# Patient Record
Sex: MALE | Race: BLACK OR AFRICAN AMERICAN | NOT HISPANIC OR LATINO | URBAN - METROPOLITAN AREA
[De-identification: names, ages, dates, MRNs, and addresses within clinical notes are randomized per-mention and may not be internally consistent; named-entity substitution may affect disease eponyms.]

---

## 2017-01-03 ENCOUNTER — INPATIENT (INPATIENT)
Facility: HOSPITAL | Age: 27
LOS: 5 days | Discharge: HOME | DRG: 546 | End: 2017-01-09
Attending: HOSPITALIST | Admitting: HOSPITALIST
Payer: MEDICAID

## 2017-01-03 VITALS
WEIGHT: 214.95 LBS | HEIGHT: 70 IN | OXYGEN SATURATION: 100 % | HEART RATE: 113 BPM | RESPIRATION RATE: 20 BRPM | SYSTOLIC BLOOD PRESSURE: 143 MMHG | TEMPERATURE: 98 F | DIASTOLIC BLOOD PRESSURE: 105 MMHG

## 2017-01-03 DIAGNOSIS — M32.13 LUNG INVOLVEMENT IN SYSTEMIC LUPUS ERYTHEMATOSUS: ICD-10-CM

## 2017-01-03 DIAGNOSIS — Z95.828 PRESENCE OF OTHER VASCULAR IMPLANTS AND GRAFTS: Chronic | ICD-10-CM

## 2017-01-03 DIAGNOSIS — Z41.8 ENCOUNTER FOR OTHER PROCEDURES FOR PURPOSES OTHER THAN REMEDYING HEALTH STATE: ICD-10-CM

## 2017-01-03 DIAGNOSIS — R07.89 OTHER CHEST PAIN: ICD-10-CM

## 2017-01-03 DIAGNOSIS — M32.9 SYSTEMIC LUPUS ERYTHEMATOSUS, UNSPECIFIED: ICD-10-CM

## 2017-01-03 DIAGNOSIS — I82.501 CHRONIC EMBOLISM AND THROMBOSIS OF UNSPECIFIED DEEP VEINS OF RIGHT LOWER EXTREMITY: ICD-10-CM

## 2017-01-03 DIAGNOSIS — B34.9 VIRAL INFECTION, UNSPECIFIED: ICD-10-CM

## 2017-01-03 DIAGNOSIS — Z98.890 OTHER SPECIFIED POSTPROCEDURAL STATES: Chronic | ICD-10-CM

## 2017-01-03 LAB
ALBUMIN SERPL ELPH-MCNC: 2 G/DL — LOW (ref 3.3–5)
ALP SERPL-CCNC: 65 U/L — SIGNIFICANT CHANGE UP (ref 40–120)
ALT FLD-CCNC: 13 U/L RC — SIGNIFICANT CHANGE UP (ref 10–45)
ANION GAP SERPL CALC-SCNC: 13 MMOL/L — SIGNIFICANT CHANGE UP (ref 5–17)
AST SERPL-CCNC: 14 U/L — SIGNIFICANT CHANGE UP (ref 10–40)
BASOPHILS # BLD AUTO: 0 K/UL — SIGNIFICANT CHANGE UP (ref 0–0.2)
BASOPHILS NFR BLD AUTO: 0.5 % — SIGNIFICANT CHANGE UP (ref 0–2)
BILIRUB SERPL-MCNC: 0.1 MG/DL — LOW (ref 0.2–1.2)
BUN SERPL-MCNC: 5 MG/DL — LOW (ref 7–23)
CALCIUM SERPL-MCNC: 8.4 MG/DL — SIGNIFICANT CHANGE UP (ref 8.4–10.5)
CHLORIDE SERPL-SCNC: 104 MMOL/L — SIGNIFICANT CHANGE UP (ref 96–108)
CK MB CFR SERPL CALC: 1 NG/ML — SIGNIFICANT CHANGE UP (ref 0–6.7)
CK SERPL-CCNC: 46 U/L — SIGNIFICANT CHANGE UP (ref 30–200)
CO2 SERPL-SCNC: 23 MMOL/L — SIGNIFICANT CHANGE UP (ref 22–31)
CREAT SERPL-MCNC: 0.67 MG/DL — SIGNIFICANT CHANGE UP (ref 0.5–1.3)
EOSINOPHIL # BLD AUTO: 0.1 K/UL — SIGNIFICANT CHANGE UP (ref 0–0.5)
EOSINOPHIL NFR BLD AUTO: 2.2 % — SIGNIFICANT CHANGE UP (ref 0–6)
GAS PNL BLDV: SIGNIFICANT CHANGE UP
GLUCOSE SERPL-MCNC: 84 MG/DL — SIGNIFICANT CHANGE UP (ref 70–99)
HCT VFR BLD CALC: 33.3 % — LOW (ref 39–50)
HGB BLD-MCNC: 11.3 G/DL — LOW (ref 13–17)
INR BLD: 0.9 RATIO — SIGNIFICANT CHANGE UP (ref 0.88–1.16)
LYMPHOCYTES # BLD AUTO: 0.9 K/UL — LOW (ref 1–3.3)
LYMPHOCYTES # BLD AUTO: 12.8 % — LOW (ref 13–44)
MCHC RBC-ENTMCNC: 28.8 PG — SIGNIFICANT CHANGE UP (ref 27–34)
MCHC RBC-ENTMCNC: 33.9 GM/DL — SIGNIFICANT CHANGE UP (ref 32–36)
MCV RBC AUTO: 85 FL — SIGNIFICANT CHANGE UP (ref 80–100)
MONOCYTES # BLD AUTO: 0.7 K/UL — SIGNIFICANT CHANGE UP (ref 0–0.9)
MONOCYTES NFR BLD AUTO: 10.9 % — SIGNIFICANT CHANGE UP (ref 2–14)
NEUTROPHILS # BLD AUTO: 4.9 K/UL — SIGNIFICANT CHANGE UP (ref 1.8–7.4)
NEUTROPHILS NFR BLD AUTO: 73.6 % — SIGNIFICANT CHANGE UP (ref 43–77)
PLATELET # BLD AUTO: 200 K/UL — SIGNIFICANT CHANGE UP (ref 150–400)
POTASSIUM SERPL-MCNC: 4.2 MMOL/L — SIGNIFICANT CHANGE UP (ref 3.5–5.3)
POTASSIUM SERPL-SCNC: 4.2 MMOL/L — SIGNIFICANT CHANGE UP (ref 3.5–5.3)
PROT SERPL-MCNC: 5.3 G/DL — LOW (ref 6–8.3)
PROTHROM AB SERPL-ACNC: 9.7 SEC — LOW (ref 10–13.1)
RAPID RVP RESULT: SIGNIFICANT CHANGE UP
RBC # BLD: 3.91 M/UL — LOW (ref 4.2–5.8)
RBC # FLD: 15.5 % — HIGH (ref 10.3–14.5)
SODIUM SERPL-SCNC: 140 MMOL/L — SIGNIFICANT CHANGE UP (ref 135–145)
TROPONIN T SERPL-MCNC: <0.01 NG/ML — SIGNIFICANT CHANGE UP (ref 0–0.06)
TROPONIN T SERPL-MCNC: <0.01 NG/ML — SIGNIFICANT CHANGE UP (ref 0–0.06)
WBC # BLD: 6.7 K/UL — SIGNIFICANT CHANGE UP (ref 3.8–10.5)
WBC # FLD AUTO: 6.7 K/UL — SIGNIFICANT CHANGE UP (ref 3.8–10.5)

## 2017-01-03 PROCEDURE — 99285 EMERGENCY DEPT VISIT HI MDM: CPT | Mod: 25

## 2017-01-03 PROCEDURE — 93010 ELECTROCARDIOGRAM REPORT: CPT

## 2017-01-03 PROCEDURE — 71020: CPT | Mod: 26

## 2017-01-03 PROCEDURE — 99223 1ST HOSP IP/OBS HIGH 75: CPT | Mod: GC

## 2017-01-03 RX ORDER — OXYCODONE HYDROCHLORIDE 5 MG/1
20 TABLET ORAL EVERY 12 HOURS
Qty: 0 | Refills: 0 | Status: DISCONTINUED | OUTPATIENT
Start: 2017-01-03 | End: 2017-01-04

## 2017-01-03 RX ORDER — ASPIRIN/CALCIUM CARB/MAGNESIUM 324 MG
81 TABLET ORAL DAILY
Qty: 0 | Refills: 0 | Status: DISCONTINUED | OUTPATIENT
Start: 2017-01-03 | End: 2017-01-09

## 2017-01-03 RX ORDER — GABAPENTIN 400 MG/1
400 CAPSULE ORAL THREE TIMES A DAY
Qty: 0 | Refills: 0 | Status: DISCONTINUED | OUTPATIENT
Start: 2017-01-03 | End: 2017-01-06

## 2017-01-03 RX ORDER — SODIUM CHLORIDE 9 MG/ML
1000 INJECTION INTRAMUSCULAR; INTRAVENOUS; SUBCUTANEOUS ONCE
Qty: 0 | Refills: 0 | Status: COMPLETED | OUTPATIENT
Start: 2017-01-03 | End: 2017-01-03

## 2017-01-03 RX ORDER — ENOXAPARIN SODIUM 100 MG/ML
100 INJECTION SUBCUTANEOUS
Qty: 0 | Refills: 0 | Status: COMPLETED | OUTPATIENT
Start: 2017-01-03 | End: 2017-01-05

## 2017-01-03 RX ORDER — HYDROMORPHONE HYDROCHLORIDE 2 MG/ML
1 INJECTION INTRAMUSCULAR; INTRAVENOUS; SUBCUTANEOUS ONCE
Qty: 0 | Refills: 0 | Status: DISCONTINUED | OUTPATIENT
Start: 2017-01-03 | End: 2017-01-03

## 2017-01-03 RX ORDER — ENOXAPARIN SODIUM 100 MG/ML
100 INJECTION SUBCUTANEOUS
Qty: 0 | Refills: 0 | Status: DISCONTINUED | OUTPATIENT
Start: 2017-01-03 | End: 2017-01-03

## 2017-01-03 RX ORDER — HYDROMORPHONE HYDROCHLORIDE 2 MG/ML
4 INJECTION INTRAMUSCULAR; INTRAVENOUS; SUBCUTANEOUS EVERY 6 HOURS
Qty: 0 | Refills: 0 | Status: DISCONTINUED | OUTPATIENT
Start: 2017-01-03 | End: 2017-01-04

## 2017-01-03 RX ORDER — COLCHICINE 0.6 MG
0.6 TABLET ORAL DAILY
Qty: 0 | Refills: 0 | Status: DISCONTINUED | OUTPATIENT
Start: 2017-01-03 | End: 2017-01-07

## 2017-01-03 RX ORDER — HYDROMORPHONE HYDROCHLORIDE 2 MG/ML
2 INJECTION INTRAMUSCULAR; INTRAVENOUS; SUBCUTANEOUS ONCE
Qty: 0 | Refills: 0 | Status: DISCONTINUED | OUTPATIENT
Start: 2017-01-03 | End: 2017-01-03

## 2017-01-03 RX ORDER — HYDROXYCHLOROQUINE SULFATE 200 MG
200 TABLET ORAL
Qty: 0 | Refills: 0 | Status: DISCONTINUED | OUTPATIENT
Start: 2017-01-03 | End: 2017-01-09

## 2017-01-03 RX ORDER — HYDROMORPHONE HYDROCHLORIDE 2 MG/ML
2 INJECTION INTRAMUSCULAR; INTRAVENOUS; SUBCUTANEOUS EVERY 4 HOURS
Qty: 0 | Refills: 0 | Status: DISCONTINUED | OUTPATIENT
Start: 2017-01-03 | End: 2017-01-03

## 2017-01-03 RX ORDER — OXYCODONE HYDROCHLORIDE 5 MG/1
30 TABLET ORAL EVERY 12 HOURS
Qty: 0 | Refills: 0 | Status: DISCONTINUED | OUTPATIENT
Start: 2017-01-03 | End: 2017-01-03

## 2017-01-03 RX ORDER — HYDROMORPHONE HYDROCHLORIDE 2 MG/ML
4 INJECTION INTRAMUSCULAR; INTRAVENOUS; SUBCUTANEOUS EVERY 4 HOURS
Qty: 0 | Refills: 0 | Status: DISCONTINUED | OUTPATIENT
Start: 2017-01-03 | End: 2017-01-03

## 2017-01-03 RX ORDER — ONDANSETRON 8 MG/1
4 TABLET, FILM COATED ORAL ONCE
Qty: 0 | Refills: 0 | Status: COMPLETED | OUTPATIENT
Start: 2017-01-03 | End: 2017-01-03

## 2017-01-03 RX ORDER — INFLUENZA VIRUS VACCINE 15; 15; 15; 15 UG/.5ML; UG/.5ML; UG/.5ML; UG/.5ML
0.5 SUSPENSION INTRAMUSCULAR ONCE
Qty: 0 | Refills: 0 | Status: COMPLETED | OUTPATIENT
Start: 2017-01-03 | End: 2017-01-03

## 2017-01-03 RX ORDER — HYDROMORPHONE HYDROCHLORIDE 2 MG/ML
2 INJECTION INTRAMUSCULAR; INTRAVENOUS; SUBCUTANEOUS
Qty: 0 | Refills: 0 | Status: DISCONTINUED | OUTPATIENT
Start: 2017-01-03 | End: 2017-01-03

## 2017-01-03 RX ORDER — FAMOTIDINE 10 MG/ML
20 INJECTION INTRAVENOUS DAILY
Qty: 0 | Refills: 0 | Status: DISCONTINUED | OUTPATIENT
Start: 2017-01-03 | End: 2017-01-06

## 2017-01-03 RX ORDER — OXYCODONE HYDROCHLORIDE 5 MG/1
15 TABLET ORAL EVERY 6 HOURS
Qty: 0 | Refills: 0 | Status: DISCONTINUED | OUTPATIENT
Start: 2017-01-03 | End: 2017-01-04

## 2017-01-03 RX ORDER — DABIGATRAN ETEXILATE MESYLATE 150 MG/1
150 CAPSULE ORAL EVERY 12 HOURS
Qty: 0 | Refills: 0 | Status: DISCONTINUED | OUTPATIENT
Start: 2017-01-03 | End: 2017-01-09

## 2017-01-03 RX ADMIN — HYDROMORPHONE HYDROCHLORIDE 2 MILLIGRAM(S): 2 INJECTION INTRAMUSCULAR; INTRAVENOUS; SUBCUTANEOUS at 20:15

## 2017-01-03 RX ADMIN — Medication 1 TABLET(S): at 19:53

## 2017-01-03 RX ADMIN — Medication 0.6 MILLIGRAM(S): at 17:38

## 2017-01-03 RX ADMIN — Medication 81 MILLIGRAM(S): at 17:40

## 2017-01-03 RX ADMIN — FAMOTIDINE 20 MILLIGRAM(S): 10 INJECTION INTRAVENOUS at 17:38

## 2017-01-03 RX ADMIN — HYDROMORPHONE HYDROCHLORIDE 1 MILLIGRAM(S): 2 INJECTION INTRAMUSCULAR; INTRAVENOUS; SUBCUTANEOUS at 11:52

## 2017-01-03 RX ADMIN — ONDANSETRON 4 MILLIGRAM(S): 8 TABLET, FILM COATED ORAL at 11:14

## 2017-01-03 RX ADMIN — HYDROMORPHONE HYDROCHLORIDE 2 MILLIGRAM(S): 2 INJECTION INTRAMUSCULAR; INTRAVENOUS; SUBCUTANEOUS at 19:52

## 2017-01-03 RX ADMIN — Medication 32 MILLIGRAM(S): at 19:52

## 2017-01-03 RX ADMIN — HYDROMORPHONE HYDROCHLORIDE 1 MILLIGRAM(S): 2 INJECTION INTRAMUSCULAR; INTRAVENOUS; SUBCUTANEOUS at 14:52

## 2017-01-03 RX ADMIN — SODIUM CHLORIDE 500 MILLILITER(S): 9 INJECTION INTRAMUSCULAR; INTRAVENOUS; SUBCUTANEOUS at 11:13

## 2017-01-03 RX ADMIN — HYDROMORPHONE HYDROCHLORIDE 1 MILLIGRAM(S): 2 INJECTION INTRAMUSCULAR; INTRAVENOUS; SUBCUTANEOUS at 12:22

## 2017-01-03 RX ADMIN — HYDROMORPHONE HYDROCHLORIDE 1 MILLIGRAM(S): 2 INJECTION INTRAMUSCULAR; INTRAVENOUS; SUBCUTANEOUS at 15:22

## 2017-01-03 RX ADMIN — HYDROMORPHONE HYDROCHLORIDE 1 MILLIGRAM(S): 2 INJECTION INTRAMUSCULAR; INTRAVENOUS; SUBCUTANEOUS at 11:14

## 2017-01-03 RX ADMIN — Medication 200 MILLIGRAM(S): at 19:52

## 2017-01-03 RX ADMIN — ENOXAPARIN SODIUM 100 MILLIGRAM(S): 100 INJECTION SUBCUTANEOUS at 14:53

## 2017-01-03 RX ADMIN — HYDROMORPHONE HYDROCHLORIDE 2 MILLIGRAM(S): 2 INJECTION INTRAMUSCULAR; INTRAVENOUS; SUBCUTANEOUS at 18:00

## 2017-01-03 RX ADMIN — DABIGATRAN ETEXILATE MESYLATE 150 MILLIGRAM(S): 150 CAPSULE ORAL at 19:52

## 2017-01-03 RX ADMIN — OXYCODONE HYDROCHLORIDE 15 MILLIGRAM(S): 5 TABLET ORAL at 22:06

## 2017-01-03 RX ADMIN — HYDROMORPHONE HYDROCHLORIDE 1 MILLIGRAM(S): 2 INJECTION INTRAMUSCULAR; INTRAVENOUS; SUBCUTANEOUS at 11:44

## 2017-01-03 RX ADMIN — OXYCODONE HYDROCHLORIDE 15 MILLIGRAM(S): 5 TABLET ORAL at 22:36

## 2017-01-03 RX ADMIN — HYDROMORPHONE HYDROCHLORIDE 2 MILLIGRAM(S): 2 INJECTION INTRAMUSCULAR; INTRAVENOUS; SUBCUTANEOUS at 17:37

## 2017-01-03 NOTE — H&P ADULT. - NEGATIVE OPHTHALMOLOGIC SYMPTOMS
no blurred vision L/no lacrimation L/no lacrimation R/no diplopia/no photophobia/no blurred vision R

## 2017-01-03 NOTE — H&P ADULT. - ASSESSMENT
27 y/o M with PMH SLE c/b recurrent pleural effusions requiring chest tube (last 3/2016), stage V lupus nephritis, DVT/PE s/p IVC filter (placed 1 yr ago at [place]) due to failed a/c therapy (failed xarelto, coumadin, lovenox, altrexa being bridged as outpatient to Pradaxa)  and high clot burden, bilateral renal vein thrombosis presenting to ED with one day of midsternal, pressure-like chest pain and nausea, vomiting, and diffuse muscle and body aches concerning for ACS/ Lupus flare/thrombosis/withdrawal/viral syndrome.

## 2017-01-03 NOTE — H&P ADULT. - NEUROLOGICAL DETAILS
responds to verbal commands/alert and oriented x 3/sensation intact/responds to pain/cranial nerves intact

## 2017-01-03 NOTE — H&P ADULT. - ATTENDING COMMENTS
Agree with above, edited where appropriate.   26M w/SLE seronegative APLS with repeated clots, failed multiple a/c agents, s/p IVC filter Lupus Nephritis Class V on cyclophosphamide, steroids, chronic pain syndrome followed by pain mgmt presents after recent admission with chest pain that is typical of prior pain episodes. Had recent TTE this month to rule out pericarditis/pericardial effusion. No Becks triad or other features of tamponade. Pain is reproducible upon palpation of chest wall. Labs are not significantly different from prior admission. Pt c/o N/V.   1) Atypical chest pain - low suspicion for pericarditis but will check TTE given history. Tele monitoring, trend enzymes.   2) SLE, LN Class V - Renal eval, patient does not have nephrologist - pt may benefit from ACE given high level of proteinuria.   3) Diarrhea - given immunosuppressed, recent hospitalization, check C diff if persistent  4) DVT - Patient is in the midst of Lovenox bridge to Pradaxa, will continue as per prior discharge.   5) Chronic pain syndrome - Pain Mgmt eval appreciated, check UTox, will try to wean IV pain meds as tolerated by pt to home regimen. ISTOP reviewed.

## 2017-01-03 NOTE — ED PROVIDER NOTE - OBJECTIVE STATEMENT
27 y/o male with pmhx of DVT, hld, PE, and systemic lupus erythematosus was recently discharged from [facility] on december 29th for blood clot in left kidney. Pt for the last 24 hours has been experiencing multiple episodes of vomiting and diarrhea, and chest pain. Denies having taken the flu shot this year. Takes Plaquenil, and takes 64 mg of methylprednisolone daily. Denies sore throat, fever, chills or any other complaints.  PMD/rheumatologist: Dr. Lowery [phone]

## 2017-01-03 NOTE — H&P ADULT. - HISTORY OF PRESENT ILLNESS
27 y/o M with PMH SLE c/b arthritis, serositis, recurrent pleural effusions requiring chest tube (last 3/2016), stage V lupus nephritis, DVT/PE s/p IVC filter (placed 1 yr ago at [place]) due to failed a/c therapy (failed xarelto, coumadin, lovenox, altrexa)  and high clot burden, bilateral renal vein thrombosis presenting to ED with one day of midsternal, pressure-like chest pain that is non-radiating and constant. Patient reports that starting on the evening prior to coming to the hospital the pain started while he was at rest and that he is not able to lay flat due to worsening of the pressure. Patient reports no relief in pressure with any medications, including his pain medications. Patient also reports having fever (Tmax 102.2), nausea, 5 episodes of nbnb vomiting, several episodes of brown, watery diarrhea, diffuse muscle aches, and sourness and inflammation of his elbows, shoulders, and knees with some stiffness. Patient reports being consistent with his prescribed medication regimen, no sick contacts. Of note, patient discharged previously from [facility] where he was found to have L renal vein thrombosis. Patient was discharged on a Lovenox bridge to pradaxa for which he would have completed tomorrow.     In ED: Vitals: T: 98.4 HR: 113 BP: 143/105 RR: 20 O2: 100% on RA   Patient complaining of 10/10 chest pain given 2 x 1mg Dilaudid IVP, 1L NS bolus, CXR negative 25 y/o M with PMH SLE c/b arthritis, serositis, recurrent pleural effusions requiring chest tube (last 3/2016), stage V lupus nephritis, DVT/PE s/p IVC filter (placed 1 yr ago at [place]) due to failed a/c therapy (failed xarelto, coumadin, lovenox, arixta)  and high clot burden, bilateral renal vein thrombosis presenting to ED with one day of midsternal, pressure-like chest pain that is non-radiating and constant. Patient reports that starting on the evening prior to coming to the hospital the pain started while he was at rest and that he is not able to lay flat due to worsening of the pressure. Patient reports no relief in pressure with any medications, including his pain medications. Patient also reports having fever (Tmax 102.2), nausea, 5 episodes of nbnb vomiting, several episodes of brown, watery diarrhea, diffuse muscle aches, and sourness and inflammation of his elbows, shoulders, and knees with some stiffness. Patient reports being consistent with his prescribed medication regimen, no sick contacts. Of note, patient discharged previously from [facility] where he was found to have L renal vein thrombosis. Patient was discharged on a Lovenox bridge to pradaxa for which he would have completed tomorrow.     In ED: Vitals: T: 98.4 HR: 113 BP: 143/105 RR: 20 O2: 100% on RA   Patient complaining of 10/10 chest pain given 2 x 1mg Dilaudid IVP, 1L NS bolus, CXR negative

## 2017-01-03 NOTE — ED PROVIDER NOTE - CONSTITUTIONAL, MLM
normal... Awake, alert, oriented to person, place, time/situation and holding chest in apparent distress.

## 2017-01-03 NOTE — ED PROVIDER NOTE - MEDICAL DECISION MAKING DETAILS
pt with a complex hx with SLE, recently d/c 5 days ago. Returns with uncontrolled CP, nausea, vomiting, diarrhea, and body aches. Denies known fever, and is taking his usual medications as directed. Recently diagnosed with renal vein thrombosis. Has IVC filter. Here with mild tachycardia, afebrile, other wise stable in obvious pain. Will be worked up for infectious cause of symptoms. Given analgesics, IV fluids, and re-admitetd to hospitalist.

## 2017-01-03 NOTE — H&P ADULT. - RS GEN PE MLT RESP DETAILS PC
good air movement/no rhonchi/clear to auscultation bilaterally/breath sounds equal/no wheezes/respirations non-labored/normal/no rales/chest wall tenderness/airway patent

## 2017-01-03 NOTE — H&P ADULT. - PROBLEM SELECTOR PLAN 2
- febrile at home, tachycardia with known baseline of tachycardia  - does not meet SIRS criteria, CXR negative  - check RVP  - questionable symptoms concerning for possible withdrawal-like symptoms?

## 2017-01-03 NOTE — ED ADULT NURSE NOTE - PMH
DVT (deep venous thrombosis)    Hyperlipidemia, unspecified hyperlipidemia type    Membranous glomerulonephritis, stage 5    Pulmonary embolism    Systemic lupus erythematosus

## 2017-01-03 NOTE — H&P ADULT. - FAMILY HISTORY
Mother  Still living? Unknown  Family history of systemic lupus erythematosus (SLE) in mother, Age at diagnosis: Age Unknown

## 2017-01-03 NOTE — ED PROVIDER NOTE - CARDIAC, MLM
Normal rate, regular rhythm.  Heart sounds S1, S2.  No murmurs, rubs or gallops. Chest wall tenderness to sternum.

## 2017-01-03 NOTE — H&P ADULT. - NEGATIVE ENMT SYMPTOMS
no nasal discharge/no ear pain/no sinus symptoms/no tinnitus/no vertigo/no hearing difficulty/no nasal congestion/no nasal obstruction

## 2017-01-03 NOTE — ED PROVIDER NOTE - PRINCIPAL DIAGNOSIS
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

## 2017-01-03 NOTE — H&P ADULT. - PROBLEM SELECTOR PLAN 1
- rule out ACS - serial troponins (neg x 1)  - unclear etiology, EKG w/ sinus tachycardia  - s/p IVC filter, no evidence of desaturation/PE at this time, questionable utility of d-dimer  - pain control w/ Dilaudid

## 2017-01-03 NOTE — ED PROVIDER NOTE - NS ED MD SCRIBE ATTENDING SCRIBE SECTIONS
PAST MEDICAL/SURGICAL/SOCIAL HISTORY/PHYSICAL EXAM/VITAL SIGNS( Pullset)/REVIEW OF SYSTEMS/RESULTS/HIV/HISTORY OF PRESENT ILLNESS/DISPOSITION

## 2017-01-03 NOTE — ED PROVIDER NOTE - CARE PLAN
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

## 2017-01-03 NOTE — H&P ADULT. - NEGATIVE NEUROLOGICAL SYMPTOMS
no generalized seizures/no focal seizures/no tremors/no paresthesias/no transient paralysis/no confusion/no weakness/no syncope

## 2017-01-03 NOTE — H&P ADULT. - PROBLEM SELECTOR PLAN 3
- continue with home regimen of steroids, Paquenil, Bactrim  - questionable flare?  - consider rhematology consult

## 2017-01-03 NOTE — ED ADULT NURSE NOTE - OBJECTIVE STATEMENT
27 y/o male with pmhx of DVT, hld, PE, and systemic lupus erythematosus was recently discharged from [facility] on december 29th for blood clot in left kidney, present to ED with the last 24 hours has been experiencing multiple episodes of vomiting and diarrhea, and chest pain. Denies sore throat, fever, chills or any other complaints. EKG done and given to MD, placed CM sinus tachy

## 2017-01-04 LAB
ANION GAP SERPL CALC-SCNC: 13 MMOL/L — SIGNIFICANT CHANGE UP (ref 5–17)
APPEARANCE UR: CLEAR — SIGNIFICANT CHANGE UP
BILIRUB UR-MCNC: NEGATIVE — SIGNIFICANT CHANGE UP
BUN SERPL-MCNC: 10 MG/DL — SIGNIFICANT CHANGE UP (ref 7–23)
C3 SERPL-MCNC: 117 MG/DL — SIGNIFICANT CHANGE UP (ref 80–180)
C4 SERPL-MCNC: 28 MG/DL — SIGNIFICANT CHANGE UP (ref 10–45)
CALCIUM SERPL-MCNC: 9.1 MG/DL — SIGNIFICANT CHANGE UP (ref 8.4–10.5)
CHLORIDE SERPL-SCNC: 99 MMOL/L — SIGNIFICANT CHANGE UP (ref 96–108)
CO2 SERPL-SCNC: 24 MMOL/L — SIGNIFICANT CHANGE UP (ref 22–31)
COLOR SPEC: YELLOW — SIGNIFICANT CHANGE UP
CREAT ?TM UR-MCNC: 60 MG/DL — SIGNIFICANT CHANGE UP
CREAT SERPL-MCNC: 0.74 MG/DL — SIGNIFICANT CHANGE UP (ref 0.5–1.3)
DIFF PNL FLD: ABNORMAL
DSDNA AB SER-ACNC: 429 IU/ML — HIGH
GLUCOSE SERPL-MCNC: 97 MG/DL — SIGNIFICANT CHANGE UP (ref 70–99)
GLUCOSE UR QL: NEGATIVE MG/DL — SIGNIFICANT CHANGE UP
HCT VFR BLD CALC: 34.1 % — LOW (ref 39–50)
HGB BLD-MCNC: 11.2 G/DL — LOW (ref 13–17)
KETONES UR-MCNC: NEGATIVE — SIGNIFICANT CHANGE UP
LEUKOCYTE ESTERASE UR-ACNC: NEGATIVE — SIGNIFICANT CHANGE UP
MAGNESIUM SERPL-MCNC: 2.1 MG/DL — SIGNIFICANT CHANGE UP (ref 1.6–2.6)
MCHC RBC-ENTMCNC: 27.6 PG — SIGNIFICANT CHANGE UP (ref 27–34)
MCHC RBC-ENTMCNC: 32.9 GM/DL — SIGNIFICANT CHANGE UP (ref 32–36)
MCV RBC AUTO: 83.8 FL — SIGNIFICANT CHANGE UP (ref 80–100)
NITRITE UR-MCNC: NEGATIVE — SIGNIFICANT CHANGE UP
PH UR: 7.5 — SIGNIFICANT CHANGE UP (ref 5–8)
PHOSPHATE SERPL-MCNC: 5.1 MG/DL — HIGH (ref 2.5–4.5)
PLATELET # BLD AUTO: 234 K/UL — SIGNIFICANT CHANGE UP (ref 150–400)
POTASSIUM SERPL-MCNC: 4.9 MMOL/L — SIGNIFICANT CHANGE UP (ref 3.5–5.3)
POTASSIUM SERPL-SCNC: 4.9 MMOL/L — SIGNIFICANT CHANGE UP (ref 3.5–5.3)
PROT ?TM UR-MCNC: 378 MG/DL — HIGH (ref 0–12)
PROT UR-MCNC: 300 MG/DL
PROT/CREAT UR-RTO: 6.3 RATIO — HIGH (ref 0–0.2)
RBC # BLD: 4.07 M/UL — LOW (ref 4.2–5.8)
RBC # FLD: 14.7 % — HIGH (ref 10.3–14.5)
SODIUM SERPL-SCNC: 136 MMOL/L — SIGNIFICANT CHANGE UP (ref 135–145)
SP GR SPEC: 1.01 — SIGNIFICANT CHANGE UP (ref 1.01–1.02)
UROBILINOGEN FLD QL: NEGATIVE MG/DL — SIGNIFICANT CHANGE UP
WBC # BLD: 5.7 K/UL — SIGNIFICANT CHANGE UP (ref 3.8–10.5)
WBC # FLD AUTO: 5.7 K/UL — SIGNIFICANT CHANGE UP (ref 3.8–10.5)

## 2017-01-04 PROCEDURE — 99223 1ST HOSP IP/OBS HIGH 75: CPT | Mod: GC

## 2017-01-04 PROCEDURE — 78582 LUNG VENTILAT&PERFUS IMAGING: CPT | Mod: 26

## 2017-01-04 PROCEDURE — 99233 SBSQ HOSP IP/OBS HIGH 50: CPT | Mod: GC

## 2017-01-04 RX ORDER — HYDROMORPHONE HYDROCHLORIDE 2 MG/ML
4 INJECTION INTRAMUSCULAR; INTRAVENOUS; SUBCUTANEOUS EVERY 4 HOURS
Qty: 0 | Refills: 0 | Status: DISCONTINUED | OUTPATIENT
Start: 2017-01-04 | End: 2017-01-09

## 2017-01-04 RX ORDER — HYDROMORPHONE HYDROCHLORIDE 2 MG/ML
4 INJECTION INTRAMUSCULAR; INTRAVENOUS; SUBCUTANEOUS EVERY 4 HOURS
Qty: 0 | Refills: 0 | Status: DISCONTINUED | OUTPATIENT
Start: 2017-01-04 | End: 2017-01-04

## 2017-01-04 RX ORDER — LOSARTAN POTASSIUM 100 MG/1
50 TABLET, FILM COATED ORAL DAILY
Qty: 0 | Refills: 0 | Status: DISCONTINUED | OUTPATIENT
Start: 2017-01-04 | End: 2017-01-04

## 2017-01-04 RX ORDER — HYDROMORPHONE HYDROCHLORIDE 2 MG/ML
2 INJECTION INTRAMUSCULAR; INTRAVENOUS; SUBCUTANEOUS
Qty: 0 | Refills: 0 | Status: DISCONTINUED | OUTPATIENT
Start: 2017-01-04 | End: 2017-01-09

## 2017-01-04 RX ADMIN — HYDROMORPHONE HYDROCHLORIDE 4 MILLIGRAM(S): 2 INJECTION INTRAMUSCULAR; INTRAVENOUS; SUBCUTANEOUS at 02:04

## 2017-01-04 RX ADMIN — HYDROMORPHONE HYDROCHLORIDE 2 MILLIGRAM(S): 2 INJECTION INTRAMUSCULAR; INTRAVENOUS; SUBCUTANEOUS at 21:35

## 2017-01-04 RX ADMIN — HYDROMORPHONE HYDROCHLORIDE 4 MILLIGRAM(S): 2 INJECTION INTRAMUSCULAR; INTRAVENOUS; SUBCUTANEOUS at 14:10

## 2017-01-04 RX ADMIN — FAMOTIDINE 20 MILLIGRAM(S): 10 INJECTION INTRAVENOUS at 06:13

## 2017-01-04 RX ADMIN — HYDROMORPHONE HYDROCHLORIDE 2 MILLIGRAM(S): 2 INJECTION INTRAMUSCULAR; INTRAVENOUS; SUBCUTANEOUS at 22:05

## 2017-01-04 RX ADMIN — OXYCODONE HYDROCHLORIDE 20 MILLIGRAM(S): 5 TABLET ORAL at 06:43

## 2017-01-04 RX ADMIN — HYDROMORPHONE HYDROCHLORIDE 4 MILLIGRAM(S): 2 INJECTION INTRAMUSCULAR; INTRAVENOUS; SUBCUTANEOUS at 13:52

## 2017-01-04 RX ADMIN — ENOXAPARIN SODIUM 100 MILLIGRAM(S): 100 INJECTION SUBCUTANEOUS at 06:13

## 2017-01-04 RX ADMIN — HYDROMORPHONE HYDROCHLORIDE 2 MILLIGRAM(S): 2 INJECTION INTRAMUSCULAR; INTRAVENOUS; SUBCUTANEOUS at 11:55

## 2017-01-04 RX ADMIN — GABAPENTIN 400 MILLIGRAM(S): 400 CAPSULE ORAL at 13:53

## 2017-01-04 RX ADMIN — HYDROMORPHONE HYDROCHLORIDE 2 MILLIGRAM(S): 2 INJECTION INTRAMUSCULAR; INTRAVENOUS; SUBCUTANEOUS at 16:30

## 2017-01-04 RX ADMIN — HYDROMORPHONE HYDROCHLORIDE 4 MILLIGRAM(S): 2 INJECTION INTRAMUSCULAR; INTRAVENOUS; SUBCUTANEOUS at 08:07

## 2017-01-04 RX ADMIN — Medication 200 MILLIGRAM(S): at 18:28

## 2017-01-04 RX ADMIN — DABIGATRAN ETEXILATE MESYLATE 150 MILLIGRAM(S): 150 CAPSULE ORAL at 18:27

## 2017-01-04 RX ADMIN — Medication 32 MILLIGRAM(S): at 06:13

## 2017-01-04 RX ADMIN — DABIGATRAN ETEXILATE MESYLATE 150 MILLIGRAM(S): 150 CAPSULE ORAL at 06:13

## 2017-01-04 RX ADMIN — ENOXAPARIN SODIUM 100 MILLIGRAM(S): 100 INJECTION SUBCUTANEOUS at 18:28

## 2017-01-04 RX ADMIN — HYDROMORPHONE HYDROCHLORIDE 4 MILLIGRAM(S): 2 INJECTION INTRAMUSCULAR; INTRAVENOUS; SUBCUTANEOUS at 02:34

## 2017-01-04 RX ADMIN — Medication 81 MILLIGRAM(S): at 13:53

## 2017-01-04 RX ADMIN — HYDROMORPHONE HYDROCHLORIDE 4 MILLIGRAM(S): 2 INJECTION INTRAMUSCULAR; INTRAVENOUS; SUBCUTANEOUS at 08:24

## 2017-01-04 RX ADMIN — HYDROMORPHONE HYDROCHLORIDE 2 MILLIGRAM(S): 2 INJECTION INTRAMUSCULAR; INTRAVENOUS; SUBCUTANEOUS at 11:39

## 2017-01-04 RX ADMIN — Medication 32 MILLIGRAM(S): at 18:28

## 2017-01-04 RX ADMIN — HYDROMORPHONE HYDROCHLORIDE 4 MILLIGRAM(S): 2 INJECTION INTRAMUSCULAR; INTRAVENOUS; SUBCUTANEOUS at 18:37

## 2017-01-04 RX ADMIN — HYDROMORPHONE HYDROCHLORIDE 4 MILLIGRAM(S): 2 INJECTION INTRAMUSCULAR; INTRAVENOUS; SUBCUTANEOUS at 18:56

## 2017-01-04 RX ADMIN — OXYCODONE HYDROCHLORIDE 20 MILLIGRAM(S): 5 TABLET ORAL at 06:13

## 2017-01-04 RX ADMIN — Medication 0.6 MILLIGRAM(S): at 13:53

## 2017-01-04 RX ADMIN — HYDROMORPHONE HYDROCHLORIDE 2 MILLIGRAM(S): 2 INJECTION INTRAMUSCULAR; INTRAVENOUS; SUBCUTANEOUS at 16:15

## 2017-01-04 RX ADMIN — Medication 200 MILLIGRAM(S): at 08:09

## 2017-01-05 LAB
ANION GAP SERPL CALC-SCNC: 10 MMOL/L — SIGNIFICANT CHANGE UP (ref 5–17)
BUN SERPL-MCNC: 13 MG/DL — SIGNIFICANT CHANGE UP (ref 7–23)
CALCIUM SERPL-MCNC: 8.7 MG/DL — SIGNIFICANT CHANGE UP (ref 8.4–10.5)
CHLORIDE SERPL-SCNC: 101 MMOL/L — SIGNIFICANT CHANGE UP (ref 96–108)
CO2 SERPL-SCNC: 26 MMOL/L — SIGNIFICANT CHANGE UP (ref 22–31)
CREAT SERPL-MCNC: 0.62 MG/DL — SIGNIFICANT CHANGE UP (ref 0.5–1.3)
CULTURE RESULTS: NO GROWTH — SIGNIFICANT CHANGE UP
GLUCOSE SERPL-MCNC: 94 MG/DL — SIGNIFICANT CHANGE UP (ref 70–99)
HAV IGM SER-ACNC: SIGNIFICANT CHANGE UP
HBV CORE AB SER-ACNC: SIGNIFICANT CHANGE UP
HBV CORE IGM SER-ACNC: SIGNIFICANT CHANGE UP
HBV SURFACE AB SER-ACNC: REACTIVE
HBV SURFACE AG SER-ACNC: SIGNIFICANT CHANGE UP
HCT VFR BLD CALC: 30.8 % — LOW (ref 39–50)
HCV AB S/CO SERPL IA: 0.09 S/CO — SIGNIFICANT CHANGE UP
HCV AB SERPL-IMP: SIGNIFICANT CHANGE UP
HGB BLD-MCNC: 11.1 G/DL — LOW (ref 13–17)
IGA FLD-MCNC: 420 MG/DL — HIGH (ref 68–378)
IGG FLD-MCNC: 783 MG/DL — SIGNIFICANT CHANGE UP (ref 694–1618)
IGM SERPL-MCNC: 38 MG/DL — LOW (ref 40–230)
KAPPA LC SER QL IFE: 5.63 MG/DL — HIGH (ref 0.33–1.94)
KAPPA/LAMBDA FREE LIGHT CHAIN RATIO, SERUM: 1.73 RATIO — HIGH (ref 0.26–1.65)
LAMBDA LC SER QL IFE: 3.26 MG/DL — HIGH (ref 0.57–2.63)
MAGNESIUM SERPL-MCNC: 1.8 MG/DL — SIGNIFICANT CHANGE UP (ref 1.6–2.6)
MCHC RBC-ENTMCNC: 30.2 PG — SIGNIFICANT CHANGE UP (ref 27–34)
MCHC RBC-ENTMCNC: 36.1 GM/DL — HIGH (ref 32–36)
MCV RBC AUTO: 83.5 FL — SIGNIFICANT CHANGE UP (ref 80–100)
PHOSPHATE SERPL-MCNC: 4.3 MG/DL — SIGNIFICANT CHANGE UP (ref 2.5–4.5)
PLATELET # BLD AUTO: 227 K/UL — SIGNIFICANT CHANGE UP (ref 150–400)
POTASSIUM SERPL-MCNC: 4.7 MMOL/L — SIGNIFICANT CHANGE UP (ref 3.5–5.3)
POTASSIUM SERPL-SCNC: 4.7 MMOL/L — SIGNIFICANT CHANGE UP (ref 3.5–5.3)
RBC # BLD: 3.69 M/UL — LOW (ref 4.2–5.8)
RBC # FLD: 14.6 % — HIGH (ref 10.3–14.5)
SODIUM SERPL-SCNC: 137 MMOL/L — SIGNIFICANT CHANGE UP (ref 135–145)
SPECIMEN SOURCE: SIGNIFICANT CHANGE UP
WBC # BLD: 4.9 K/UL — SIGNIFICANT CHANGE UP (ref 3.8–10.5)
WBC # FLD AUTO: 4.9 K/UL — SIGNIFICANT CHANGE UP (ref 3.8–10.5)

## 2017-01-05 PROCEDURE — 99233 SBSQ HOSP IP/OBS HIGH 50: CPT

## 2017-01-05 PROCEDURE — 99233 SBSQ HOSP IP/OBS HIGH 50: CPT | Mod: GC

## 2017-01-05 PROCEDURE — 99231 SBSQ HOSP IP/OBS SF/LOW 25: CPT | Mod: GC

## 2017-01-05 RX ADMIN — HYDROMORPHONE HYDROCHLORIDE 4 MILLIGRAM(S): 2 INJECTION INTRAMUSCULAR; INTRAVENOUS; SUBCUTANEOUS at 02:02

## 2017-01-05 RX ADMIN — HYDROMORPHONE HYDROCHLORIDE 2 MILLIGRAM(S): 2 INJECTION INTRAMUSCULAR; INTRAVENOUS; SUBCUTANEOUS at 19:45

## 2017-01-05 RX ADMIN — HYDROMORPHONE HYDROCHLORIDE 2 MILLIGRAM(S): 2 INJECTION INTRAMUSCULAR; INTRAVENOUS; SUBCUTANEOUS at 16:12

## 2017-01-05 RX ADMIN — HYDROMORPHONE HYDROCHLORIDE 4 MILLIGRAM(S): 2 INJECTION INTRAMUSCULAR; INTRAVENOUS; SUBCUTANEOUS at 18:31

## 2017-01-05 RX ADMIN — HYDROMORPHONE HYDROCHLORIDE 2 MILLIGRAM(S): 2 INJECTION INTRAMUSCULAR; INTRAVENOUS; SUBCUTANEOUS at 10:45

## 2017-01-05 RX ADMIN — Medication 81 MILLIGRAM(S): at 12:56

## 2017-01-05 RX ADMIN — HYDROMORPHONE HYDROCHLORIDE 4 MILLIGRAM(S): 2 INJECTION INTRAMUSCULAR; INTRAVENOUS; SUBCUTANEOUS at 22:55

## 2017-01-05 RX ADMIN — HYDROMORPHONE HYDROCHLORIDE 2 MILLIGRAM(S): 2 INJECTION INTRAMUSCULAR; INTRAVENOUS; SUBCUTANEOUS at 03:43

## 2017-01-05 RX ADMIN — HYDROMORPHONE HYDROCHLORIDE 4 MILLIGRAM(S): 2 INJECTION INTRAMUSCULAR; INTRAVENOUS; SUBCUTANEOUS at 05:48

## 2017-01-05 RX ADMIN — HYDROMORPHONE HYDROCHLORIDE 4 MILLIGRAM(S): 2 INJECTION INTRAMUSCULAR; INTRAVENOUS; SUBCUTANEOUS at 18:45

## 2017-01-05 RX ADMIN — Medication 0.6 MILLIGRAM(S): at 12:56

## 2017-01-05 RX ADMIN — DABIGATRAN ETEXILATE MESYLATE 150 MILLIGRAM(S): 150 CAPSULE ORAL at 05:48

## 2017-01-05 RX ADMIN — HYDROMORPHONE HYDROCHLORIDE 2 MILLIGRAM(S): 2 INJECTION INTRAMUSCULAR; INTRAVENOUS; SUBCUTANEOUS at 21:50

## 2017-01-05 RX ADMIN — HYDROMORPHONE HYDROCHLORIDE 4 MILLIGRAM(S): 2 INJECTION INTRAMUSCULAR; INTRAVENOUS; SUBCUTANEOUS at 08:40

## 2017-01-05 RX ADMIN — HYDROMORPHONE HYDROCHLORIDE 4 MILLIGRAM(S): 2 INJECTION INTRAMUSCULAR; INTRAVENOUS; SUBCUTANEOUS at 08:24

## 2017-01-05 RX ADMIN — Medication 1 TABLET(S): at 12:57

## 2017-01-05 RX ADMIN — HYDROMORPHONE HYDROCHLORIDE 2 MILLIGRAM(S): 2 INJECTION INTRAMUSCULAR; INTRAVENOUS; SUBCUTANEOUS at 04:13

## 2017-01-05 RX ADMIN — DABIGATRAN ETEXILATE MESYLATE 150 MILLIGRAM(S): 150 CAPSULE ORAL at 18:39

## 2017-01-05 RX ADMIN — HYDROMORPHONE HYDROCHLORIDE 4 MILLIGRAM(S): 2 INJECTION INTRAMUSCULAR; INTRAVENOUS; SUBCUTANEOUS at 06:18

## 2017-01-05 RX ADMIN — Medication 200 MILLIGRAM(S): at 18:23

## 2017-01-05 RX ADMIN — HYDROMORPHONE HYDROCHLORIDE 2 MILLIGRAM(S): 2 INJECTION INTRAMUSCULAR; INTRAVENOUS; SUBCUTANEOUS at 16:30

## 2017-01-05 RX ADMIN — HYDROMORPHONE HYDROCHLORIDE 4 MILLIGRAM(S): 2 INJECTION INTRAMUSCULAR; INTRAVENOUS; SUBCUTANEOUS at 01:32

## 2017-01-05 RX ADMIN — FAMOTIDINE 20 MILLIGRAM(S): 10 INJECTION INTRAVENOUS at 12:58

## 2017-01-05 RX ADMIN — HYDROMORPHONE HYDROCHLORIDE 2 MILLIGRAM(S): 2 INJECTION INTRAMUSCULAR; INTRAVENOUS; SUBCUTANEOUS at 20:00

## 2017-01-05 RX ADMIN — HYDROMORPHONE HYDROCHLORIDE 2 MILLIGRAM(S): 2 INJECTION INTRAMUSCULAR; INTRAVENOUS; SUBCUTANEOUS at 00:00

## 2017-01-05 RX ADMIN — HYDROMORPHONE HYDROCHLORIDE 4 MILLIGRAM(S): 2 INJECTION INTRAMUSCULAR; INTRAVENOUS; SUBCUTANEOUS at 13:05

## 2017-01-05 RX ADMIN — HYDROMORPHONE HYDROCHLORIDE 4 MILLIGRAM(S): 2 INJECTION INTRAMUSCULAR; INTRAVENOUS; SUBCUTANEOUS at 12:48

## 2017-01-05 RX ADMIN — Medication 200 MILLIGRAM(S): at 12:58

## 2017-01-05 RX ADMIN — ENOXAPARIN SODIUM 100 MILLIGRAM(S): 100 INJECTION SUBCUTANEOUS at 05:48

## 2017-01-05 RX ADMIN — HYDROMORPHONE HYDROCHLORIDE 2 MILLIGRAM(S): 2 INJECTION INTRAMUSCULAR; INTRAVENOUS; SUBCUTANEOUS at 10:29

## 2017-01-05 RX ADMIN — HYDROMORPHONE HYDROCHLORIDE 4 MILLIGRAM(S): 2 INJECTION INTRAMUSCULAR; INTRAVENOUS; SUBCUTANEOUS at 23:10

## 2017-01-05 RX ADMIN — Medication 32 MILLIGRAM(S): at 12:55

## 2017-01-06 LAB
4/8 RATIO: 1.01 RATIO — SIGNIFICANT CHANGE UP (ref 0.9–3.6)
ABS CD8: 435 /UL — SIGNIFICANT CHANGE UP (ref 136–757)
CD16+CD56+ CELLS NFR BLD: 12 % — SIGNIFICANT CHANGE UP (ref 4–25)
CD16+CD56+ CELLS NFR SPEC: 131 /UL — SIGNIFICANT CHANGE UP (ref 64–494)
CD19 BLASTS SPEC-ACNC: 102 /UL — SIGNIFICANT CHANGE UP (ref 68–528)
CD19 BLASTS SPEC-ACNC: 9 % — SIGNIFICANT CHANGE UP (ref 5–22)
CD3 BLASTS SPEC-ACNC: 78 % — SIGNIFICANT CHANGE UP (ref 59–85)
CD3 BLASTS SPEC-ACNC: 872 /UL — SIGNIFICANT CHANGE UP (ref 799–2171)
CD4 %: 40 % — SIGNIFICANT CHANGE UP (ref 36–65)
CD8 %: 39 % — HIGH (ref 11–36)
CREATININE, URINE RESULT: 27 MG/DL — SIGNIFICANT CHANGE UP
PROT ?TM UR-MCNC: 91 MG/DL — HIGH (ref 0–12)
T-CELL CD4 SUBSET PNL BLD: 441 /UL — LOW (ref 489–1457)

## 2017-01-06 PROCEDURE — 99231 SBSQ HOSP IP/OBS SF/LOW 25: CPT | Mod: GC

## 2017-01-06 PROCEDURE — 93975 VASCULAR STUDY: CPT | Mod: 26

## 2017-01-06 PROCEDURE — 99233 SBSQ HOSP IP/OBS HIGH 50: CPT

## 2017-01-06 PROCEDURE — 99233 SBSQ HOSP IP/OBS HIGH 50: CPT | Mod: GC

## 2017-01-06 RX ORDER — FAMOTIDINE 10 MG/ML
20 INJECTION INTRAVENOUS
Qty: 0 | Refills: 0 | Status: DISCONTINUED | OUTPATIENT
Start: 2017-01-06 | End: 2017-01-07

## 2017-01-06 RX ORDER — ACETAMINOPHEN 500 MG
650 TABLET ORAL ONCE
Qty: 0 | Refills: 0 | Status: COMPLETED | OUTPATIENT
Start: 2017-01-06 | End: 2017-01-06

## 2017-01-06 RX ORDER — DIPHENHYDRAMINE HCL 50 MG
25 CAPSULE ORAL ONCE
Qty: 0 | Refills: 0 | Status: COMPLETED | OUTPATIENT
Start: 2017-01-06 | End: 2017-01-06

## 2017-01-06 RX ORDER — DIPHENHYDRAMINE HCL 50 MG
25 CAPSULE ORAL ONCE
Qty: 0 | Refills: 0 | Status: DISCONTINUED | OUTPATIENT
Start: 2017-01-06 | End: 2017-01-06

## 2017-01-06 RX ORDER — RITUXIMAB 10 MG/ML
1000 INJECTION, SOLUTION INTRAVENOUS ONCE
Qty: 0 | Refills: 0 | Status: COMPLETED | OUTPATIENT
Start: 2017-01-06 | End: 2017-01-06

## 2017-01-06 RX ORDER — POLYETHYLENE GLYCOL 3350 17 G/17G
17 POWDER, FOR SOLUTION ORAL DAILY
Qty: 0 | Refills: 0 | Status: DISCONTINUED | OUTPATIENT
Start: 2017-01-06 | End: 2017-01-09

## 2017-01-06 RX ADMIN — DABIGATRAN ETEXILATE MESYLATE 150 MILLIGRAM(S): 150 CAPSULE ORAL at 06:22

## 2017-01-06 RX ADMIN — Medication 81 MILLIGRAM(S): at 12:07

## 2017-01-06 RX ADMIN — HYDROMORPHONE HYDROCHLORIDE 4 MILLIGRAM(S): 2 INJECTION INTRAMUSCULAR; INTRAVENOUS; SUBCUTANEOUS at 07:49

## 2017-01-06 RX ADMIN — HYDROMORPHONE HYDROCHLORIDE 4 MILLIGRAM(S): 2 INJECTION INTRAMUSCULAR; INTRAVENOUS; SUBCUTANEOUS at 08:19

## 2017-01-06 RX ADMIN — RITUXIMAB 200 MILLIGRAM(S): 10 INJECTION, SOLUTION INTRAVENOUS at 12:15

## 2017-01-06 RX ADMIN — FAMOTIDINE 20 MILLIGRAM(S): 10 INJECTION INTRAVENOUS at 06:22

## 2017-01-06 RX ADMIN — HYDROMORPHONE HYDROCHLORIDE 2 MILLIGRAM(S): 2 INJECTION INTRAMUSCULAR; INTRAVENOUS; SUBCUTANEOUS at 10:49

## 2017-01-06 RX ADMIN — Medication 32 MILLIGRAM(S): at 17:18

## 2017-01-06 RX ADMIN — Medication 25 MILLIGRAM(S): at 11:54

## 2017-01-06 RX ADMIN — HYDROMORPHONE HYDROCHLORIDE 4 MILLIGRAM(S): 2 INJECTION INTRAMUSCULAR; INTRAVENOUS; SUBCUTANEOUS at 03:10

## 2017-01-06 RX ADMIN — HYDROMORPHONE HYDROCHLORIDE 4 MILLIGRAM(S): 2 INJECTION INTRAMUSCULAR; INTRAVENOUS; SUBCUTANEOUS at 17:10

## 2017-01-06 RX ADMIN — HYDROMORPHONE HYDROCHLORIDE 2 MILLIGRAM(S): 2 INJECTION INTRAMUSCULAR; INTRAVENOUS; SUBCUTANEOUS at 00:30

## 2017-01-06 RX ADMIN — HYDROMORPHONE HYDROCHLORIDE 2 MILLIGRAM(S): 2 INJECTION INTRAMUSCULAR; INTRAVENOUS; SUBCUTANEOUS at 23:25

## 2017-01-06 RX ADMIN — Medication 650 MILLIGRAM(S): at 12:24

## 2017-01-06 RX ADMIN — Medication 30 MILLILITER(S): at 20:04

## 2017-01-06 RX ADMIN — HYDROMORPHONE HYDROCHLORIDE 4 MILLIGRAM(S): 2 INJECTION INTRAMUSCULAR; INTRAVENOUS; SUBCUTANEOUS at 21:30

## 2017-01-06 RX ADMIN — HYDROMORPHONE HYDROCHLORIDE 2 MILLIGRAM(S): 2 INJECTION INTRAMUSCULAR; INTRAVENOUS; SUBCUTANEOUS at 10:19

## 2017-01-06 RX ADMIN — HYDROMORPHONE HYDROCHLORIDE 4 MILLIGRAM(S): 2 INJECTION INTRAMUSCULAR; INTRAVENOUS; SUBCUTANEOUS at 12:27

## 2017-01-06 RX ADMIN — HYDROMORPHONE HYDROCHLORIDE 2 MILLIGRAM(S): 2 INJECTION INTRAMUSCULAR; INTRAVENOUS; SUBCUTANEOUS at 00:45

## 2017-01-06 RX ADMIN — HYDROMORPHONE HYDROCHLORIDE 4 MILLIGRAM(S): 2 INJECTION INTRAMUSCULAR; INTRAVENOUS; SUBCUTANEOUS at 17:30

## 2017-01-06 RX ADMIN — Medication 32 MILLIGRAM(S): at 07:10

## 2017-01-06 RX ADMIN — HYDROMORPHONE HYDROCHLORIDE 2 MILLIGRAM(S): 2 INJECTION INTRAMUSCULAR; INTRAVENOUS; SUBCUTANEOUS at 06:30

## 2017-01-06 RX ADMIN — HYDROMORPHONE HYDROCHLORIDE 4 MILLIGRAM(S): 2 INJECTION INTRAMUSCULAR; INTRAVENOUS; SUBCUTANEOUS at 21:15

## 2017-01-06 RX ADMIN — Medication 0.6 MILLIGRAM(S): at 12:07

## 2017-01-06 RX ADMIN — HYDROMORPHONE HYDROCHLORIDE 2 MILLIGRAM(S): 2 INJECTION INTRAMUSCULAR; INTRAVENOUS; SUBCUTANEOUS at 19:35

## 2017-01-06 RX ADMIN — HYDROMORPHONE HYDROCHLORIDE 2 MILLIGRAM(S): 2 INJECTION INTRAMUSCULAR; INTRAVENOUS; SUBCUTANEOUS at 19:20

## 2017-01-06 RX ADMIN — HYDROMORPHONE HYDROCHLORIDE 4 MILLIGRAM(S): 2 INJECTION INTRAMUSCULAR; INTRAVENOUS; SUBCUTANEOUS at 02:55

## 2017-01-06 RX ADMIN — HYDROMORPHONE HYDROCHLORIDE 2 MILLIGRAM(S): 2 INJECTION INTRAMUSCULAR; INTRAVENOUS; SUBCUTANEOUS at 14:13

## 2017-01-06 RX ADMIN — Medication 200 MILLIGRAM(S): at 14:15

## 2017-01-06 RX ADMIN — Medication 650 MILLIGRAM(S): at 11:54

## 2017-01-06 RX ADMIN — Medication 25 MILLIGRAM(S): at 11:55

## 2017-01-06 RX ADMIN — HYDROMORPHONE HYDROCHLORIDE 4 MILLIGRAM(S): 2 INJECTION INTRAMUSCULAR; INTRAVENOUS; SUBCUTANEOUS at 11:57

## 2017-01-06 RX ADMIN — Medication 200 MILLIGRAM(S): at 19:23

## 2017-01-06 RX ADMIN — HYDROMORPHONE HYDROCHLORIDE 2 MILLIGRAM(S): 2 INJECTION INTRAMUSCULAR; INTRAVENOUS; SUBCUTANEOUS at 06:45

## 2017-01-06 RX ADMIN — HYDROMORPHONE HYDROCHLORIDE 2 MILLIGRAM(S): 2 INJECTION INTRAMUSCULAR; INTRAVENOUS; SUBCUTANEOUS at 14:44

## 2017-01-06 RX ADMIN — DABIGATRAN ETEXILATE MESYLATE 150 MILLIGRAM(S): 150 CAPSULE ORAL at 17:18

## 2017-01-06 RX ADMIN — FAMOTIDINE 20 MILLIGRAM(S): 10 INJECTION INTRAVENOUS at 17:20

## 2017-01-06 RX ADMIN — HYDROMORPHONE HYDROCHLORIDE 2 MILLIGRAM(S): 2 INJECTION INTRAMUSCULAR; INTRAVENOUS; SUBCUTANEOUS at 23:10

## 2017-01-06 NOTE — ADVANCED PRACTICE NURSE CONSULT - ASSESSMENT
Cycle # day 1/1.Height and weight verified. Lab results as per Md migdalia aware of same. Vital signs stable prior to the start of chemotherapy, see sunrise.  Pt educated on the importance of saving urine, verbalize understanding of same.Pt education done re chemo regimen, drug effects and potential side effects, written material provided  pt states understanding. .Pt withpiv  line,inserted by IV team today  site free from signs and symptoms of infection, positive blood return obtained. Pre-medicated with tylenol 650mg po at 1154, benardyl 25mg ivp x1 at 1154, solumedrol 25mg ivp x1 at 1154 pt resting in bed started on rituxin 1000mg iv as ist time infusion protocol intiated at 1220 at 50mg /hr wathched pt closely for 30 min pt with no reaction increased rate to 100mg /hr Cycle # day 1/1.Height and weight verified. Lab results as per Md migdalia aware of same. Vital signs stable prior to the start of chemotherapy, see sunrise.  Pt educated on the importance of saving urine, verbalize understanding of same.Pt education done re chemo regimen, drug effects and potential side effects, written material provided  pt states understanding. .Pt withpiv  line,inserted by IV team today  site free from signs and symptoms of infection, positive blood return obtained. Pre-medicated with tylenol 650mg po at 1154, benardyl 25mg ivp x1 at 1154, solumedrol 25mg ivp x1 at 1154 pt resting in bed started on rituxin 1000mg iv as ist time infusion protocol intiated at 1220 at 50mg /hr wathched pt closely for 30 min pt with no reaction increased rate to 100mg /hr pt tolerated at that rate rate incresed every 30 min by 50mg /hr to a max rate of 400mg/hr Cycle # day 1/1.Height and weight verified. Lab results as per Md migdalia aware of same. Vital signs stable prior to the start of chemotherapy, see sunrise.  Pt educated on the importance of saving urine, verbalize understanding of same.Pt education done re chemo regimen, drug effects and potential side effects, written material provided  pt states understanding. .Pt withpiv  line,inserted by IV team today  site free from signs and symptoms of infection, positive blood return obtained. Pre-medicated with tylenol 650mg po at 1154, benardyl 25mg ivp x1 at 1154, solumedrol 25mg ivp x1 at 1154 pt resting in bed started on rituxin 1000mg iv as ist time infusion protocol intiated at 1220 at 50mg /hr wathched pt closely for 30 min pt with no reaction increased rate to 100mg /hr pt tolerated at that rate rate incresed every 30 min by 50mg /hr to a max rate of 400mg/hr pt tolerated well with no adverse effects completed infusion at 1700 report given to the area nurse

## 2017-01-07 LAB
ALBUMIN SERPL ELPH-MCNC: 2.2 G/DL — LOW (ref 3.3–5)
ALP SERPL-CCNC: 63 U/L — SIGNIFICANT CHANGE UP (ref 40–120)
ALT FLD-CCNC: 12 U/L RC — SIGNIFICANT CHANGE UP (ref 10–45)
ANION GAP SERPL CALC-SCNC: 10 MMOL/L — SIGNIFICANT CHANGE UP (ref 5–17)
AST SERPL-CCNC: 11 U/L — SIGNIFICANT CHANGE UP (ref 10–40)
B2 MICROGLOB SERPL-MCNC: 2.5 MG/L — HIGH (ref 0.8–2.2)
BILIRUB SERPL-MCNC: <0.1 MG/DL — LOW (ref 0.2–1.2)
BUN SERPL-MCNC: 13 MG/DL — SIGNIFICANT CHANGE UP (ref 7–23)
CALCIUM SERPL-MCNC: 8.7 MG/DL — SIGNIFICANT CHANGE UP (ref 8.4–10.5)
CHLORIDE SERPL-SCNC: 100 MMOL/L — SIGNIFICANT CHANGE UP (ref 96–108)
CO2 SERPL-SCNC: 27 MMOL/L — SIGNIFICANT CHANGE UP (ref 22–31)
CREAT SERPL-MCNC: 0.49 MG/DL — LOW (ref 0.5–1.3)
GLUCOSE SERPL-MCNC: 108 MG/DL — HIGH (ref 70–99)
HCT VFR BLD CALC: 31.9 % — LOW (ref 39–50)
HGB BLD-MCNC: 11.2 G/DL — LOW (ref 13–17)
MAGNESIUM SERPL-MCNC: 2 MG/DL — SIGNIFICANT CHANGE UP (ref 1.6–2.6)
MCHC RBC-ENTMCNC: 29.2 PG — SIGNIFICANT CHANGE UP (ref 27–34)
MCHC RBC-ENTMCNC: 35.3 GM/DL — SIGNIFICANT CHANGE UP (ref 32–36)
MCV RBC AUTO: 82.9 FL — SIGNIFICANT CHANGE UP (ref 80–100)
PHOSPHATE SERPL-MCNC: 3.5 MG/DL — SIGNIFICANT CHANGE UP (ref 2.5–4.5)
PLATELET # BLD AUTO: 291 K/UL — SIGNIFICANT CHANGE UP (ref 150–400)
POTASSIUM SERPL-MCNC: 3.8 MMOL/L — SIGNIFICANT CHANGE UP (ref 3.5–5.3)
POTASSIUM SERPL-SCNC: 3.8 MMOL/L — SIGNIFICANT CHANGE UP (ref 3.5–5.3)
PROT SERPL-MCNC: 4.8 G/DL — LOW (ref 6–8.3)
PROT SERPL-MCNC: 4.8 G/DL — LOW (ref 6–8.3)
PROT SERPL-MCNC: 5.5 G/DL — LOW (ref 6–8.3)
RBC # BLD: 3.85 M/UL — LOW (ref 4.2–5.8)
RBC # FLD: 14.5 % — SIGNIFICANT CHANGE UP (ref 10.3–14.5)
SODIUM SERPL-SCNC: 137 MMOL/L — SIGNIFICANT CHANGE UP (ref 135–145)
WBC # BLD: 7.1 K/UL — SIGNIFICANT CHANGE UP (ref 3.8–10.5)
WBC # FLD AUTO: 7.1 K/UL — SIGNIFICANT CHANGE UP (ref 3.8–10.5)

## 2017-01-07 PROCEDURE — 99233 SBSQ HOSP IP/OBS HIGH 50: CPT | Mod: GC

## 2017-01-07 RX ORDER — PANTOPRAZOLE SODIUM 20 MG/1
40 TABLET, DELAYED RELEASE ORAL
Qty: 0 | Refills: 0 | Status: DISCONTINUED | OUTPATIENT
Start: 2017-01-07 | End: 2017-01-08

## 2017-01-07 RX ORDER — SUCRALFATE 1 G
1 TABLET ORAL
Qty: 0 | Refills: 0 | Status: DISCONTINUED | OUTPATIENT
Start: 2017-01-07 | End: 2017-01-09

## 2017-01-07 RX ORDER — SUCRALFATE 1 G
1 TABLET ORAL DAILY
Qty: 0 | Refills: 0 | Status: DISCONTINUED | OUTPATIENT
Start: 2017-01-07 | End: 2017-01-07

## 2017-01-07 RX ADMIN — PANTOPRAZOLE SODIUM 40 MILLIGRAM(S): 20 TABLET, DELAYED RELEASE ORAL at 14:04

## 2017-01-07 RX ADMIN — HYDROMORPHONE HYDROCHLORIDE 2 MILLIGRAM(S): 2 INJECTION INTRAMUSCULAR; INTRAVENOUS; SUBCUTANEOUS at 07:20

## 2017-01-07 RX ADMIN — Medication 30 MILLILITER(S): at 04:32

## 2017-01-07 RX ADMIN — Medication 32 MILLIGRAM(S): at 07:05

## 2017-01-07 RX ADMIN — HYDROMORPHONE HYDROCHLORIDE 2 MILLIGRAM(S): 2 INJECTION INTRAMUSCULAR; INTRAVENOUS; SUBCUTANEOUS at 13:55

## 2017-01-07 RX ADMIN — Medication 1 TABLET(S): at 11:03

## 2017-01-07 RX ADMIN — HYDROMORPHONE HYDROCHLORIDE 4 MILLIGRAM(S): 2 INJECTION INTRAMUSCULAR; INTRAVENOUS; SUBCUTANEOUS at 20:25

## 2017-01-07 RX ADMIN — HYDROMORPHONE HYDROCHLORIDE 2 MILLIGRAM(S): 2 INJECTION INTRAMUSCULAR; INTRAVENOUS; SUBCUTANEOUS at 07:40

## 2017-01-07 RX ADMIN — HYDROMORPHONE HYDROCHLORIDE 4 MILLIGRAM(S): 2 INJECTION INTRAMUSCULAR; INTRAVENOUS; SUBCUTANEOUS at 01:35

## 2017-01-07 RX ADMIN — HYDROMORPHONE HYDROCHLORIDE 4 MILLIGRAM(S): 2 INJECTION INTRAMUSCULAR; INTRAVENOUS; SUBCUTANEOUS at 19:29

## 2017-01-07 RX ADMIN — HYDROMORPHONE HYDROCHLORIDE 2 MILLIGRAM(S): 2 INJECTION INTRAMUSCULAR; INTRAVENOUS; SUBCUTANEOUS at 02:45

## 2017-01-07 RX ADMIN — Medication 81 MILLIGRAM(S): at 11:03

## 2017-01-07 RX ADMIN — HYDROMORPHONE HYDROCHLORIDE 4 MILLIGRAM(S): 2 INJECTION INTRAMUSCULAR; INTRAVENOUS; SUBCUTANEOUS at 10:54

## 2017-01-07 RX ADMIN — DABIGATRAN ETEXILATE MESYLATE 150 MILLIGRAM(S): 150 CAPSULE ORAL at 06:02

## 2017-01-07 RX ADMIN — HYDROMORPHONE HYDROCHLORIDE 2 MILLIGRAM(S): 2 INJECTION INTRAMUSCULAR; INTRAVENOUS; SUBCUTANEOUS at 17:58

## 2017-01-07 RX ADMIN — FAMOTIDINE 20 MILLIGRAM(S): 10 INJECTION INTRAVENOUS at 06:02

## 2017-01-07 RX ADMIN — HYDROMORPHONE HYDROCHLORIDE 4 MILLIGRAM(S): 2 INJECTION INTRAMUSCULAR; INTRAVENOUS; SUBCUTANEOUS at 15:50

## 2017-01-07 RX ADMIN — Medication 0.6 MILLIGRAM(S): at 11:03

## 2017-01-07 RX ADMIN — Medication 1 GRAM(S): at 17:41

## 2017-01-07 RX ADMIN — Medication 30 MILLILITER(S): at 15:33

## 2017-01-07 RX ADMIN — HYDROMORPHONE HYDROCHLORIDE 4 MILLIGRAM(S): 2 INJECTION INTRAMUSCULAR; INTRAVENOUS; SUBCUTANEOUS at 23:27

## 2017-01-07 RX ADMIN — Medication 30 MILLILITER(S): at 21:02

## 2017-01-07 RX ADMIN — HYDROMORPHONE HYDROCHLORIDE 4 MILLIGRAM(S): 2 INJECTION INTRAMUSCULAR; INTRAVENOUS; SUBCUTANEOUS at 06:10

## 2017-01-07 RX ADMIN — HYDROMORPHONE HYDROCHLORIDE 4 MILLIGRAM(S): 2 INJECTION INTRAMUSCULAR; INTRAVENOUS; SUBCUTANEOUS at 06:25

## 2017-01-07 RX ADMIN — Medication 200 MILLIGRAM(S): at 17:41

## 2017-01-07 RX ADMIN — Medication 32 MILLIGRAM(S): at 17:41

## 2017-01-07 RX ADMIN — HYDROMORPHONE HYDROCHLORIDE 4 MILLIGRAM(S): 2 INJECTION INTRAMUSCULAR; INTRAVENOUS; SUBCUTANEOUS at 23:55

## 2017-01-07 RX ADMIN — HYDROMORPHONE HYDROCHLORIDE 2 MILLIGRAM(S): 2 INJECTION INTRAMUSCULAR; INTRAVENOUS; SUBCUTANEOUS at 09:40

## 2017-01-07 RX ADMIN — HYDROMORPHONE HYDROCHLORIDE 2 MILLIGRAM(S): 2 INJECTION INTRAMUSCULAR; INTRAVENOUS; SUBCUTANEOUS at 21:30

## 2017-01-07 RX ADMIN — DABIGATRAN ETEXILATE MESYLATE 150 MILLIGRAM(S): 150 CAPSULE ORAL at 17:41

## 2017-01-07 RX ADMIN — HYDROMORPHONE HYDROCHLORIDE 4 MILLIGRAM(S): 2 INJECTION INTRAMUSCULAR; INTRAVENOUS; SUBCUTANEOUS at 11:15

## 2017-01-07 RX ADMIN — HYDROMORPHONE HYDROCHLORIDE 2 MILLIGRAM(S): 2 INJECTION INTRAMUSCULAR; INTRAVENOUS; SUBCUTANEOUS at 18:15

## 2017-01-07 RX ADMIN — Medication 200 MILLIGRAM(S): at 09:08

## 2017-01-07 RX ADMIN — HYDROMORPHONE HYDROCHLORIDE 2 MILLIGRAM(S): 2 INJECTION INTRAMUSCULAR; INTRAVENOUS; SUBCUTANEOUS at 13:35

## 2017-01-07 RX ADMIN — POLYETHYLENE GLYCOL 3350 17 GRAM(S): 17 POWDER, FOR SOLUTION ORAL at 17:41

## 2017-01-07 RX ADMIN — HYDROMORPHONE HYDROCHLORIDE 4 MILLIGRAM(S): 2 INJECTION INTRAMUSCULAR; INTRAVENOUS; SUBCUTANEOUS at 01:20

## 2017-01-07 RX ADMIN — HYDROMORPHONE HYDROCHLORIDE 4 MILLIGRAM(S): 2 INJECTION INTRAMUSCULAR; INTRAVENOUS; SUBCUTANEOUS at 15:33

## 2017-01-07 RX ADMIN — HYDROMORPHONE HYDROCHLORIDE 2 MILLIGRAM(S): 2 INJECTION INTRAMUSCULAR; INTRAVENOUS; SUBCUTANEOUS at 10:00

## 2017-01-07 RX ADMIN — HYDROMORPHONE HYDROCHLORIDE 2 MILLIGRAM(S): 2 INJECTION INTRAMUSCULAR; INTRAVENOUS; SUBCUTANEOUS at 21:02

## 2017-01-07 RX ADMIN — HYDROMORPHONE HYDROCHLORIDE 2 MILLIGRAM(S): 2 INJECTION INTRAMUSCULAR; INTRAVENOUS; SUBCUTANEOUS at 02:30

## 2017-01-08 LAB
ALBUMIN SERPL ELPH-MCNC: 2.4 G/DL — LOW (ref 3.3–5)
ALP SERPL-CCNC: 63 U/L — SIGNIFICANT CHANGE UP (ref 40–120)
ALT FLD-CCNC: 12 U/L RC — SIGNIFICANT CHANGE UP (ref 10–45)
AMPHET UR-MCNC: NEGATIVE — SIGNIFICANT CHANGE UP
ANION GAP SERPL CALC-SCNC: 11 MMOL/L — SIGNIFICANT CHANGE UP (ref 5–17)
AST SERPL-CCNC: 13 U/L — SIGNIFICANT CHANGE UP (ref 10–40)
BARBITURATES, URINE.: NEGATIVE — SIGNIFICANT CHANGE UP
BENZODIAZ UR-MCNC: NEGATIVE — SIGNIFICANT CHANGE UP
BILIRUB SERPL-MCNC: <0.1 MG/DL — LOW (ref 0.2–1.2)
BUN SERPL-MCNC: 12 MG/DL — SIGNIFICANT CHANGE UP (ref 7–23)
CALCIUM SERPL-MCNC: 8.5 MG/DL — SIGNIFICANT CHANGE UP (ref 8.4–10.5)
CHLORIDE SERPL-SCNC: 99 MMOL/L — SIGNIFICANT CHANGE UP (ref 96–108)
CK MB CFR SERPL CALC: 1.2 NG/ML — SIGNIFICANT CHANGE UP (ref 0–6.7)
CK SERPL-CCNC: 19 U/L — LOW (ref 30–200)
CO2 SERPL-SCNC: 27 MMOL/L — SIGNIFICANT CHANGE UP (ref 22–31)
COCAINE METAB.OTHER UR-MCNC: NEGATIVE — SIGNIFICANT CHANGE UP
CREAT SERPL-MCNC: 0.59 MG/DL — SIGNIFICANT CHANGE UP (ref 0.5–1.3)
CREATININE, URINE THERAPEUTIC: 63.5 MG/DL — SIGNIFICANT CHANGE UP
CULTURE RESULTS: SIGNIFICANT CHANGE UP
CULTURE RESULTS: SIGNIFICANT CHANGE UP
GLUCOSE SERPL-MCNC: 88 MG/DL — SIGNIFICANT CHANGE UP (ref 70–99)
HCT VFR BLD CALC: 36.5 % — LOW (ref 39–50)
HGB BLD-MCNC: 12.7 G/DL — LOW (ref 13–17)
MAGNESIUM SERPL-MCNC: 2.1 MG/DL — SIGNIFICANT CHANGE UP (ref 1.6–2.6)
MCHC RBC-ENTMCNC: 28.9 PG — SIGNIFICANT CHANGE UP (ref 27–34)
MCHC RBC-ENTMCNC: 34.9 GM/DL — SIGNIFICANT CHANGE UP (ref 32–36)
MCV RBC AUTO: 82.9 FL — SIGNIFICANT CHANGE UP (ref 80–100)
METHADONE UR-MCNC: NEGATIVE — SIGNIFICANT CHANGE UP
METHAQUALONE UR QL: NEGATIVE — SIGNIFICANT CHANGE UP
METHAQUALONE UR-MCNC: NEGATIVE — SIGNIFICANT CHANGE UP
OPIATES UR-MCNC: SIGNIFICANT CHANGE UP
PCP UR-MCNC: NEGATIVE — SIGNIFICANT CHANGE UP
PHOSPHATE SERPL-MCNC: 3.1 MG/DL — SIGNIFICANT CHANGE UP (ref 2.5–4.5)
PLATELET # BLD AUTO: 306 K/UL — SIGNIFICANT CHANGE UP (ref 150–400)
POTASSIUM SERPL-MCNC: 4.5 MMOL/L — SIGNIFICANT CHANGE UP (ref 3.5–5.3)
POTASSIUM SERPL-SCNC: 4.5 MMOL/L — SIGNIFICANT CHANGE UP (ref 3.5–5.3)
PROPOXYPH UR QL: NEGATIVE — SIGNIFICANT CHANGE UP
PROT SERPL-MCNC: 5.8 G/DL — LOW (ref 6–8.3)
RBC # BLD: 4.4 M/UL — SIGNIFICANT CHANGE UP (ref 4.2–5.8)
RBC # FLD: 15.5 % — HIGH (ref 10.3–14.5)
SODIUM SERPL-SCNC: 137 MMOL/L — SIGNIFICANT CHANGE UP (ref 135–145)
SPECIMEN SOURCE: SIGNIFICANT CHANGE UP
SPECIMEN SOURCE: SIGNIFICANT CHANGE UP
THC UR QL: SIGNIFICANT CHANGE UP
TROPONIN T SERPL-MCNC: <0.01 NG/ML — SIGNIFICANT CHANGE UP (ref 0–0.06)
WBC # BLD: 10.6 K/UL — HIGH (ref 3.8–10.5)
WBC # FLD AUTO: 10.6 K/UL — HIGH (ref 3.8–10.5)

## 2017-01-08 PROCEDURE — 99233 SBSQ HOSP IP/OBS HIGH 50: CPT | Mod: GC

## 2017-01-08 PROCEDURE — 99232 SBSQ HOSP IP/OBS MODERATE 35: CPT

## 2017-01-08 RX ORDER — SIMETHICONE 80 MG/1
80 TABLET, CHEWABLE ORAL THREE TIMES A DAY
Qty: 0 | Refills: 0 | Status: DISCONTINUED | OUTPATIENT
Start: 2017-01-08 | End: 2017-01-08

## 2017-01-08 RX ORDER — FAMOTIDINE 10 MG/ML
20 INJECTION INTRAVENOUS ONCE
Qty: 0 | Refills: 0 | Status: COMPLETED | OUTPATIENT
Start: 2017-01-08 | End: 2017-01-08

## 2017-01-08 RX ORDER — PANTOPRAZOLE SODIUM 20 MG/1
40 TABLET, DELAYED RELEASE ORAL
Qty: 0 | Refills: 0 | Status: DISCONTINUED | OUTPATIENT
Start: 2017-01-08 | End: 2017-01-09

## 2017-01-08 RX ORDER — METOCLOPRAMIDE HCL 10 MG
5 TABLET ORAL EVERY 8 HOURS
Qty: 0 | Refills: 0 | Status: DISCONTINUED | OUTPATIENT
Start: 2017-01-08 | End: 2017-01-09

## 2017-01-08 RX ORDER — BACLOFEN 100 %
5 POWDER (GRAM) MISCELLANEOUS THREE TIMES A DAY
Qty: 0 | Refills: 0 | Status: DISCONTINUED | OUTPATIENT
Start: 2017-01-08 | End: 2017-01-09

## 2017-01-08 RX ADMIN — Medication 5 MILLIGRAM(S): at 20:55

## 2017-01-08 RX ADMIN — HYDROMORPHONE HYDROCHLORIDE 2 MILLIGRAM(S): 2 INJECTION INTRAMUSCULAR; INTRAVENOUS; SUBCUTANEOUS at 07:11

## 2017-01-08 RX ADMIN — HYDROMORPHONE HYDROCHLORIDE 4 MILLIGRAM(S): 2 INJECTION INTRAMUSCULAR; INTRAVENOUS; SUBCUTANEOUS at 12:56

## 2017-01-08 RX ADMIN — HYDROMORPHONE HYDROCHLORIDE 2 MILLIGRAM(S): 2 INJECTION INTRAMUSCULAR; INTRAVENOUS; SUBCUTANEOUS at 18:05

## 2017-01-08 RX ADMIN — HYDROMORPHONE HYDROCHLORIDE 2 MILLIGRAM(S): 2 INJECTION INTRAMUSCULAR; INTRAVENOUS; SUBCUTANEOUS at 05:40

## 2017-01-08 RX ADMIN — FAMOTIDINE 20 MILLIGRAM(S): 10 INJECTION INTRAVENOUS at 12:27

## 2017-01-08 RX ADMIN — HYDROMORPHONE HYDROCHLORIDE 2 MILLIGRAM(S): 2 INJECTION INTRAMUSCULAR; INTRAVENOUS; SUBCUTANEOUS at 01:20

## 2017-01-08 RX ADMIN — HYDROMORPHONE HYDROCHLORIDE 4 MILLIGRAM(S): 2 INJECTION INTRAMUSCULAR; INTRAVENOUS; SUBCUTANEOUS at 16:38

## 2017-01-08 RX ADMIN — Medication 5 MILLIGRAM(S): at 21:17

## 2017-01-08 RX ADMIN — Medication 81 MILLIGRAM(S): at 12:27

## 2017-01-08 RX ADMIN — PANTOPRAZOLE SODIUM 40 MILLIGRAM(S): 20 TABLET, DELAYED RELEASE ORAL at 05:12

## 2017-01-08 RX ADMIN — HYDROMORPHONE HYDROCHLORIDE 2 MILLIGRAM(S): 2 INJECTION INTRAMUSCULAR; INTRAVENOUS; SUBCUTANEOUS at 17:35

## 2017-01-08 RX ADMIN — HYDROMORPHONE HYDROCHLORIDE 2 MILLIGRAM(S): 2 INJECTION INTRAMUSCULAR; INTRAVENOUS; SUBCUTANEOUS at 10:57

## 2017-01-08 RX ADMIN — HYDROMORPHONE HYDROCHLORIDE 2 MILLIGRAM(S): 2 INJECTION INTRAMUSCULAR; INTRAVENOUS; SUBCUTANEOUS at 00:51

## 2017-01-08 RX ADMIN — HYDROMORPHONE HYDROCHLORIDE 2 MILLIGRAM(S): 2 INJECTION INTRAMUSCULAR; INTRAVENOUS; SUBCUTANEOUS at 05:12

## 2017-01-08 RX ADMIN — HYDROMORPHONE HYDROCHLORIDE 4 MILLIGRAM(S): 2 INJECTION INTRAMUSCULAR; INTRAVENOUS; SUBCUTANEOUS at 08:21

## 2017-01-08 RX ADMIN — HYDROMORPHONE HYDROCHLORIDE 2 MILLIGRAM(S): 2 INJECTION INTRAMUSCULAR; INTRAVENOUS; SUBCUTANEOUS at 21:53

## 2017-01-08 RX ADMIN — HYDROMORPHONE HYDROCHLORIDE 2 MILLIGRAM(S): 2 INJECTION INTRAMUSCULAR; INTRAVENOUS; SUBCUTANEOUS at 23:56

## 2017-01-08 RX ADMIN — HYDROMORPHONE HYDROCHLORIDE 2 MILLIGRAM(S): 2 INJECTION INTRAMUSCULAR; INTRAVENOUS; SUBCUTANEOUS at 03:08

## 2017-01-08 RX ADMIN — Medication 1 GRAM(S): at 17:35

## 2017-01-08 RX ADMIN — PANTOPRAZOLE SODIUM 40 MILLIGRAM(S): 20 TABLET, DELAYED RELEASE ORAL at 17:34

## 2017-01-08 RX ADMIN — Medication 200 MILLIGRAM(S): at 17:35

## 2017-01-08 RX ADMIN — HYDROMORPHONE HYDROCHLORIDE 4 MILLIGRAM(S): 2 INJECTION INTRAMUSCULAR; INTRAVENOUS; SUBCUTANEOUS at 12:26

## 2017-01-08 RX ADMIN — HYDROMORPHONE HYDROCHLORIDE 2 MILLIGRAM(S): 2 INJECTION INTRAMUSCULAR; INTRAVENOUS; SUBCUTANEOUS at 22:20

## 2017-01-08 RX ADMIN — HYDROMORPHONE HYDROCHLORIDE 2 MILLIGRAM(S): 2 INJECTION INTRAMUSCULAR; INTRAVENOUS; SUBCUTANEOUS at 10:27

## 2017-01-08 RX ADMIN — DABIGATRAN ETEXILATE MESYLATE 150 MILLIGRAM(S): 150 CAPSULE ORAL at 05:11

## 2017-01-08 RX ADMIN — HYDROMORPHONE HYDROCHLORIDE 4 MILLIGRAM(S): 2 INJECTION INTRAMUSCULAR; INTRAVENOUS; SUBCUTANEOUS at 21:00

## 2017-01-08 RX ADMIN — Medication 5 MILLIGRAM(S): at 14:41

## 2017-01-08 RX ADMIN — DABIGATRAN ETEXILATE MESYLATE 150 MILLIGRAM(S): 150 CAPSULE ORAL at 17:35

## 2017-01-08 RX ADMIN — HYDROMORPHONE HYDROCHLORIDE 2 MILLIGRAM(S): 2 INJECTION INTRAMUSCULAR; INTRAVENOUS; SUBCUTANEOUS at 07:41

## 2017-01-08 RX ADMIN — HYDROMORPHONE HYDROCHLORIDE 4 MILLIGRAM(S): 2 INJECTION INTRAMUSCULAR; INTRAVENOUS; SUBCUTANEOUS at 20:31

## 2017-01-08 RX ADMIN — HYDROMORPHONE HYDROCHLORIDE 4 MILLIGRAM(S): 2 INJECTION INTRAMUSCULAR; INTRAVENOUS; SUBCUTANEOUS at 04:25

## 2017-01-08 RX ADMIN — HYDROMORPHONE HYDROCHLORIDE 2 MILLIGRAM(S): 2 INJECTION INTRAMUSCULAR; INTRAVENOUS; SUBCUTANEOUS at 02:54

## 2017-01-08 RX ADMIN — Medication 1 GRAM(S): at 05:12

## 2017-01-08 RX ADMIN — Medication 200 MILLIGRAM(S): at 07:11

## 2017-01-08 RX ADMIN — HYDROMORPHONE HYDROCHLORIDE 2 MILLIGRAM(S): 2 INJECTION INTRAMUSCULAR; INTRAVENOUS; SUBCUTANEOUS at 15:11

## 2017-01-08 RX ADMIN — HYDROMORPHONE HYDROCHLORIDE 4 MILLIGRAM(S): 2 INJECTION INTRAMUSCULAR; INTRAVENOUS; SUBCUTANEOUS at 08:51

## 2017-01-08 RX ADMIN — HYDROMORPHONE HYDROCHLORIDE 4 MILLIGRAM(S): 2 INJECTION INTRAMUSCULAR; INTRAVENOUS; SUBCUTANEOUS at 03:56

## 2017-01-08 RX ADMIN — HYDROMORPHONE HYDROCHLORIDE 2 MILLIGRAM(S): 2 INJECTION INTRAMUSCULAR; INTRAVENOUS; SUBCUTANEOUS at 14:41

## 2017-01-08 NOTE — PROVIDER CONTACT NOTE (OTHER) - SITUATION
Pt c/o  nonstop hiccups&pain throughout the day, at bedside earlier to assess. pt medicated frequently for acid reflux. pt also refusing solumedrol for lupus. pt educated on medication importance.

## 2017-01-08 NOTE — PROVIDER CONTACT NOTE (OTHER) - ACTION/TREATMENT ORDERED:
md notified and aware. Md Donis will come assess patient to discuss medication options. will continue to monitor

## 2017-01-09 ENCOUNTER — TRANSCRIPTION ENCOUNTER (OUTPATIENT)
Age: 27
End: 2017-01-09

## 2017-01-09 VITALS
SYSTOLIC BLOOD PRESSURE: 160 MMHG | RESPIRATION RATE: 18 BRPM | DIASTOLIC BLOOD PRESSURE: 87 MMHG | OXYGEN SATURATION: 100 % | TEMPERATURE: 98 F | HEART RATE: 104 BPM

## 2017-01-09 LAB
ALBUMIN SERPL ELPH-MCNC: 2.3 G/DL — LOW (ref 3.3–5)
ALP SERPL-CCNC: 64 U/L — SIGNIFICANT CHANGE UP (ref 40–120)
ALT FLD-CCNC: 12 U/L RC — SIGNIFICANT CHANGE UP (ref 10–45)
ANION GAP SERPL CALC-SCNC: 7 MMOL/L — SIGNIFICANT CHANGE UP (ref 5–17)
AST SERPL-CCNC: 13 U/L — SIGNIFICANT CHANGE UP (ref 10–40)
BILIRUB SERPL-MCNC: <0.1 MG/DL — LOW (ref 0.2–1.2)
BUN SERPL-MCNC: 10 MG/DL — SIGNIFICANT CHANGE UP (ref 7–23)
CALCIUM SERPL-MCNC: 9 MG/DL — SIGNIFICANT CHANGE UP (ref 8.4–10.5)
CHLORIDE SERPL-SCNC: 100 MMOL/L — SIGNIFICANT CHANGE UP (ref 96–108)
CO2 SERPL-SCNC: 27 MMOL/L — SIGNIFICANT CHANGE UP (ref 22–31)
CREAT SERPL-MCNC: 0.57 MG/DL — SIGNIFICANT CHANGE UP (ref 0.5–1.3)
GLUCOSE SERPL-MCNC: 110 MG/DL — HIGH (ref 70–99)
HCT VFR BLD CALC: 32.5 % — LOW (ref 39–50)
HGB BLD-MCNC: 11.1 G/DL — LOW (ref 13–17)
MAGNESIUM SERPL-MCNC: 1.9 MG/DL — SIGNIFICANT CHANGE UP (ref 1.6–2.6)
MCHC RBC-ENTMCNC: 28.4 PG — SIGNIFICANT CHANGE UP (ref 27–34)
MCHC RBC-ENTMCNC: 34 GM/DL — SIGNIFICANT CHANGE UP (ref 32–36)
MCV RBC AUTO: 83.6 FL — SIGNIFICANT CHANGE UP (ref 80–100)
PHOSPHATE SERPL-MCNC: 3.5 MG/DL — SIGNIFICANT CHANGE UP (ref 2.5–4.5)
PLATELET # BLD AUTO: 243 K/UL — SIGNIFICANT CHANGE UP (ref 150–400)
POTASSIUM SERPL-MCNC: 4.7 MMOL/L — SIGNIFICANT CHANGE UP (ref 3.5–5.3)
POTASSIUM SERPL-SCNC: 4.7 MMOL/L — SIGNIFICANT CHANGE UP (ref 3.5–5.3)
PROT SERPL-MCNC: 5 G/DL — LOW (ref 6–8.3)
RBC # BLD: 3.89 M/UL — LOW (ref 4.2–5.8)
RBC # FLD: 15.4 % — HIGH (ref 10.3–14.5)
SODIUM SERPL-SCNC: 134 MMOL/L — LOW (ref 135–145)
WBC # BLD: 5.9 K/UL — SIGNIFICANT CHANGE UP (ref 3.8–10.5)
WBC # FLD AUTO: 5.9 K/UL — SIGNIFICANT CHANGE UP (ref 3.8–10.5)

## 2017-01-09 PROCEDURE — 99239 HOSP IP/OBS DSCHRG MGMT >30: CPT

## 2017-01-09 PROCEDURE — 99222 1ST HOSP IP/OBS MODERATE 55: CPT | Mod: GC

## 2017-01-09 RX ORDER — OXYCODONE HYDROCHLORIDE 5 MG/1
10 TABLET ORAL ONCE
Qty: 0 | Refills: 0 | Status: DISCONTINUED | OUTPATIENT
Start: 2017-01-09 | End: 2017-01-09

## 2017-01-09 RX ORDER — METOCLOPRAMIDE HCL 10 MG
1 TABLET ORAL
Qty: 90 | Refills: 0 | OUTPATIENT
Start: 2017-01-09 | End: 2017-02-08

## 2017-01-09 RX ORDER — OXYCODONE HYDROCHLORIDE 5 MG/1
1 TABLET ORAL
Qty: 60 | Refills: 0 | OUTPATIENT
Start: 2017-01-09 | End: 2017-01-19

## 2017-01-09 RX ORDER — HYDROMORPHONE HYDROCHLORIDE 2 MG/ML
2 INJECTION INTRAMUSCULAR; INTRAVENOUS; SUBCUTANEOUS EVERY 4 HOURS
Qty: 0 | Refills: 0 | Status: DISCONTINUED | OUTPATIENT
Start: 2017-01-09 | End: 2017-01-09

## 2017-01-09 RX ORDER — HYDROMORPHONE HYDROCHLORIDE 2 MG/ML
1 INJECTION INTRAMUSCULAR; INTRAVENOUS; SUBCUTANEOUS
Qty: 0 | Refills: 0 | Status: DISCONTINUED | OUTPATIENT
Start: 2017-01-09 | End: 2017-01-09

## 2017-01-09 RX ADMIN — Medication 1 GRAM(S): at 05:34

## 2017-01-09 RX ADMIN — HYDROMORPHONE HYDROCHLORIDE 2 MILLIGRAM(S): 2 INJECTION INTRAMUSCULAR; INTRAVENOUS; SUBCUTANEOUS at 07:34

## 2017-01-09 RX ADMIN — HYDROMORPHONE HYDROCHLORIDE 2 MILLIGRAM(S): 2 INJECTION INTRAMUSCULAR; INTRAVENOUS; SUBCUTANEOUS at 11:09

## 2017-01-09 RX ADMIN — HYDROMORPHONE HYDROCHLORIDE 1 MILLIGRAM(S): 2 INJECTION INTRAMUSCULAR; INTRAVENOUS; SUBCUTANEOUS at 12:09

## 2017-01-09 RX ADMIN — OXYCODONE HYDROCHLORIDE 10 MILLIGRAM(S): 5 TABLET ORAL at 14:19

## 2017-01-09 RX ADMIN — HYDROMORPHONE HYDROCHLORIDE 4 MILLIGRAM(S): 2 INJECTION INTRAMUSCULAR; INTRAVENOUS; SUBCUTANEOUS at 01:40

## 2017-01-09 RX ADMIN — PANTOPRAZOLE SODIUM 40 MILLIGRAM(S): 20 TABLET, DELAYED RELEASE ORAL at 05:35

## 2017-01-09 RX ADMIN — Medication 5 MILLIGRAM(S): at 05:33

## 2017-01-09 RX ADMIN — HYDROMORPHONE HYDROCHLORIDE 2 MILLIGRAM(S): 2 INJECTION INTRAMUSCULAR; INTRAVENOUS; SUBCUTANEOUS at 02:32

## 2017-01-09 RX ADMIN — Medication 81 MILLIGRAM(S): at 11:40

## 2017-01-09 RX ADMIN — Medication 200 MILLIGRAM(S): at 08:42

## 2017-01-09 RX ADMIN — HYDROMORPHONE HYDROCHLORIDE 2 MILLIGRAM(S): 2 INJECTION INTRAMUSCULAR; INTRAVENOUS; SUBCUTANEOUS at 07:04

## 2017-01-09 RX ADMIN — Medication 28 MILLIGRAM(S): at 05:34

## 2017-01-09 RX ADMIN — DABIGATRAN ETEXILATE MESYLATE 150 MILLIGRAM(S): 150 CAPSULE ORAL at 05:33

## 2017-01-09 RX ADMIN — HYDROMORPHONE HYDROCHLORIDE 1 MILLIGRAM(S): 2 INJECTION INTRAMUSCULAR; INTRAVENOUS; SUBCUTANEOUS at 11:39

## 2017-01-09 RX ADMIN — HYDROMORPHONE HYDROCHLORIDE 2 MILLIGRAM(S): 2 INJECTION INTRAMUSCULAR; INTRAVENOUS; SUBCUTANEOUS at 10:39

## 2017-01-09 RX ADMIN — HYDROMORPHONE HYDROCHLORIDE 4 MILLIGRAM(S): 2 INJECTION INTRAMUSCULAR; INTRAVENOUS; SUBCUTANEOUS at 01:10

## 2017-01-09 RX ADMIN — Medication 5 MILLIGRAM(S): at 13:16

## 2017-01-09 RX ADMIN — HYDROMORPHONE HYDROCHLORIDE 4 MILLIGRAM(S): 2 INJECTION INTRAMUSCULAR; INTRAVENOUS; SUBCUTANEOUS at 05:35

## 2017-01-09 RX ADMIN — Medication 5 MILLIGRAM(S): at 05:35

## 2017-01-09 RX ADMIN — HYDROMORPHONE HYDROCHLORIDE 2 MILLIGRAM(S): 2 INJECTION INTRAMUSCULAR; INTRAVENOUS; SUBCUTANEOUS at 00:20

## 2017-01-09 RX ADMIN — HYDROMORPHONE HYDROCHLORIDE 4 MILLIGRAM(S): 2 INJECTION INTRAMUSCULAR; INTRAVENOUS; SUBCUTANEOUS at 06:00

## 2017-01-09 RX ADMIN — Medication 1 TABLET(S): at 11:40

## 2017-01-09 RX ADMIN — HYDROMORPHONE HYDROCHLORIDE 2 MILLIGRAM(S): 2 INJECTION INTRAMUSCULAR; INTRAVENOUS; SUBCUTANEOUS at 03:02

## 2017-01-09 NOTE — DISCHARGE NOTE ADULT - CARE PROVIDERS DIRECT ADDRESSES
[email or web address]

## 2017-01-09 NOTE — DISCHARGE NOTE ADULT - MEDICATION SUMMARY - MEDICATIONS TO CHANGE
I will SWITCH the dose or number of times a day I take the medications listed below when I get home from the hospital:    Medrol  -- 32 milligram(s) by mouth 2 times a day

## 2017-01-09 NOTE — DIETITIAN INITIAL EVALUATION ADULT. - OTHER INFO
Pt seen for length of stay. Admitted with Systemic Lupus Erythematosis Stage V Nephritis with fever and 5 episodes of vomiting and brown diarrhea – appears resolved last BM was (1/8 not diarrhea). Currently tolerating Low salt regular diet, denies nausea, vomiting, diarrhea or constipation. Last vomiting episode was in ED on admission. Patient reports a good appetite eating (%) of meals. Per previous RD note patient has been receptive general nutrition education in the past, denied need for further education. Weight is stable, patient has had no weight loss in last 6 months. Usual body weight is 210-215 pounds, current body weight 212 pounds No hx of chewing / swallowing difficulties.

## 2017-01-09 NOTE — DIETITIAN INITIAL EVALUATION ADULT. - ADHERENCE
good/Patient reports avoiding red meat and occasionally avoiding pork and beef per past MD recommendations for Nephrotic Syndrome. Claims he has liberalized protein restriction and his wife does not cook with salt. Patient confirmed allergy to shellfish. Patient is taking Calcium 600 + D at home.

## 2017-01-09 NOTE — DISCHARGE NOTE ADULT - HOSPITAL COURSE
27 y/o M with PMH SLE c/b arthritis, serositis, recurrent pleural effusions requiring chest tube (last 3/2016), stage V lupus nephritis, DVT/PE s/p IVC filter (placed 1 yr ago at [facility]) due to failed a/c therapy (failed xarelto, coumadin, lovenox, arixta)  and high clot burden, bilateral renal vein thrombosis presenting to ED with one day of midsternal, pressure-like chest pain that is non-radiating and constant. Patient reports that starting on the evening prior to coming to the hospital the pain started while he was at rest and that he is not able to lay flat due to worsening of the pressure. Patient reports no relief in pressure with any medications, including his pain medications. Patient also reports having fever (Tmax 102.2), nausea, 5 episodes of nbnb vomiting, several episodes of brown, watery diarrhea, diffuse muscle aches, and sourness and inflammation of his elbows, shoulders, and knees with some stiffness. Patient reports being consistent with his prescribed medication regimen, no sick contacts. Of note, patient discharged previously from [facility] where he was found to have L renal vein thrombosis. Patient was discharged on a Lovenox bridge to pradaxa for which he would have completed tomorrow.     In ED: Vitals: T: 98.4 HR: 113 BP: 143/105 RR: 20 O2: 100% on RA   Patient complaining of 10/10 chest pain given 2 x 1mg Dilaudid IVP, 1L NS bolus, CXR negative 25 y/o M with PMH SLE c/b arthritis, serositis, recurrent pleural effusions requiring chest tube (last 3/2016), stage V lupus nephritis, DVT/PE s/p IVC filter (placed 1 yr ago at [place]) due to failed a/c therapy (failed xarelto, coumadin, lovenox, arixta)  and high clot burden, with recent hospitalization c/b bilateral renal vein thrombosis who presented to ED with one day of midsternal, pressure-like chest pain rated 10/10 in severity that was non-radiating and constant. Patient reported that starting on the evening prior to coming to the hospital the pain started while he was at rest; he was not able to lay flat due to worsening of the pressure. Patient reported no relief in pressure with any medications, including his pain medications. Patient also reported having fever (Tmax 102.2), nausea, 5 episodes of nbnb vomiting, several episodes of brown, watery diarrhea, diffuse muscle aches, and soreness and inflammation of his elbows, shoulders, and knees with some stiffness. Patient reported being consistent with his prescribed medication regimen, no sick contacts.     In ED: Vitals: T: 98.4 HR: 113 BP: 143/105 RR: 20 O2: 100% on RA. Exam was significant for chest pain reproducible on palpation. Labs demonstrated pt's mild baseline anemia and low serum protein and albumin consistent with history of lupus nephritis class V.  EKG demonstrated sinus tachycardia with no ischemic changes. CXR negative. Pt was given 2 x 1mg Dilaudid IVP, 1L NS bolus with negligible improvement in pain. Pt was admitted to medicine for evaluation of his atypical chest pain with differential diagnosis including lupus flare, pericarditis, pulmonary embolus.     Pt's chest pain was determined to be most likely secondary to lupus flare, as symptoms were similar to his past episodes. While inpatient, pt had consistently negative cardiac enzymes. VQ scan ruled out new and worsening pulmonary emboli with no new or worsening areas of VQ mismatch. Echocardiogram demonstrated normal right and left ventricular function with normal pericardium without pericardial effusions. As pt has been refractory to many lupus therapies, at the recommendation of rheumatology and nephrology, a trial of Rituximab (1mg infusion) was started on 1/6/17. Pt tolerated infusion well, and his symptoms continued to improve on regimen of Dilaudid 4mg q4 with Dilaudid 2mg q2 for breakthrough pain. He was discharged on his home oral pain medication regimen. During hospitalization, pt was followed by rheumatology and nephrology. At discharge, pt was advised to follow-up outpatient with his rheumatologist (Dr. Lowery) for regular lupus management and second infusion of Rituximab, a nephrologist for management of his lupus nephritis, his pain management specialist (Dr. Monsivais) for optimization of his chronic pain control, and a gastroenterologist for evaluation of gastroparesis, which is most likely to be secondary to opioid use. HPI:  25 y/o M with PMH SLE c/b arthritis, serositis, recurrent pleural effusions requiring chest tube (last 3/2016), stage V lupus nephritis, DVT/PE s/p IVC filter (placed 1 yr ago at [place]) due to failed a/c therapy (failed xarelto, coumadin, lovenox, arixta)  and high clot burden, with recent hospitalization c/b bilateral renal vein thrombosis who presented to ED with one day of midsternal, pressure-like chest pain rated 10/10 in severity that was non-radiating and constant. Patient reported that starting on the evening prior to coming to the hospital the pain started while he was at rest; he was not able to lay flat due to worsening of the pressure. Patient reported no relief in pressure with any medications, including his pain medications. Patient also reported having fever (Tmax 102.2), nausea, 5 episodes of nbnb vomiting, several episodes of brown, watery diarrhea, diffuse muscle aches, and soreness and inflammation of his elbows, shoulders, and knees with some stiffness. Patient reported being consistent with his prescribed medication regimen, no sick contacts.     In ED: Vitals: T: 98.4 HR: 113 BP: 143/105 RR: 20 O2: 100% on RA. Exam was significant for chest pain reproducible on palpation. Labs demonstrated pt's mild baseline anemia and low serum protein and albumin consistent with history of lupus nephritis class V.  EKG demonstrated sinus tachycardia with no ischemic changes. CXR negative. Pt was given 2 x 1mg Dilaudid IVP, 1L NS bolus with negligible improvement in pain. Pt was admitted to medicine for evaluation of his atypical chest pain with differential diagnosis including lupus flare, pericarditis, pulmonary embolus.     Hospital Course:  Pt's chest pain was determined to be most likely secondary to lupus flare, as symptoms were similar to his past episodes and in the setting of arthralgias. While inpatient, pt had consistently negative cardiac enzymes, EKG on admission without evidence of ischemia, thus diminishing suspicion for ACS. VQ scan ruled out both new and worsening pulmonary emboli with no new or worsening areas of VQ mismatch. Though initially there was concern chest pain may be related to pericarditis given underlying SLE, EKG on admission was negative for pericarditis associated changes and echocardiogram demonstrated normal right and left ventricular function with normal pericardium without pericardial effusions. As pt has been refractory to many lupus therapies, at the recommendation of rheumatology and nephrology, a trial of Rituximab (1mg infusion) was started on 1/6/17 in addition to keeping patient on his home doses of Bactrim and Plaquenil. His home PO steroids were converted to Solumedrol IV 32 mg BID. Pt tolerated infusion well, and his symptoms continued to improve on regimen of Dilaudid 4mg q4 with Dilaudid 2mg q2 for breakthrough pain.     Nephrology also followed the patient for his stage V membranous nephropathy. Both nephrology and rheum agreed that Rituxan with steroids would concomitantly treat but renal disease and SLE flare. Nephrology also recommended adding Losartan qHS for protein wasting but patient refused on multiple occasions.     Patient's course was complicated by LLQ abdominal pain and tenderness on exam which was at first concerning for splenic vein or alternative thrombosis given patient's history. A Doppler ultrasound of the abdomen was performed and was negative for any clot. Patient's pain eventually resolved over the course of stay with improvement in bowel regimen.    Patient's course was complicated by severe dyspepsia symptoms that were refractory to PPI, H2 antoagonists in both PO and IV forms, Maalox and carafate. His symptoms at first were believed to be 2/2 high dose steroids but given high dose opioid regimen was tried on Reglan q8h standing, which brought him significant relief.     Patient came in on Lovenox bridge to Pradaxa. It was noted that he had only two days left of Lovenox and so this was completed and he remained on Pradaxa alone the remainder of his admission with instruction to continue taking Pradaxa as prescribed as an outpatient    Patient was discharged stable to his home on 1/9/17 on his oral pain medication regimen from last discharge, oxycodone 10mg 1-2 tabs q4-6h PRN. Patient was given instructions to follow-up outpatient with his primary rheumatologist Dr. Lowery for next Rituxan infusion on January 20th, 2 weeks from first inpatient dose.  He was also advised to make an appointment with Dr. Omalley in nephrology on February 10th. He will follow up with his pain management specialist (Dr. Monsivais) for optimization of his chronic pain control. Patient was advised that if he preferred, he could make an appointment with gastroenterology for evaluation of gastroparesis as an outpatient but that this may all be related to high dose opioids.

## 2017-01-09 NOTE — DISCHARGE NOTE ADULT - PLAN OF CARE
Remission of active flare, prevention of subsequent episodes You were admitted to the hospital for chest pain. It was determined that the chest pain was not a heart attack based on your admission EKG and several lab tests. You had imaging of your lungs that did not show any new or worsening clots to explain the pain, and an echocardiogram of your heart did not show any fluid in the sac your heart is contained in. As a result, the most likely cause of your chest pain was determined to be a flare of your lupus as it was consistent with prior episodes you had had. The rheumatology seem saw you and you were continued on your home doses of Bactrim and Plaquenil and were put on IV instead of oral steroids. The decision with both the rheumatology and kidney doctors was to try a new drug for you called Rituxan to see if it could better control your symptoms. You received one dose on 1/6 and tolerated it well and thereafter began to notice improvement in your pain along with an IV Dilaudid regimen. It is very important that you call your primary rheumatologist Dr. Lowery to schedule your next Rituxan infusion which should be two weeks from the one you received in the hospital, which would be 1/20. Please also make an appointment within 1-2 weeks with Dr. Monsivais for further management of your pain. You should continue taking Plaquenil and Bactrim as you were prior to admission. Per rheumatology recommendations you should take 28mg of methylprednisone twice daily until your follow up with Dr. Lowery. A new prescription for this has been sent to your pharmacy. You also have a prescription for oxycodone 10mg 1-2 tabs every 4-6 hours as needed, the equivalent of your pain regimen on your last hospital discharge. Proper follow up with nephrology, prevention of further kidney dysfunction You were known on this admission to have a diagnosis of membranous nephropathy, a type of kidney disease that can cause protein loss in your urine resulting in fluid accumulation in your body. The nephrology (kidney doctor) team here saw you and recommended Rituxan along with steroids per discussion with rheumatology to treat your kidney disease in addition to your lupus. They also recommended starting a drug called Losartan to reduce the amount of protein lost through your urine and prevent further progression of your kidney disease, but you declined to take that at this time. Please follow up with Dr. Omalley for an appointment on February 10th. Her number is provided in this discharge paperwork. Prevention of further blood clots You came into the hospital on both Lovenox subcutaneous and Pradaxa with intention to stop Lovenox eventually. You only had two days left of Lovenox so those doses were completed and you remained on Pradaxa alone, which you should continue taking as prescribed to you. Though there was some concern that the left sided abdominal pain you had on admission was related to a blood clot, an abdominal ultrasound did not reveal any blood clot. Relief of symptoms During this hospitalization you reported burning pain in your stomach associated with unremitting belching and hiccups. At first this was believed to possibly be related to the high dose steroids you were receiving but your symptoms did not improve with medications used for acid reflux. Because high dose opioids like Dilaudid can cause slowing og the gastrointestinal tract, including the stomach, it can sometimes cause something called gastroparesis, which means the stomach empties food slower than usual which can lead to pain and the other symptoms you experienced. As a result, you were started on a medication called Reglan which improved your symptoms significantly. You have been given a prescription for Reglan which you can take every 8 hours as needed for similar symptoms, though it is very likely that your symptoms will subside with discontinuation of IV steroids alone. If your symptoms persist despite oral oxycodone you can make an appointment with Dr. Carrasco in GI for evaluation. His number is provided in this paperwork.

## 2017-01-09 NOTE — DISCHARGE NOTE ADULT - PATIENT PORTAL LINK FT
“You can access the FollowHealth Patient Portal, offered by [facility], by registering with the following website: [email or web address]”

## 2017-01-09 NOTE — DISCHARGE NOTE ADULT - MEDICATION SUMMARY - MEDICATIONS TO STOP TAKING
I will STOP taking the medications listed below when I get home from the hospital:    colchicine 0.6 mg oral tablet  -- 1 tab(s) by mouth once a day    Neurontin 400 mg oral capsule  -- 1 cap(s) by mouth 3 times a day    Lovenox 100 mg/mL injectable solution  -- 100 unit(s) injectable 2 times a day  Take for 7 days (until 1/4/17).    OxyCONTIN 10 mg oral tablet, extended release  -- 1 tab(s) by mouth 2 times a day MDD:2 tabs  -- Caution federal law prohibits the transfer of this drug to any person other  than the person for whom it was prescribed.  Check with your doctor before becoming pregnant.  Do not chew, break, or crush.  Do not drink alcoholic beverages when taking this medication.  May cause drowsiness.  Alcohol may intensify this effect.  Use care when operating dangerous machinery.  Obtain medical advice before taking any non-prescription drugs as some may affect the action of this medication.  Swallow whole.  Do not crush.  This drug may impair the ability to drive or operate machinery.  Use care until you become familiar with its effects.  This prescription cannot be refilled.  Using more of this medication than prescribed may cause serious breathing problems.    oxyCODONE 15 mg oral tablet  -- 1 tab(s) by mouth every 6 hours As Needed for Moderate-Severe Pain  MDD:4 tablets  -- Caution federal law prohibits the transfer of this drug to any person other  than the person for whom it was prescribed.  It is very important that you take or use this exactly as directed.  Do not skip doses or discontinue unless directed by your doctor.  May cause drowsiness.  Alcohol may intensify this effect.  Use care when operating dangerous machinery.  Swallow whole.  Do not crush.  This prescription cannot be refilled.  Using more of this medication than prescribed may cause serious breathing problems.

## 2017-01-09 NOTE — DISCHARGE NOTE ADULT - CARE PROVIDER_API CALL
Demetrio Omalley), Internal Medicine; Nephrology  [address] [place] [address]  Phone: [phone]  Fax: [phone]    Jt Lowery (MD), Internal Medicine; Rheumatology  [address] [place] [address]  Phone: [phone]  Fax: [phone]    Tevin Monsivais), Neurology  [address] [place] [address]  Phone: [phone]  Fax: [phone]    Daniel Carrasco), Gastroenterology; Internal Medicine  [address] [place] [address]  Phone: [phone]  Fax: [phone]

## 2017-01-09 NOTE — DIETITIAN INITIAL EVALUATION ADULT. - ENERGY NEEDS
Ht: 70 inches Wt: 212 pounds (standing 1/4) BMI: 30.4kg/m2 IBW: 166 pounds (+/-10%) 127 %IBW  No edema present. Skin intact.

## 2017-01-09 NOTE — DISCHARGE NOTE ADULT - MEDICATION SUMMARY - MEDICATIONS TO TAKE
I will START or STAY ON the medications listed below when I get home from the hospital:    Medrol 4 mg oral tablet  -- 7 tab(s) by mouth 2 times a day  -- It is very important that you take or use this exactly as directed.  Do not skip doses or discontinue unless directed by your doctor.  Obtain medical advice before taking any non-prescription drugs as some may affect the action of this medication.  Take with food or milk.    -- Indication: For Systemic lupus erythematosus    oxyCODONE 10 mg oral tablet  -- 1 to 2 tab(s) by mouth every 4 to 6 hours as needed for pain MDD:120mg  -- Caution federal law prohibits the transfer of this drug to any person other  than the person for whom it was prescribed.  Check with your doctor before becoming pregnant.  Do not drink alcoholic beverages when taking this medication.  May cause drowsiness.  Alcohol may intensify this effect.  Use care when operating dangerous machinery.  This drug may impair the ability to drive or operate machinery.  Use care until you become familiar with its effects.  This prescription cannot be refilled.  Using more of this medication than prescribed may cause serious breathing problems.    -- Indication: For Chest wall pain    Aspirin Enteric Coated 81 mg oral delayed release tablet  -- 1 tab(s) by mouth once a day  -- Swallow whole.  Do not crush.  Take with food or milk.    -- Indication: For Chronic deep vein thrombosis (DVT) of right lower extremity, unspecified vein    Pradaxa 150 mg oral capsule  -- 1 cap(s) by mouth 2 times a day  -- Do not chew, break, or crush.  It is very important that you take or use this exactly as directed.  Do not skip doses or discontinue unless directed by your doctor.  Obtain medical advice before taking any non-prescription drugs as some may affect the action of this medication.  Swallow whole.  Do not crush.    -- Indication: For Chronic deep vein thrombosis (DVT) of right lower extremity, unspecified vein    metoclopramide 5 mg oral tablet  -- 1 tab(s) by mouth every 8 hours as needed for stomach upset  -- Indication: For Dyspepsia    hydroxychloroquine 200 mg oral tablet  -- 1 tab(s) by mouth 2 times a day  -- Indication: For Systemic lupus erythematosus    famotidine 20 mg oral tablet  -- 1 tab(s) by mouth once a day  -- It is very important that you take or use this exactly as directed.  Do not skip doses or discontinue unless directed by your doctor.  Obtain medical advice before taking any non-prescription drugs as some may affect the action of this medication.    -- Indication: For Dyspepsia    sulfamethoxazole-trimethoprim 800 mg-160 mg oral tablet  -- 1 tab(s) by mouth every other day  -- Indication: For Systemic lupus erythematosus    Calcium 600+D 600 mg-200 intl units oral tablet  -- Take 1 tab by mouth daily  -- Indication: For Calcium supplement

## 2017-01-09 NOTE — DIETITIAN INITIAL EVALUATION ADULT. - NS AS NUTRI INTERV MEALS SNACK
Mineral - modified diet/1) Continue with regular, low- salt diet 2) Monitor PO intake, appetite, skin, weight and GI tolerance  3) Honor food preferences as requested 4) RD remains available for further nutrition interventions / questions per patient / team request/General/healthful diet

## 2017-01-09 NOTE — DISCHARGE NOTE ADULT - CARE PLAN
Principal Discharge DX:	Other systemic lupus erythematosus with other organ involvement  Goal:	Remission of active flare, prevention of subsequent episodes  Instructions for follow-up, activity and diet:	You were admitted to the hospital for chest pain. It was determined that the chest pain was not a heart attack based on your admission EKG and several lab tests. You had imaging of your lungs that did not show any new or worsening clots to explain the pain, and an echocardiogram of your heart did not show any fluid in the sac your heart is contained in. As a result, the most likely cause of your chest pain was determined to be a flare of your lupus as it was consistent with prior episodes you had had. The rheumatology seem saw you and you were continued on your home doses of Bactrim and Plaquenil and were put on IV instead of oral steroids. The decision with both the rheumatology and kidney doctors was to try a new drug for you called Rituxan to see if it could better control your symptoms. You received one dose on 1/6 and tolerated it well and thereafter began to notice improvement in your pain along with an IV Dilaudid regimen. It is very important that you call your primary rheumatologist Dr. Lowery to schedule your next Rituxan infusion which should be two weeks from the one you received in the hospital, which would be 1/20. Please also make an appointment within 1-2 weeks with Dr. Monsivais for further management of your pain. You should continue taking Plaquenil and Bactrim as you were prior to admission. Per rheumatology recommendations you should take 28mg of methylprednisone twice daily until your follow up with Dr. Lowery. A new prescription for this has been sent to your pharmacy. You also have a prescription for oxycodone 10mg 1-2 tabs every 4-6 hours as needed, the equivalent of your pain regimen on your last hospital discharge.  Secondary Diagnosis:	Membranous glomerulonephritis, stage 5  Goal:	Proper follow up with nephrology, prevention of further kidney dysfunction  Instructions for follow-up, activity and diet:	You were known on this admission to have a diagnosis of membranous nephropathy, a type of kidney disease that can cause protein loss in your urine resulting in fluid accumulation in your body. The nephrology (kidney doctor) team here saw you and recommended Rituxan along with steroids per discussion with rheumatology to treat your kidney disease in addition to your lupus. They also recommended starting a drug called Losartan to reduce the amount of protein lost through your urine and prevent further progression of your kidney disease, but you declined to take that at this time. Please follow up with Dr. Omalley for an appointment on February 10th. Her number is provided in this discharge paperwork.  Secondary Diagnosis:	S/P IVC filter  Goal:	Prevention of further blood clots  Instructions for follow-up, activity and diet:	You came into the hospital on both Lovenox subcutaneous and Pradaxa with intention to stop Lovenox eventually. You only had two days left of Lovenox so those doses were completed and you remained on Pradaxa alone, which you should continue taking as prescribed to you. Though there was some concern that the left sided abdominal pain you had on admission was related to a blood clot, an abdominal ultrasound did not reveal any blood clot.  Secondary Diagnosis:	Dyspepsia  Goal:	Relief of symptoms  Instructions for follow-up, activity and diet:	During this hospitalization you reported burning pain in your stomach associated with unremitting belching and hiccups. At first this was believed to possibly be related to the high dose steroids you were receiving but your symptoms did not improve with medications used for acid reflux. Because high dose opioids like Dilaudid can cause slowing og the gastrointestinal tract, including the stomach, it can sometimes cause something called gastroparesis, which means the stomach empties food slower than usual which can lead to pain and the other symptoms you experienced. As a result, you were started on a medication called Reglan which improved your symptoms significantly. You have been given a prescription for Reglan which you can take every 8 hours as needed for similar symptoms, though it is very likely that your symptoms will subside with discontinuation of IV steroids alone. If your symptoms persist despite oral oxycodone you can make an appointment with Dr. Carrasco in GI for evaluation. His number is provided in this paperwork.

## 2017-01-09 NOTE — DIETITIAN INITIAL EVALUATION ADULT. - ORAL INTAKE PTA
Patient denies any change in baseline appetite and intake. Usual intake: Breakfast- Scrambled eggs, fruit/protein shake 1 scoop; Lunch- Any fish other than shellfish (ex. salmon), hot dog with fruit; Dinner-  chicken and pasta./good

## 2017-01-10 LAB
% GAMMA, URINE: 7.4 % — SIGNIFICANT CHANGE UP
ALBUMIN 24H MFR UR ELPH: 70.3 % — SIGNIFICANT CHANGE UP
ALPHA1 GLOB 24H MFR UR ELPH: 9.2 % — SIGNIFICANT CHANGE UP
ALPHA2 GLOB 24H MFR UR ELPH: 5 % — SIGNIFICANT CHANGE UP
B-GLOBULIN 24H MFR UR ELPH: 8.1 % — SIGNIFICANT CHANGE UP
COLLECT DURATION TIME UR: 24 HR — SIGNIFICANT CHANGE UP
INTERPRETATION 24H UR IFE-IMP: SIGNIFICANT CHANGE UP
INTERPRETATION 24H UR IFE-IMP: SIGNIFICANT CHANGE UP
M PROTEIN 24H UR ELPH-MRATE: 0 MG/24HR — SIGNIFICANT CHANGE UP (ref 0–0)
M PROTEIN 24H UR ELPH-MRATE: 0 MG/DL — SIGNIFICANT CHANGE UP
PROT ?TM UR-MCNC: 91 MG/DL — HIGH (ref 0–12)
PROT PATTERN 24H UR ELPH-IMP: SIGNIFICANT CHANGE UP
PROTEIN QUANT CALC, URINE: 2252 MG/24 H — HIGH (ref 50–100)
TOTAL VOLUME - 24 HOUR: 2475 ML — SIGNIFICANT CHANGE UP
URINE CREATININE CALCULATION: 0.7 G/24 H — LOW (ref 1–2)

## 2017-01-11 ENCOUNTER — OUTPATIENT (OUTPATIENT)
Dept: OUTPATIENT SERVICES | Facility: HOSPITAL | Age: 27
LOS: 1 days | Discharge: HOME | End: 2017-01-11

## 2017-01-11 DIAGNOSIS — Z98.890 OTHER SPECIFIED POSTPROCEDURAL STATES: Chronic | ICD-10-CM

## 2017-01-11 DIAGNOSIS — D68.69 OTHER THROMBOPHILIA: ICD-10-CM

## 2017-01-11 DIAGNOSIS — Z95.828 PRESENCE OF OTHER VASCULAR IMPLANTS AND GRAFTS: Chronic | ICD-10-CM

## 2017-01-11 LAB
% ALBUMIN: 44.6 % — SIGNIFICANT CHANGE UP
% ALPHA 1: 6.2 % — SIGNIFICANT CHANGE UP
% ALPHA 2: 20.6 % — SIGNIFICANT CHANGE UP
% BETA: 15.6 % — SIGNIFICANT CHANGE UP
% GAMMA: 13 % — SIGNIFICANT CHANGE UP
% M SPIKE: SIGNIFICANT CHANGE UP %
ALBUMIN SERPL ELPH-MCNC: 2.1 G/DL — LOW (ref 3.6–5.5)
ALBUMIN/GLOB SERPL ELPH: 0.8 RATIO — SIGNIFICANT CHANGE UP
ALPHA1 GLOB SERPL ELPH-MCNC: 0.3 G/DL — SIGNIFICANT CHANGE UP (ref 0.1–0.4)
ALPHA2 GLOB SERPL ELPH-MCNC: 1 G/DL — SIGNIFICANT CHANGE UP (ref 0.5–1)
B-GLOBULIN SERPL ELPH-MCNC: 0.7 G/DL — SIGNIFICANT CHANGE UP (ref 0.5–1)
GAMMA GLOBULIN: 0.6 G/DL — SIGNIFICANT CHANGE UP (ref 0.6–1.6)
INTERPRETATION SERPL IFE-IMP: SIGNIFICANT CHANGE UP
M-SPIKE: SIGNIFICANT CHANGE UP G/DL (ref 0–0)
PROT PATTERN SERPL ELPH-IMP: SIGNIFICANT CHANGE UP

## 2017-01-12 ENCOUNTER — APPOINTMENT (OUTPATIENT)
Dept: HEMATOLOGY ONCOLOGY | Facility: CLINIC | Age: 27
End: 2017-01-12

## 2017-01-13 LAB — B2 MICROGLOB 24H UR-MCNC: < 0.08 MG/L — SIGNIFICANT CHANGE UP

## 2017-01-26 ENCOUNTER — LABORATORY RESULT (OUTPATIENT)
Age: 27
End: 2017-01-26

## 2017-01-26 ENCOUNTER — APPOINTMENT (OUTPATIENT)
Dept: RHEUMATOLOGY | Facility: CLINIC | Age: 27
End: 2017-01-26

## 2017-02-01 ENCOUNTER — INPATIENT (INPATIENT)
Facility: HOSPITAL | Age: 27
LOS: 6 days | Discharge: HOME | DRG: 546 | End: 2017-02-08
Attending: HOSPITALIST | Admitting: HOSPITALIST
Payer: MEDICAID

## 2017-02-01 VITALS
DIASTOLIC BLOOD PRESSURE: 98 MMHG | HEIGHT: 71 IN | RESPIRATION RATE: 16 BRPM | WEIGHT: 214.95 LBS | OXYGEN SATURATION: 99 % | SYSTOLIC BLOOD PRESSURE: 144 MMHG | HEART RATE: 114 BPM | TEMPERATURE: 99 F

## 2017-02-01 DIAGNOSIS — E78.5 HYPERLIPIDEMIA, UNSPECIFIED: ICD-10-CM

## 2017-02-01 DIAGNOSIS — R07.9 CHEST PAIN, UNSPECIFIED: ICD-10-CM

## 2017-02-01 DIAGNOSIS — I26.99 OTHER PULMONARY EMBOLISM WITHOUT ACUTE COR PULMONALE: ICD-10-CM

## 2017-02-01 DIAGNOSIS — Z95.828 PRESENCE OF OTHER VASCULAR IMPLANTS AND GRAFTS: Chronic | ICD-10-CM

## 2017-02-01 DIAGNOSIS — M32.19 OTHER ORGAN OR SYSTEM INVOLVEMENT IN SYSTEMIC LUPUS ERYTHEMATOSUS: ICD-10-CM

## 2017-02-01 DIAGNOSIS — Z98.890 OTHER SPECIFIED POSTPROCEDURAL STATES: Chronic | ICD-10-CM

## 2017-02-01 DIAGNOSIS — I27.82 CHRONIC PULMONARY EMBOLISM: ICD-10-CM

## 2017-02-01 DIAGNOSIS — R10.9 UNSPECIFIED ABDOMINAL PAIN: ICD-10-CM

## 2017-02-01 LAB
ALBUMIN SERPL ELPH-MCNC: 1.9 G/DL — LOW (ref 3.3–5)
ALP SERPL-CCNC: 74 U/L — SIGNIFICANT CHANGE UP (ref 40–120)
ALT FLD-CCNC: 11 U/L RC — SIGNIFICANT CHANGE UP (ref 10–45)
ANION GAP SERPL CALC-SCNC: 12 MMOL/L — SIGNIFICANT CHANGE UP (ref 5–17)
APTT BLD: 45.9 SEC — HIGH (ref 27.5–37.4)
AST SERPL-CCNC: 15 U/L — SIGNIFICANT CHANGE UP (ref 10–40)
BILIRUB SERPL-MCNC: <0.1 MG/DL — LOW (ref 0.2–1.2)
BUN SERPL-MCNC: 4 MG/DL — LOW (ref 7–23)
CALCIUM SERPL-MCNC: 8.1 MG/DL — LOW (ref 8.4–10.5)
CHLORIDE SERPL-SCNC: 106 MMOL/L — SIGNIFICANT CHANGE UP (ref 96–108)
CO2 SERPL-SCNC: 24 MMOL/L — SIGNIFICANT CHANGE UP (ref 22–31)
CREAT SERPL-MCNC: 0.56 MG/DL — SIGNIFICANT CHANGE UP (ref 0.5–1.3)
GAS PNL BLDV: SIGNIFICANT CHANGE UP
GLUCOSE SERPL-MCNC: 89 MG/DL — SIGNIFICANT CHANGE UP (ref 70–99)
HCT VFR BLD CALC: 31.4 % — LOW (ref 39–50)
HGB BLD-MCNC: 10.2 G/DL — LOW (ref 13–17)
INR BLD: 1.03 RATIO — SIGNIFICANT CHANGE UP (ref 0.88–1.16)
MCHC RBC-ENTMCNC: 27 PG — SIGNIFICANT CHANGE UP (ref 27–34)
MCHC RBC-ENTMCNC: 32.5 GM/DL — SIGNIFICANT CHANGE UP (ref 32–36)
MCV RBC AUTO: 83 FL — SIGNIFICANT CHANGE UP (ref 80–100)
PLATELET # BLD AUTO: 278 K/UL — SIGNIFICANT CHANGE UP (ref 150–400)
POTASSIUM SERPL-MCNC: 4.1 MMOL/L — SIGNIFICANT CHANGE UP (ref 3.5–5.3)
POTASSIUM SERPL-SCNC: 4.1 MMOL/L — SIGNIFICANT CHANGE UP (ref 3.5–5.3)
PROT SERPL-MCNC: 5.5 G/DL — LOW (ref 6–8.3)
PROTHROM AB SERPL-ACNC: 11.1 SEC — SIGNIFICANT CHANGE UP (ref 10–13.1)
RBC # BLD: 3.78 M/UL — LOW (ref 4.2–5.8)
RBC # FLD: 16 % — HIGH (ref 10.3–14.5)
SODIUM SERPL-SCNC: 142 MMOL/L — SIGNIFICANT CHANGE UP (ref 135–145)
TROPONIN T SERPL-MCNC: <0.01 NG/ML — SIGNIFICANT CHANGE UP (ref 0–0.06)
TROPONIN T SERPL-MCNC: <0.01 NG/ML — SIGNIFICANT CHANGE UP (ref 0–0.06)
WBC # BLD: 5.6 K/UL — SIGNIFICANT CHANGE UP (ref 3.8–10.5)
WBC # FLD AUTO: 5.6 K/UL — SIGNIFICANT CHANGE UP (ref 3.8–10.5)

## 2017-02-01 PROCEDURE — 99223 1ST HOSP IP/OBS HIGH 75: CPT

## 2017-02-01 PROCEDURE — 99223 1ST HOSP IP/OBS HIGH 75: CPT | Mod: GC

## 2017-02-01 PROCEDURE — 71010: CPT | Mod: 26

## 2017-02-01 PROCEDURE — 99285 EMERGENCY DEPT VISIT HI MDM: CPT | Mod: 25

## 2017-02-01 RX ORDER — SODIUM CHLORIDE 9 MG/ML
1000 INJECTION INTRAMUSCULAR; INTRAVENOUS; SUBCUTANEOUS ONCE
Qty: 0 | Refills: 0 | Status: COMPLETED | OUTPATIENT
Start: 2017-02-01 | End: 2017-02-01

## 2017-02-01 RX ORDER — HYDROMORPHONE HYDROCHLORIDE 2 MG/ML
1 INJECTION INTRAMUSCULAR; INTRAVENOUS; SUBCUTANEOUS ONCE
Qty: 0 | Refills: 0 | Status: DISCONTINUED | OUTPATIENT
Start: 2017-02-01 | End: 2017-02-01

## 2017-02-01 RX ORDER — ACETAMINOPHEN 500 MG
1000 TABLET ORAL ONCE
Qty: 0 | Refills: 0 | Status: COMPLETED | OUTPATIENT
Start: 2017-02-01 | End: 2017-02-01

## 2017-02-01 RX ORDER — HYDROMORPHONE HYDROCHLORIDE 2 MG/ML
2 INJECTION INTRAMUSCULAR; INTRAVENOUS; SUBCUTANEOUS EVERY 4 HOURS
Qty: 0 | Refills: 0 | Status: DISCONTINUED | OUTPATIENT
Start: 2017-02-01 | End: 2017-02-01

## 2017-02-01 RX ORDER — HYDROMORPHONE HYDROCHLORIDE 2 MG/ML
2 INJECTION INTRAMUSCULAR; INTRAVENOUS; SUBCUTANEOUS ONCE
Qty: 0 | Refills: 0 | Status: DISCONTINUED | OUTPATIENT
Start: 2017-02-01 | End: 2017-02-01

## 2017-02-01 RX ORDER — HYDROMORPHONE HYDROCHLORIDE 2 MG/ML
1 INJECTION INTRAMUSCULAR; INTRAVENOUS; SUBCUTANEOUS
Qty: 0 | Refills: 0 | Status: DISCONTINUED | OUTPATIENT
Start: 2017-02-01 | End: 2017-02-01

## 2017-02-01 RX ORDER — DABIGATRAN ETEXILATE MESYLATE 150 MG/1
150 CAPSULE ORAL EVERY 12 HOURS
Qty: 0 | Refills: 0 | Status: DISCONTINUED | OUTPATIENT
Start: 2017-02-01 | End: 2017-02-08

## 2017-02-01 RX ORDER — HYDROMORPHONE HYDROCHLORIDE 2 MG/ML
4 INJECTION INTRAMUSCULAR; INTRAVENOUS; SUBCUTANEOUS EVERY 4 HOURS
Qty: 0 | Refills: 0 | Status: DISCONTINUED | OUTPATIENT
Start: 2017-02-01 | End: 2017-02-08

## 2017-02-01 RX ORDER — HYDROXYCHLOROQUINE SULFATE 200 MG
200 TABLET ORAL
Qty: 0 | Refills: 0 | Status: DISCONTINUED | OUTPATIENT
Start: 2017-02-01 | End: 2017-02-08

## 2017-02-01 RX ORDER — ASPIRIN/CALCIUM CARB/MAGNESIUM 324 MG
81 TABLET ORAL DAILY
Qty: 0 | Refills: 0 | Status: DISCONTINUED | OUTPATIENT
Start: 2017-02-01 | End: 2017-02-08

## 2017-02-01 RX ORDER — METOCLOPRAMIDE HCL 10 MG
5 TABLET ORAL EVERY 8 HOURS
Qty: 0 | Refills: 0 | Status: DISCONTINUED | OUTPATIENT
Start: 2017-02-01 | End: 2017-02-08

## 2017-02-01 RX ORDER — FAMOTIDINE 10 MG/ML
20 INJECTION INTRAVENOUS DAILY
Qty: 0 | Refills: 0 | Status: DISCONTINUED | OUTPATIENT
Start: 2017-02-01 | End: 2017-02-03

## 2017-02-01 RX ADMIN — HYDROMORPHONE HYDROCHLORIDE 1 MILLIGRAM(S): 2 INJECTION INTRAMUSCULAR; INTRAVENOUS; SUBCUTANEOUS at 09:23

## 2017-02-01 RX ADMIN — HYDROMORPHONE HYDROCHLORIDE 1 MILLIGRAM(S): 2 INJECTION INTRAMUSCULAR; INTRAVENOUS; SUBCUTANEOUS at 09:14

## 2017-02-01 RX ADMIN — Medication 81 MILLIGRAM(S): at 13:27

## 2017-02-01 RX ADMIN — HYDROMORPHONE HYDROCHLORIDE 1 MILLIGRAM(S): 2 INJECTION INTRAMUSCULAR; INTRAVENOUS; SUBCUTANEOUS at 13:17

## 2017-02-01 RX ADMIN — HYDROMORPHONE HYDROCHLORIDE 4 MILLIGRAM(S): 2 INJECTION INTRAMUSCULAR; INTRAVENOUS; SUBCUTANEOUS at 23:11

## 2017-02-01 RX ADMIN — Medication 28 MILLIGRAM(S): at 17:29

## 2017-02-01 RX ADMIN — HYDROMORPHONE HYDROCHLORIDE 2 MILLIGRAM(S): 2 INJECTION INTRAMUSCULAR; INTRAVENOUS; SUBCUTANEOUS at 22:47

## 2017-02-01 RX ADMIN — HYDROMORPHONE HYDROCHLORIDE 1 MILLIGRAM(S): 2 INJECTION INTRAMUSCULAR; INTRAVENOUS; SUBCUTANEOUS at 13:07

## 2017-02-01 RX ADMIN — SODIUM CHLORIDE 1000 MILLILITER(S): 9 INJECTION INTRAMUSCULAR; INTRAVENOUS; SUBCUTANEOUS at 08:23

## 2017-02-01 RX ADMIN — HYDROMORPHONE HYDROCHLORIDE 2 MILLIGRAM(S): 2 INJECTION INTRAMUSCULAR; INTRAVENOUS; SUBCUTANEOUS at 17:58

## 2017-02-01 RX ADMIN — HYDROMORPHONE HYDROCHLORIDE 1 MILLIGRAM(S): 2 INJECTION INTRAMUSCULAR; INTRAVENOUS; SUBCUTANEOUS at 13:18

## 2017-02-01 RX ADMIN — Medication 1000 MILLIGRAM(S): at 09:14

## 2017-02-01 RX ADMIN — HYDROMORPHONE HYDROCHLORIDE 2 MILLIGRAM(S): 2 INJECTION INTRAMUSCULAR; INTRAVENOUS; SUBCUTANEOUS at 21:41

## 2017-02-01 RX ADMIN — HYDROMORPHONE HYDROCHLORIDE 1 MILLIGRAM(S): 2 INJECTION INTRAMUSCULAR; INTRAVENOUS; SUBCUTANEOUS at 10:37

## 2017-02-01 RX ADMIN — Medication 400 MILLIGRAM(S): at 08:23

## 2017-02-01 RX ADMIN — Medication 1 TABLET(S): at 17:28

## 2017-02-01 RX ADMIN — FAMOTIDINE 20 MILLIGRAM(S): 10 INJECTION INTRAVENOUS at 17:28

## 2017-02-01 RX ADMIN — DABIGATRAN ETEXILATE MESYLATE 150 MILLIGRAM(S): 150 CAPSULE ORAL at 17:29

## 2017-02-01 RX ADMIN — Medication 200 MILLIGRAM(S): at 17:29

## 2017-02-01 RX ADMIN — HYDROMORPHONE HYDROCHLORIDE 2 MILLIGRAM(S): 2 INJECTION INTRAMUSCULAR; INTRAVENOUS; SUBCUTANEOUS at 17:28

## 2017-02-01 RX ADMIN — HYDROMORPHONE HYDROCHLORIDE 1 MILLIGRAM(S): 2 INJECTION INTRAMUSCULAR; INTRAVENOUS; SUBCUTANEOUS at 10:28

## 2017-02-01 RX ADMIN — HYDROMORPHONE HYDROCHLORIDE 1 MILLIGRAM(S): 2 INJECTION INTRAMUSCULAR; INTRAVENOUS; SUBCUTANEOUS at 08:23

## 2017-02-01 NOTE — ED ADULT NURSE REASSESSMENT NOTE - NS ED NURSE REASSESS COMMENT FT1
Pt's mother called at around 1130am. Pt's mother wanted to discuss son's plan of care on the phone. Explained to pt's mother that because of HIPPAA law I cannot discuss the plan of care of the pt over the phone. Pt's mother started to get angry and loud over the phone. Pt's mother put on hold and Dr. Benjamin came to the phone to speak to the mother. Pt is a 26 year old male alert and orientedX4.

## 2017-02-01 NOTE — H&P ADULT. - PROBLEM SELECTOR PLAN 1
-admit to rule out ACS, first set of troponins negative  -history of PE (right posterior basal segment mismatch on VQ scan done in early January 2016), on pradaxa, patient reports compliance with medications. Tachycardic, but not hypoxic or dyspneic. Will hold off on repeating VQ scan for now.   -dilaudid PRN pain   -history of pericarditis, will check echo for effusion

## 2017-02-01 NOTE — H&P ADULT. - HISTORY OF PRESENT ILLNESS
25 y/o M with history of SLE (ARTURO, dsDNA, low C3, arthritis and pericarditis) in the setting of recurrent pleural effusions (requiring multiple chest tubes), lupus nephritis (class V, failed cellcept), seronegative APS, DVT/PE with IVC filter placement due to failed anticoagulation therapy (failed xarelto, coumadin, lovenox, and arixtra), and bilateral renal vein thrombosis presenting to the ED with worsening chest pain and left flank pain. The patient was recently discharged from [facility] and was stable at home since discharge. He had started therapy with Rituxan infusions and had his last infusion last Thursday. He reports that he had been tolerating these well. However,3 days ago he started to develop severe chest and flank pain. Non -radiating pain. He states that it feels like his chest is being squeezed. Denies dyspnea, palpitations, light headedness, dizziness, fevers, chills, night sweats, cough, diarrhea, dysuria. His pain is constant and severe. He reports being adherent with all of his medications including his anticoagulant.

## 2017-02-01 NOTE — ED ADULT NURSE NOTE - OBJECTIVE STATEMENT
Came in with c/o chest pain related to Lupus. No distress. Breathing easy and non labored. ambulatory. Pt not diaphoretic. Very anxious. Emotional support offered

## 2017-02-01 NOTE — H&P ADULT. - PROBLEM SELECTOR PLAN 2
-creatinine stable, no urinary symptoms  -history of bilateral renal vein thrombosis  -declines MRV for re-evaluation as patient does not wish to receive contrast (history of allergic reaction)

## 2017-02-01 NOTE — H&P ADULT. - PROBLEM SELECTOR PLAN 4
-s/p 2 doses of rituximab  -consult placed with house rheumatology, patient is well known to them  -will continue medrol, and plaquenil

## 2017-02-01 NOTE — ED PROVIDER NOTE - OBJECTIVE STATEMENT
pt is a 27 y/o male with h/o SLE, DVT, PE with mulitple visits for pleuritic chest pain, h/o pe on pradaxa with GFF, non complaint with meds. no fevers or chills. He has been have the cp for the past few days now. describes mild sob.

## 2017-02-01 NOTE — H&P ADULT. - ASSESSMENT
27 y/o M with history of SLE (ARTURO, dsDNA, low C3, arthritis and pericarditis) in the setting of recurrent pleural effusions (requiring multiple chest tubes), lupus nephritis (class V, failed cellcept), seronegative APS, DVT/PE with IVC filter placement due to failed anticoagulation therapy (failed xarelto, coumadin, lovenox, and arixtra), and bilateral renal vein thrombosis admitted for worsening chest pain and left flank pain.

## 2017-02-01 NOTE — ED PROVIDER NOTE - ATTENDING CONTRIBUTION TO CARE
pt is a 27 y/o male with h/o SLE, mulitple visits for pleuritic chest pain, h/o pe on pradaxa with GFF, echo with no pericardial effusions, small l sided pleural effusion, ekg no changes but sinus tachy, analgesia ordered, cardiac work up. pt is a 27 y/o male with h/o SLE, mulitple visits for pleuritic chest pain, h/o pe on pradaxa with GFF, echo with no pericardial effusions, small l sided pleural effusion, ekg no changes but sinus tachy, analgesia ordered, cardiac work up. pt with iv contrast allergy for possible pe work up.

## 2017-02-01 NOTE — H&P ADULT. - NEGATIVE CARDIOVASCULAR SYMPTOMS
no palpitations/no orthopnea/no paroxysmal nocturnal dyspnea/no peripheral edema/no dyspnea on exertion

## 2017-02-02 LAB
ANION GAP SERPL CALC-SCNC: 10 MMOL/L — SIGNIFICANT CHANGE UP (ref 5–17)
APPEARANCE UR: CLEAR — SIGNIFICANT CHANGE UP
BILIRUB UR-MCNC: NEGATIVE — SIGNIFICANT CHANGE UP
BUN SERPL-MCNC: 7 MG/DL — SIGNIFICANT CHANGE UP (ref 7–23)
CALCIUM SERPL-MCNC: 8.9 MG/DL — SIGNIFICANT CHANGE UP (ref 8.4–10.5)
CHLORIDE SERPL-SCNC: 104 MMOL/L — SIGNIFICANT CHANGE UP (ref 96–108)
CO2 SERPL-SCNC: 24 MMOL/L — SIGNIFICANT CHANGE UP (ref 22–31)
COLOR SPEC: YELLOW — SIGNIFICANT CHANGE UP
CREAT SERPL-MCNC: 0.59 MG/DL — SIGNIFICANT CHANGE UP (ref 0.5–1.3)
DIFF PNL FLD: ABNORMAL
GLUCOSE SERPL-MCNC: 102 MG/DL — HIGH (ref 70–99)
GLUCOSE UR QL: NEGATIVE MG/DL — SIGNIFICANT CHANGE UP
HCT VFR BLD CALC: 32.7 % — LOW (ref 39–50)
HGB BLD-MCNC: 10.9 G/DL — LOW (ref 13–17)
KETONES UR-MCNC: NEGATIVE — SIGNIFICANT CHANGE UP
LEUKOCYTE ESTERASE UR-ACNC: NEGATIVE — SIGNIFICANT CHANGE UP
MCHC RBC-ENTMCNC: 27.7 PG — SIGNIFICANT CHANGE UP (ref 27–34)
MCHC RBC-ENTMCNC: 33.3 GM/DL — SIGNIFICANT CHANGE UP (ref 32–36)
MCV RBC AUTO: 83 FL — SIGNIFICANT CHANGE UP (ref 80–100)
NITRITE UR-MCNC: NEGATIVE — SIGNIFICANT CHANGE UP
PH UR: 7 — SIGNIFICANT CHANGE UP (ref 5–8)
PLATELET # BLD AUTO: 293 K/UL — SIGNIFICANT CHANGE UP (ref 150–400)
POTASSIUM SERPL-MCNC: 4.9 MMOL/L — SIGNIFICANT CHANGE UP (ref 3.5–5.3)
POTASSIUM SERPL-SCNC: 4.9 MMOL/L — SIGNIFICANT CHANGE UP (ref 3.5–5.3)
PROT UR-MCNC: 300 MG/DL
RBC # BLD: 3.94 M/UL — LOW (ref 4.2–5.8)
RBC # FLD: 15.7 % — HIGH (ref 10.3–14.5)
SODIUM SERPL-SCNC: 138 MMOL/L — SIGNIFICANT CHANGE UP (ref 135–145)
SP GR SPEC: 1.02 — SIGNIFICANT CHANGE UP (ref 1.01–1.02)
TROPONIN T SERPL-MCNC: <0.01 NG/ML — SIGNIFICANT CHANGE UP (ref 0–0.06)
UROBILINOGEN FLD QL: NEGATIVE MG/DL — SIGNIFICANT CHANGE UP
WBC # BLD: 4.3 K/UL — SIGNIFICANT CHANGE UP (ref 3.8–10.5)
WBC # FLD AUTO: 4.3 K/UL — SIGNIFICANT CHANGE UP (ref 3.8–10.5)

## 2017-02-02 PROCEDURE — 93010 ELECTROCARDIOGRAM REPORT: CPT

## 2017-02-02 PROCEDURE — 93306 TTE W/DOPPLER COMPLETE: CPT | Mod: 26

## 2017-02-02 PROCEDURE — 99232 SBSQ HOSP IP/OBS MODERATE 35: CPT | Mod: GC

## 2017-02-02 PROCEDURE — 99233 SBSQ HOSP IP/OBS HIGH 50: CPT

## 2017-02-02 RX ORDER — FAMOTIDINE 10 MG/ML
20 INJECTION INTRAVENOUS ONCE
Qty: 0 | Refills: 0 | Status: COMPLETED | OUTPATIENT
Start: 2017-02-02 | End: 2017-02-02

## 2017-02-02 RX ORDER — HYDROMORPHONE HYDROCHLORIDE 2 MG/ML
2 INJECTION INTRAMUSCULAR; INTRAVENOUS; SUBCUTANEOUS EVERY 6 HOURS
Qty: 0 | Refills: 0 | Status: DISCONTINUED | OUTPATIENT
Start: 2017-02-02 | End: 2017-02-03

## 2017-02-02 RX ORDER — HYDROMORPHONE HYDROCHLORIDE 2 MG/ML
1 INJECTION INTRAMUSCULAR; INTRAVENOUS; SUBCUTANEOUS ONCE
Qty: 0 | Refills: 0 | Status: DISCONTINUED | OUTPATIENT
Start: 2017-02-02 | End: 2017-02-02

## 2017-02-02 RX ADMIN — HYDROMORPHONE HYDROCHLORIDE 2 MILLIGRAM(S): 2 INJECTION INTRAMUSCULAR; INTRAVENOUS; SUBCUTANEOUS at 22:08

## 2017-02-02 RX ADMIN — DABIGATRAN ETEXILATE MESYLATE 150 MILLIGRAM(S): 150 CAPSULE ORAL at 18:30

## 2017-02-02 RX ADMIN — HYDROMORPHONE HYDROCHLORIDE 2 MILLIGRAM(S): 2 INJECTION INTRAMUSCULAR; INTRAVENOUS; SUBCUTANEOUS at 22:40

## 2017-02-02 RX ADMIN — Medication 1 TABLET(S): at 10:34

## 2017-02-02 RX ADMIN — Medication 81 MILLIGRAM(S): at 10:34

## 2017-02-02 RX ADMIN — HYDROMORPHONE HYDROCHLORIDE 4 MILLIGRAM(S): 2 INJECTION INTRAMUSCULAR; INTRAVENOUS; SUBCUTANEOUS at 20:55

## 2017-02-02 RX ADMIN — FAMOTIDINE 20 MILLIGRAM(S): 10 INJECTION INTRAVENOUS at 10:35

## 2017-02-02 RX ADMIN — HYDROMORPHONE HYDROCHLORIDE 2 MILLIGRAM(S): 2 INJECTION INTRAMUSCULAR; INTRAVENOUS; SUBCUTANEOUS at 14:10

## 2017-02-02 RX ADMIN — Medication 28 MILLIGRAM(S): at 21:30

## 2017-02-02 RX ADMIN — HYDROMORPHONE HYDROCHLORIDE 4 MILLIGRAM(S): 2 INJECTION INTRAMUSCULAR; INTRAVENOUS; SUBCUTANEOUS at 07:37

## 2017-02-02 RX ADMIN — HYDROMORPHONE HYDROCHLORIDE 4 MILLIGRAM(S): 2 INJECTION INTRAMUSCULAR; INTRAVENOUS; SUBCUTANEOUS at 11:33

## 2017-02-02 RX ADMIN — HYDROMORPHONE HYDROCHLORIDE 4 MILLIGRAM(S): 2 INJECTION INTRAMUSCULAR; INTRAVENOUS; SUBCUTANEOUS at 20:29

## 2017-02-02 RX ADMIN — HYDROMORPHONE HYDROCHLORIDE 4 MILLIGRAM(S): 2 INJECTION INTRAMUSCULAR; INTRAVENOUS; SUBCUTANEOUS at 16:33

## 2017-02-02 RX ADMIN — DABIGATRAN ETEXILATE MESYLATE 150 MILLIGRAM(S): 150 CAPSULE ORAL at 06:06

## 2017-02-02 RX ADMIN — Medication 28 MILLIGRAM(S): at 06:18

## 2017-02-02 RX ADMIN — HYDROMORPHONE HYDROCHLORIDE 4 MILLIGRAM(S): 2 INJECTION INTRAMUSCULAR; INTRAVENOUS; SUBCUTANEOUS at 03:13

## 2017-02-02 RX ADMIN — HYDROMORPHONE HYDROCHLORIDE 4 MILLIGRAM(S): 2 INJECTION INTRAMUSCULAR; INTRAVENOUS; SUBCUTANEOUS at 15:59

## 2017-02-02 RX ADMIN — HYDROMORPHONE HYDROCHLORIDE 4 MILLIGRAM(S): 2 INJECTION INTRAMUSCULAR; INTRAVENOUS; SUBCUTANEOUS at 04:31

## 2017-02-02 RX ADMIN — Medication 200 MILLIGRAM(S): at 10:35

## 2017-02-02 RX ADMIN — Medication 200 MILLIGRAM(S): at 18:31

## 2017-02-02 RX ADMIN — HYDROMORPHONE HYDROCHLORIDE 4 MILLIGRAM(S): 2 INJECTION INTRAMUSCULAR; INTRAVENOUS; SUBCUTANEOUS at 08:33

## 2017-02-02 RX ADMIN — HYDROMORPHONE HYDROCHLORIDE 4 MILLIGRAM(S): 2 INJECTION INTRAMUSCULAR; INTRAVENOUS; SUBCUTANEOUS at 00:05

## 2017-02-02 RX ADMIN — HYDROMORPHONE HYDROCHLORIDE 1 MILLIGRAM(S): 2 INJECTION INTRAMUSCULAR; INTRAVENOUS; SUBCUTANEOUS at 02:47

## 2017-02-02 RX ADMIN — FAMOTIDINE 20 MILLIGRAM(S): 10 INJECTION INTRAVENOUS at 21:27

## 2017-02-02 RX ADMIN — HYDROMORPHONE HYDROCHLORIDE 4 MILLIGRAM(S): 2 INJECTION INTRAMUSCULAR; INTRAVENOUS; SUBCUTANEOUS at 10:35

## 2017-02-02 RX ADMIN — HYDROMORPHONE HYDROCHLORIDE 1 MILLIGRAM(S): 2 INJECTION INTRAMUSCULAR; INTRAVENOUS; SUBCUTANEOUS at 02:11

## 2017-02-02 RX ADMIN — HYDROMORPHONE HYDROCHLORIDE 2 MILLIGRAM(S): 2 INJECTION INTRAMUSCULAR; INTRAVENOUS; SUBCUTANEOUS at 15:33

## 2017-02-03 PROCEDURE — 99233 SBSQ HOSP IP/OBS HIGH 50: CPT

## 2017-02-03 PROCEDURE — 76775 US EXAM ABDO BACK WALL LIM: CPT | Mod: 26

## 2017-02-03 PROCEDURE — 99231 SBSQ HOSP IP/OBS SF/LOW 25: CPT | Mod: GC

## 2017-02-03 RX ORDER — HYDROMORPHONE HYDROCHLORIDE 2 MG/ML
2 INJECTION INTRAMUSCULAR; INTRAVENOUS; SUBCUTANEOUS
Qty: 0 | Refills: 0 | Status: DISCONTINUED | OUTPATIENT
Start: 2017-02-03 | End: 2017-02-06

## 2017-02-03 RX ORDER — FAMOTIDINE 10 MG/ML
20 INJECTION INTRAVENOUS
Qty: 0 | Refills: 0 | Status: DISCONTINUED | OUTPATIENT
Start: 2017-02-03 | End: 2017-02-08

## 2017-02-03 RX ADMIN — FAMOTIDINE 20 MILLIGRAM(S): 10 INJECTION INTRAVENOUS at 22:14

## 2017-02-03 RX ADMIN — HYDROMORPHONE HYDROCHLORIDE 4 MILLIGRAM(S): 2 INJECTION INTRAMUSCULAR; INTRAVENOUS; SUBCUTANEOUS at 04:33

## 2017-02-03 RX ADMIN — HYDROMORPHONE HYDROCHLORIDE 4 MILLIGRAM(S): 2 INJECTION INTRAMUSCULAR; INTRAVENOUS; SUBCUTANEOUS at 00:33

## 2017-02-03 RX ADMIN — HYDROMORPHONE HYDROCHLORIDE 2 MILLIGRAM(S): 2 INJECTION INTRAMUSCULAR; INTRAVENOUS; SUBCUTANEOUS at 18:52

## 2017-02-03 RX ADMIN — Medication 200 MILLIGRAM(S): at 17:49

## 2017-02-03 RX ADMIN — HYDROMORPHONE HYDROCHLORIDE 4 MILLIGRAM(S): 2 INJECTION INTRAMUSCULAR; INTRAVENOUS; SUBCUTANEOUS at 14:00

## 2017-02-03 RX ADMIN — HYDROMORPHONE HYDROCHLORIDE 4 MILLIGRAM(S): 2 INJECTION INTRAMUSCULAR; INTRAVENOUS; SUBCUTANEOUS at 09:25

## 2017-02-03 RX ADMIN — HYDROMORPHONE HYDROCHLORIDE 2 MILLIGRAM(S): 2 INJECTION INTRAMUSCULAR; INTRAVENOUS; SUBCUTANEOUS at 23:33

## 2017-02-03 RX ADMIN — HYDROMORPHONE HYDROCHLORIDE 4 MILLIGRAM(S): 2 INJECTION INTRAMUSCULAR; INTRAVENOUS; SUBCUTANEOUS at 05:05

## 2017-02-03 RX ADMIN — HYDROMORPHONE HYDROCHLORIDE 2 MILLIGRAM(S): 2 INJECTION INTRAMUSCULAR; INTRAVENOUS; SUBCUTANEOUS at 06:12

## 2017-02-03 RX ADMIN — HYDROMORPHONE HYDROCHLORIDE 2 MILLIGRAM(S): 2 INJECTION INTRAMUSCULAR; INTRAVENOUS; SUBCUTANEOUS at 16:00

## 2017-02-03 RX ADMIN — HYDROMORPHONE HYDROCHLORIDE 4 MILLIGRAM(S): 2 INJECTION INTRAMUSCULAR; INTRAVENOUS; SUBCUTANEOUS at 22:08

## 2017-02-03 RX ADMIN — DABIGATRAN ETEXILATE MESYLATE 150 MILLIGRAM(S): 150 CAPSULE ORAL at 17:49

## 2017-02-03 RX ADMIN — HYDROMORPHONE HYDROCHLORIDE 4 MILLIGRAM(S): 2 INJECTION INTRAMUSCULAR; INTRAVENOUS; SUBCUTANEOUS at 18:22

## 2017-02-03 RX ADMIN — HYDROMORPHONE HYDROCHLORIDE 4 MILLIGRAM(S): 2 INJECTION INTRAMUSCULAR; INTRAVENOUS; SUBCUTANEOUS at 08:51

## 2017-02-03 RX ADMIN — Medication 28 MILLIGRAM(S): at 22:14

## 2017-02-03 RX ADMIN — HYDROMORPHONE HYDROCHLORIDE 2 MILLIGRAM(S): 2 INJECTION INTRAMUSCULAR; INTRAVENOUS; SUBCUTANEOUS at 12:30

## 2017-02-03 RX ADMIN — FAMOTIDINE 20 MILLIGRAM(S): 10 INJECTION INTRAVENOUS at 06:12

## 2017-02-03 RX ADMIN — HYDROMORPHONE HYDROCHLORIDE 2 MILLIGRAM(S): 2 INJECTION INTRAMUSCULAR; INTRAVENOUS; SUBCUTANEOUS at 06:53

## 2017-02-03 RX ADMIN — HYDROMORPHONE HYDROCHLORIDE 4 MILLIGRAM(S): 2 INJECTION INTRAMUSCULAR; INTRAVENOUS; SUBCUTANEOUS at 13:23

## 2017-02-03 RX ADMIN — HYDROMORPHONE HYDROCHLORIDE 2 MILLIGRAM(S): 2 INJECTION INTRAMUSCULAR; INTRAVENOUS; SUBCUTANEOUS at 19:22

## 2017-02-03 RX ADMIN — HYDROMORPHONE HYDROCHLORIDE 2 MILLIGRAM(S): 2 INJECTION INTRAMUSCULAR; INTRAVENOUS; SUBCUTANEOUS at 23:31

## 2017-02-03 RX ADMIN — HYDROMORPHONE HYDROCHLORIDE 4 MILLIGRAM(S): 2 INJECTION INTRAMUSCULAR; INTRAVENOUS; SUBCUTANEOUS at 23:31

## 2017-02-03 RX ADMIN — HYDROMORPHONE HYDROCHLORIDE 4 MILLIGRAM(S): 2 INJECTION INTRAMUSCULAR; INTRAVENOUS; SUBCUTANEOUS at 01:05

## 2017-02-03 RX ADMIN — Medication 200 MILLIGRAM(S): at 08:51

## 2017-02-03 RX ADMIN — DABIGATRAN ETEXILATE MESYLATE 150 MILLIGRAM(S): 150 CAPSULE ORAL at 06:12

## 2017-02-03 RX ADMIN — HYDROMORPHONE HYDROCHLORIDE 2 MILLIGRAM(S): 2 INJECTION INTRAMUSCULAR; INTRAVENOUS; SUBCUTANEOUS at 11:51

## 2017-02-03 RX ADMIN — HYDROMORPHONE HYDROCHLORIDE 2 MILLIGRAM(S): 2 INJECTION INTRAMUSCULAR; INTRAVENOUS; SUBCUTANEOUS at 15:29

## 2017-02-03 RX ADMIN — Medication 81 MILLIGRAM(S): at 08:51

## 2017-02-03 RX ADMIN — Medication 28 MILLIGRAM(S): at 06:12

## 2017-02-03 RX ADMIN — HYDROMORPHONE HYDROCHLORIDE 2 MILLIGRAM(S): 2 INJECTION INTRAMUSCULAR; INTRAVENOUS; SUBCUTANEOUS at 21:07

## 2017-02-03 RX ADMIN — HYDROMORPHONE HYDROCHLORIDE 4 MILLIGRAM(S): 2 INJECTION INTRAMUSCULAR; INTRAVENOUS; SUBCUTANEOUS at 17:50

## 2017-02-04 LAB
HCT VFR BLD CALC: 34.9 % — LOW (ref 39–50)
HGB BLD-MCNC: 11.6 G/DL — LOW (ref 13–17)
MCHC RBC-ENTMCNC: 27.7 PG — SIGNIFICANT CHANGE UP (ref 27–34)
MCHC RBC-ENTMCNC: 33.4 GM/DL — SIGNIFICANT CHANGE UP (ref 32–36)
MCV RBC AUTO: 83 FL — SIGNIFICANT CHANGE UP (ref 80–100)
PLATELET # BLD AUTO: 293 K/UL — SIGNIFICANT CHANGE UP (ref 150–400)
RBC # BLD: 4.2 M/UL — SIGNIFICANT CHANGE UP (ref 4.2–5.8)
RBC # FLD: 16.1 % — HIGH (ref 10.3–14.5)
WBC # BLD: 5.6 K/UL — SIGNIFICANT CHANGE UP (ref 3.8–10.5)
WBC # FLD AUTO: 5.6 K/UL — SIGNIFICANT CHANGE UP (ref 3.8–10.5)

## 2017-02-04 PROCEDURE — 99233 SBSQ HOSP IP/OBS HIGH 50: CPT

## 2017-02-04 PROCEDURE — 74176 CT ABD & PELVIS W/O CONTRAST: CPT | Mod: 26

## 2017-02-04 RX ADMIN — HYDROMORPHONE HYDROCHLORIDE 2 MILLIGRAM(S): 2 INJECTION INTRAMUSCULAR; INTRAVENOUS; SUBCUTANEOUS at 12:20

## 2017-02-04 RX ADMIN — HYDROMORPHONE HYDROCHLORIDE 4 MILLIGRAM(S): 2 INJECTION INTRAMUSCULAR; INTRAVENOUS; SUBCUTANEOUS at 06:31

## 2017-02-04 RX ADMIN — HYDROMORPHONE HYDROCHLORIDE 4 MILLIGRAM(S): 2 INJECTION INTRAMUSCULAR; INTRAVENOUS; SUBCUTANEOUS at 02:16

## 2017-02-04 RX ADMIN — FAMOTIDINE 20 MILLIGRAM(S): 10 INJECTION INTRAVENOUS at 05:21

## 2017-02-04 RX ADMIN — HYDROMORPHONE HYDROCHLORIDE 2 MILLIGRAM(S): 2 INJECTION INTRAMUSCULAR; INTRAVENOUS; SUBCUTANEOUS at 17:01

## 2017-02-04 RX ADMIN — HYDROMORPHONE HYDROCHLORIDE 4 MILLIGRAM(S): 2 INJECTION INTRAMUSCULAR; INTRAVENOUS; SUBCUTANEOUS at 10:30

## 2017-02-04 RX ADMIN — HYDROMORPHONE HYDROCHLORIDE 4 MILLIGRAM(S): 2 INJECTION INTRAMUSCULAR; INTRAVENOUS; SUBCUTANEOUS at 19:05

## 2017-02-04 RX ADMIN — Medication 200 MILLIGRAM(S): at 08:13

## 2017-02-04 RX ADMIN — HYDROMORPHONE HYDROCHLORIDE 2 MILLIGRAM(S): 2 INJECTION INTRAMUSCULAR; INTRAVENOUS; SUBCUTANEOUS at 04:47

## 2017-02-04 RX ADMIN — Medication 81 MILLIGRAM(S): at 08:12

## 2017-02-04 RX ADMIN — HYDROMORPHONE HYDROCHLORIDE 4 MILLIGRAM(S): 2 INJECTION INTRAMUSCULAR; INTRAVENOUS; SUBCUTANEOUS at 18:52

## 2017-02-04 RX ADMIN — HYDROMORPHONE HYDROCHLORIDE 2 MILLIGRAM(S): 2 INJECTION INTRAMUSCULAR; INTRAVENOUS; SUBCUTANEOUS at 02:20

## 2017-02-04 RX ADMIN — HYDROMORPHONE HYDROCHLORIDE 4 MILLIGRAM(S): 2 INJECTION INTRAMUSCULAR; INTRAVENOUS; SUBCUTANEOUS at 23:26

## 2017-02-04 RX ADMIN — Medication 1 TABLET(S): at 08:13

## 2017-02-04 RX ADMIN — HYDROMORPHONE HYDROCHLORIDE 4 MILLIGRAM(S): 2 INJECTION INTRAMUSCULAR; INTRAVENOUS; SUBCUTANEOUS at 11:14

## 2017-02-04 RX ADMIN — HYDROMORPHONE HYDROCHLORIDE 4 MILLIGRAM(S): 2 INJECTION INTRAMUSCULAR; INTRAVENOUS; SUBCUTANEOUS at 15:06

## 2017-02-04 RX ADMIN — HYDROMORPHONE HYDROCHLORIDE 4 MILLIGRAM(S): 2 INJECTION INTRAMUSCULAR; INTRAVENOUS; SUBCUTANEOUS at 02:20

## 2017-02-04 RX ADMIN — HYDROMORPHONE HYDROCHLORIDE 2 MILLIGRAM(S): 2 INJECTION INTRAMUSCULAR; INTRAVENOUS; SUBCUTANEOUS at 20:21

## 2017-02-04 RX ADMIN — DABIGATRAN ETEXILATE MESYLATE 150 MILLIGRAM(S): 150 CAPSULE ORAL at 05:20

## 2017-02-04 RX ADMIN — HYDROMORPHONE HYDROCHLORIDE 4 MILLIGRAM(S): 2 INJECTION INTRAMUSCULAR; INTRAVENOUS; SUBCUTANEOUS at 23:50

## 2017-02-04 RX ADMIN — HYDROMORPHONE HYDROCHLORIDE 4 MILLIGRAM(S): 2 INJECTION INTRAMUSCULAR; INTRAVENOUS; SUBCUTANEOUS at 14:36

## 2017-02-04 RX ADMIN — FAMOTIDINE 20 MILLIGRAM(S): 10 INJECTION INTRAVENOUS at 17:31

## 2017-02-04 RX ADMIN — HYDROMORPHONE HYDROCHLORIDE 2 MILLIGRAM(S): 2 INJECTION INTRAMUSCULAR; INTRAVENOUS; SUBCUTANEOUS at 20:50

## 2017-02-04 RX ADMIN — HYDROMORPHONE HYDROCHLORIDE 2 MILLIGRAM(S): 2 INJECTION INTRAMUSCULAR; INTRAVENOUS; SUBCUTANEOUS at 04:17

## 2017-02-04 RX ADMIN — HYDROMORPHONE HYDROCHLORIDE 4 MILLIGRAM(S): 2 INJECTION INTRAMUSCULAR; INTRAVENOUS; SUBCUTANEOUS at 08:13

## 2017-02-04 RX ADMIN — HYDROMORPHONE HYDROCHLORIDE 2 MILLIGRAM(S): 2 INJECTION INTRAMUSCULAR; INTRAVENOUS; SUBCUTANEOUS at 08:12

## 2017-02-04 RX ADMIN — HYDROMORPHONE HYDROCHLORIDE 2 MILLIGRAM(S): 2 INJECTION INTRAMUSCULAR; INTRAVENOUS; SUBCUTANEOUS at 09:05

## 2017-02-04 RX ADMIN — Medication 28 MILLIGRAM(S): at 17:31

## 2017-02-04 RX ADMIN — DABIGATRAN ETEXILATE MESYLATE 150 MILLIGRAM(S): 150 CAPSULE ORAL at 17:31

## 2017-02-04 RX ADMIN — Medication 28 MILLIGRAM(S): at 05:21

## 2017-02-04 RX ADMIN — HYDROMORPHONE HYDROCHLORIDE 2 MILLIGRAM(S): 2 INJECTION INTRAMUSCULAR; INTRAVENOUS; SUBCUTANEOUS at 16:45

## 2017-02-04 RX ADMIN — Medication 200 MILLIGRAM(S): at 17:31

## 2017-02-04 RX ADMIN — HYDROMORPHONE HYDROCHLORIDE 2 MILLIGRAM(S): 2 INJECTION INTRAMUSCULAR; INTRAVENOUS; SUBCUTANEOUS at 13:39

## 2017-02-05 PROCEDURE — 99233 SBSQ HOSP IP/OBS HIGH 50: CPT

## 2017-02-05 RX ORDER — LIDOCAINE 4 G/100G
1 CREAM TOPICAL DAILY
Qty: 0 | Refills: 0 | Status: DISCONTINUED | OUTPATIENT
Start: 2017-02-05 | End: 2017-02-08

## 2017-02-05 RX ADMIN — Medication 28 MILLIGRAM(S): at 06:11

## 2017-02-05 RX ADMIN — DABIGATRAN ETEXILATE MESYLATE 150 MILLIGRAM(S): 150 CAPSULE ORAL at 17:15

## 2017-02-05 RX ADMIN — LIDOCAINE 1 PATCH: 4 CREAM TOPICAL at 17:14

## 2017-02-05 RX ADMIN — HYDROMORPHONE HYDROCHLORIDE 2 MILLIGRAM(S): 2 INJECTION INTRAMUSCULAR; INTRAVENOUS; SUBCUTANEOUS at 23:05

## 2017-02-05 RX ADMIN — HYDROMORPHONE HYDROCHLORIDE 4 MILLIGRAM(S): 2 INJECTION INTRAMUSCULAR; INTRAVENOUS; SUBCUTANEOUS at 17:07

## 2017-02-05 RX ADMIN — HYDROMORPHONE HYDROCHLORIDE 2 MILLIGRAM(S): 2 INJECTION INTRAMUSCULAR; INTRAVENOUS; SUBCUTANEOUS at 19:52

## 2017-02-05 RX ADMIN — HYDROMORPHONE HYDROCHLORIDE 2 MILLIGRAM(S): 2 INJECTION INTRAMUSCULAR; INTRAVENOUS; SUBCUTANEOUS at 15:10

## 2017-02-05 RX ADMIN — HYDROMORPHONE HYDROCHLORIDE 4 MILLIGRAM(S): 2 INJECTION INTRAMUSCULAR; INTRAVENOUS; SUBCUTANEOUS at 04:30

## 2017-02-05 RX ADMIN — HYDROMORPHONE HYDROCHLORIDE 2 MILLIGRAM(S): 2 INJECTION INTRAMUSCULAR; INTRAVENOUS; SUBCUTANEOUS at 14:51

## 2017-02-05 RX ADMIN — HYDROMORPHONE HYDROCHLORIDE 4 MILLIGRAM(S): 2 INJECTION INTRAMUSCULAR; INTRAVENOUS; SUBCUTANEOUS at 10:05

## 2017-02-05 RX ADMIN — HYDROMORPHONE HYDROCHLORIDE 4 MILLIGRAM(S): 2 INJECTION INTRAMUSCULAR; INTRAVENOUS; SUBCUTANEOUS at 17:35

## 2017-02-05 RX ADMIN — HYDROMORPHONE HYDROCHLORIDE 4 MILLIGRAM(S): 2 INJECTION INTRAMUSCULAR; INTRAVENOUS; SUBCUTANEOUS at 13:25

## 2017-02-05 RX ADMIN — Medication 200 MILLIGRAM(S): at 17:14

## 2017-02-05 RX ADMIN — HYDROMORPHONE HYDROCHLORIDE 4 MILLIGRAM(S): 2 INJECTION INTRAMUSCULAR; INTRAVENOUS; SUBCUTANEOUS at 21:48

## 2017-02-05 RX ADMIN — FAMOTIDINE 20 MILLIGRAM(S): 10 INJECTION INTRAVENOUS at 06:10

## 2017-02-05 RX ADMIN — HYDROMORPHONE HYDROCHLORIDE 2 MILLIGRAM(S): 2 INJECTION INTRAMUSCULAR; INTRAVENOUS; SUBCUTANEOUS at 06:40

## 2017-02-05 RX ADMIN — HYDROMORPHONE HYDROCHLORIDE 4 MILLIGRAM(S): 2 INJECTION INTRAMUSCULAR; INTRAVENOUS; SUBCUTANEOUS at 13:06

## 2017-02-05 RX ADMIN — HYDROMORPHONE HYDROCHLORIDE 2 MILLIGRAM(S): 2 INJECTION INTRAMUSCULAR; INTRAVENOUS; SUBCUTANEOUS at 11:20

## 2017-02-05 RX ADMIN — HYDROMORPHONE HYDROCHLORIDE 4 MILLIGRAM(S): 2 INJECTION INTRAMUSCULAR; INTRAVENOUS; SUBCUTANEOUS at 04:06

## 2017-02-05 RX ADMIN — HYDROMORPHONE HYDROCHLORIDE 4 MILLIGRAM(S): 2 INJECTION INTRAMUSCULAR; INTRAVENOUS; SUBCUTANEOUS at 21:18

## 2017-02-05 RX ADMIN — HYDROMORPHONE HYDROCHLORIDE 2 MILLIGRAM(S): 2 INJECTION INTRAMUSCULAR; INTRAVENOUS; SUBCUTANEOUS at 22:35

## 2017-02-05 RX ADMIN — DABIGATRAN ETEXILATE MESYLATE 150 MILLIGRAM(S): 150 CAPSULE ORAL at 06:10

## 2017-02-05 RX ADMIN — HYDROMORPHONE HYDROCHLORIDE 2 MILLIGRAM(S): 2 INJECTION INTRAMUSCULAR; INTRAVENOUS; SUBCUTANEOUS at 19:22

## 2017-02-05 RX ADMIN — Medication 81 MILLIGRAM(S): at 11:08

## 2017-02-05 RX ADMIN — FAMOTIDINE 20 MILLIGRAM(S): 10 INJECTION INTRAVENOUS at 17:15

## 2017-02-05 RX ADMIN — HYDROMORPHONE HYDROCHLORIDE 2 MILLIGRAM(S): 2 INJECTION INTRAMUSCULAR; INTRAVENOUS; SUBCUTANEOUS at 11:05

## 2017-02-05 RX ADMIN — HYDROMORPHONE HYDROCHLORIDE 2 MILLIGRAM(S): 2 INJECTION INTRAMUSCULAR; INTRAVENOUS; SUBCUTANEOUS at 06:08

## 2017-02-05 RX ADMIN — Medication 200 MILLIGRAM(S): at 09:46

## 2017-02-05 RX ADMIN — HYDROMORPHONE HYDROCHLORIDE 4 MILLIGRAM(S): 2 INJECTION INTRAMUSCULAR; INTRAVENOUS; SUBCUTANEOUS at 09:48

## 2017-02-05 NOTE — PROVIDER CONTACT NOTE (OTHER) - RECOMMENDATIONS
NP to assess patient
NP to provide order for breakthrough medication
PA aware. No new orders at this time. Pain management team is not available at this time as per PA as the closes early.
reviewed patient meds with NP

## 2017-02-06 PROCEDURE — 99233 SBSQ HOSP IP/OBS HIGH 50: CPT

## 2017-02-06 RX ORDER — HYDROMORPHONE HYDROCHLORIDE 2 MG/ML
2 INJECTION INTRAMUSCULAR; INTRAVENOUS; SUBCUTANEOUS EVERY 4 HOURS
Qty: 0 | Refills: 0 | Status: DISCONTINUED | OUTPATIENT
Start: 2017-02-06 | End: 2017-02-07

## 2017-02-06 RX ADMIN — HYDROMORPHONE HYDROCHLORIDE 4 MILLIGRAM(S): 2 INJECTION INTRAMUSCULAR; INTRAVENOUS; SUBCUTANEOUS at 17:15

## 2017-02-06 RX ADMIN — Medication 81 MILLIGRAM(S): at 09:01

## 2017-02-06 RX ADMIN — HYDROMORPHONE HYDROCHLORIDE 2 MILLIGRAM(S): 2 INJECTION INTRAMUSCULAR; INTRAVENOUS; SUBCUTANEOUS at 22:44

## 2017-02-06 RX ADMIN — LIDOCAINE 1 PATCH: 4 CREAM TOPICAL at 05:31

## 2017-02-06 RX ADMIN — Medication 200 MILLIGRAM(S): at 18:39

## 2017-02-06 RX ADMIN — HYDROMORPHONE HYDROCHLORIDE 4 MILLIGRAM(S): 2 INJECTION INTRAMUSCULAR; INTRAVENOUS; SUBCUTANEOUS at 20:30

## 2017-02-06 RX ADMIN — Medication 5 MILLIGRAM(S): at 09:01

## 2017-02-06 RX ADMIN — HYDROMORPHONE HYDROCHLORIDE 2 MILLIGRAM(S): 2 INJECTION INTRAMUSCULAR; INTRAVENOUS; SUBCUTANEOUS at 03:13

## 2017-02-06 RX ADMIN — HYDROMORPHONE HYDROCHLORIDE 4 MILLIGRAM(S): 2 INJECTION INTRAMUSCULAR; INTRAVENOUS; SUBCUTANEOUS at 06:03

## 2017-02-06 RX ADMIN — DABIGATRAN ETEXILATE MESYLATE 150 MILLIGRAM(S): 150 CAPSULE ORAL at 18:39

## 2017-02-06 RX ADMIN — Medication 200 MILLIGRAM(S): at 09:01

## 2017-02-06 RX ADMIN — HYDROMORPHONE HYDROCHLORIDE 4 MILLIGRAM(S): 2 INJECTION INTRAMUSCULAR; INTRAVENOUS; SUBCUTANEOUS at 20:07

## 2017-02-06 RX ADMIN — HYDROMORPHONE HYDROCHLORIDE 4 MILLIGRAM(S): 2 INJECTION INTRAMUSCULAR; INTRAVENOUS; SUBCUTANEOUS at 09:47

## 2017-02-06 RX ADMIN — HYDROMORPHONE HYDROCHLORIDE 4 MILLIGRAM(S): 2 INJECTION INTRAMUSCULAR; INTRAVENOUS; SUBCUTANEOUS at 12:44

## 2017-02-06 RX ADMIN — DABIGATRAN ETEXILATE MESYLATE 150 MILLIGRAM(S): 150 CAPSULE ORAL at 05:34

## 2017-02-06 RX ADMIN — HYDROMORPHONE HYDROCHLORIDE 2 MILLIGRAM(S): 2 INJECTION INTRAMUSCULAR; INTRAVENOUS; SUBCUTANEOUS at 19:00

## 2017-02-06 RX ADMIN — FAMOTIDINE 20 MILLIGRAM(S): 10 INJECTION INTRAVENOUS at 18:39

## 2017-02-06 RX ADMIN — HYDROMORPHONE HYDROCHLORIDE 2 MILLIGRAM(S): 2 INJECTION INTRAMUSCULAR; INTRAVENOUS; SUBCUTANEOUS at 10:51

## 2017-02-06 RX ADMIN — FAMOTIDINE 20 MILLIGRAM(S): 10 INJECTION INTRAVENOUS at 05:34

## 2017-02-06 RX ADMIN — HYDROMORPHONE HYDROCHLORIDE 2 MILLIGRAM(S): 2 INJECTION INTRAMUSCULAR; INTRAVENOUS; SUBCUTANEOUS at 14:39

## 2017-02-06 RX ADMIN — HYDROMORPHONE HYDROCHLORIDE 4 MILLIGRAM(S): 2 INJECTION INTRAMUSCULAR; INTRAVENOUS; SUBCUTANEOUS at 16:34

## 2017-02-06 RX ADMIN — HYDROMORPHONE HYDROCHLORIDE 2 MILLIGRAM(S): 2 INJECTION INTRAMUSCULAR; INTRAVENOUS; SUBCUTANEOUS at 03:30

## 2017-02-06 RX ADMIN — HYDROMORPHONE HYDROCHLORIDE 4 MILLIGRAM(S): 2 INJECTION INTRAMUSCULAR; INTRAVENOUS; SUBCUTANEOUS at 01:29

## 2017-02-06 RX ADMIN — HYDROMORPHONE HYDROCHLORIDE 4 MILLIGRAM(S): 2 INJECTION INTRAMUSCULAR; INTRAVENOUS; SUBCUTANEOUS at 13:05

## 2017-02-06 RX ADMIN — HYDROMORPHONE HYDROCHLORIDE 2 MILLIGRAM(S): 2 INJECTION INTRAMUSCULAR; INTRAVENOUS; SUBCUTANEOUS at 18:39

## 2017-02-06 RX ADMIN — Medication 1 TABLET(S): at 09:01

## 2017-02-06 RX ADMIN — HYDROMORPHONE HYDROCHLORIDE 2 MILLIGRAM(S): 2 INJECTION INTRAMUSCULAR; INTRAVENOUS; SUBCUTANEOUS at 02:43

## 2017-02-06 RX ADMIN — HYDROMORPHONE HYDROCHLORIDE 2 MILLIGRAM(S): 2 INJECTION INTRAMUSCULAR; INTRAVENOUS; SUBCUTANEOUS at 14:08

## 2017-02-06 RX ADMIN — HYDROMORPHONE HYDROCHLORIDE 2 MILLIGRAM(S): 2 INJECTION INTRAMUSCULAR; INTRAVENOUS; SUBCUTANEOUS at 23:15

## 2017-02-06 RX ADMIN — HYDROMORPHONE HYDROCHLORIDE 4 MILLIGRAM(S): 2 INJECTION INTRAMUSCULAR; INTRAVENOUS; SUBCUTANEOUS at 09:01

## 2017-02-06 RX ADMIN — LIDOCAINE 1 PATCH: 4 CREAM TOPICAL at 09:01

## 2017-02-06 RX ADMIN — HYDROMORPHONE HYDROCHLORIDE 2 MILLIGRAM(S): 2 INJECTION INTRAMUSCULAR; INTRAVENOUS; SUBCUTANEOUS at 11:16

## 2017-02-06 RX ADMIN — HYDROMORPHONE HYDROCHLORIDE 4 MILLIGRAM(S): 2 INJECTION INTRAMUSCULAR; INTRAVENOUS; SUBCUTANEOUS at 01:59

## 2017-02-06 RX ADMIN — HYDROMORPHONE HYDROCHLORIDE 4 MILLIGRAM(S): 2 INJECTION INTRAMUSCULAR; INTRAVENOUS; SUBCUTANEOUS at 00:45

## 2017-02-06 RX ADMIN — HYDROMORPHONE HYDROCHLORIDE 4 MILLIGRAM(S): 2 INJECTION INTRAMUSCULAR; INTRAVENOUS; SUBCUTANEOUS at 05:33

## 2017-02-06 NOTE — PROVIDER CONTACT NOTE (OTHER) - ACTION/TREATMENT ORDERED:
Domonique to notify Dr Reyes; will follow up
will closely monitor the pt.
NP made aware. NP to assess patient.
NP made aware. NP to order 1mg dose of IVP dilaudid.

## 2017-02-06 NOTE — PROVIDER CONTACT NOTE (OTHER) - ASSESSMENT
Ao=O x 4. Pain controlled at this time. Received 2 mg IV dilaudid at 1730.
Patient alert and oriented x. Patient complaining of 7/10 pain
Patient alert and oriented x4. Patient complaining of increasing chest pain after having receiving 2mg dilaudid in ER.  1956 vitals: T = 97.9; HR = 98; BP = 117/82; RR = 18; SpO2 = 98% on room air
patient A&O x4, denies cp/sob. patient asymptomatic, VSS, /96, HR 98 pox 100% RA, SR-ST 's on tele monitor. Patient ambulates to bathroom at time of occurrence.
Pt VSS; A&O x 4; free from symptomatic arrhythmias & respiratory distress

## 2017-02-06 NOTE — PROVIDER CONTACT NOTE (OTHER) - BACKGROUND
Admitted for chest pain
Patient admitted for chest pain
Patient admitted for chest pain
patient admitted for CP , r/o mi negative
h/o SLE

## 2017-02-06 NOTE — PROVIDER CONTACT NOTE (OTHER) - REASON
Patient complaing of chest pain; requesting supplemental pain medication
Patient wants to talk to provider and pain management
Patient with 7/10 pain, no order for breakthrough medication
Refusing medrol
ST in 130-140 with OOB activity.

## 2017-02-07 PROCEDURE — 99233 SBSQ HOSP IP/OBS HIGH 50: CPT

## 2017-02-07 RX ORDER — HYDROMORPHONE HYDROCHLORIDE 2 MG/ML
2 INJECTION INTRAMUSCULAR; INTRAVENOUS; SUBCUTANEOUS ONCE
Qty: 0 | Refills: 0 | Status: DISCONTINUED | OUTPATIENT
Start: 2017-02-07 | End: 2017-02-07

## 2017-02-07 RX ORDER — HYDROMORPHONE HYDROCHLORIDE 2 MG/ML
2 INJECTION INTRAMUSCULAR; INTRAVENOUS; SUBCUTANEOUS EVERY 8 HOURS
Qty: 0 | Refills: 0 | Status: DISCONTINUED | OUTPATIENT
Start: 2017-02-07 | End: 2017-02-07

## 2017-02-07 RX ORDER — OXYCODONE HYDROCHLORIDE 5 MG/1
15 TABLET ORAL EVERY 12 HOURS
Qty: 0 | Refills: 0 | Status: DISCONTINUED | OUTPATIENT
Start: 2017-02-07 | End: 2017-02-08

## 2017-02-07 RX ORDER — HYDROMORPHONE HYDROCHLORIDE 2 MG/ML
2 INJECTION INTRAMUSCULAR; INTRAVENOUS; SUBCUTANEOUS EVERY 8 HOURS
Qty: 0 | Refills: 0 | Status: DISCONTINUED | OUTPATIENT
Start: 2017-02-07 | End: 2017-02-08

## 2017-02-07 RX ADMIN — Medication 200 MILLIGRAM(S): at 09:20

## 2017-02-07 RX ADMIN — FAMOTIDINE 20 MILLIGRAM(S): 10 INJECTION INTRAVENOUS at 17:04

## 2017-02-07 RX ADMIN — HYDROMORPHONE HYDROCHLORIDE 4 MILLIGRAM(S): 2 INJECTION INTRAMUSCULAR; INTRAVENOUS; SUBCUTANEOUS at 00:12

## 2017-02-07 RX ADMIN — HYDROMORPHONE HYDROCHLORIDE 4 MILLIGRAM(S): 2 INJECTION INTRAMUSCULAR; INTRAVENOUS; SUBCUTANEOUS at 13:45

## 2017-02-07 RX ADMIN — Medication 81 MILLIGRAM(S): at 11:35

## 2017-02-07 RX ADMIN — HYDROMORPHONE HYDROCHLORIDE 2 MILLIGRAM(S): 2 INJECTION INTRAMUSCULAR; INTRAVENOUS; SUBCUTANEOUS at 12:10

## 2017-02-07 RX ADMIN — HYDROMORPHONE HYDROCHLORIDE 4 MILLIGRAM(S): 2 INJECTION INTRAMUSCULAR; INTRAVENOUS; SUBCUTANEOUS at 09:35

## 2017-02-07 RX ADMIN — HYDROMORPHONE HYDROCHLORIDE 4 MILLIGRAM(S): 2 INJECTION INTRAMUSCULAR; INTRAVENOUS; SUBCUTANEOUS at 21:40

## 2017-02-07 RX ADMIN — HYDROMORPHONE HYDROCHLORIDE 2 MILLIGRAM(S): 2 INJECTION INTRAMUSCULAR; INTRAVENOUS; SUBCUTANEOUS at 11:34

## 2017-02-07 RX ADMIN — LIDOCAINE 1 PATCH: 4 CREAM TOPICAL at 00:30

## 2017-02-07 RX ADMIN — HYDROMORPHONE HYDROCHLORIDE 2 MILLIGRAM(S): 2 INJECTION INTRAMUSCULAR; INTRAVENOUS; SUBCUTANEOUS at 06:30

## 2017-02-07 RX ADMIN — DABIGATRAN ETEXILATE MESYLATE 150 MILLIGRAM(S): 150 CAPSULE ORAL at 17:04

## 2017-02-07 RX ADMIN — Medication 28 MILLIGRAM(S): at 17:07

## 2017-02-07 RX ADMIN — OXYCODONE HYDROCHLORIDE 15 MILLIGRAM(S): 5 TABLET ORAL at 17:08

## 2017-02-07 RX ADMIN — HYDROMORPHONE HYDROCHLORIDE 2 MILLIGRAM(S): 2 INJECTION INTRAMUSCULAR; INTRAVENOUS; SUBCUTANEOUS at 02:52

## 2017-02-07 RX ADMIN — OXYCODONE HYDROCHLORIDE 15 MILLIGRAM(S): 5 TABLET ORAL at 17:57

## 2017-02-07 RX ADMIN — HYDROMORPHONE HYDROCHLORIDE 4 MILLIGRAM(S): 2 INJECTION INTRAMUSCULAR; INTRAVENOUS; SUBCUTANEOUS at 22:10

## 2017-02-07 RX ADMIN — FAMOTIDINE 20 MILLIGRAM(S): 10 INJECTION INTRAVENOUS at 06:28

## 2017-02-07 RX ADMIN — HYDROMORPHONE HYDROCHLORIDE 4 MILLIGRAM(S): 2 INJECTION INTRAMUSCULAR; INTRAVENOUS; SUBCUTANEOUS at 09:00

## 2017-02-07 RX ADMIN — HYDROMORPHONE HYDROCHLORIDE 4 MILLIGRAM(S): 2 INJECTION INTRAMUSCULAR; INTRAVENOUS; SUBCUTANEOUS at 13:02

## 2017-02-07 RX ADMIN — HYDROMORPHONE HYDROCHLORIDE 2 MILLIGRAM(S): 2 INJECTION INTRAMUSCULAR; INTRAVENOUS; SUBCUTANEOUS at 20:55

## 2017-02-07 RX ADMIN — DABIGATRAN ETEXILATE MESYLATE 150 MILLIGRAM(S): 150 CAPSULE ORAL at 06:27

## 2017-02-07 RX ADMIN — HYDROMORPHONE HYDROCHLORIDE 4 MILLIGRAM(S): 2 INJECTION INTRAMUSCULAR; INTRAVENOUS; SUBCUTANEOUS at 17:03

## 2017-02-07 RX ADMIN — HYDROMORPHONE HYDROCHLORIDE 4 MILLIGRAM(S): 2 INJECTION INTRAMUSCULAR; INTRAVENOUS; SUBCUTANEOUS at 17:57

## 2017-02-07 RX ADMIN — LIDOCAINE 1 PATCH: 4 CREAM TOPICAL at 11:35

## 2017-02-07 RX ADMIN — LIDOCAINE 1 PATCH: 4 CREAM TOPICAL at 23:30

## 2017-02-07 RX ADMIN — HYDROMORPHONE HYDROCHLORIDE 4 MILLIGRAM(S): 2 INJECTION INTRAMUSCULAR; INTRAVENOUS; SUBCUTANEOUS at 04:45

## 2017-02-07 RX ADMIN — Medication 200 MILLIGRAM(S): at 17:04

## 2017-02-07 RX ADMIN — HYDROMORPHONE HYDROCHLORIDE 2 MILLIGRAM(S): 2 INJECTION INTRAMUSCULAR; INTRAVENOUS; SUBCUTANEOUS at 20:22

## 2017-02-07 RX ADMIN — HYDROMORPHONE HYDROCHLORIDE 4 MILLIGRAM(S): 2 INJECTION INTRAMUSCULAR; INTRAVENOUS; SUBCUTANEOUS at 04:11

## 2017-02-07 NOTE — ED PROCEDURE NOTE - PROCEDURE ADDITIONAL DETAILS
trace pericardial effusion, cardiac good contractility,  mild left pleural effusion.  positive sliding bilateral lung.

## 2017-02-08 ENCOUNTER — TRANSCRIPTION ENCOUNTER (OUTPATIENT)
Age: 27
End: 2017-02-08

## 2017-02-08 VITALS
TEMPERATURE: 98 F | RESPIRATION RATE: 18 BRPM | HEART RATE: 101 BPM | DIASTOLIC BLOOD PRESSURE: 94 MMHG | SYSTOLIC BLOOD PRESSURE: 142 MMHG | OXYGEN SATURATION: 100 %

## 2017-02-08 PROCEDURE — 99231 SBSQ HOSP IP/OBS SF/LOW 25: CPT | Mod: GC

## 2017-02-08 PROCEDURE — 99239 HOSP IP/OBS DSCHRG MGMT >30: CPT

## 2017-02-08 RX ORDER — OXYCODONE HYDROCHLORIDE 5 MG/1
1 TABLET ORAL
Qty: 12 | Refills: 0 | OUTPATIENT
Start: 2017-02-08 | End: 2017-02-11

## 2017-02-08 RX ORDER — OXYCODONE HYDROCHLORIDE 5 MG/1
1 TABLET ORAL
Qty: 0 | Refills: 0 | COMMUNITY

## 2017-02-08 RX ORDER — POLYETHYLENE GLYCOL 3350 17 G/17G
17 POWDER, FOR SOLUTION ORAL
Qty: 0 | Refills: 0 | COMMUNITY
Start: 2017-02-08

## 2017-02-08 RX ORDER — LIDOCAINE 4 G/100G
1 CREAM TOPICAL
Qty: 30 | Refills: 0 | OUTPATIENT
Start: 2017-02-08 | End: 2017-03-10

## 2017-02-08 RX ORDER — FAMOTIDINE 10 MG/ML
1 INJECTION INTRAVENOUS
Qty: 30 | Refills: 0 | OUTPATIENT
Start: 2017-02-08 | End: 2017-03-10

## 2017-02-08 RX ORDER — OXYCODONE HYDROCHLORIDE 5 MG/1
20 TABLET ORAL EVERY 4 HOURS
Qty: 0 | Refills: 0 | Status: DISCONTINUED | OUTPATIENT
Start: 2017-02-08 | End: 2017-02-08

## 2017-02-08 RX ORDER — COLCHICINE 0.6 MG
1 TABLET ORAL
Qty: 0 | Refills: 0 | COMMUNITY

## 2017-02-08 RX ORDER — FAMOTIDINE 10 MG/ML
1 INJECTION INTRAVENOUS
Qty: 0 | Refills: 0 | COMMUNITY

## 2017-02-08 RX ORDER — OXYCODONE HYDROCHLORIDE 5 MG/1
1 TABLET ORAL
Qty: 6 | Refills: 0 | OUTPATIENT
Start: 2017-02-08 | End: 2017-02-11

## 2017-02-08 RX ORDER — POLYETHYLENE GLYCOL 3350 17 G/17G
17 POWDER, FOR SOLUTION ORAL AT BEDTIME
Qty: 0 | Refills: 0 | Status: DISCONTINUED | OUTPATIENT
Start: 2017-02-08 | End: 2017-02-08

## 2017-02-08 RX ORDER — OXYCODONE HYDROCHLORIDE 5 MG/1
10 TABLET ORAL EVERY 4 HOURS
Qty: 0 | Refills: 0 | Status: DISCONTINUED | OUTPATIENT
Start: 2017-02-08 | End: 2017-02-08

## 2017-02-08 RX ORDER — PANTOPRAZOLE SODIUM 20 MG/1
40 TABLET, DELAYED RELEASE ORAL ONCE
Qty: 0 | Refills: 0 | Status: COMPLETED | OUTPATIENT
Start: 2017-02-08 | End: 2017-02-08

## 2017-02-08 RX ORDER — OXYCODONE HYDROCHLORIDE 5 MG/1
0 TABLET ORAL
Qty: 0 | Refills: 0 | COMMUNITY

## 2017-02-08 RX ADMIN — Medication 81 MILLIGRAM(S): at 13:10

## 2017-02-08 RX ADMIN — HYDROMORPHONE HYDROCHLORIDE 4 MILLIGRAM(S): 2 INJECTION INTRAMUSCULAR; INTRAVENOUS; SUBCUTANEOUS at 06:28

## 2017-02-08 RX ADMIN — HYDROMORPHONE HYDROCHLORIDE 2 MILLIGRAM(S): 2 INJECTION INTRAMUSCULAR; INTRAVENOUS; SUBCUTANEOUS at 05:10

## 2017-02-08 RX ADMIN — HYDROMORPHONE HYDROCHLORIDE 4 MILLIGRAM(S): 2 INJECTION INTRAMUSCULAR; INTRAVENOUS; SUBCUTANEOUS at 10:35

## 2017-02-08 RX ADMIN — OXYCODONE HYDROCHLORIDE 15 MILLIGRAM(S): 5 TABLET ORAL at 08:17

## 2017-02-08 RX ADMIN — DABIGATRAN ETEXILATE MESYLATE 150 MILLIGRAM(S): 150 CAPSULE ORAL at 17:23

## 2017-02-08 RX ADMIN — Medication 1 TABLET(S): at 13:10

## 2017-02-08 RX ADMIN — HYDROMORPHONE HYDROCHLORIDE 4 MILLIGRAM(S): 2 INJECTION INTRAMUSCULAR; INTRAVENOUS; SUBCUTANEOUS at 11:53

## 2017-02-08 RX ADMIN — OXYCODONE HYDROCHLORIDE 10 MILLIGRAM(S): 5 TABLET ORAL at 14:45

## 2017-02-08 RX ADMIN — FAMOTIDINE 20 MILLIGRAM(S): 10 INJECTION INTRAVENOUS at 06:27

## 2017-02-08 RX ADMIN — Medication 200 MILLIGRAM(S): at 17:23

## 2017-02-08 RX ADMIN — OXYCODONE HYDROCHLORIDE 10 MILLIGRAM(S): 5 TABLET ORAL at 13:13

## 2017-02-08 RX ADMIN — PANTOPRAZOLE SODIUM 40 MILLIGRAM(S): 20 TABLET, DELAYED RELEASE ORAL at 16:20

## 2017-02-08 RX ADMIN — Medication 200 MILLIGRAM(S): at 09:54

## 2017-02-08 RX ADMIN — HYDROMORPHONE HYDROCHLORIDE 4 MILLIGRAM(S): 2 INJECTION INTRAMUSCULAR; INTRAVENOUS; SUBCUTANEOUS at 02:27

## 2017-02-08 RX ADMIN — DABIGATRAN ETEXILATE MESYLATE 150 MILLIGRAM(S): 150 CAPSULE ORAL at 06:28

## 2017-02-08 RX ADMIN — HYDROMORPHONE HYDROCHLORIDE 4 MILLIGRAM(S): 2 INJECTION INTRAMUSCULAR; INTRAVENOUS; SUBCUTANEOUS at 01:55

## 2017-02-08 RX ADMIN — HYDROMORPHONE HYDROCHLORIDE 2 MILLIGRAM(S): 2 INJECTION INTRAMUSCULAR; INTRAVENOUS; SUBCUTANEOUS at 04:44

## 2017-02-08 RX ADMIN — OXYCODONE HYDROCHLORIDE 15 MILLIGRAM(S): 5 TABLET ORAL at 06:52

## 2017-02-08 RX ADMIN — FAMOTIDINE 20 MILLIGRAM(S): 10 INJECTION INTRAVENOUS at 17:23

## 2017-02-08 RX ADMIN — HYDROMORPHONE HYDROCHLORIDE 4 MILLIGRAM(S): 2 INJECTION INTRAMUSCULAR; INTRAVENOUS; SUBCUTANEOUS at 08:17

## 2017-02-08 RX ADMIN — HYDROMORPHONE HYDROCHLORIDE 4 MILLIGRAM(S): 2 INJECTION INTRAMUSCULAR; INTRAVENOUS; SUBCUTANEOUS at 14:48

## 2017-02-08 RX ADMIN — Medication 20 MILLIGRAM(S): at 09:57

## 2017-02-08 RX ADMIN — HYDROMORPHONE HYDROCHLORIDE 4 MILLIGRAM(S): 2 INJECTION INTRAMUSCULAR; INTRAVENOUS; SUBCUTANEOUS at 15:42

## 2017-02-08 RX ADMIN — OXYCODONE HYDROCHLORIDE 15 MILLIGRAM(S): 5 TABLET ORAL at 17:27

## 2017-02-08 NOTE — DISCHARGE NOTE ADULT - PATIENT PORTAL LINK FT
“You can access the FollowHealth Patient Portal, offered by [facility], by registering with the following website: [email or web address]”

## 2017-02-08 NOTE — DISCHARGE NOTE ADULT - MEDICATION SUMMARY - MEDICATIONS TO CHANGE
I will SWITCH the dose or number of times a day I take the medications listed below when I get home from the hospital:    Medrol 32 mg oral tablet  -- 1 tab(s) by mouth 2 times a day

## 2017-02-08 NOTE — DISCHARGE NOTE ADULT - PROVIDER TOKENS
FREE:[LAST:[Dr. Starr],FIRST:[Rheumatology],PHONE:[(   )    -],FAX:[(   )    -]],TOKEN:'[id number]'

## 2017-02-08 NOTE — DISCHARGE NOTE ADULT - CARE PROVIDER_API CALL
Dr. Starr, Rheumatology  Phone: (   )    -  Fax: (   )    -    Tevin Monsivais), Neurology  [address] [place] [address]  Phone: [phone]  Fax: [phone]

## 2017-02-08 NOTE — DISCHARGE NOTE ADULT - MEDICATION SUMMARY - MEDICATIONS TO TAKE
I will START or STAY ON the medications listed below when I get home from the hospital:    longevity powder  -- 1 scoop(s) by mouth mix with water every other day  -- this is a multivitamin supplement  -- Indication: For Supplement    methylPREDNISolone 4 mg oral tablet  -- 7 tab(s) by mouth 2 times a day  -- Indication: For Systemic lupus erythematosus with other organ involvement, unspecified SLE type    aspirin 81 mg oral tablet  -- 1 tab(s) by mouth once a day  -- Indication: For CAD    oxyCODONE 10 mg oral tablet  --  1-2 tablets every 4-6 hours as needed by mouth   -- Indication: For Pain Management    OxyCONTIN 15 mg oral tablet, extended release  -- 1 tab(s) by mouth every 12 hours  -- Indication: For Pain management    Pradaxa 150 mg oral capsule  -- 1 cap(s) by mouth 2 times a day  -- Indication: For Thrombus    Plaquenil 200 mg oral tablet  -- 1 tab(s) by mouth 2 times a day  -- Indication: For Systemic lupus erythematosus with other organ involvement, unspecified SLE type    lidocaine 5% topical film  -- Apply on skin to affected area once a day MDD:1  -- Indication: For Pain management     famotidine 20 mg oral tablet  -- 1 tab(s) by mouth , As Needed  -- Indication: For GERDS    polyethylene glycol 3350 oral powder for reconstitution  -- 17 gram(s) by mouth once a day (at bedtime), As needed, Constipation  -- Indication: For Laxative    sulfamethoxazole-trimethoprim 800 mg-160 mg oral tablet  -- 1 tab(s) by mouth every other day  -- Indication: For Prophylaxis    Calcium 600+D 600 mg-200 intl units oral tablet  -- Take 1 tab by mouth daily  -- Indication: For Supplement I will START or STAY ON the medications listed below when I get home from the hospital:    longevity powder  -- 1 scoop(s) by mouth mix with water every other day  -- this is a multivitamin supplement  -- Indication: For Supplement    methylPREDNISolone 4 mg oral tablet  -- 6 tab(s) by mouth 2 times a day  -- Indication: For Systemic lupus erythematosus    OxyCONTIN 15 mg oral tablet, extended release  -- 1 tab(s) by mouth every 12 hours  -- Indication: For Pain management    aspirin 81 mg oral tablet  -- 1 tab(s) by mouth once a day  -- Indication: For CAD    oxyCODONE 10 mg oral tablet  --  1-2 tablets every 4-6 hours as needed by mouth   -- Indication: For Pain Management    Pradaxa 150 mg oral capsule  -- 1 cap(s) by mouth 2 times a day  -- Indication: For Thrombus    Plaquenil 200 mg oral tablet  -- 1 tab(s) by mouth 2 times a day  -- Indication: For Systemic lupus erythematosus with other organ involvement, unspecified SLE type    lidocaine 5% topical film  -- Apply on skin to affected area once a day MDD:1  -- Indication: For Pain management     famotidine 20 mg oral tablet  -- 1 tab(s) by mouth once a day, As Needed MDD:1  -- Indication: For GERDS    polyethylene glycol 3350 oral powder for reconstitution  -- 17 gram(s) by mouth once a day (at bedtime), As needed, Constipation  -- Indication: For Laxative    sulfamethoxazole-trimethoprim 800 mg-160 mg oral tablet  -- 1 tab(s) by mouth every other day  -- Indication: For Prophylaxis    Calcium 600+D 600 mg-200 intl units oral tablet  -- Take 1 tab by mouth daily  -- Indication: For Supplement

## 2017-02-08 NOTE — DISCHARGE NOTE ADULT - ADDITIONAL INSTRUCTIONS
Follow up with Dr. Starr (Rheumatology) on Monday.   Follow up with Dr. Monsivais (Pain Management in 1 week. Follow up with Dr. Starr (Rheumatology) on Thursday, February 16, 2017.  Follow up with Dr. Monsivais (Pain Management in 1 week.

## 2017-02-08 NOTE — DISCHARGE NOTE ADULT - PLAN OF CARE
HOME CARE INSTRUCTIONS  For the next few days, avoid physical activities that bring on chest pain. Continue physical activities as directed.  Do not smoke.  Avoid drinking alcohol.   Only take over-the-counter or prescription medicine for pain, discomfort, or fever as directed by your caregiver.  Follow your caregiver's suggestions for further testing if your chest pain does not go away.  Keep any follow-up appointments you made. If you do not go to an appointment, you could develop lasting (chronic) problems with pain. If there is any problem keeping an appointment, you must call to reschedule.   SEEK MEDICAL CARE IF:  You think you are having problems from the medicine you are taking. Read your medicine instructions carefully.  Your chest pain does not go away, even after treatment.  You develop a rash with blisters on your chest.  SEEK IMMEDIATE MEDICAL CARE IF:  You have increased chest pain or pain that spreads to your arm, neck, jaw, back, or abdomen.   You develop shortness of breath, an increasing cough, or you are coughing up blood.  You have severe back or abdominal pain, feel nauseous, or vomit.  You develop severe weakness, fainting, or chills.  You have a fever.  THIS IS AN EMERGENCY. Do not wait to see if the pain will go away. Get medical help at once. Call your local emergency services (_____________________). Do not drive yourself to the hospital. Chest pain will be stable. Lupus will continue to be monitored. 1. Follow up with Dr. Starr (Rheumatolgy) on Monday, February 13, 2017.   2. Continue Prednisone 28 mg twice a day.   3. Continue pain medications as prescribed by your primary Pain Managmenent MD, Dr. Monsivais.

## 2017-02-08 NOTE — DISCHARGE NOTE ADULT - CARE PLAN
Principal Discharge DX:	Chest pain, unspecified type  Goal:	Chest pain will be stable.  Instructions for follow-up, activity and diet:	HOME CARE INSTRUCTIONS  For the next few days, avoid physical activities that bring on chest pain. Continue physical activities as directed.  Do not smoke.  Avoid drinking alcohol.   Only take over-the-counter or prescription medicine for pain, discomfort, or fever as directed by your caregiver.  Follow your caregiver's suggestions for further testing if your chest pain does not go away.  Keep any follow-up appointments you made. If you do not go to an appointment, you could develop lasting (chronic) problems with pain. If there is any problem keeping an appointment, you must call to reschedule.   SEEK MEDICAL CARE IF:  You think you are having problems from the medicine you are taking. Read your medicine instructions carefully.  Your chest pain does not go away, even after treatment.  You develop a rash with blisters on your chest.  SEEK IMMEDIATE MEDICAL CARE IF:  You have increased chest pain or pain that spreads to your arm, neck, jaw, back, or abdomen.   You develop shortness of breath, an increasing cough, or you are coughing up blood.  You have severe back or abdominal pain, feel nauseous, or vomit.  You develop severe weakness, fainting, or chills.  You have a fever.  THIS IS AN EMERGENCY. Do not wait to see if the pain will go away. Get medical help at once. Call your local emergency services (_____________________). Do not drive yourself to the hospital.  Secondary Diagnosis:	Systemic lupus erythematosus  Goal:	Lupus will continue to be monitored.  Instructions for follow-up, activity and diet:	1. Follow up with Dr. Starr (Rheumatolgy) on Monday, February 13, 2017.   2. Continue Prednisone 28 mg twice a day.   3. Continue pain medications as prescribed by your primary Pain Managmenent MD, Dr. Monsivais.

## 2017-02-08 NOTE — DISCHARGE NOTE ADULT - HOSPITAL COURSE
To be done by Attending. 26M pmh SLE (ARTURO, dsDNA, low C3, arthritis and pericarditis), recurrent pleural effusions (requiring multiple chest tubes), lupus nephritis (class V, failed cellcept), seronegative APS, DVT/PE with IVC filter placement due to failed anticoagulation therapy (failed xarelto, coumadin, lovenox, and arixtra), and bilateral renal vein thrombosis presenting to the ED with worsening chest pain and left flank pain. Pt had imaging studies done with did not find a cause of his pain.  The pain was presumed to be from adhesions given his h/o multiple chest tube vs functional pain from his lupus. His was treated with IV Dilaudid until his pain was under control and he could tolerate his chronic oral pain medication.  Upon discharge his chest and flank pain did resolve.    Discharge time spent: 40 min 26M pmh SLE (ARTURO, dsDNA, low C3, arthritis and pericarditis), recurrent pleural effusions (requiring multiple chest tubes), lupus nephritis (class V, failed cellcept), seronegative APS, DVT/PE with IVC filter placement due to failed anticoagulation therapy (failed xarelto, coumadin, lovenox, and arixtra), and bilateral renal vein thrombosis presenting to the ED with worsening chest pain and left flank pain. Pt had imaging studies done with did not find a cause of his pain.  The pain was presumed to be from adhesions given his h/o multiple chest tube or functional pain from his lupus. His was treated with IV Dilaudid until his pain was under control and he could tolerate his chronic oral pain medication.  Upon discharge his chest and flank pain did resolve.    Discharge time spent: 40 min

## 2017-02-08 NOTE — DISCHARGE NOTE ADULT - CARE PROVIDERS DIRECT ADDRESSES
,DirectAddress_Unknown,[email or web address],DirectAddress_Unknown

## 2017-02-16 ENCOUNTER — LABORATORY RESULT (OUTPATIENT)
Age: 27
End: 2017-02-16

## 2017-02-16 ENCOUNTER — APPOINTMENT (OUTPATIENT)
Dept: RHEUMATOLOGY | Facility: CLINIC | Age: 27
End: 2017-02-16

## 2017-02-16 VITALS
OXYGEN SATURATION: 98 % | WEIGHT: 218 LBS | TEMPERATURE: 98.3 F | SYSTOLIC BLOOD PRESSURE: 150 MMHG | DIASTOLIC BLOOD PRESSURE: 82 MMHG | BODY MASS INDEX: 31.21 KG/M2 | HEIGHT: 70 IN | HEART RATE: 125 BPM

## 2017-02-16 DIAGNOSIS — M32.9 SYSTEMIC LUPUS ERYTHEMATOSUS, UNSPECIFIED: ICD-10-CM

## 2017-02-16 DIAGNOSIS — I10 ESSENTIAL (PRIMARY) HYPERTENSION: ICD-10-CM

## 2017-02-16 DIAGNOSIS — Z86.79 PERSONAL HISTORY OF OTHER DISEASES OF THE CIRCULATORY SYSTEM: ICD-10-CM

## 2017-02-16 DIAGNOSIS — R07.9 CHEST PAIN, UNSPECIFIED: ICD-10-CM

## 2017-02-16 LAB
ALBUMIN SERPL ELPH-MCNC: 2.4 G/DL
ALP BLD-CCNC: 67 U/L
ALT SERPL-CCNC: 23 U/L
ANION GAP SERPL CALC-SCNC: 15 MMOL/L
AST SERPL-CCNC: 48 U/L
BASOPHILS # BLD AUTO: 0.01 K/UL
BASOPHILS NFR BLD AUTO: 0.1 %
BILIRUB SERPL-MCNC: <0.2 MG/DL
BUN SERPL-MCNC: 7 MG/DL
CALCIUM SERPL-MCNC: 8.7 MG/DL
CHLORIDE SERPL-SCNC: 109 MMOL/L
CO2 SERPL-SCNC: 19 MMOL/L
CREAT SERPL-MCNC: 0.62 MG/DL
CREAT SPEC-SCNC: 19 MG/DL
CREAT/PROT UR: 7.6 RATIO
EOSINOPHIL # BLD AUTO: 0.05 K/UL
EOSINOPHIL NFR BLD AUTO: 0.7 %
GLUCOSE SERPL-MCNC: 82 MG/DL
HCT VFR BLD CALC: 32.3 %
HGB BLD-MCNC: 10 G/DL
IMM GRANULOCYTES NFR BLD AUTO: 0.4 %
LYMPHOCYTES # BLD AUTO: 0.46 K/UL
LYMPHOCYTES NFR BLD AUTO: 6.4 %
MAN DIFF?: NORMAL
MCHC RBC-ENTMCNC: 26.2 PG
MCHC RBC-ENTMCNC: 31 GM/DL
MCV RBC AUTO: 84.8 FL
MONOCYTES # BLD AUTO: 0.42 K/UL
MONOCYTES NFR BLD AUTO: 5.8 %
NEUTROPHILS # BLD AUTO: 6.24 K/UL
NEUTROPHILS NFR BLD AUTO: 86.6 %
PLATELET # BLD AUTO: 309 K/UL
POTASSIUM SERPL-SCNC: 4 MMOL/L
PROT SERPL-MCNC: 4.9 G/DL
PROT UR-MCNC: 144 MG/DL
RBC # BLD: 3.81 M/UL
RBC # FLD: 17.3 %
SODIUM SERPL-SCNC: 143 MMOL/L
WBC # FLD AUTO: 7.21 K/UL

## 2017-02-17 ENCOUNTER — INPATIENT (INPATIENT)
Facility: HOSPITAL | Age: 27
LOS: 9 days | Discharge: HOME | DRG: 545 | End: 2017-02-27
Attending: HOSPITALIST | Admitting: INTERNAL MEDICINE
Payer: MEDICAID

## 2017-02-17 VITALS
OXYGEN SATURATION: 98 % | HEART RATE: 110 BPM | SYSTOLIC BLOOD PRESSURE: 133 MMHG | TEMPERATURE: 98 F | RESPIRATION RATE: 20 BRPM | DIASTOLIC BLOOD PRESSURE: 78 MMHG | WEIGHT: 212.97 LBS

## 2017-02-17 DIAGNOSIS — Z98.890 OTHER SPECIFIED POSTPROCEDURAL STATES: Chronic | ICD-10-CM

## 2017-02-17 DIAGNOSIS — Z95.828 PRESENCE OF OTHER VASCULAR IMPLANTS AND GRAFTS: Chronic | ICD-10-CM

## 2017-02-17 DIAGNOSIS — R07.9 CHEST PAIN, UNSPECIFIED: ICD-10-CM

## 2017-02-17 LAB
ALBUMIN SERPL ELPH-MCNC: 2.2 G/DL — LOW (ref 3.3–5)
ALP SERPL-CCNC: 58 U/L — SIGNIFICANT CHANGE UP (ref 40–120)
ALT FLD-CCNC: 24 U/L RC — SIGNIFICANT CHANGE UP (ref 10–45)
ANION GAP SERPL CALC-SCNC: 12 MMOL/L — SIGNIFICANT CHANGE UP (ref 5–17)
APTT BLD: 38.7 SEC — HIGH (ref 27.5–37.4)
AST SERPL-CCNC: 45 U/L — HIGH (ref 10–40)
BASOPHILS # BLD AUTO: 0 K/UL — SIGNIFICANT CHANGE UP (ref 0–0.2)
BASOPHILS NFR BLD AUTO: 0.2 % — SIGNIFICANT CHANGE UP (ref 0–2)
BILIRUB SERPL-MCNC: <0.1 MG/DL — LOW (ref 0.2–1.2)
BUN SERPL-MCNC: 11 MG/DL — SIGNIFICANT CHANGE UP (ref 7–23)
CALCIUM SERPL-MCNC: 7.9 MG/DL — LOW (ref 8.4–10.5)
CHLORIDE SERPL-SCNC: 106 MMOL/L — SIGNIFICANT CHANGE UP (ref 96–108)
CK MB BLD-MCNC: 0.1 % — SIGNIFICANT CHANGE UP (ref 0–3.5)
CK MB CFR SERPL CALC: 1.1 NG/ML — SIGNIFICANT CHANGE UP (ref 0–6.7)
CK SERPL-CCNC: 826 U/L — HIGH (ref 30–200)
CO2 SERPL-SCNC: 23 MMOL/L — SIGNIFICANT CHANGE UP (ref 22–31)
CREAT SERPL-MCNC: 0.64 MG/DL — SIGNIFICANT CHANGE UP (ref 0.5–1.3)
D DIMER BLD IA.RAPID-MCNC: 1122 NG/ML DDU — HIGH
EOSINOPHIL # BLD AUTO: 0.2 K/UL — SIGNIFICANT CHANGE UP (ref 0–0.5)
EOSINOPHIL NFR BLD AUTO: 2.2 % — SIGNIFICANT CHANGE UP (ref 0–6)
GLUCOSE SERPL-MCNC: 98 MG/DL — SIGNIFICANT CHANGE UP (ref 70–99)
HCT VFR BLD CALC: 27.5 % — LOW (ref 39–50)
HGB BLD-MCNC: 9.2 G/DL — LOW (ref 13–17)
INR BLD: 0.92 RATIO — SIGNIFICANT CHANGE UP (ref 0.88–1.16)
LIDOCAIN IGE QN: 85 U/L — HIGH (ref 7–60)
LYMPHOCYTES # BLD AUTO: 1 K/UL — SIGNIFICANT CHANGE UP (ref 1–3.3)
LYMPHOCYTES # BLD AUTO: 14.4 % — SIGNIFICANT CHANGE UP (ref 13–44)
MCHC RBC-ENTMCNC: 27.7 PG — SIGNIFICANT CHANGE UP (ref 27–34)
MCHC RBC-ENTMCNC: 33.4 GM/DL — SIGNIFICANT CHANGE UP (ref 32–36)
MCV RBC AUTO: 82.9 FL — SIGNIFICANT CHANGE UP (ref 80–100)
MONOCYTES # BLD AUTO: 0.7 K/UL — SIGNIFICANT CHANGE UP (ref 0–0.9)
MONOCYTES NFR BLD AUTO: 9.8 % — SIGNIFICANT CHANGE UP (ref 2–14)
NEUTROPHILS # BLD AUTO: 5.1 K/UL — SIGNIFICANT CHANGE UP (ref 1.8–7.4)
NEUTROPHILS NFR BLD AUTO: 73.3 % — SIGNIFICANT CHANGE UP (ref 43–77)
NT-PROBNP SERPL-SCNC: 67 PG/ML — SIGNIFICANT CHANGE UP (ref 0–300)
PLATELET # BLD AUTO: 235 K/UL — SIGNIFICANT CHANGE UP (ref 150–400)
POTASSIUM SERPL-MCNC: 4.1 MMOL/L — SIGNIFICANT CHANGE UP (ref 3.5–5.3)
POTASSIUM SERPL-SCNC: 4.1 MMOL/L — SIGNIFICANT CHANGE UP (ref 3.5–5.3)
PROT SERPL-MCNC: 5 G/DL — LOW (ref 6–8.3)
PROTHROM AB SERPL-ACNC: 9.9 SEC — LOW (ref 10–13.1)
RBC # BLD: 3.32 M/UL — LOW (ref 4.2–5.8)
RBC # FLD: 16.6 % — HIGH (ref 10.3–14.5)
SODIUM SERPL-SCNC: 141 MMOL/L — SIGNIFICANT CHANGE UP (ref 135–145)
TROPONIN T SERPL-MCNC: <0.01 NG/ML — SIGNIFICANT CHANGE UP (ref 0–0.06)
WBC # BLD: 7 K/UL — SIGNIFICANT CHANGE UP (ref 3.8–10.5)
WBC # FLD AUTO: 7 K/UL — SIGNIFICANT CHANGE UP (ref 3.8–10.5)

## 2017-02-17 PROCEDURE — 99285 EMERGENCY DEPT VISIT HI MDM: CPT | Mod: 25

## 2017-02-17 PROCEDURE — 93010 ELECTROCARDIOGRAM REPORT: CPT

## 2017-02-17 PROCEDURE — 70450 CT HEAD/BRAIN W/O DYE: CPT | Mod: 26

## 2017-02-17 PROCEDURE — 71010: CPT | Mod: 26

## 2017-02-17 RX ORDER — METOCLOPRAMIDE HCL 10 MG
10 TABLET ORAL ONCE
Qty: 0 | Refills: 0 | Status: COMPLETED | OUTPATIENT
Start: 2017-02-17 | End: 2017-02-17

## 2017-02-17 RX ORDER — HYDROMORPHONE HYDROCHLORIDE 2 MG/ML
1 INJECTION INTRAMUSCULAR; INTRAVENOUS; SUBCUTANEOUS ONCE
Qty: 0 | Refills: 0 | Status: DISCONTINUED | OUTPATIENT
Start: 2017-02-17 | End: 2017-02-17

## 2017-02-17 RX ORDER — HEPARIN SODIUM 5000 [USP'U]/ML
8000 INJECTION INTRAVENOUS; SUBCUTANEOUS ONCE
Qty: 0 | Refills: 0 | Status: COMPLETED | OUTPATIENT
Start: 2017-02-17 | End: 2017-02-17

## 2017-02-17 RX ORDER — HEPARIN SODIUM 5000 [USP'U]/ML
8000 INJECTION INTRAVENOUS; SUBCUTANEOUS EVERY 6 HOURS
Qty: 0 | Refills: 0 | Status: DISCONTINUED | OUTPATIENT
Start: 2017-02-17 | End: 2017-02-18

## 2017-02-17 RX ORDER — SODIUM CHLORIDE 9 MG/ML
1000 INJECTION INTRAMUSCULAR; INTRAVENOUS; SUBCUTANEOUS ONCE
Qty: 0 | Refills: 0 | Status: COMPLETED | OUTPATIENT
Start: 2017-02-17 | End: 2017-02-17

## 2017-02-17 RX ORDER — HEPARIN SODIUM 5000 [USP'U]/ML
4000 INJECTION INTRAVENOUS; SUBCUTANEOUS EVERY 6 HOURS
Qty: 0 | Refills: 0 | Status: DISCONTINUED | OUTPATIENT
Start: 2017-02-17 | End: 2017-02-18

## 2017-02-17 RX ORDER — SODIUM CHLORIDE 9 MG/ML
3 INJECTION INTRAMUSCULAR; INTRAVENOUS; SUBCUTANEOUS ONCE
Qty: 0 | Refills: 0 | Status: COMPLETED | OUTPATIENT
Start: 2017-02-17 | End: 2017-02-17

## 2017-02-17 RX ORDER — ASPIRIN/CALCIUM CARB/MAGNESIUM 324 MG
324 TABLET ORAL ONCE
Qty: 0 | Refills: 0 | Status: COMPLETED | OUTPATIENT
Start: 2017-02-17 | End: 2017-02-17

## 2017-02-17 RX ORDER — HEPARIN SODIUM 5000 [USP'U]/ML
INJECTION INTRAVENOUS; SUBCUTANEOUS
Qty: 25000 | Refills: 0 | Status: DISCONTINUED | OUTPATIENT
Start: 2017-02-17 | End: 2017-02-18

## 2017-02-17 RX ORDER — HYDROCORTISONE 20 MG
200 TABLET ORAL ONCE
Qty: 0 | Refills: 0 | Status: COMPLETED | OUTPATIENT
Start: 2017-02-17 | End: 2017-02-17

## 2017-02-17 RX ADMIN — Medication 10 MILLIGRAM(S): at 21:44

## 2017-02-17 RX ADMIN — SODIUM CHLORIDE 1000 MILLILITER(S): 9 INJECTION INTRAMUSCULAR; INTRAVENOUS; SUBCUTANEOUS at 21:44

## 2017-02-17 RX ADMIN — HYDROMORPHONE HYDROCHLORIDE 1 MILLIGRAM(S): 2 INJECTION INTRAMUSCULAR; INTRAVENOUS; SUBCUTANEOUS at 21:43

## 2017-02-17 RX ADMIN — HEPARIN SODIUM 1800 UNIT(S)/HR: 5000 INJECTION INTRAVENOUS; SUBCUTANEOUS at 23:00

## 2017-02-17 RX ADMIN — Medication 324 MILLIGRAM(S): at 21:42

## 2017-02-17 RX ADMIN — HEPARIN SODIUM 8000 UNIT(S): 5000 INJECTION INTRAVENOUS; SUBCUTANEOUS at 22:59

## 2017-02-17 RX ADMIN — SODIUM CHLORIDE 3 MILLILITER(S): 9 INJECTION INTRAMUSCULAR; INTRAVENOUS; SUBCUTANEOUS at 21:28

## 2017-02-17 RX ADMIN — HYDROMORPHONE HYDROCHLORIDE 1 MILLIGRAM(S): 2 INJECTION INTRAMUSCULAR; INTRAVENOUS; SUBCUTANEOUS at 22:00

## 2017-02-17 RX ADMIN — HYDROMORPHONE HYDROCHLORIDE 1 MILLIGRAM(S): 2 INJECTION INTRAMUSCULAR; INTRAVENOUS; SUBCUTANEOUS at 22:32

## 2017-02-17 RX ADMIN — HYDROMORPHONE HYDROCHLORIDE 1 MILLIGRAM(S): 2 INJECTION INTRAMUSCULAR; INTRAVENOUS; SUBCUTANEOUS at 22:45

## 2017-02-17 RX ADMIN — Medication 200 MILLIGRAM(S): at 21:43

## 2017-02-17 NOTE — ED PROVIDER NOTE - MEDICAL DECISION MAKING DETAILS
concern for pe recurrance, will check labs, likely switch to heparin, Symptomatic treatment. might be unable to do cta. will pulse steroids for symptoms as well as pre-med for ct. given headache will ct for possible spont sah, given on pradaxa considering risks and benefits will not do lumbar puncture.

## 2017-02-17 NOTE — ED ADULT NURSE NOTE - OBJECTIVE STATEMENT
Presents c/o cavazos, pleuritic chest pain and joint pain. Pain unrelieved by PO dilaudid. Denies any injury. Pt denies hx of ha. Pt A&Ox3. CARRION. Pt c/o SOB. Sats 99% on nasal cannula. Pt A&Ox3. CARRION. Denies n/v/d. Denies urinary symptoms. Skin WDI. Await CT head and CXR. WIll monitor.

## 2017-02-17 NOTE — ED PROVIDER NOTE - CARE PLAN
Principal Discharge DX:	Chest pain, unspecified type  Secondary Diagnosis:	Nonintractable headache, unspecified chronicity pattern, unspecified headache type

## 2017-02-17 NOTE — ED PROVIDER NOTE - PHYSICAL EXAMINATION
Gen: well appearing, of stated age, mild acute distress from pain; Head: NC, AT; ENT: MMM, no uvular deviation; Neck: supple with full ROM; Chest: CTAB, no retractions, rate normal, appears to breath comfortable; Heart: RRR S1S2 No JVD minimal peripheral edema b/l pulses 2+ in arms and legs; Abd: Soft non-tender, no rebound or guarding, no masses, no shetty sign, no mcburney tenderness, no CVAT; Back: No spinal deformity; Ext: Moving all 4 limbs without obvious impairment to ROM, rom at joints w/pain good strength and range, no obvious weakness; Neuro: fluid speech, no focal deficits; Psych: No anxiety, depression or pressured speech noted; Skin: no utricaria, no diffuse rash. -ncohen

## 2017-02-17 NOTE — ED PROVIDER NOTE - ATTENDING CONTRIBUTION TO CARE
Attending MD Watson: I personally have seen and examined this patient.  Resident note reviewed and agree on plan of care and except where noted.  See below for details.     26M with extensive PMH including SLE, lupus nephritis, recurrent pleural effusions, DVT/PEs with IVC filter on Pradaxa presents to the ED with headache and chest pain.  Reports that he has also been having pain in his elbow, shoulder and knees. Reports that the pain is unaided by his po Dilaudid, denies neck stiffness. +chest pain, shortness of breath, Denies abdominal pain, nausea, vomiting, diarrhea, blood in stools. Denies dysuria, hematuria, change in urinary habits including frequency, urgency. Reports symptoms started today. Reports did not take steroid yesterday.  On exam, head NCAT, PERRL, moist oral mucosa, FROM at neck, no tenderness to palpation or stepoffs along length of spine, lungs CTAB with good inspiratory effort, +S1S2, no m/r/g, abdomen soft with +BS, NT, ND, no CVAT, moving all extremities with 5/5 strength bilateral upper and lower extremities, good and equal  strength bilaterally, no focal deficit, no rash; Plan: CT head, labs, CXR, EKG, pain control, admit

## 2017-02-17 NOTE — ED PROVIDER NOTE - OBJECTIVE STATEMENT
26 year old male hx of SLE (ARTURO, dsDNA, low C3, arthritis and pericarditis), recurrent pleural effusions (requiring multiple chest tubes), lupus nephritis (class V, failed cellcept), seronegative APS, DVT/PE with IVC filter placement due to failed anticoagulation therapy (failed xarelto, coumadin, lovenox, and arixtra), and bilateral renal vein thrombosis.    presenting with headache came on slowly this am, pleuritic chest pain, and joint pain to knees, shoulders and elbows. sharp severe and refractory to po dilaudid. mild sob. denies any trauma or injury, fever or chills, medication non-compliance. Has not had headaches in past. No neck stiffness. No n/v. symptoms started today, pt reporting dropping steroid dosing from yesterday appt with rheum.

## 2017-02-18 DIAGNOSIS — R51 HEADACHE: ICD-10-CM

## 2017-02-18 DIAGNOSIS — R06.02 SHORTNESS OF BREATH: ICD-10-CM

## 2017-02-18 DIAGNOSIS — R07.9 CHEST PAIN, UNSPECIFIED: ICD-10-CM

## 2017-02-18 DIAGNOSIS — D68.61 ANTIPHOSPHOLIPID SYNDROME: ICD-10-CM

## 2017-02-18 DIAGNOSIS — I26.99 OTHER PULMONARY EMBOLISM WITHOUT ACUTE COR PULMONALE: ICD-10-CM

## 2017-02-18 DIAGNOSIS — M32.9 SYSTEMIC LUPUS ERYTHEMATOSUS, UNSPECIFIED: ICD-10-CM

## 2017-02-18 DIAGNOSIS — Z41.8 ENCOUNTER FOR OTHER PROCEDURES FOR PURPOSES OTHER THAN REMEDYING HEALTH STATE: ICD-10-CM

## 2017-02-18 LAB
ALBUMIN SERPL ELPH-MCNC: 2 G/DL — LOW (ref 3.3–5)
ALP SERPL-CCNC: 53 U/L — SIGNIFICANT CHANGE UP (ref 40–120)
ALT FLD-CCNC: 22 U/L — SIGNIFICANT CHANGE UP (ref 10–45)
ANION GAP SERPL CALC-SCNC: 10 MMOL/L — SIGNIFICANT CHANGE UP (ref 5–17)
APPEARANCE UR: CLEAR — SIGNIFICANT CHANGE UP
APTT BLD: 59.4 SEC — HIGH (ref 27.5–37.4)
APTT BLD: >200 SEC — CRITICAL HIGH (ref 27.5–37.4)
AST SERPL-CCNC: 37 U/L — SIGNIFICANT CHANGE UP (ref 10–40)
BASOPHILS # BLD AUTO: 0.01 K/UL — SIGNIFICANT CHANGE UP (ref 0–0.2)
BASOPHILS NFR BLD AUTO: 0.2 % — SIGNIFICANT CHANGE UP (ref 0–2)
BILIRUB SERPL-MCNC: <0.2 MG/DL — SIGNIFICANT CHANGE UP (ref 0.2–1.2)
BILIRUB UR-MCNC: NEGATIVE — SIGNIFICANT CHANGE UP
BUN SERPL-MCNC: 9 MG/DL — SIGNIFICANT CHANGE UP (ref 7–23)
CALCIUM SERPL-MCNC: 8.6 MG/DL — SIGNIFICANT CHANGE UP (ref 8.4–10.5)
CHLORIDE SERPL-SCNC: 106 MMOL/L — SIGNIFICANT CHANGE UP (ref 96–108)
CK MB BLD-MCNC: 0.2 % — SIGNIFICANT CHANGE UP (ref 0–3.5)
CK MB CFR SERPL CALC: 1.1 NG/ML — SIGNIFICANT CHANGE UP (ref 0–10)
CK SERPL-CCNC: 538 U/L — HIGH (ref 30–200)
CO2 SERPL-SCNC: 22 MMOL/L — SIGNIFICANT CHANGE UP (ref 22–31)
COLOR SPEC: YELLOW — SIGNIFICANT CHANGE UP
CREAT SERPL-MCNC: 0.52 MG/DL — SIGNIFICANT CHANGE UP (ref 0.5–1.3)
CRP SERPL-MCNC: 0.94 MG/DL — HIGH (ref 0–0.4)
DIFF PNL FLD: ABNORMAL
EOSINOPHIL # BLD AUTO: 0.03 K/UL — SIGNIFICANT CHANGE UP (ref 0–0.5)
EOSINOPHIL NFR BLD AUTO: 0.5 % — SIGNIFICANT CHANGE UP (ref 0–6)
GLUCOSE SERPL-MCNC: 109 MG/DL — HIGH (ref 70–99)
GLUCOSE UR QL: NEGATIVE MG/DL — SIGNIFICANT CHANGE UP
HCT VFR BLD CALC: 27.8 % — LOW (ref 39–50)
HCT VFR BLD CALC: 28.2 % — LOW (ref 39–50)
HCT VFR BLD CALC: 28.4 % — LOW (ref 39–50)
HGB BLD-MCNC: 8.8 G/DL — LOW (ref 13–17)
HGB BLD-MCNC: 9.1 G/DL — LOW (ref 13–17)
HGB BLD-MCNC: 9.2 G/DL — LOW (ref 13–17)
IMM GRANULOCYTES NFR BLD AUTO: 0.2 % — SIGNIFICANT CHANGE UP (ref 0–1.5)
KETONES UR-MCNC: NEGATIVE — SIGNIFICANT CHANGE UP
LEUKOCYTE ESTERASE UR-ACNC: NEGATIVE — SIGNIFICANT CHANGE UP
LYMPHOCYTES # BLD AUTO: 0.77 K/UL — LOW (ref 1–3.3)
LYMPHOCYTES # BLD AUTO: 13.8 % — SIGNIFICANT CHANGE UP (ref 13–44)
MAGNESIUM SERPL-MCNC: 1.9 MG/DL — SIGNIFICANT CHANGE UP (ref 1.6–2.6)
MCHC RBC-ENTMCNC: 26.1 PG — LOW (ref 27–34)
MCHC RBC-ENTMCNC: 26.6 PG — LOW (ref 27–34)
MCHC RBC-ENTMCNC: 27.4 PG — SIGNIFICANT CHANGE UP (ref 27–34)
MCHC RBC-ENTMCNC: 31.2 GM/DL — LOW (ref 32–36)
MCHC RBC-ENTMCNC: 31.9 GM/DL — LOW (ref 32–36)
MCHC RBC-ENTMCNC: 33.1 GM/DL — SIGNIFICANT CHANGE UP (ref 32–36)
MCV RBC AUTO: 83 FL — SIGNIFICANT CHANGE UP (ref 80–100)
MCV RBC AUTO: 83.3 FL — SIGNIFICANT CHANGE UP (ref 80–100)
MCV RBC AUTO: 83.7 FL — SIGNIFICANT CHANGE UP (ref 80–100)
MONOCYTES # BLD AUTO: 0.44 K/UL — SIGNIFICANT CHANGE UP (ref 0–0.9)
MONOCYTES NFR BLD AUTO: 7.9 % — SIGNIFICANT CHANGE UP (ref 2–14)
NEUTROPHILS # BLD AUTO: 4.31 K/UL — SIGNIFICANT CHANGE UP (ref 1.8–7.4)
NEUTROPHILS NFR BLD AUTO: 77.4 % — HIGH (ref 43–77)
NITRITE UR-MCNC: NEGATIVE — SIGNIFICANT CHANGE UP
PH UR: 5.5 — SIGNIFICANT CHANGE UP (ref 5–8)
PHOSPHATE SERPL-MCNC: 4 MG/DL — SIGNIFICANT CHANGE UP (ref 2.5–4.5)
PLATELET # BLD AUTO: 247 K/UL — SIGNIFICANT CHANGE UP (ref 150–400)
PLATELET # BLD AUTO: 268 K/UL — SIGNIFICANT CHANGE UP (ref 150–400)
PLATELET # BLD AUTO: 292 K/UL — SIGNIFICANT CHANGE UP (ref 150–400)
POTASSIUM SERPL-MCNC: 5.2 MMOL/L — SIGNIFICANT CHANGE UP (ref 3.5–5.3)
POTASSIUM SERPL-SCNC: 5.2 MMOL/L — SIGNIFICANT CHANGE UP (ref 3.5–5.3)
PROT SERPL-MCNC: 5 G/DL — LOW (ref 6–8.3)
PROT UR-MCNC: 300 MG/DL
RBC # BLD: 3.35 M/UL — LOW (ref 4.2–5.8)
RBC # BLD: 3.37 M/UL — LOW (ref 4.2–5.8)
RBC # BLD: 3.41 M/UL — LOW (ref 4.2–5.8)
RBC # FLD: 16.1 % — HIGH (ref 10.3–14.5)
RBC # FLD: 16.4 % — HIGH (ref 10.3–14.5)
RBC # FLD: 17 % — HIGH (ref 10.3–14.5)
SODIUM SERPL-SCNC: 138 MMOL/L — SIGNIFICANT CHANGE UP (ref 135–145)
SP GR SPEC: 1.02 — SIGNIFICANT CHANGE UP (ref 1.01–1.02)
UROBILINOGEN FLD QL: NEGATIVE MG/DL — SIGNIFICANT CHANGE UP
WBC # BLD: 5.57 K/UL — SIGNIFICANT CHANGE UP (ref 3.8–10.5)
WBC # BLD: 5.6 K/UL — SIGNIFICANT CHANGE UP (ref 3.8–10.5)
WBC # BLD: 9.3 K/UL — SIGNIFICANT CHANGE UP (ref 3.8–10.5)
WBC # FLD AUTO: 5.57 K/UL — SIGNIFICANT CHANGE UP (ref 3.8–10.5)
WBC # FLD AUTO: 5.6 K/UL — SIGNIFICANT CHANGE UP (ref 3.8–10.5)
WBC # FLD AUTO: 9.3 K/UL — SIGNIFICANT CHANGE UP (ref 3.8–10.5)

## 2017-02-18 PROCEDURE — 99223 1ST HOSP IP/OBS HIGH 75: CPT | Mod: GC

## 2017-02-18 PROCEDURE — 70546 MR ANGIOGRAPH HEAD W/O&W/DYE: CPT | Mod: 26,59

## 2017-02-18 RX ORDER — HYDROMORPHONE HYDROCHLORIDE 2 MG/ML
2 INJECTION INTRAMUSCULAR; INTRAVENOUS; SUBCUTANEOUS EVERY 6 HOURS
Qty: 0 | Refills: 0 | Status: DISCONTINUED | OUTPATIENT
Start: 2017-02-18 | End: 2017-02-18

## 2017-02-18 RX ORDER — HYDROMORPHONE HYDROCHLORIDE 2 MG/ML
2 INJECTION INTRAMUSCULAR; INTRAVENOUS; SUBCUTANEOUS EVERY 4 HOURS
Qty: 0 | Refills: 0 | Status: DISCONTINUED | OUTPATIENT
Start: 2017-02-18 | End: 2017-02-18

## 2017-02-18 RX ORDER — HYDROXYCHLOROQUINE SULFATE 200 MG
200 TABLET ORAL
Qty: 0 | Refills: 0 | Status: DISCONTINUED | OUTPATIENT
Start: 2017-02-18 | End: 2017-02-27

## 2017-02-18 RX ORDER — SENNA PLUS 8.6 MG/1
2 TABLET ORAL AT BEDTIME
Qty: 0 | Refills: 0 | Status: DISCONTINUED | OUTPATIENT
Start: 2017-02-18 | End: 2017-02-27

## 2017-02-18 RX ORDER — HEPARIN SODIUM 5000 [USP'U]/ML
4000 INJECTION INTRAVENOUS; SUBCUTANEOUS EVERY 6 HOURS
Qty: 0 | Refills: 0 | Status: DISCONTINUED | OUTPATIENT
Start: 2017-02-18 | End: 2017-02-18

## 2017-02-18 RX ORDER — HYDROMORPHONE HYDROCHLORIDE 2 MG/ML
2 INJECTION INTRAMUSCULAR; INTRAVENOUS; SUBCUTANEOUS
Qty: 0 | Refills: 0 | Status: DISCONTINUED | OUTPATIENT
Start: 2017-02-18 | End: 2017-02-22

## 2017-02-18 RX ORDER — HYDROMORPHONE HYDROCHLORIDE 2 MG/ML
4 INJECTION INTRAMUSCULAR; INTRAVENOUS; SUBCUTANEOUS EVERY 6 HOURS
Qty: 0 | Refills: 0 | Status: DISCONTINUED | OUTPATIENT
Start: 2017-02-18 | End: 2017-02-18

## 2017-02-18 RX ORDER — HYDROXYCHLOROQUINE SULFATE 200 MG
200 TABLET ORAL DAILY
Qty: 0 | Refills: 0 | Status: DISCONTINUED | OUTPATIENT
Start: 2017-02-18 | End: 2017-02-18

## 2017-02-18 RX ORDER — HEPARIN SODIUM 5000 [USP'U]/ML
INJECTION INTRAVENOUS; SUBCUTANEOUS
Qty: 25000 | Refills: 0 | Status: COMPLETED | OUTPATIENT
Start: 2017-02-18 | End: 2017-02-18

## 2017-02-18 RX ORDER — HEPARIN SODIUM 5000 [USP'U]/ML
8500 INJECTION INTRAVENOUS; SUBCUTANEOUS EVERY 6 HOURS
Qty: 0 | Refills: 0 | Status: DISCONTINUED | OUTPATIENT
Start: 2017-02-18 | End: 2017-02-18

## 2017-02-18 RX ORDER — HEPARIN SODIUM 5000 [USP'U]/ML
INJECTION INTRAVENOUS; SUBCUTANEOUS
Qty: 25000 | Refills: 0 | Status: DISCONTINUED | OUTPATIENT
Start: 2017-02-18 | End: 2017-02-18

## 2017-02-18 RX ORDER — DABIGATRAN ETEXILATE MESYLATE 150 MG/1
150 CAPSULE ORAL EVERY 12 HOURS
Qty: 0 | Refills: 0 | Status: DISCONTINUED | OUTPATIENT
Start: 2017-02-18 | End: 2017-02-27

## 2017-02-18 RX ORDER — FAMOTIDINE 10 MG/ML
20 INJECTION INTRAVENOUS
Qty: 0 | Refills: 0 | Status: DISCONTINUED | OUTPATIENT
Start: 2017-02-18 | End: 2017-02-27

## 2017-02-18 RX ORDER — HYDROMORPHONE HYDROCHLORIDE 2 MG/ML
4 INJECTION INTRAMUSCULAR; INTRAVENOUS; SUBCUTANEOUS EVERY 4 HOURS
Qty: 0 | Refills: 0 | Status: DISCONTINUED | OUTPATIENT
Start: 2017-02-18 | End: 2017-02-22

## 2017-02-18 RX ORDER — DOCUSATE SODIUM 100 MG
100 CAPSULE ORAL
Qty: 0 | Refills: 0 | Status: DISCONTINUED | OUTPATIENT
Start: 2017-02-18 | End: 2017-02-27

## 2017-02-18 RX ORDER — ASPIRIN/CALCIUM CARB/MAGNESIUM 324 MG
81 TABLET ORAL DAILY
Qty: 0 | Refills: 0 | Status: DISCONTINUED | OUTPATIENT
Start: 2017-02-18 | End: 2017-02-27

## 2017-02-18 RX ORDER — SODIUM CHLORIDE 9 MG/ML
1000 INJECTION INTRAMUSCULAR; INTRAVENOUS; SUBCUTANEOUS
Qty: 0 | Refills: 0 | Status: COMPLETED | OUTPATIENT
Start: 2017-02-18 | End: 2017-02-18

## 2017-02-18 RX ORDER — HYDROMORPHONE HYDROCHLORIDE 2 MG/ML
1 INJECTION INTRAMUSCULAR; INTRAVENOUS; SUBCUTANEOUS ONCE
Qty: 0 | Refills: 0 | Status: DISCONTINUED | OUTPATIENT
Start: 2017-02-18 | End: 2017-02-18

## 2017-02-18 RX ADMIN — HEPARIN SODIUM 1800 UNIT(S)/HR: 5000 INJECTION INTRAVENOUS; SUBCUTANEOUS at 18:39

## 2017-02-18 RX ADMIN — HYDROMORPHONE HYDROCHLORIDE 2 MILLIGRAM(S): 2 INJECTION INTRAMUSCULAR; INTRAVENOUS; SUBCUTANEOUS at 12:54

## 2017-02-18 RX ADMIN — HYDROMORPHONE HYDROCHLORIDE 1 MILLIGRAM(S): 2 INJECTION INTRAMUSCULAR; INTRAVENOUS; SUBCUTANEOUS at 01:02

## 2017-02-18 RX ADMIN — HYDROMORPHONE HYDROCHLORIDE 4 MILLIGRAM(S): 2 INJECTION INTRAMUSCULAR; INTRAVENOUS; SUBCUTANEOUS at 20:17

## 2017-02-18 RX ADMIN — HYDROMORPHONE HYDROCHLORIDE 2 MILLIGRAM(S): 2 INJECTION INTRAMUSCULAR; INTRAVENOUS; SUBCUTANEOUS at 10:29

## 2017-02-18 RX ADMIN — HYDROMORPHONE HYDROCHLORIDE 2 MILLIGRAM(S): 2 INJECTION INTRAMUSCULAR; INTRAVENOUS; SUBCUTANEOUS at 23:26

## 2017-02-18 RX ADMIN — Medication 200 MILLIGRAM(S): at 18:55

## 2017-02-18 RX ADMIN — HYDROMORPHONE HYDROCHLORIDE 4 MILLIGRAM(S): 2 INJECTION INTRAMUSCULAR; INTRAVENOUS; SUBCUTANEOUS at 14:48

## 2017-02-18 RX ADMIN — HYDROMORPHONE HYDROCHLORIDE 4 MILLIGRAM(S): 2 INJECTION INTRAMUSCULAR; INTRAVENOUS; SUBCUTANEOUS at 21:15

## 2017-02-18 RX ADMIN — Medication 20 MILLIGRAM(S): at 14:49

## 2017-02-18 RX ADMIN — Medication 81 MILLIGRAM(S): at 14:46

## 2017-02-18 RX ADMIN — HYDROMORPHONE HYDROCHLORIDE 4 MILLIGRAM(S): 2 INJECTION INTRAMUSCULAR; INTRAVENOUS; SUBCUTANEOUS at 08:32

## 2017-02-18 RX ADMIN — HYDROMORPHONE HYDROCHLORIDE 2 MILLIGRAM(S): 2 INJECTION INTRAMUSCULAR; INTRAVENOUS; SUBCUTANEOUS at 12:47

## 2017-02-18 RX ADMIN — HYDROMORPHONE HYDROCHLORIDE 4 MILLIGRAM(S): 2 INJECTION INTRAMUSCULAR; INTRAVENOUS; SUBCUTANEOUS at 04:20

## 2017-02-18 RX ADMIN — HYDROMORPHONE HYDROCHLORIDE 4 MILLIGRAM(S): 2 INJECTION INTRAMUSCULAR; INTRAVENOUS; SUBCUTANEOUS at 09:58

## 2017-02-18 RX ADMIN — SODIUM CHLORIDE 100 MILLILITER(S): 9 INJECTION INTRAMUSCULAR; INTRAVENOUS; SUBCUTANEOUS at 19:18

## 2017-02-18 RX ADMIN — SODIUM CHLORIDE 100 MILLILITER(S): 9 INJECTION INTRAMUSCULAR; INTRAVENOUS; SUBCUTANEOUS at 05:00

## 2017-02-18 RX ADMIN — HYDROMORPHONE HYDROCHLORIDE 1 MILLIGRAM(S): 2 INJECTION INTRAMUSCULAR; INTRAVENOUS; SUBCUTANEOUS at 01:30

## 2017-02-18 RX ADMIN — HYDROMORPHONE HYDROCHLORIDE 4 MILLIGRAM(S): 2 INJECTION INTRAMUSCULAR; INTRAVENOUS; SUBCUTANEOUS at 03:52

## 2017-02-18 RX ADMIN — DABIGATRAN ETEXILATE MESYLATE 150 MILLIGRAM(S): 150 CAPSULE ORAL at 18:53

## 2017-02-18 RX ADMIN — HYDROMORPHONE HYDROCHLORIDE 4 MILLIGRAM(S): 2 INJECTION INTRAMUSCULAR; INTRAVENOUS; SUBCUTANEOUS at 16:54

## 2017-02-18 RX ADMIN — HYDROMORPHONE HYDROCHLORIDE 2 MILLIGRAM(S): 2 INJECTION INTRAMUSCULAR; INTRAVENOUS; SUBCUTANEOUS at 06:32

## 2017-02-18 RX ADMIN — FAMOTIDINE 20 MILLIGRAM(S): 10 INJECTION INTRAVENOUS at 14:48

## 2017-02-18 RX ADMIN — HYDROMORPHONE HYDROCHLORIDE 2 MILLIGRAM(S): 2 INJECTION INTRAMUSCULAR; INTRAVENOUS; SUBCUTANEOUS at 11:00

## 2017-02-18 RX ADMIN — HYDROMORPHONE HYDROCHLORIDE 2 MILLIGRAM(S): 2 INJECTION INTRAMUSCULAR; INTRAVENOUS; SUBCUTANEOUS at 22:43

## 2017-02-18 RX ADMIN — HYDROMORPHONE HYDROCHLORIDE 2 MILLIGRAM(S): 2 INJECTION INTRAMUSCULAR; INTRAVENOUS; SUBCUTANEOUS at 06:17

## 2017-02-18 RX ADMIN — HEPARIN SODIUM 1800 UNIT(S)/HR: 5000 INJECTION INTRAVENOUS; SUBCUTANEOUS at 08:15

## 2017-02-18 NOTE — H&P ADULT. - ATTENDING COMMENTS
I have reviewed the labs, imaging, ekg and prior medical records,  26 male w/ hx of SLE (ARTURO, dsDNA, low C3, arthritis, pericarditis), recurrent pleural effusions (requiring multiple chest tubes), lupus nephritis (class V, failed Cellcept), seronegative APS, DVT/PE s/p RLE IVC filter placement 2/2 to failed a/c (failed Xarelto coumadin, Lovenox and Arixtra on Pradaxa) b/l renal vein thrombosis and recent admission for chest pain p/w headache, possibly worsening chest pain as well as diffuse joint pains. Encouraged by wife to seek further evaluation. Of note, pt seen in rheumatology office yesterday with headache and had steroids lowered. Today in ER, given IV hydrocortisone in anticipation for CTA chest to r/o PE given chest pain, hx of abnormal V/Q scan and SOB. Upon my discussion with patient, he states that he has had severe reactions to iodine contrast even after premedication and is unwilling to get IV iodine contrast. Chest pain is similar to baseline but possibly mildly worse. Headache which persisted from yesterday has improved significantly since arrival but still present. Pt states he does not usually get headaches. As per pt, 2 primary issues are headache and heaviness in knees currently. States he was encouraged by his wife to seek medical attention. Will eval for cerebral thrombus given hx of multiple clots. Unclear what best approach to chest pain would be as it appears similar to chronic chest pain. Has had negative V/Q scan in past.   -Pt started on heparin gtt by ER, can cont. for now as pt still has headache (although improved). Chest pain currently similar to baseline. If headache resolves in daytime would consider cancelling MRI/ MRV of head but if persistent would d/c heparin gtt based on MRI/ MRV results.   -Restart dabigatran if no new clots are assessed  -Please consult rheum in AM, collateral from provider who knows patient well would be beneficial  -Restart home steroid regimen as per Rheum, was on solumedrol 20mg daily  -Was started on dilt 30mg BID by rheum although will hold now as VS unremarkable  -Cont. pain control regimen  -DVT PPx, on systemic AC

## 2017-02-18 NOTE — H&P ADULT. - RS GEN PE MLT RESP DETAILS PC
normal/good air movement/decreased bs at bases b/l/breath sounds equal/respirations non-labored/airway patent

## 2017-02-18 NOTE — H&P ADULT. - HISTORY OF PRESENT ILLNESS
26M pmh SLE (ARTURO, dsDNA, low C3, arthritis, pericarditis), recurrent pleural effusions (requiring multiple chest tubes), lupus nephritis (class V, failed cellcept), seronegative APS, DVT/PE with IVC filter placement due to failed anticoagulation therapy (failed xarelto, coumadin, lovenox, and arixtra), b/l renal vein thrombosis and recent admission for chest pain p/w       In the ED, vital signs: T:97.9, HR:110, BP:133/78, RR:20 and O2 sat:98% on RA. Patient received Aspirin 324mg, 26 male w/ hx of SLE (ARTURO, dsDNA, low C3, arthritis, pericarditis), recurrent pleural effusions (requiring multiple chest tubes), lupus nephritis (class V, failed cellcept), seronegative APS, DVT/PE with IVC filter placement due to failed anticoagulation therapy (failed Xarelto coumadin, Lovenox and Arixtra b/l renal vein thrombosis and recent admission for chest pain p/w headache, worsening chest pain as well as diffuse joint pains.     Patient was hospitalized from 2/1 to 2/8 for chest pain which was presumed to be 2/2 lupus vs adhesions given history of multiple chest tube placements. He states that he was doing well shortly after his hospitalization and that his pain was fairly well controlled with [id number] BID and [id number] as needed. However, over the last several days, he has been experiencing severe joint pains (involving b/l elbows, knees and wrists) and worsening chest pain and shortness of breath. He repots that his shortness of breath is so severe that he is unable to ambulate even within his house without feeling short of breath. Patient also reports that yesterday evening, shortly after returning home from seeing Dr. Estrella in clinic, he developed  a headache that started in the back of his head, radiated to his neck, was 8/10 in severity and not alleviated by [id number]. At that time, his wife advised him to come to the ED.     Patient denies any fevers/chills, vision changes, lightheadedness, changes in his speech, abdominal pain and nausea/vomiting.     In the ED, vital signs: T:97.9, HR:110, BP:133/78, RR:20 and O2 sat:98% on RA. Patient received Aspirin 324mg, 26 male w/ hx of SLE (ARTURO, dsDNA, low C3, arthritis, pericarditis), recurrent pleural effusions (requiring multiple chest tubes), lupus nephritis (class V, failed cellcept), seronegative APS, DVT/PE s/p RLE IVC filter placement 2/2to failed a/c (failed Xarelto coumadin, Lovenox and Arixtra) b/l renal vein thrombosis and recent admission for chest pain p/w headache, worsening chest pain as well as diffuse joint pains.     Patient was hospitalized from 2/1 to 2/8 for chest pain which was presumed to be 2/2 lupus vs adhesions given history of multiple chest tube placements. He states that he was doing well shortly after his hospitalization and that his pain was fairly well controlled with [id number] BID and [id number] as needed. However, over the last several days, he has been experiencing severe joint pains (involving b/l elbows, knees and wrists) and worsening chest pain and shortness of breath. He repots that his shortness of breath is so severe that he is unable to ambulate even within his house without feeling short of breath. Patient also reports that yesterday evening, shortly after returning home from seeing Dr. Estrella in clinic, he developed  a headache that started in the back of his head, radiated to his neck, was 8/10 in severity and not alleviated by [id number]. At that time, his wife advised him to come to the ED.     Patient denies any fevers/chills, vision changes, lightheadedness, changes in his speech, abdominal pain and nausea/vomiting.     In the ED, vital signs: T:97.9, HR:110, BP:133/78, RR:20 and O2 sat:98% on RA. Patient received Aspirin 324mg, 26 male w/ hx of SLE (ARTURO, dsDNA, low C3, arthritis, pericarditis), recurrent pleural effusions (requiring multiple chest tubes), lupus nephritis (class V, failed cellcept), seronegative APS, DVT/PE s/p RLE IVC filter placement 2/2to failed a/c (failed Xarelto coumadin, Lovenox and Arixtra) b/l renal vein thrombosis and recent admission for chest pain p/w headache, worsening chest pain as well as diffuse joint pains.     Patient was hospitalized from 2/1 to 2/8 for chest pain which was presumed to be 2/2 lupus vs adhesions given history of multiple chest tube placements. He states that he was doing well shortly after his hospitalization and that his pain was fairly well controlled with [id number] BID and [id number] as needed. However, over the last several days, he has been experiencing severe joint pains (involving b/l elbows, knees and wrists) and worsening chest pain and shortness of breath. He repots that his shortness of breath is so severe that he is unable to ambulate even within his house without feeling short of breath. Patient also reports that yesterday evening, shortly after returning home from seeing Dr. Estrella in clinic, he developed  a headache that started in the back of his head, radiated to his neck, was 8/10 in severity and not alleviated by [id number]. At that time, his wife advised him to come to the ED.     Patient denies any fevers/chills, vision changes, lightheadedness, changes in his speech, abdominal pain and nausea/vomiting.     Of note, patient reports that Dr. Estrella tapered his methylprednisolone to 20mg yesterday from 24mg that he had been on for several weeks. He was also advised to start Diltiazem for BP control.     In the ED, vital signs: T:97.9, HR:110, BP:133/78, RR:20 and O2 sat:98% on RA. Patient received Aspirin 324mg, Hydrocortisone 200mg, Dilaudid 1mg IV x 3, Reglan and 1 L NS.

## 2017-02-18 NOTE — H&P ADULT. - RADIOLOGY RESULTS AND INTERPRETATION
CXR reviewed personally: no focal consolidation   CT head reviewed: no acute intracranial hemorrhage,  extra-axial  fluid  collection  or  displaced skull  fracture.

## 2017-02-18 NOTE — H&P ADULT. - ASSESSMENT
26 male w/ hx of SLE (ARTURO, dsDNA, low C3, arthritis, pericarditis), recurrent pleural effusions (requiring multiple chest tubes), lupus nephritis (class V, failed Cellcept), seronegative APS, DVT/PE s/p RLE IVC filter placement 2/2 to failed a/c (failed Xarelto coumadin, Lovenox and Arixtra on Pradaxa) b/l renal vein thrombosis and recent admission for chest pain p/w headache, worsening chest pain as well as diffuse joint pains. Symptoms may be related to SLE flare.

## 2017-02-18 NOTE — H&P ADULT. - PROBLEM SELECTOR PLAN 3
pt presenting with worsening shortness of breath and found to be in sinus tachycardia. He is however, saturating well on room air.  Given history of DVT/PE pt at high risk for another PE although he in on Pradaxa (he has failed multiple agents in the past). pt presenting with worsening shortness of breath and found to be in sinus tachycardia. He is however, saturating well on room air.  Given history of DVT/PE pt at high risk for another PE although he in on Pradaxa (he has failed multiple agents in the past).  - Obtain CTA chest w/ premedication with steroids at 13, 7 and 1hr prior to procedure and Benadryl 1hr prior to procedure.   - continue heparin ggt for now Pt presenting with worsening shortness of breath and found to be in sinus tachycardia. He is however, saturating well on room air.  Given history of DVT/PE pt at high risk for another PE although he in on Pradaxa (he has failed multiple agents in the past).  - Pt declined CTA chest given prior reactions to contrast.  - continue heparin ggt for now, if headache resolves and/or MRI of head is negative would resume pradaxa unless clinical suspicion increases.

## 2017-02-18 NOTE — H&P ADULT. - PROBLEM SELECTOR PLAN 4
pt received two doses of Rituxan last month and is currently on plaquenil and methylprednisolone 20mg (tapered from 24 yesterday)  - pt received hydrocortisone 200mg in ED, will start hydrocortisone 100 TID   - continue Plaquenil   - rheumatology consult in AM pt received two doses of Rituxan last month and is currently on Plaquenil and methylprednisolone 20mg (tapered from 24 yesterday). His symptoms (diffuse joint pains, chest pain) may be related to SLE flare.   - check dsDNA, c3 and C4  - pt received hydrocortisone 200mg in ED, start prednisone 50mg   - continue Plaquenil   - rheumatology consult in AM pt received two doses of Rituxan last month and is currently on Plaquenil and methylprednisolone 20mg (tapered from 24 yesterday). His symptoms (diffuse joint pains, chest pain) may be related to SLE flare.   - check dsDNA, c3 and C4  - continue methylprednisolone 24mg and Plaquenil   - rheumatology consult in AM pt received two doses of Rituxan last month and is currently on Plaquenil and methylprednisolone 20mg (tapered from 24 yesterday). His symptoms (diffuse joint pains, chest pain) may be related to SLE flare.   - check dsDNA, c3 and C4  - continue methylprednisolone 20mg and Plaquenil   - rheumatology consult in AM

## 2017-02-18 NOTE — H&P ADULT. - PROBLEM SELECTOR PLAN 2
pt presenting with worsening chronic chest pain. Likely 2/2 SLE vs adhesions 2/2 multiple chest tube placements in the past. Low suspicion for ACS given no EKG changes and troponins negative x 1   - trend enzymes x 3, monitor on tele   - pain control as above pt presenting with worsening chronic chest pain. Likely 2/2 SLE vs adhesions 2/2 multiple chest tube placements in the past. Unlikely ACS given no EKG changes and troponins negative x 1. Given steroids in ER as protocol for CTA chest but upon discussion with pt, he still has reaction after protocol. Hx of abnormal V/Q scans.   - Trend cardiac enzymes  - pain control as above

## 2017-02-18 NOTE — H&P ADULT. - PROBLEM SELECTOR PLAN 1
pt presenting with severe headache, stating that he has never had this before. May be migraine   - Patient without focal neurologic deficits and CT head negative for any acute intracranial process   - pain control with Dilaudid 4mg IV Q6hr for severe pain and 2mg IV for moderate pain pt presenting with severe headache, stating that he has never had this before. May be migraine vs lupus cerebritis   - Patient without focal neurologic deficits and CT head negative for any acute intracranial process   - pain control with Dilaudid 4mg IV Q6hr for severe pain and 2mg IV for moderate pain pt presenting with severe headache, stating that he has never had this before. Maybe migraine vs cerebral venous thrombus vs less likely lupus cerebritis  - Patient without focal neurologic deficits and CT head negative for any acute intracranial process   - pain control with Dilaudid 4mg IV Q6hr for severe pain and 2mg IV for moderate pain

## 2017-02-19 LAB
ANION GAP SERPL CALC-SCNC: 9 MMOL/L — SIGNIFICANT CHANGE UP (ref 5–17)
BUN SERPL-MCNC: 11 MG/DL — SIGNIFICANT CHANGE UP (ref 7–23)
CALCIUM SERPL-MCNC: 8.5 MG/DL — SIGNIFICANT CHANGE UP (ref 8.4–10.5)
CHLORIDE SERPL-SCNC: 104 MMOL/L — SIGNIFICANT CHANGE UP (ref 96–108)
CO2 SERPL-SCNC: 24 MMOL/L — SIGNIFICANT CHANGE UP (ref 22–31)
CREAT ?TM UR-MCNC: 94 MG/DL — SIGNIFICANT CHANGE UP
CREAT SERPL-MCNC: 0.55 MG/DL — SIGNIFICANT CHANGE UP (ref 0.5–1.3)
ERYTHROCYTE [SEDIMENTATION RATE] IN BLOOD: 61 MM/HR — HIGH (ref 0–15)
GLUCOSE SERPL-MCNC: 88 MG/DL — SIGNIFICANT CHANGE UP (ref 70–99)
HCT VFR BLD CALC: 27 % — LOW (ref 39–50)
HGB BLD-MCNC: 8.3 G/DL — LOW (ref 13–17)
MCHC RBC-ENTMCNC: 25.8 PG — LOW (ref 27–34)
MCHC RBC-ENTMCNC: 30.7 GM/DL — LOW (ref 32–36)
MCV RBC AUTO: 83.9 FL — SIGNIFICANT CHANGE UP (ref 80–100)
PLATELET # BLD AUTO: 282 K/UL — SIGNIFICANT CHANGE UP (ref 150–400)
POTASSIUM SERPL-MCNC: 4.4 MMOL/L — SIGNIFICANT CHANGE UP (ref 3.5–5.3)
POTASSIUM SERPL-SCNC: 4.4 MMOL/L — SIGNIFICANT CHANGE UP (ref 3.5–5.3)
PROT ?TM UR-MCNC: 620 MG/DL — HIGH (ref 0–12)
PROT/CREAT UR-RTO: 6.6 RATIO — HIGH (ref 0–0.2)
RBC # BLD: 3.22 M/UL — LOW (ref 4.2–5.8)
RBC # FLD: 16.5 % — HIGH (ref 10.3–14.5)
SODIUM SERPL-SCNC: 137 MMOL/L — SIGNIFICANT CHANGE UP (ref 135–145)
TROPONIN T SERPL-MCNC: <0.01 NG/ML — SIGNIFICANT CHANGE UP (ref 0–0.06)
WBC # BLD: 5.48 K/UL — SIGNIFICANT CHANGE UP (ref 3.8–10.5)
WBC # FLD AUTO: 5.48 K/UL — SIGNIFICANT CHANGE UP (ref 3.8–10.5)

## 2017-02-19 PROCEDURE — 99223 1ST HOSP IP/OBS HIGH 75: CPT | Mod: GC

## 2017-02-19 PROCEDURE — 99233 SBSQ HOSP IP/OBS HIGH 50: CPT | Mod: GC

## 2017-02-19 RX ORDER — FLUCONAZOLE 150 MG/1
100 TABLET ORAL DAILY
Qty: 0 | Refills: 0 | Status: COMPLETED | OUTPATIENT
Start: 2017-02-20 | End: 2017-02-25

## 2017-02-19 RX ORDER — FLUCONAZOLE 150 MG/1
200 TABLET ORAL ONCE
Qty: 0 | Refills: 0 | Status: COMPLETED | OUTPATIENT
Start: 2017-02-19 | End: 2017-02-19

## 2017-02-19 RX ORDER — PANTOPRAZOLE SODIUM 20 MG/1
40 TABLET, DELAYED RELEASE ORAL ONCE
Qty: 0 | Refills: 0 | Status: COMPLETED | OUTPATIENT
Start: 2017-02-19 | End: 2017-02-19

## 2017-02-19 RX ORDER — OXYCODONE HYDROCHLORIDE 5 MG/1
15 TABLET ORAL EVERY 12 HOURS
Qty: 0 | Refills: 0 | Status: DISCONTINUED | OUTPATIENT
Start: 2017-02-19 | End: 2017-02-24

## 2017-02-19 RX ORDER — FLUCONAZOLE 150 MG/1
200 TABLET ORAL DAILY
Qty: 0 | Refills: 0 | Status: DISCONTINUED | OUTPATIENT
Start: 2017-02-19 | End: 2017-02-19

## 2017-02-19 RX ADMIN — OXYCODONE HYDROCHLORIDE 15 MILLIGRAM(S): 5 TABLET ORAL at 18:30

## 2017-02-19 RX ADMIN — HYDROMORPHONE HYDROCHLORIDE 2 MILLIGRAM(S): 2 INJECTION INTRAMUSCULAR; INTRAVENOUS; SUBCUTANEOUS at 16:12

## 2017-02-19 RX ADMIN — Medication 20 MILLIGRAM(S): at 05:31

## 2017-02-19 RX ADMIN — FLUCONAZOLE 200 MILLIGRAM(S): 150 TABLET ORAL at 11:56

## 2017-02-19 RX ADMIN — HYDROMORPHONE HYDROCHLORIDE 4 MILLIGRAM(S): 2 INJECTION INTRAMUSCULAR; INTRAVENOUS; SUBCUTANEOUS at 00:38

## 2017-02-19 RX ADMIN — HYDROMORPHONE HYDROCHLORIDE 4 MILLIGRAM(S): 2 INJECTION INTRAMUSCULAR; INTRAVENOUS; SUBCUTANEOUS at 01:35

## 2017-02-19 RX ADMIN — HYDROMORPHONE HYDROCHLORIDE 4 MILLIGRAM(S): 2 INJECTION INTRAMUSCULAR; INTRAVENOUS; SUBCUTANEOUS at 22:40

## 2017-02-19 RX ADMIN — Medication 200 MILLIGRAM(S): at 17:33

## 2017-02-19 RX ADMIN — HYDROMORPHONE HYDROCHLORIDE 4 MILLIGRAM(S): 2 INJECTION INTRAMUSCULAR; INTRAVENOUS; SUBCUTANEOUS at 14:49

## 2017-02-19 RX ADMIN — PANTOPRAZOLE SODIUM 40 MILLIGRAM(S): 20 TABLET, DELAYED RELEASE ORAL at 21:41

## 2017-02-19 RX ADMIN — HYDROMORPHONE HYDROCHLORIDE 2 MILLIGRAM(S): 2 INJECTION INTRAMUSCULAR; INTRAVENOUS; SUBCUTANEOUS at 03:18

## 2017-02-19 RX ADMIN — HYDROMORPHONE HYDROCHLORIDE 2 MILLIGRAM(S): 2 INJECTION INTRAMUSCULAR; INTRAVENOUS; SUBCUTANEOUS at 04:15

## 2017-02-19 RX ADMIN — HYDROMORPHONE HYDROCHLORIDE 4 MILLIGRAM(S): 2 INJECTION INTRAMUSCULAR; INTRAVENOUS; SUBCUTANEOUS at 17:34

## 2017-02-19 RX ADMIN — HYDROMORPHONE HYDROCHLORIDE 2 MILLIGRAM(S): 2 INJECTION INTRAMUSCULAR; INTRAVENOUS; SUBCUTANEOUS at 16:49

## 2017-02-19 RX ADMIN — HYDROMORPHONE HYDROCHLORIDE 4 MILLIGRAM(S): 2 INJECTION INTRAMUSCULAR; INTRAVENOUS; SUBCUTANEOUS at 13:41

## 2017-02-19 RX ADMIN — OXYCODONE HYDROCHLORIDE 15 MILLIGRAM(S): 5 TABLET ORAL at 17:33

## 2017-02-19 RX ADMIN — Medication 200 MILLIGRAM(S): at 08:07

## 2017-02-19 RX ADMIN — FAMOTIDINE 20 MILLIGRAM(S): 10 INJECTION INTRAVENOUS at 05:30

## 2017-02-19 RX ADMIN — HYDROMORPHONE HYDROCHLORIDE 2 MILLIGRAM(S): 2 INJECTION INTRAMUSCULAR; INTRAVENOUS; SUBCUTANEOUS at 20:30

## 2017-02-19 RX ADMIN — DABIGATRAN ETEXILATE MESYLATE 150 MILLIGRAM(S): 150 CAPSULE ORAL at 17:31

## 2017-02-19 RX ADMIN — HYDROMORPHONE HYDROCHLORIDE 4 MILLIGRAM(S): 2 INJECTION INTRAMUSCULAR; INTRAVENOUS; SUBCUTANEOUS at 06:26

## 2017-02-19 RX ADMIN — HYDROMORPHONE HYDROCHLORIDE 4 MILLIGRAM(S): 2 INJECTION INTRAMUSCULAR; INTRAVENOUS; SUBCUTANEOUS at 17:49

## 2017-02-19 RX ADMIN — HYDROMORPHONE HYDROCHLORIDE 2 MILLIGRAM(S): 2 INJECTION INTRAMUSCULAR; INTRAVENOUS; SUBCUTANEOUS at 19:29

## 2017-02-19 RX ADMIN — HYDROMORPHONE HYDROCHLORIDE 4 MILLIGRAM(S): 2 INJECTION INTRAMUSCULAR; INTRAVENOUS; SUBCUTANEOUS at 10:49

## 2017-02-19 RX ADMIN — HYDROMORPHONE HYDROCHLORIDE 4 MILLIGRAM(S): 2 INJECTION INTRAMUSCULAR; INTRAVENOUS; SUBCUTANEOUS at 10:18

## 2017-02-19 RX ADMIN — HYDROMORPHONE HYDROCHLORIDE 2 MILLIGRAM(S): 2 INJECTION INTRAMUSCULAR; INTRAVENOUS; SUBCUTANEOUS at 11:30

## 2017-02-19 RX ADMIN — FAMOTIDINE 20 MILLIGRAM(S): 10 INJECTION INTRAVENOUS at 17:32

## 2017-02-19 RX ADMIN — HYDROMORPHONE HYDROCHLORIDE 2 MILLIGRAM(S): 2 INJECTION INTRAMUSCULAR; INTRAVENOUS; SUBCUTANEOUS at 11:52

## 2017-02-19 RX ADMIN — Medication 81 MILLIGRAM(S): at 11:57

## 2017-02-19 RX ADMIN — HYDROMORPHONE HYDROCHLORIDE 2 MILLIGRAM(S): 2 INJECTION INTRAMUSCULAR; INTRAVENOUS; SUBCUTANEOUS at 08:13

## 2017-02-19 RX ADMIN — HYDROMORPHONE HYDROCHLORIDE 4 MILLIGRAM(S): 2 INJECTION INTRAMUSCULAR; INTRAVENOUS; SUBCUTANEOUS at 05:31

## 2017-02-19 RX ADMIN — DABIGATRAN ETEXILATE MESYLATE 150 MILLIGRAM(S): 150 CAPSULE ORAL at 05:31

## 2017-02-19 RX ADMIN — HYDROMORPHONE HYDROCHLORIDE 2 MILLIGRAM(S): 2 INJECTION INTRAMUSCULAR; INTRAVENOUS; SUBCUTANEOUS at 08:30

## 2017-02-19 RX ADMIN — HYDROMORPHONE HYDROCHLORIDE 4 MILLIGRAM(S): 2 INJECTION INTRAMUSCULAR; INTRAVENOUS; SUBCUTANEOUS at 21:41

## 2017-02-20 LAB
ANION GAP SERPL CALC-SCNC: 10 MMOL/L — SIGNIFICANT CHANGE UP (ref 5–17)
APPEARANCE: CLEAR
BACTERIA: NEGATIVE
BILIRUBIN URINE: NEGATIVE
BLOOD URINE: ABNORMAL
BUN SERPL-MCNC: 10 MG/DL — SIGNIFICANT CHANGE UP (ref 7–23)
C3 SERPL-MCNC: 109 MG/DL
C3 SERPL-MCNC: 112 MG/DL — SIGNIFICANT CHANGE UP (ref 80–180)
C4 SERPL-MCNC: 30 MG/DL
C4 SERPL-MCNC: 32 MG/DL — SIGNIFICANT CHANGE UP (ref 10–45)
CALCIUM SERPL-MCNC: 8.3 MG/DL — LOW (ref 8.4–10.5)
CHLORIDE SERPL-SCNC: 103 MMOL/L — SIGNIFICANT CHANGE UP (ref 96–108)
CO2 SERPL-SCNC: 28 MMOL/L — SIGNIFICANT CHANGE UP (ref 22–31)
COLOR: YELLOW
CREAT SERPL-MCNC: 0.5 MG/DL — SIGNIFICANT CHANGE UP (ref 0.5–1.3)
DSDNA AB SER-ACNC: 297 IU/ML
GLUCOSE QUALITATIVE U: NORMAL MG/DL
GLUCOSE SERPL-MCNC: 102 MG/DL — HIGH (ref 70–99)
HCT VFR BLD CALC: 25.9 % — LOW (ref 39–50)
HGB BLD-MCNC: 8.4 G/DL — LOW (ref 13–17)
KETONES URINE: NEGATIVE
LEUKOCYTE ESTERASE URINE: NEGATIVE
MCHC RBC-ENTMCNC: 27 PG — SIGNIFICANT CHANGE UP (ref 27–34)
MCHC RBC-ENTMCNC: 32.3 GM/DL — SIGNIFICANT CHANGE UP (ref 32–36)
MCV RBC AUTO: 83.4 FL — SIGNIFICANT CHANGE UP (ref 80–100)
MICROSCOPIC-UA: NORMAL
NITRITE URINE: NEGATIVE
PH URINE: 6.5
PLATELET # BLD AUTO: 264 K/UL — SIGNIFICANT CHANGE UP (ref 150–400)
POTASSIUM SERPL-MCNC: 3.7 MMOL/L — SIGNIFICANT CHANGE UP (ref 3.5–5.3)
POTASSIUM SERPL-SCNC: 3.7 MMOL/L — SIGNIFICANT CHANGE UP (ref 3.5–5.3)
PROTEIN URINE: 100 MG/DL
RBC # BLD: 3.1 M/UL — LOW (ref 4.2–5.8)
RBC # FLD: 15.9 % — HIGH (ref 10.3–14.5)
RED BLOOD CELLS URINE: 5 /HPF
SODIUM SERPL-SCNC: 141 MMOL/L — SIGNIFICANT CHANGE UP (ref 135–145)
SPECIFIC GRAVITY URINE: 1
SQUAMOUS EPITHELIAL CELLS: 0 /HPF
UROBILINOGEN URINE: NORMAL MG/DL
WBC # BLD: 4.5 K/UL — SIGNIFICANT CHANGE UP (ref 3.8–10.5)
WBC # FLD AUTO: 4.5 K/UL — SIGNIFICANT CHANGE UP (ref 3.8–10.5)
WHITE BLOOD CELLS URINE: 1 /HPF

## 2017-02-20 PROCEDURE — 99223 1ST HOSP IP/OBS HIGH 75: CPT | Mod: GC

## 2017-02-20 PROCEDURE — 99233 SBSQ HOSP IP/OBS HIGH 50: CPT | Mod: GC

## 2017-02-20 RX ORDER — NICOTINE POLACRILEX 2 MG
1 GUM BUCCAL DAILY
Qty: 0 | Refills: 0 | Status: DISCONTINUED | OUTPATIENT
Start: 2017-02-20 | End: 2017-02-27

## 2017-02-20 RX ADMIN — HYDROMORPHONE HYDROCHLORIDE 4 MILLIGRAM(S): 2 INJECTION INTRAMUSCULAR; INTRAVENOUS; SUBCUTANEOUS at 22:40

## 2017-02-20 RX ADMIN — HYDROMORPHONE HYDROCHLORIDE 4 MILLIGRAM(S): 2 INJECTION INTRAMUSCULAR; INTRAVENOUS; SUBCUTANEOUS at 10:19

## 2017-02-20 RX ADMIN — Medication 1 PATCH: at 11:33

## 2017-02-20 RX ADMIN — HYDROMORPHONE HYDROCHLORIDE 4 MILLIGRAM(S): 2 INJECTION INTRAMUSCULAR; INTRAVENOUS; SUBCUTANEOUS at 01:41

## 2017-02-20 RX ADMIN — HYDROMORPHONE HYDROCHLORIDE 2 MILLIGRAM(S): 2 INJECTION INTRAMUSCULAR; INTRAVENOUS; SUBCUTANEOUS at 20:40

## 2017-02-20 RX ADMIN — HYDROMORPHONE HYDROCHLORIDE 2 MILLIGRAM(S): 2 INJECTION INTRAMUSCULAR; INTRAVENOUS; SUBCUTANEOUS at 08:30

## 2017-02-20 RX ADMIN — Medication 200 MILLIGRAM(S): at 18:06

## 2017-02-20 RX ADMIN — HYDROMORPHONE HYDROCHLORIDE 2 MILLIGRAM(S): 2 INJECTION INTRAMUSCULAR; INTRAVENOUS; SUBCUTANEOUS at 04:45

## 2017-02-20 RX ADMIN — Medication 200 MILLIGRAM(S): at 07:59

## 2017-02-20 RX ADMIN — DABIGATRAN ETEXILATE MESYLATE 150 MILLIGRAM(S): 150 CAPSULE ORAL at 06:04

## 2017-02-20 RX ADMIN — HYDROMORPHONE HYDROCHLORIDE 2 MILLIGRAM(S): 2 INJECTION INTRAMUSCULAR; INTRAVENOUS; SUBCUTANEOUS at 16:21

## 2017-02-20 RX ADMIN — HYDROMORPHONE HYDROCHLORIDE 2 MILLIGRAM(S): 2 INJECTION INTRAMUSCULAR; INTRAVENOUS; SUBCUTANEOUS at 03:40

## 2017-02-20 RX ADMIN — HYDROMORPHONE HYDROCHLORIDE 4 MILLIGRAM(S): 2 INJECTION INTRAMUSCULAR; INTRAVENOUS; SUBCUTANEOUS at 14:24

## 2017-02-20 RX ADMIN — HYDROMORPHONE HYDROCHLORIDE 4 MILLIGRAM(S): 2 INJECTION INTRAMUSCULAR; INTRAVENOUS; SUBCUTANEOUS at 15:00

## 2017-02-20 RX ADMIN — HYDROMORPHONE HYDROCHLORIDE 4 MILLIGRAM(S): 2 INJECTION INTRAMUSCULAR; INTRAVENOUS; SUBCUTANEOUS at 02:32

## 2017-02-20 RX ADMIN — HYDROMORPHONE HYDROCHLORIDE 2 MILLIGRAM(S): 2 INJECTION INTRAMUSCULAR; INTRAVENOUS; SUBCUTANEOUS at 17:38

## 2017-02-20 RX ADMIN — HYDROMORPHONE HYDROCHLORIDE 2 MILLIGRAM(S): 2 INJECTION INTRAMUSCULAR; INTRAVENOUS; SUBCUTANEOUS at 21:00

## 2017-02-20 RX ADMIN — FAMOTIDINE 20 MILLIGRAM(S): 10 INJECTION INTRAVENOUS at 06:04

## 2017-02-20 RX ADMIN — HYDROMORPHONE HYDROCHLORIDE 2 MILLIGRAM(S): 2 INJECTION INTRAMUSCULAR; INTRAVENOUS; SUBCUTANEOUS at 00:09

## 2017-02-20 RX ADMIN — OXYCODONE HYDROCHLORIDE 15 MILLIGRAM(S): 5 TABLET ORAL at 06:04

## 2017-02-20 RX ADMIN — Medication 81 MILLIGRAM(S): at 11:36

## 2017-02-20 RX ADMIN — HYDROMORPHONE HYDROCHLORIDE 2 MILLIGRAM(S): 2 INJECTION INTRAMUSCULAR; INTRAVENOUS; SUBCUTANEOUS at 13:15

## 2017-02-20 RX ADMIN — FAMOTIDINE 20 MILLIGRAM(S): 10 INJECTION INTRAVENOUS at 18:06

## 2017-02-20 RX ADMIN — HYDROMORPHONE HYDROCHLORIDE 2 MILLIGRAM(S): 2 INJECTION INTRAMUSCULAR; INTRAVENOUS; SUBCUTANEOUS at 07:58

## 2017-02-20 RX ADMIN — HYDROMORPHONE HYDROCHLORIDE 4 MILLIGRAM(S): 2 INJECTION INTRAMUSCULAR; INTRAVENOUS; SUBCUTANEOUS at 19:09

## 2017-02-20 RX ADMIN — DABIGATRAN ETEXILATE MESYLATE 150 MILLIGRAM(S): 150 CAPSULE ORAL at 18:06

## 2017-02-20 RX ADMIN — FLUCONAZOLE 100 MILLIGRAM(S): 150 TABLET ORAL at 11:36

## 2017-02-20 RX ADMIN — HYDROMORPHONE HYDROCHLORIDE 2 MILLIGRAM(S): 2 INJECTION INTRAMUSCULAR; INTRAVENOUS; SUBCUTANEOUS at 01:00

## 2017-02-20 RX ADMIN — HYDROMORPHONE HYDROCHLORIDE 4 MILLIGRAM(S): 2 INJECTION INTRAMUSCULAR; INTRAVENOUS; SUBCUTANEOUS at 07:00

## 2017-02-20 RX ADMIN — HYDROMORPHONE HYDROCHLORIDE 4 MILLIGRAM(S): 2 INJECTION INTRAMUSCULAR; INTRAVENOUS; SUBCUTANEOUS at 22:55

## 2017-02-20 RX ADMIN — OXYCODONE HYDROCHLORIDE 15 MILLIGRAM(S): 5 TABLET ORAL at 18:27

## 2017-02-20 RX ADMIN — HYDROMORPHONE HYDROCHLORIDE 4 MILLIGRAM(S): 2 INJECTION INTRAMUSCULAR; INTRAVENOUS; SUBCUTANEOUS at 11:00

## 2017-02-20 RX ADMIN — HYDROMORPHONE HYDROCHLORIDE 4 MILLIGRAM(S): 2 INJECTION INTRAMUSCULAR; INTRAVENOUS; SUBCUTANEOUS at 18:30

## 2017-02-20 RX ADMIN — OXYCODONE HYDROCHLORIDE 15 MILLIGRAM(S): 5 TABLET ORAL at 18:06

## 2017-02-20 RX ADMIN — HYDROMORPHONE HYDROCHLORIDE 2 MILLIGRAM(S): 2 INJECTION INTRAMUSCULAR; INTRAVENOUS; SUBCUTANEOUS at 12:28

## 2017-02-20 RX ADMIN — OXYCODONE HYDROCHLORIDE 15 MILLIGRAM(S): 5 TABLET ORAL at 07:01

## 2017-02-20 RX ADMIN — HYDROMORPHONE HYDROCHLORIDE 4 MILLIGRAM(S): 2 INJECTION INTRAMUSCULAR; INTRAVENOUS; SUBCUTANEOUS at 06:05

## 2017-02-21 LAB
ANION GAP SERPL CALC-SCNC: 10 MMOL/L — SIGNIFICANT CHANGE UP (ref 5–17)
BASOPHILS # BLD AUTO: 0 K/UL — SIGNIFICANT CHANGE UP (ref 0–0.2)
BASOPHILS NFR BLD AUTO: 0.2 % — SIGNIFICANT CHANGE UP (ref 0–2)
BUN SERPL-MCNC: 7 MG/DL — SIGNIFICANT CHANGE UP (ref 7–23)
CALCIUM SERPL-MCNC: 8.1 MG/DL — LOW (ref 8.4–10.5)
CHLORIDE SERPL-SCNC: 101 MMOL/L — SIGNIFICANT CHANGE UP (ref 96–108)
CHOLEST SERPL-MCNC: 232 MG/DL — HIGH (ref 10–199)
CO2 SERPL-SCNC: 28 MMOL/L — SIGNIFICANT CHANGE UP (ref 22–31)
CREAT SERPL-MCNC: 0.52 MG/DL — SIGNIFICANT CHANGE UP (ref 0.5–1.3)
DSDNA AB SER-ACNC: 155 IU/ML — HIGH
EOSINOPHIL # BLD AUTO: 0.1 K/UL — SIGNIFICANT CHANGE UP (ref 0–0.5)
EOSINOPHIL NFR BLD AUTO: 2.7 % — SIGNIFICANT CHANGE UP (ref 0–6)
GLUCOSE SERPL-MCNC: 81 MG/DL — SIGNIFICANT CHANGE UP (ref 70–99)
HCT VFR BLD CALC: 25.5 % — LOW (ref 39–50)
HDLC SERPL-MCNC: 53 MG/DL — SIGNIFICANT CHANGE UP (ref 40–125)
HGB BLD-MCNC: 8.4 G/DL — LOW (ref 13–17)
LIPID PNL WITH DIRECT LDL SERPL: 150 MG/DL — HIGH
LYMPHOCYTES # BLD AUTO: 0.8 K/UL — LOW (ref 1–3.3)
LYMPHOCYTES # BLD AUTO: 15.9 % — SIGNIFICANT CHANGE UP (ref 13–44)
MCHC RBC-ENTMCNC: 27.2 PG — SIGNIFICANT CHANGE UP (ref 27–34)
MCHC RBC-ENTMCNC: 33 GM/DL — SIGNIFICANT CHANGE UP (ref 32–36)
MCV RBC AUTO: 82.3 FL — SIGNIFICANT CHANGE UP (ref 80–100)
MONOCYTES # BLD AUTO: 0.6 K/UL — SIGNIFICANT CHANGE UP (ref 0–0.9)
MONOCYTES NFR BLD AUTO: 11.9 % — SIGNIFICANT CHANGE UP (ref 2–14)
NEUTROPHILS # BLD AUTO: 3.6 K/UL — SIGNIFICANT CHANGE UP (ref 1.8–7.4)
NEUTROPHILS NFR BLD AUTO: 69.3 % — SIGNIFICANT CHANGE UP (ref 43–77)
PLATELET # BLD AUTO: 229 K/UL — SIGNIFICANT CHANGE UP (ref 150–400)
POTASSIUM SERPL-MCNC: 4.1 MMOL/L — SIGNIFICANT CHANGE UP (ref 3.5–5.3)
POTASSIUM SERPL-SCNC: 4.1 MMOL/L — SIGNIFICANT CHANGE UP (ref 3.5–5.3)
RBC # BLD: 3.1 M/UL — LOW (ref 4.2–5.8)
RBC # FLD: 16.1 % — HIGH (ref 10.3–14.5)
SODIUM SERPL-SCNC: 139 MMOL/L — SIGNIFICANT CHANGE UP (ref 135–145)
TOTAL CHOLESTEROL/HDL RATIO MEASUREMENT: 4.4 RATIO — SIGNIFICANT CHANGE UP (ref 3.4–9.6)
TRIGL SERPL-MCNC: 144 MG/DL — SIGNIFICANT CHANGE UP (ref 10–149)
WBC # BLD: 5.2 K/UL — SIGNIFICANT CHANGE UP (ref 3.8–10.5)
WBC # FLD AUTO: 5.2 K/UL — SIGNIFICANT CHANGE UP (ref 3.8–10.5)

## 2017-02-21 PROCEDURE — 99233 SBSQ HOSP IP/OBS HIGH 50: CPT | Mod: GC

## 2017-02-21 PROCEDURE — 99232 SBSQ HOSP IP/OBS MODERATE 35: CPT | Mod: GC

## 2017-02-21 RX ORDER — PANTOPRAZOLE SODIUM 20 MG/1
40 TABLET, DELAYED RELEASE ORAL DAILY
Qty: 0 | Refills: 0 | Status: DISCONTINUED | OUTPATIENT
Start: 2017-02-21 | End: 2017-02-22

## 2017-02-21 RX ORDER — PANTOPRAZOLE SODIUM 20 MG/1
40 TABLET, DELAYED RELEASE ORAL EVERY 12 HOURS
Qty: 0 | Refills: 0 | Status: DISCONTINUED | OUTPATIENT
Start: 2017-02-21 | End: 2017-02-21

## 2017-02-21 RX ORDER — CHLORPROMAZINE HCL 10 MG
10 TABLET ORAL ONCE
Qty: 0 | Refills: 0 | Status: DISCONTINUED | OUTPATIENT
Start: 2017-02-21 | End: 2017-02-21

## 2017-02-21 RX ORDER — POLYETHYLENE GLYCOL 3350 17 G/17G
17 POWDER, FOR SOLUTION ORAL DAILY
Qty: 0 | Refills: 0 | Status: DISCONTINUED | OUTPATIENT
Start: 2017-02-21 | End: 2017-02-27

## 2017-02-21 RX ORDER — PANTOPRAZOLE SODIUM 20 MG/1
40 TABLET, DELAYED RELEASE ORAL
Qty: 0 | Refills: 0 | Status: DISCONTINUED | OUTPATIENT
Start: 2017-02-21 | End: 2017-02-21

## 2017-02-21 RX ORDER — PANTOPRAZOLE SODIUM 20 MG/1
40 TABLET, DELAYED RELEASE ORAL ONCE
Qty: 0 | Refills: 0 | Status: COMPLETED | OUTPATIENT
Start: 2017-02-21 | End: 2017-02-21

## 2017-02-21 RX ADMIN — HYDROMORPHONE HYDROCHLORIDE 4 MILLIGRAM(S): 2 INJECTION INTRAMUSCULAR; INTRAVENOUS; SUBCUTANEOUS at 15:20

## 2017-02-21 RX ADMIN — Medication 100 MILLIGRAM(S): at 17:48

## 2017-02-21 RX ADMIN — HYDROMORPHONE HYDROCHLORIDE 4 MILLIGRAM(S): 2 INJECTION INTRAMUSCULAR; INTRAVENOUS; SUBCUTANEOUS at 11:05

## 2017-02-21 RX ADMIN — HYDROMORPHONE HYDROCHLORIDE 4 MILLIGRAM(S): 2 INJECTION INTRAMUSCULAR; INTRAVENOUS; SUBCUTANEOUS at 06:35

## 2017-02-21 RX ADMIN — HYDROMORPHONE HYDROCHLORIDE 2 MILLIGRAM(S): 2 INJECTION INTRAMUSCULAR; INTRAVENOUS; SUBCUTANEOUS at 20:06

## 2017-02-21 RX ADMIN — HYDROMORPHONE HYDROCHLORIDE 4 MILLIGRAM(S): 2 INJECTION INTRAMUSCULAR; INTRAVENOUS; SUBCUTANEOUS at 14:57

## 2017-02-21 RX ADMIN — Medication 200 MILLIGRAM(S): at 08:52

## 2017-02-21 RX ADMIN — Medication 81 MILLIGRAM(S): at 11:30

## 2017-02-21 RX ADMIN — PANTOPRAZOLE SODIUM 40 MILLIGRAM(S): 20 TABLET, DELAYED RELEASE ORAL at 20:17

## 2017-02-21 RX ADMIN — HYDROMORPHONE HYDROCHLORIDE 2 MILLIGRAM(S): 2 INJECTION INTRAMUSCULAR; INTRAVENOUS; SUBCUTANEOUS at 17:15

## 2017-02-21 RX ADMIN — Medication 1 PATCH: at 11:30

## 2017-02-21 RX ADMIN — HYDROMORPHONE HYDROCHLORIDE 2 MILLIGRAM(S): 2 INJECTION INTRAMUSCULAR; INTRAVENOUS; SUBCUTANEOUS at 12:15

## 2017-02-21 RX ADMIN — HYDROMORPHONE HYDROCHLORIDE 4 MILLIGRAM(S): 2 INJECTION INTRAMUSCULAR; INTRAVENOUS; SUBCUTANEOUS at 18:59

## 2017-02-21 RX ADMIN — Medication 200 MILLIGRAM(S): at 17:48

## 2017-02-21 RX ADMIN — OXYCODONE HYDROCHLORIDE 15 MILLIGRAM(S): 5 TABLET ORAL at 06:06

## 2017-02-21 RX ADMIN — HYDROMORPHONE HYDROCHLORIDE 2 MILLIGRAM(S): 2 INJECTION INTRAMUSCULAR; INTRAVENOUS; SUBCUTANEOUS at 09:15

## 2017-02-21 RX ADMIN — FAMOTIDINE 20 MILLIGRAM(S): 10 INJECTION INTRAVENOUS at 06:06

## 2017-02-21 RX ADMIN — HYDROMORPHONE HYDROCHLORIDE 4 MILLIGRAM(S): 2 INJECTION INTRAMUSCULAR; INTRAVENOUS; SUBCUTANEOUS at 06:21

## 2017-02-21 RX ADMIN — HYDROMORPHONE HYDROCHLORIDE 4 MILLIGRAM(S): 2 INJECTION INTRAMUSCULAR; INTRAVENOUS; SUBCUTANEOUS at 10:45

## 2017-02-21 RX ADMIN — HYDROMORPHONE HYDROCHLORIDE 2 MILLIGRAM(S): 2 INJECTION INTRAMUSCULAR; INTRAVENOUS; SUBCUTANEOUS at 20:20

## 2017-02-21 RX ADMIN — DABIGATRAN ETEXILATE MESYLATE 150 MILLIGRAM(S): 150 CAPSULE ORAL at 06:06

## 2017-02-21 RX ADMIN — HYDROMORPHONE HYDROCHLORIDE 2 MILLIGRAM(S): 2 INJECTION INTRAMUSCULAR; INTRAVENOUS; SUBCUTANEOUS at 12:35

## 2017-02-21 RX ADMIN — HYDROMORPHONE HYDROCHLORIDE 4 MILLIGRAM(S): 2 INJECTION INTRAMUSCULAR; INTRAVENOUS; SUBCUTANEOUS at 19:15

## 2017-02-21 RX ADMIN — OXYCODONE HYDROCHLORIDE 15 MILLIGRAM(S): 5 TABLET ORAL at 18:30

## 2017-02-21 RX ADMIN — PANTOPRAZOLE SODIUM 40 MILLIGRAM(S): 20 TABLET, DELAYED RELEASE ORAL at 00:35

## 2017-02-21 RX ADMIN — HYDROMORPHONE HYDROCHLORIDE 4 MILLIGRAM(S): 2 INJECTION INTRAMUSCULAR; INTRAVENOUS; SUBCUTANEOUS at 22:41

## 2017-02-21 RX ADMIN — HYDROMORPHONE HYDROCHLORIDE 2 MILLIGRAM(S): 2 INJECTION INTRAMUSCULAR; INTRAVENOUS; SUBCUTANEOUS at 00:39

## 2017-02-21 RX ADMIN — HYDROMORPHONE HYDROCHLORIDE 2 MILLIGRAM(S): 2 INJECTION INTRAMUSCULAR; INTRAVENOUS; SUBCUTANEOUS at 16:50

## 2017-02-21 RX ADMIN — FAMOTIDINE 20 MILLIGRAM(S): 10 INJECTION INTRAVENOUS at 17:48

## 2017-02-21 RX ADMIN — DABIGATRAN ETEXILATE MESYLATE 150 MILLIGRAM(S): 150 CAPSULE ORAL at 17:48

## 2017-02-21 RX ADMIN — OXYCODONE HYDROCHLORIDE 15 MILLIGRAM(S): 5 TABLET ORAL at 07:00

## 2017-02-21 RX ADMIN — HYDROMORPHONE HYDROCHLORIDE 4 MILLIGRAM(S): 2 INJECTION INTRAMUSCULAR; INTRAVENOUS; SUBCUTANEOUS at 22:23

## 2017-02-21 RX ADMIN — OXYCODONE HYDROCHLORIDE 15 MILLIGRAM(S): 5 TABLET ORAL at 17:48

## 2017-02-21 RX ADMIN — FLUCONAZOLE 100 MILLIGRAM(S): 150 TABLET ORAL at 11:30

## 2017-02-21 RX ADMIN — HYDROMORPHONE HYDROCHLORIDE 2 MILLIGRAM(S): 2 INJECTION INTRAMUSCULAR; INTRAVENOUS; SUBCUTANEOUS at 00:22

## 2017-02-21 RX ADMIN — HYDROMORPHONE HYDROCHLORIDE 2 MILLIGRAM(S): 2 INJECTION INTRAMUSCULAR; INTRAVENOUS; SUBCUTANEOUS at 08:52

## 2017-02-21 RX ADMIN — Medication 1 PATCH: at 11:31

## 2017-02-22 LAB
ANION GAP SERPL CALC-SCNC: 9 MMOL/L — SIGNIFICANT CHANGE UP (ref 5–17)
BUN SERPL-MCNC: 7 MG/DL — SIGNIFICANT CHANGE UP (ref 7–23)
CALCIUM SERPL-MCNC: 8.5 MG/DL — SIGNIFICANT CHANGE UP (ref 8.4–10.5)
CHLORIDE SERPL-SCNC: 98 MMOL/L — SIGNIFICANT CHANGE UP (ref 96–108)
CO2 SERPL-SCNC: 28 MMOL/L — SIGNIFICANT CHANGE UP (ref 22–31)
CREAT SERPL-MCNC: 0.56 MG/DL — SIGNIFICANT CHANGE UP (ref 0.5–1.3)
GLUCOSE SERPL-MCNC: 84 MG/DL — SIGNIFICANT CHANGE UP (ref 70–99)
HCT VFR BLD CALC: 25.9 % — LOW (ref 39–50)
HGB BLD-MCNC: 8.3 G/DL — LOW (ref 13–17)
MCHC RBC-ENTMCNC: 26.3 PG — LOW (ref 27–34)
MCHC RBC-ENTMCNC: 32.1 GM/DL — SIGNIFICANT CHANGE UP (ref 32–36)
MCV RBC AUTO: 81.9 FL — SIGNIFICANT CHANGE UP (ref 80–100)
PLATELET # BLD AUTO: 254 K/UL — SIGNIFICANT CHANGE UP (ref 150–400)
POTASSIUM SERPL-MCNC: 4 MMOL/L — SIGNIFICANT CHANGE UP (ref 3.5–5.3)
POTASSIUM SERPL-SCNC: 4 MMOL/L — SIGNIFICANT CHANGE UP (ref 3.5–5.3)
RBC # BLD: 3.17 M/UL — LOW (ref 4.2–5.8)
RBC # FLD: 15.6 % — HIGH (ref 10.3–14.5)
SODIUM SERPL-SCNC: 135 MMOL/L — SIGNIFICANT CHANGE UP (ref 135–145)
WBC # BLD: 4.6 K/UL — SIGNIFICANT CHANGE UP (ref 3.8–10.5)
WBC # FLD AUTO: 4.6 K/UL — SIGNIFICANT CHANGE UP (ref 3.8–10.5)

## 2017-02-22 PROCEDURE — 99233 SBSQ HOSP IP/OBS HIGH 50: CPT | Mod: GC

## 2017-02-22 RX ORDER — HYDROMORPHONE HYDROCHLORIDE 2 MG/ML
2 INJECTION INTRAMUSCULAR; INTRAVENOUS; SUBCUTANEOUS EVERY 4 HOURS
Qty: 0 | Refills: 0 | Status: DISCONTINUED | OUTPATIENT
Start: 2017-02-22 | End: 2017-02-23

## 2017-02-22 RX ORDER — CHLORPROMAZINE HCL 10 MG
10 TABLET ORAL ONCE
Qty: 0 | Refills: 0 | Status: COMPLETED | OUTPATIENT
Start: 2017-02-22 | End: 2017-02-22

## 2017-02-22 RX ORDER — HYDROMORPHONE HYDROCHLORIDE 2 MG/ML
1 INJECTION INTRAMUSCULAR; INTRAVENOUS; SUBCUTANEOUS
Qty: 0 | Refills: 0 | Status: DISCONTINUED | OUTPATIENT
Start: 2017-02-22 | End: 2017-02-22

## 2017-02-22 RX ORDER — VALSARTAN 80 MG/1
80 TABLET ORAL DAILY
Qty: 0 | Refills: 0 | Status: DISCONTINUED | OUTPATIENT
Start: 2017-02-22 | End: 2017-02-27

## 2017-02-22 RX ORDER — HYDROMORPHONE HYDROCHLORIDE 2 MG/ML
1 INJECTION INTRAMUSCULAR; INTRAVENOUS; SUBCUTANEOUS
Qty: 0 | Refills: 0 | Status: DISCONTINUED | OUTPATIENT
Start: 2017-02-22 | End: 2017-02-23

## 2017-02-22 RX ORDER — PANTOPRAZOLE SODIUM 20 MG/1
40 TABLET, DELAYED RELEASE ORAL DAILY
Qty: 0 | Refills: 0 | Status: DISCONTINUED | OUTPATIENT
Start: 2017-02-22 | End: 2017-02-27

## 2017-02-22 RX ADMIN — HYDROMORPHONE HYDROCHLORIDE 4 MILLIGRAM(S): 2 INJECTION INTRAMUSCULAR; INTRAVENOUS; SUBCUTANEOUS at 03:07

## 2017-02-22 RX ADMIN — HYDROMORPHONE HYDROCHLORIDE 2 MILLIGRAM(S): 2 INJECTION INTRAMUSCULAR; INTRAVENOUS; SUBCUTANEOUS at 12:25

## 2017-02-22 RX ADMIN — Medication 1 PATCH: at 11:03

## 2017-02-22 RX ADMIN — HYDROMORPHONE HYDROCHLORIDE 2 MILLIGRAM(S): 2 INJECTION INTRAMUSCULAR; INTRAVENOUS; SUBCUTANEOUS at 17:50

## 2017-02-22 RX ADMIN — HYDROMORPHONE HYDROCHLORIDE 1 MILLIGRAM(S): 2 INJECTION INTRAMUSCULAR; INTRAVENOUS; SUBCUTANEOUS at 19:15

## 2017-02-22 RX ADMIN — HYDROMORPHONE HYDROCHLORIDE 2 MILLIGRAM(S): 2 INJECTION INTRAMUSCULAR; INTRAVENOUS; SUBCUTANEOUS at 17:28

## 2017-02-22 RX ADMIN — HYDROMORPHONE HYDROCHLORIDE 2 MILLIGRAM(S): 2 INJECTION INTRAMUSCULAR; INTRAVENOUS; SUBCUTANEOUS at 09:05

## 2017-02-22 RX ADMIN — Medication 100 MILLIGRAM(S): at 06:16

## 2017-02-22 RX ADMIN — HYDROMORPHONE HYDROCHLORIDE 4 MILLIGRAM(S): 2 INJECTION INTRAMUSCULAR; INTRAVENOUS; SUBCUTANEOUS at 08:05

## 2017-02-22 RX ADMIN — FAMOTIDINE 20 MILLIGRAM(S): 10 INJECTION INTRAVENOUS at 17:30

## 2017-02-22 RX ADMIN — HYDROMORPHONE HYDROCHLORIDE 2 MILLIGRAM(S): 2 INJECTION INTRAMUSCULAR; INTRAVENOUS; SUBCUTANEOUS at 22:36

## 2017-02-22 RX ADMIN — Medication 100 MILLIGRAM(S): at 17:30

## 2017-02-22 RX ADMIN — HYDROMORPHONE HYDROCHLORIDE 2 MILLIGRAM(S): 2 INJECTION INTRAMUSCULAR; INTRAVENOUS; SUBCUTANEOUS at 05:59

## 2017-02-22 RX ADMIN — FLUCONAZOLE 100 MILLIGRAM(S): 150 TABLET ORAL at 11:03

## 2017-02-22 RX ADMIN — HYDROMORPHONE HYDROCHLORIDE 4 MILLIGRAM(S): 2 INJECTION INTRAMUSCULAR; INTRAVENOUS; SUBCUTANEOUS at 12:30

## 2017-02-22 RX ADMIN — Medication 10 MILLIGRAM(S): at 09:05

## 2017-02-22 RX ADMIN — HYDROMORPHONE HYDROCHLORIDE 2 MILLIGRAM(S): 2 INJECTION INTRAMUSCULAR; INTRAVENOUS; SUBCUTANEOUS at 06:14

## 2017-02-22 RX ADMIN — HYDROMORPHONE HYDROCHLORIDE 2 MILLIGRAM(S): 2 INJECTION INTRAMUSCULAR; INTRAVENOUS; SUBCUTANEOUS at 08:42

## 2017-02-22 RX ADMIN — HYDROMORPHONE HYDROCHLORIDE 1 MILLIGRAM(S): 2 INJECTION INTRAMUSCULAR; INTRAVENOUS; SUBCUTANEOUS at 23:45

## 2017-02-22 RX ADMIN — DABIGATRAN ETEXILATE MESYLATE 150 MILLIGRAM(S): 150 CAPSULE ORAL at 05:58

## 2017-02-22 RX ADMIN — Medication 81 MILLIGRAM(S): at 11:03

## 2017-02-22 RX ADMIN — HYDROMORPHONE HYDROCHLORIDE 4 MILLIGRAM(S): 2 INJECTION INTRAMUSCULAR; INTRAVENOUS; SUBCUTANEOUS at 12:05

## 2017-02-22 RX ADMIN — HYDROMORPHONE HYDROCHLORIDE 1 MILLIGRAM(S): 2 INJECTION INTRAMUSCULAR; INTRAVENOUS; SUBCUTANEOUS at 21:22

## 2017-02-22 RX ADMIN — HYDROMORPHONE HYDROCHLORIDE 1 MILLIGRAM(S): 2 INJECTION INTRAMUSCULAR; INTRAVENOUS; SUBCUTANEOUS at 23:30

## 2017-02-22 RX ADMIN — HYDROMORPHONE HYDROCHLORIDE 2 MILLIGRAM(S): 2 INJECTION INTRAMUSCULAR; INTRAVENOUS; SUBCUTANEOUS at 11:04

## 2017-02-22 RX ADMIN — OXYCODONE HYDROCHLORIDE 15 MILLIGRAM(S): 5 TABLET ORAL at 05:59

## 2017-02-22 RX ADMIN — PANTOPRAZOLE SODIUM 40 MILLIGRAM(S): 20 TABLET, DELAYED RELEASE ORAL at 12:04

## 2017-02-22 RX ADMIN — Medication 200 MILLIGRAM(S): at 17:30

## 2017-02-22 RX ADMIN — Medication 200 MILLIGRAM(S): at 08:42

## 2017-02-22 RX ADMIN — HYDROMORPHONE HYDROCHLORIDE 2 MILLIGRAM(S): 2 INJECTION INTRAMUSCULAR; INTRAVENOUS; SUBCUTANEOUS at 22:21

## 2017-02-22 RX ADMIN — HYDROMORPHONE HYDROCHLORIDE 4 MILLIGRAM(S): 2 INJECTION INTRAMUSCULAR; INTRAVENOUS; SUBCUTANEOUS at 07:44

## 2017-02-22 RX ADMIN — HYDROMORPHONE HYDROCHLORIDE 1 MILLIGRAM(S): 2 INJECTION INTRAMUSCULAR; INTRAVENOUS; SUBCUTANEOUS at 21:07

## 2017-02-22 RX ADMIN — HYDROMORPHONE HYDROCHLORIDE 4 MILLIGRAM(S): 2 INJECTION INTRAMUSCULAR; INTRAVENOUS; SUBCUTANEOUS at 03:20

## 2017-02-22 RX ADMIN — OXYCODONE HYDROCHLORIDE 15 MILLIGRAM(S): 5 TABLET ORAL at 17:37

## 2017-02-22 RX ADMIN — SENNA PLUS 2 TABLET(S): 8.6 TABLET ORAL at 21:07

## 2017-02-22 RX ADMIN — FAMOTIDINE 20 MILLIGRAM(S): 10 INJECTION INTRAVENOUS at 06:00

## 2017-02-22 RX ADMIN — POLYETHYLENE GLYCOL 3350 17 GRAM(S): 17 POWDER, FOR SOLUTION ORAL at 06:11

## 2017-02-22 RX ADMIN — OXYCODONE HYDROCHLORIDE 15 MILLIGRAM(S): 5 TABLET ORAL at 06:16

## 2017-02-22 RX ADMIN — VALSARTAN 80 MILLIGRAM(S): 80 TABLET ORAL at 18:51

## 2017-02-22 RX ADMIN — OXYCODONE HYDROCHLORIDE 15 MILLIGRAM(S): 5 TABLET ORAL at 18:05

## 2017-02-22 RX ADMIN — DABIGATRAN ETEXILATE MESYLATE 150 MILLIGRAM(S): 150 CAPSULE ORAL at 17:30

## 2017-02-22 RX ADMIN — Medication 1 PATCH: at 11:58

## 2017-02-22 RX ADMIN — HYDROMORPHONE HYDROCHLORIDE 1 MILLIGRAM(S): 2 INJECTION INTRAMUSCULAR; INTRAVENOUS; SUBCUTANEOUS at 18:55

## 2017-02-23 LAB
ANION GAP SERPL CALC-SCNC: 9 MMOL/L — SIGNIFICANT CHANGE UP (ref 5–17)
BUN SERPL-MCNC: 8 MG/DL — SIGNIFICANT CHANGE UP (ref 7–23)
CALCIUM SERPL-MCNC: 8.4 MG/DL — SIGNIFICANT CHANGE UP (ref 8.4–10.5)
CHLORIDE SERPL-SCNC: 98 MMOL/L — SIGNIFICANT CHANGE UP (ref 96–108)
CO2 SERPL-SCNC: 30 MMOL/L — SIGNIFICANT CHANGE UP (ref 22–31)
CREAT SERPL-MCNC: 0.54 MG/DL — SIGNIFICANT CHANGE UP (ref 0.5–1.3)
GLUCOSE SERPL-MCNC: 103 MG/DL — HIGH (ref 70–99)
HCT VFR BLD CALC: 25.1 % — LOW (ref 39–50)
HGB BLD-MCNC: 8.5 G/DL — LOW (ref 13–17)
MCHC RBC-ENTMCNC: 27.6 PG — SIGNIFICANT CHANGE UP (ref 27–34)
MCHC RBC-ENTMCNC: 34 GM/DL — SIGNIFICANT CHANGE UP (ref 32–36)
MCV RBC AUTO: 81 FL — SIGNIFICANT CHANGE UP (ref 80–100)
PLATELET # BLD AUTO: 246 K/UL — SIGNIFICANT CHANGE UP (ref 150–400)
POTASSIUM SERPL-MCNC: 4 MMOL/L — SIGNIFICANT CHANGE UP (ref 3.5–5.3)
POTASSIUM SERPL-SCNC: 4 MMOL/L — SIGNIFICANT CHANGE UP (ref 3.5–5.3)
RBC # BLD: 3.09 M/UL — LOW (ref 4.2–5.8)
RBC # FLD: 15.8 % — HIGH (ref 10.3–14.5)
SODIUM SERPL-SCNC: 137 MMOL/L — SIGNIFICANT CHANGE UP (ref 135–145)
WBC # BLD: 4.3 K/UL — SIGNIFICANT CHANGE UP (ref 3.8–10.5)
WBC # FLD AUTO: 4.3 K/UL — SIGNIFICANT CHANGE UP (ref 3.8–10.5)

## 2017-02-23 PROCEDURE — 99233 SBSQ HOSP IP/OBS HIGH 50: CPT | Mod: GC

## 2017-02-23 PROCEDURE — 93975 VASCULAR STUDY: CPT | Mod: 26

## 2017-02-23 RX ORDER — HYDROMORPHONE HYDROCHLORIDE 2 MG/ML
5 INJECTION INTRAMUSCULAR; INTRAVENOUS; SUBCUTANEOUS EVERY 4 HOURS
Qty: 0 | Refills: 0 | Status: DISCONTINUED | OUTPATIENT
Start: 2017-02-23 | End: 2017-02-24

## 2017-02-23 RX ORDER — HYDROMORPHONE HYDROCHLORIDE 2 MG/ML
10 INJECTION INTRAMUSCULAR; INTRAVENOUS; SUBCUTANEOUS EVERY 4 HOURS
Qty: 0 | Refills: 0 | Status: DISCONTINUED | OUTPATIENT
Start: 2017-02-23 | End: 2017-02-24

## 2017-02-23 RX ADMIN — HYDROMORPHONE HYDROCHLORIDE 2 MILLIGRAM(S): 2 INJECTION INTRAMUSCULAR; INTRAVENOUS; SUBCUTANEOUS at 10:39

## 2017-02-23 RX ADMIN — HYDROMORPHONE HYDROCHLORIDE 1 MILLIGRAM(S): 2 INJECTION INTRAMUSCULAR; INTRAVENOUS; SUBCUTANEOUS at 12:08

## 2017-02-23 RX ADMIN — HYDROMORPHONE HYDROCHLORIDE 1 MILLIGRAM(S): 2 INJECTION INTRAMUSCULAR; INTRAVENOUS; SUBCUTANEOUS at 12:30

## 2017-02-23 RX ADMIN — Medication 100 MILLIGRAM(S): at 05:38

## 2017-02-23 RX ADMIN — Medication 200 MILLIGRAM(S): at 09:34

## 2017-02-23 RX ADMIN — OXYCODONE HYDROCHLORIDE 15 MILLIGRAM(S): 5 TABLET ORAL at 06:15

## 2017-02-23 RX ADMIN — FAMOTIDINE 20 MILLIGRAM(S): 10 INJECTION INTRAVENOUS at 05:38

## 2017-02-23 RX ADMIN — Medication 100 MILLIGRAM(S): at 18:24

## 2017-02-23 RX ADMIN — HYDROMORPHONE HYDROCHLORIDE 1 MILLIGRAM(S): 2 INJECTION INTRAMUSCULAR; INTRAVENOUS; SUBCUTANEOUS at 03:55

## 2017-02-23 RX ADMIN — OXYCODONE HYDROCHLORIDE 15 MILLIGRAM(S): 5 TABLET ORAL at 05:38

## 2017-02-23 RX ADMIN — HYDROMORPHONE HYDROCHLORIDE 2 MILLIGRAM(S): 2 INJECTION INTRAMUSCULAR; INTRAVENOUS; SUBCUTANEOUS at 06:38

## 2017-02-23 RX ADMIN — VALSARTAN 80 MILLIGRAM(S): 80 TABLET ORAL at 05:38

## 2017-02-23 RX ADMIN — OXYCODONE HYDROCHLORIDE 15 MILLIGRAM(S): 5 TABLET ORAL at 18:26

## 2017-02-23 RX ADMIN — Medication 200 MILLIGRAM(S): at 18:24

## 2017-02-23 RX ADMIN — FAMOTIDINE 20 MILLIGRAM(S): 10 INJECTION INTRAVENOUS at 18:24

## 2017-02-23 RX ADMIN — HYDROMORPHONE HYDROCHLORIDE 2 MILLIGRAM(S): 2 INJECTION INTRAMUSCULAR; INTRAVENOUS; SUBCUTANEOUS at 02:37

## 2017-02-23 RX ADMIN — Medication 1 PATCH: at 11:35

## 2017-02-23 RX ADMIN — Medication 1 PATCH: at 12:00

## 2017-02-23 RX ADMIN — HYDROMORPHONE HYDROCHLORIDE 1 MILLIGRAM(S): 2 INJECTION INTRAMUSCULAR; INTRAVENOUS; SUBCUTANEOUS at 03:40

## 2017-02-23 RX ADMIN — HYDROMORPHONE HYDROCHLORIDE 1 MILLIGRAM(S): 2 INJECTION INTRAMUSCULAR; INTRAVENOUS; SUBCUTANEOUS at 08:20

## 2017-02-23 RX ADMIN — PANTOPRAZOLE SODIUM 40 MILLIGRAM(S): 20 TABLET, DELAYED RELEASE ORAL at 11:36

## 2017-02-23 RX ADMIN — HYDROMORPHONE HYDROCHLORIDE 2 MILLIGRAM(S): 2 INJECTION INTRAMUSCULAR; INTRAVENOUS; SUBCUTANEOUS at 11:00

## 2017-02-23 RX ADMIN — FLUCONAZOLE 100 MILLIGRAM(S): 150 TABLET ORAL at 11:36

## 2017-02-23 RX ADMIN — HYDROMORPHONE HYDROCHLORIDE 2 MILLIGRAM(S): 2 INJECTION INTRAMUSCULAR; INTRAVENOUS; SUBCUTANEOUS at 02:22

## 2017-02-23 RX ADMIN — HYDROMORPHONE HYDROCHLORIDE 10 MILLIGRAM(S): 2 INJECTION INTRAMUSCULAR; INTRAVENOUS; SUBCUTANEOUS at 20:46

## 2017-02-23 RX ADMIN — DABIGATRAN ETEXILATE MESYLATE 150 MILLIGRAM(S): 150 CAPSULE ORAL at 18:24

## 2017-02-23 RX ADMIN — HYDROMORPHONE HYDROCHLORIDE 2 MILLIGRAM(S): 2 INJECTION INTRAMUSCULAR; INTRAVENOUS; SUBCUTANEOUS at 06:55

## 2017-02-23 RX ADMIN — Medication 81 MILLIGRAM(S): at 11:35

## 2017-02-23 RX ADMIN — DABIGATRAN ETEXILATE MESYLATE 150 MILLIGRAM(S): 150 CAPSULE ORAL at 05:37

## 2017-02-23 RX ADMIN — HYDROMORPHONE HYDROCHLORIDE 10 MILLIGRAM(S): 2 INJECTION INTRAMUSCULAR; INTRAVENOUS; SUBCUTANEOUS at 15:40

## 2017-02-23 RX ADMIN — OXYCODONE HYDROCHLORIDE 15 MILLIGRAM(S): 5 TABLET ORAL at 19:30

## 2017-02-23 RX ADMIN — HYDROMORPHONE HYDROCHLORIDE 10 MILLIGRAM(S): 2 INJECTION INTRAMUSCULAR; INTRAVENOUS; SUBCUTANEOUS at 14:50

## 2017-02-23 RX ADMIN — HYDROMORPHONE HYDROCHLORIDE 1 MILLIGRAM(S): 2 INJECTION INTRAMUSCULAR; INTRAVENOUS; SUBCUTANEOUS at 07:57

## 2017-02-23 RX ADMIN — HYDROMORPHONE HYDROCHLORIDE 10 MILLIGRAM(S): 2 INJECTION INTRAMUSCULAR; INTRAVENOUS; SUBCUTANEOUS at 21:30

## 2017-02-24 ENCOUNTER — TRANSCRIPTION ENCOUNTER (OUTPATIENT)
Age: 27
End: 2017-02-24

## 2017-02-24 LAB
CK MB CFR SERPL CALC: 1 NG/ML — SIGNIFICANT CHANGE UP (ref 0–6.7)
CK SERPL-CCNC: 42 U/L — SIGNIFICANT CHANGE UP (ref 30–200)
TROPONIN T SERPL-MCNC: <0.01 NG/ML — SIGNIFICANT CHANGE UP (ref 0–0.06)

## 2017-02-24 PROCEDURE — 93010 ELECTROCARDIOGRAM REPORT: CPT

## 2017-02-24 PROCEDURE — 70450 CT HEAD/BRAIN W/O DYE: CPT | Mod: 26

## 2017-02-24 PROCEDURE — 99233 SBSQ HOSP IP/OBS HIGH 50: CPT | Mod: GC

## 2017-02-24 RX ORDER — HYDROMORPHONE HYDROCHLORIDE 2 MG/ML
2 INJECTION INTRAMUSCULAR; INTRAVENOUS; SUBCUTANEOUS
Qty: 0 | Refills: 0 | Status: DISCONTINUED | OUTPATIENT
Start: 2017-02-24 | End: 2017-02-24

## 2017-02-24 RX ORDER — HYDROMORPHONE HYDROCHLORIDE 2 MG/ML
2 INJECTION INTRAMUSCULAR; INTRAVENOUS; SUBCUTANEOUS ONCE
Qty: 0 | Refills: 0 | Status: DISCONTINUED | OUTPATIENT
Start: 2017-02-24 | End: 2017-02-24

## 2017-02-24 RX ORDER — VALSARTAN 80 MG/1
1 TABLET ORAL
Qty: 30 | Refills: 1 | OUTPATIENT
Start: 2017-02-24 | End: 2017-04-24

## 2017-02-24 RX ORDER — HYDROMORPHONE HYDROCHLORIDE 2 MG/ML
4 INJECTION INTRAMUSCULAR; INTRAVENOUS; SUBCUTANEOUS EVERY 4 HOURS
Qty: 0 | Refills: 0 | Status: DISCONTINUED | OUTPATIENT
Start: 2017-02-24 | End: 2017-02-25

## 2017-02-24 RX ORDER — HYDROMORPHONE HYDROCHLORIDE 2 MG/ML
2 INJECTION INTRAMUSCULAR; INTRAVENOUS; SUBCUTANEOUS
Qty: 0 | Refills: 0 | Status: DISCONTINUED | OUTPATIENT
Start: 2017-02-24 | End: 2017-02-25

## 2017-02-24 RX ORDER — DIPHENHYDRAMINE HCL 50 MG
25 CAPSULE ORAL ONCE
Qty: 0 | Refills: 0 | Status: COMPLETED | OUTPATIENT
Start: 2017-02-24 | End: 2017-02-24

## 2017-02-24 RX ORDER — DIPHENHYDRAMINE HCL 50 MG
25 CAPSULE ORAL
Qty: 0 | Refills: 0 | Status: DISCONTINUED | OUTPATIENT
Start: 2017-02-24 | End: 2017-02-24

## 2017-02-24 RX ORDER — FLUCONAZOLE 150 MG/1
1 TABLET ORAL
Qty: 3 | Refills: 0 | OUTPATIENT
Start: 2017-02-24 | End: 2017-02-27

## 2017-02-24 RX ADMIN — HYDROMORPHONE HYDROCHLORIDE 4 MILLIGRAM(S): 2 INJECTION INTRAMUSCULAR; INTRAVENOUS; SUBCUTANEOUS at 14:20

## 2017-02-24 RX ADMIN — DABIGATRAN ETEXILATE MESYLATE 150 MILLIGRAM(S): 150 CAPSULE ORAL at 06:17

## 2017-02-24 RX ADMIN — Medication 100 MILLIGRAM(S): at 06:17

## 2017-02-24 RX ADMIN — HYDROMORPHONE HYDROCHLORIDE 2 MILLIGRAM(S): 2 INJECTION INTRAMUSCULAR; INTRAVENOUS; SUBCUTANEOUS at 12:27

## 2017-02-24 RX ADMIN — HYDROMORPHONE HYDROCHLORIDE 2 MILLIGRAM(S): 2 INJECTION INTRAMUSCULAR; INTRAVENOUS; SUBCUTANEOUS at 21:50

## 2017-02-24 RX ADMIN — VALSARTAN 80 MILLIGRAM(S): 80 TABLET ORAL at 06:17

## 2017-02-24 RX ADMIN — HYDROMORPHONE HYDROCHLORIDE 4 MILLIGRAM(S): 2 INJECTION INTRAMUSCULAR; INTRAVENOUS; SUBCUTANEOUS at 13:44

## 2017-02-24 RX ADMIN — OXYCODONE HYDROCHLORIDE 15 MILLIGRAM(S): 5 TABLET ORAL at 06:17

## 2017-02-24 RX ADMIN — HYDROMORPHONE HYDROCHLORIDE 2 MILLIGRAM(S): 2 INJECTION INTRAMUSCULAR; INTRAVENOUS; SUBCUTANEOUS at 21:23

## 2017-02-24 RX ADMIN — HYDROMORPHONE HYDROCHLORIDE 10 MILLIGRAM(S): 2 INJECTION INTRAMUSCULAR; INTRAVENOUS; SUBCUTANEOUS at 11:40

## 2017-02-24 RX ADMIN — HYDROMORPHONE HYDROCHLORIDE 4 MILLIGRAM(S): 2 INJECTION INTRAMUSCULAR; INTRAVENOUS; SUBCUTANEOUS at 22:58

## 2017-02-24 RX ADMIN — HYDROMORPHONE HYDROCHLORIDE 2 MILLIGRAM(S): 2 INJECTION INTRAMUSCULAR; INTRAVENOUS; SUBCUTANEOUS at 16:20

## 2017-02-24 RX ADMIN — HYDROMORPHONE HYDROCHLORIDE 10 MILLIGRAM(S): 2 INJECTION INTRAMUSCULAR; INTRAVENOUS; SUBCUTANEOUS at 09:50

## 2017-02-24 RX ADMIN — HYDROMORPHONE HYDROCHLORIDE 4 MILLIGRAM(S): 2 INJECTION INTRAMUSCULAR; INTRAVENOUS; SUBCUTANEOUS at 18:33

## 2017-02-24 RX ADMIN — Medication 25 MILLIGRAM(S): at 22:57

## 2017-02-24 RX ADMIN — DABIGATRAN ETEXILATE MESYLATE 150 MILLIGRAM(S): 150 CAPSULE ORAL at 18:02

## 2017-02-24 RX ADMIN — HYDROMORPHONE HYDROCHLORIDE 4 MILLIGRAM(S): 2 INJECTION INTRAMUSCULAR; INTRAVENOUS; SUBCUTANEOUS at 18:00

## 2017-02-24 RX ADMIN — Medication 200 MILLIGRAM(S): at 09:51

## 2017-02-24 RX ADMIN — HYDROMORPHONE HYDROCHLORIDE 2 MILLIGRAM(S): 2 INJECTION INTRAMUSCULAR; INTRAVENOUS; SUBCUTANEOUS at 19:16

## 2017-02-24 RX ADMIN — Medication 1 PATCH: at 13:06

## 2017-02-24 RX ADMIN — HYDROMORPHONE HYDROCHLORIDE 2 MILLIGRAM(S): 2 INJECTION INTRAMUSCULAR; INTRAVENOUS; SUBCUTANEOUS at 19:45

## 2017-02-24 RX ADMIN — HYDROMORPHONE HYDROCHLORIDE 2 MILLIGRAM(S): 2 INJECTION INTRAMUSCULAR; INTRAVENOUS; SUBCUTANEOUS at 15:50

## 2017-02-24 RX ADMIN — HYDROMORPHONE HYDROCHLORIDE 2 MILLIGRAM(S): 2 INJECTION INTRAMUSCULAR; INTRAVENOUS; SUBCUTANEOUS at 11:30

## 2017-02-24 RX ADMIN — Medication 200 MILLIGRAM(S): at 18:03

## 2017-02-24 RX ADMIN — Medication 81 MILLIGRAM(S): at 12:29

## 2017-02-24 RX ADMIN — HYDROMORPHONE HYDROCHLORIDE 4 MILLIGRAM(S): 2 INJECTION INTRAMUSCULAR; INTRAVENOUS; SUBCUTANEOUS at 22:24

## 2017-02-24 RX ADMIN — FAMOTIDINE 20 MILLIGRAM(S): 10 INJECTION INTRAVENOUS at 06:17

## 2017-02-24 RX ADMIN — FAMOTIDINE 20 MILLIGRAM(S): 10 INJECTION INTRAVENOUS at 18:03

## 2017-02-24 NOTE — DISCHARGE NOTE ADULT - CARE PROVIDERS DIRECT ADDRESSES
[email or web address]_Unknown,DirectAddress_Unknown

## 2017-02-24 NOTE — DISCHARGE NOTE ADULT - PATIENT PORTAL LINK FT
“You can access the Follow[facility] Patient Portal, offered by [facility], by registering with the following website: [email or web address]”

## 2017-02-24 NOTE — DISCHARGE NOTE ADULT - CARE PROVIDER_API CALL
Annabella Estrella (), Internal Medicine; Rheumatology  [address] [place] [address]  Phone: [phone]  Fax: [phone]    Tevin Monsivais (MD), Neurology  [address] [place] [address]  Phone: [phone]  Fax: [phone]    Jhon Beltrán), Internal Medicine  [address] [place] [address]  Phone: [phone]  Fax: [phone] Annabella Estrella (), Internal Medicine; Rheumatology  [address] [place] [address]  Phone: [phone]  Fax: [phone]    Tevin Monsivais (MD), Neurology  [address] [place] [address]  Phone: [phone]  Fax: [phone]    Felipa Bender), Internal Medicine; Nephrology  [address] [facility] [address] [place] [address]  Phone: [phone]  Fax: [phone]

## 2017-02-24 NOTE — DISCHARGE NOTE ADULT - HOSPITAL COURSE
26M hx of SLE w/ multi-system involvement incl, lupus nephritis (class V), seronegative APS, hx DVT/PE incl. renal thrombosis, s/p RLE IVC filter 2/2 to failed AC (reports failing Xarelto, coumadin, Lovenox, Arixtra), currently on Pradaxa, frequent hospitalizations for chronic pain, p/w acute on chronic CP, new HA. Ruled out for ACS. Transitioned from IV narcotics to PO.   2/18/17: Started on heparin gtt for concern for recurrent PE, was being pre-medicated in ED for CTA, but then pt refused to have testing. MRI head to assess for vascular changes seen in lupus cerebritis. Heparin gtt to be dced at 1800pm. Will restart pradaxa concurrently. Rheum consulted  2/19: final read of MRI/MRA pending, patient reports improvement in headache. Pt concerned re: possible oral thrush, states oral nystatin does not work for him, will start oral fluconazole 100mg daily for 7 days total. Restarted patient on home oxycontin regimen.  2/20: HA improved; MRA/MRV negative for cerebritis or thrombosis. Continue current pain regimen for chest pain MSK. Fluconazole for oral thrush.  2/21 Clinically stable. Pain better controlled. Started chlorpramazine for hiccups. Awaiting further rheum recs.  2/22: Nephrology recs increase steroid dosage and awaiting rheum approval. Pain regimen decreased to 1mg q2h and 2mg q4h. Added PPI for dyspepsia. Awaiting renal vein US for proteinuria workup.  2/23: Renal sono negative for RVT. Transitioned to dilaudid 10mg PO PRN and 5mg PO PRN. Tolerated valsartan well. per HPI: "26 male w/ hx of SLE (ARTURO, dsDNA, low C3, arthritis, pericarditis), recurrent pleural effusions (requiring multiple chest tubes), lupus nephritis (class V, failed cellcept), seronegative APS, DVT/PE s/p RLE IVC filter placement 2/2to failed a/c (failed Xarelto coumadin, Lovenox and Arixtra) b/l renal vein thrombosis and recent admission for chest pain p/w headache, worsening chest pain as well as diffuse joint pains.     Patient was hospitalized from 2/1 to 2/8 for chest pain which was presumed to be 2/2 lupus vs adhesions given history of multiple chest tube placements. He states that he was doing well shortly after his hospitalization and that his pain was fairly well controlled with [id number] BID and [id number] as needed. However, over the last several days, he has been experiencing severe joint pains (involving b/l elbows, knees and wrists) and worsening chest pain and shortness of breath. He repots that his shortness of breath is so severe that he is unable to ambulate even within his house without feeling short of breath. Patient also reports that yesterday evening, shortly after returning home from seeing Dr. Estrella in clinic, he developed  a headache that started in the back of his head, radiated to his neck, was 8/10 in severity and not alleviated by [id number]. At that time, his wife advised him to come to the ED. Patient denies any fevers/chills, vision changes, lightheadedness, changes in his speech, abdominal pain and nausea/vomiting. Of note, patient reports that Dr. Estrella tapered his methylprednisolone to 20mg yesterday from 24mg that he had been on for several weeks. He was also advised to start Diltiazem for BP control.   In the ED, vital signs: T:97.9, HR:110, BP:133/78, RR:20 and O2 sat:98% on RA. Patient received Aspirin 324mg, Hydrocortisone 200mg, Dilaudid 1mg IV x 3, Reglan and 1 L NS."    Hospital Course:  ACS was ruled out with negative troponin and no acute EKG changes. He was started on heparin drip in the ED but refused CTA to rule out a PE given his extensive history of PE. He was eventually transinoted to his pradaxa. 02 saturations remained wnl. CT head was done which was negative for any acute findings. Given his hypercoagulable state, multiple clots in past, neurology rec MRA/MRV to rule out cerebral thrombosis and lupus cerebritis which were both negative on MRI. Patient chest pain was thought to be from either chronic PE vs MSK pain and was treated with IV narcotics which were transitioned to PO narcotics. Patient also had oral thrush and was treated with 7 days of fluconazole with improvement. Later on in hospitalization patient was noted to have left eye deviation so given his presentation in headache, ?old hemorrhage seen on MRV, neuro requested MRI to rule out acute bleed however patient refused and understood the risks . His UA was noted to have proteinuria so he was started on losartan (had ACE allergy) which he tolerated well for his lupus nephritis. He was kept on oral steroids which should be continued as outpatient. Renal u/s was negative for new renal vein thrombosis, creatinine was at baseline. By discharge patients headache and chest pain resolved. He remained HD stable. He is to follow up as outpatient with neuro, rheum and renal.    Discharge Diagnoses:  Antiphospholipid Syndrome  Lupus Nephritis  chest pain - likely musculoskeletal  Headache  SLE  Pulmonary Embolism   LE DVT  Renal Vein Thrombosis  Anemia  Oral Thrush  Anemia  Proteinuria per HPI: "26 male w/ hx of SLE (ARTURO, dsDNA, low C3, arthritis, pericarditis), recurrent pleural effusions (requiring multiple chest tubes), lupus nephritis (class V, failed cellcept), seronegative APS, DVT/PE s/p RLE IVC filter placement 2/2to failed a/c (failed Xarelto coumadin, Lovenox and Arixtra) b/l renal vein thrombosis and recent admission for chest pain p/w headache, worsening chest pain as well as diffuse joint pains.     Patient was hospitalized from 2/1 to 2/8 for chest pain which was presumed to be 2/2 lupus vs adhesions given history of multiple chest tube placements. He states that he was doing well shortly after his hospitalization and that his pain was fairly well controlled with [id number] BID and [id number] as needed. However, over the last several days, he has been experiencing severe joint pains (involving b/l elbows, knees and wrists) and worsening chest pain and shortness of breath. He repots that his shortness of breath is so severe that he is unable to ambulate even within his house without feeling short of breath. Patient also reports that yesterday evening, shortly after returning home from seeing Dr. Estrella in clinic, he developed  a headache that started in the back of his head, radiated to his neck, was 8/10 in severity and not alleviated by [id number]. At that time, his wife advised him to come to the ED. Patient denies any fevers/chills, vision changes, lightheadedness, changes in his speech, abdominal pain and nausea/vomiting. Of note, patient reports that Dr. Estrella tapered his methylprednisolone to 20mg yesterday from 24mg that he had been on for several weeks. He was also advised to start Diltiazem for BP control.   In the ED, vital signs: T:97.9, HR:110, BP:133/78, RR:20 and O2 sat:98% on RA. Patient received Aspirin 324mg, Hydrocortisone 200mg, Dilaudid 1mg IV x 3, Reglan and 1 L NS."    Hospital Course:  ACS was ruled out with negative troponin and no acute EKG changes. He was started on heparin drip in the ED but refused CTA to rule out a PE given his extensive history of PE. He was eventually transinoted to his pradaxa. 02 saturations remained wnl. CT head was done which was negative for any acute findings. Given his hypercoagulable state, multiple clots in past, neurology rec MRA/MRV to rule out cerebral thrombosis and lupus cerebritis which were both negative on MRI. Patient chest pain was thought to be from either chronic PE vs MSK pain and was treated with IV narcotics which were transitioned to PO narcotics. Patient also had oral thrush and was treated with 7 days of fluconazole with improvement. Later on in hospitalization patient was noted to have left eye deviation so given his presentation in headache, ?old hemorrhage seen on MRV, neuro requested MRI to rule out acute bleed however patient refused and understood the risks . His UA was noted to have proteinuria so he was started on losartan (had ACE allergy) which he tolerated well for his lupus nephritis. He was kept on oral steroids which should be continued as outpatient. Renal u/s was negative for new renal vein thrombosis, creatinine was at baseline. By discharge patients headache and chest pain resolved. He remained HD stable. He is to follow up as outpatient with neuro, rheum and renal.    Discharge Diagnoses:  Antiphospholipid Syndrome  Lupus Nephritis  chest pain - likely musculoskeletal  Headache  SLE  Chronic Pulmonary Embolism   LE DVT  Renal Vein Thrombosis  Anemia  Oral Thrush  Anemia  Proteinuria

## 2017-02-24 NOTE — DISCHARGE NOTE ADULT - PROVIDER TOKENS
TOKEN:'[id number]:'[id number]' TOKEN:'[id number]'

## 2017-02-24 NOTE — DISCHARGE NOTE ADULT - PLAN OF CARE
Management Pain control during the hospital stay. You were tapered to your home dosage for pain control. Your chest pain is likely secondary to MSK etiology. R/o ACS on admission. Started on valsartan 80mg daily. Please continue to take this medication and follow up with your nephrologist as outpatient. Continue to take planequil and steroids as prescribed. F/u with your rheumatologist in 1-2 wks after discharge. Pain control during the hospital stay. You were tapered to your home dosage for pain control. Your chest pain is likely secondary to Muscular skeletal. Not due to cardiac. Continue to take Plaquenil and steroids as prescribed. F/u with your rheumatologist in 1-2 wks after discharge. Resolved Headache and blurry vision resolved  Refused MRI for further work up per neurology  Follow up with Neurologist - Dr. Monsivais in 1 -2 weeks if you decide to do further work as outpatient  Follow up With history of PE/DVT0 S/P IVC filter  Continue Pradaxa and Aspirin

## 2017-02-24 NOTE — DISCHARGE NOTE ADULT - MEDICATION SUMMARY - MEDICATIONS TO STOP TAKING
I will STOP taking the medications listed below when I get home from the hospital:  None I will STOP taking the medications listed below when I get home from the hospital:    longevity powder  -- 1 scoop(s) by mouth mix with water every other day  -- this is a multivitamin supplement

## 2017-02-24 NOTE — DISCHARGE NOTE ADULT - MEDICATION SUMMARY - MEDICATIONS TO TAKE
I will START or STAY ON the medications listed below when I get home from the hospital:    longevity powder  -- 1 scoop(s) by mouth mix with water every other day  -- this is a multivitamin supplement  -- Indication: For Health maintenance    methylPREDNISolone 4 mg oral tablet  -- 5 tab(s) by mouth 2 times a day  -- Indication: For Lupus    oxyCODONE 10 mg oral tablet  -- 1 tab(s) by mouth every 6 hours MDD:4  -- Indication: For Pain    OxyCONTIN 15 mg oral tablet, extended release  -- 1 tab(s) by mouth every 12 hours MDD:2  -- Indication: For Pain    aspirin 81 mg oral tablet  -- 1 tab(s) by mouth once a day  -- Indication: For DVT    valsartan 80 mg oral tablet  -- 1 tab(s) by mouth once a day  -- Indication: For Nephritis    Pradaxa 150 mg oral capsule  -- 1 cap(s) by mouth 2 times a day  -- Indication: For Antiphospholipid syndrome    Diflucan 100 mg oral tablet  -- 1 tab(s) by mouth once a day  -- Indication: For Oral thrush    Plaquenil 200 mg oral tablet  -- 1 tab(s) by mouth 2 times a day  -- Indication: For Lupus    lidocaine 5% topical film  -- Apply on skin to affected area once a day MDD:1  -- Indication: For Skin    famotidine 20 mg oral tablet  -- 1 tab(s) by mouth once a day, As Needed MDD:1  -- Indication: For GERD    polyethylene glycol 3350 oral powder for reconstitution  -- 17 gram(s) by mouth once a day (at bedtime), As needed, Constipation  -- Indication: For Constipation    sulfamethoxazole-trimethoprim 800 mg-160 mg oral tablet  -- 1 tab(s) by mouth every other day  -- Indication: For Anti-infective    Calcium 600+D 600 mg-200 intl units oral tablet  -- Take 1 tab by mouth daily  -- Indication: For Supplement I will START or STAY ON the medications listed below when I get home from the hospital:    longevity powder  -- 1 scoop(s) by mouth mix with water every other day  -- this is a multivitamin supplement  -- Indication: For Health maintenance    methylPREDNISolone 4 mg oral tablet  -- 5 tab(s) by mouth 2 times a day  -- Indication: For Lupus    OxyCONTIN 15 mg oral tablet, extended release  -- 1 tab(s) by mouth every 12 hours MDD:2  -- Indication: For pain    oxyCODONE 5 mg oral tablet  -- 1 tab(s) by mouth every 4 hours, As needed, Severe Pain (7 - 10)  -- Indication: For pain    oxyCODONE 5 mg oral tablet  -- 1 tab(s) by mouth every 4 hours, As needed, Severe Pain (7 - 10) MDD:6  -- Indication: For pain    aspirin 81 mg oral tablet  -- 1 tab(s) by mouth once a day  -- Indication: For DVT    valsartan 80 mg oral tablet  -- 1 tab(s) by mouth once a day  -- Indication: For Lupus nephritis    Pradaxa 150 mg oral capsule  -- 1 cap(s) by mouth 2 times a day  -- Indication: For Antiphospholipid syndrome    Diflucan 100 mg oral tablet  -- 1 tab(s) by mouth once a day  -- Indication: For Oral thrush    Plaquenil 200 mg oral tablet  -- 1 tab(s) by mouth 2 times a day  -- Indication: For Lupus    lidocaine 5% topical film  -- Apply on skin to affected area once a day MDD:1  -- Indication: For Skin    famotidine 20 mg oral tablet  -- 1 tab(s) by mouth once a day, As Needed MDD:1  -- Indication: For GERD    polyethylene glycol 3350 oral powder for reconstitution  -- 17 gram(s) by mouth once a day (at bedtime), As needed, Constipation  -- Indication: For Constipation    sulfamethoxazole-trimethoprim 800 mg-160 mg oral tablet  -- 1 tab(s) by mouth every other day  -- Indication: For Anti-infective    Calcium 600+D 600 mg-200 intl units oral tablet  -- Take 1 tab by mouth daily  -- Indication: For Supplement I will START or STAY ON the medications listed below when I get home from the hospital:    longevity powder  -- 1 scoop(s) by mouth mix with water every other day  -- this is a multivitamin supplement  -- Indication: For Health maintenance    methylPREDNISolone 4 mg oral tablet  -- 5 tab(s) by mouth 2 times a day  -- Indication: For Lupus    OxyCONTIN 15 mg oral tablet, extended release  -- 1 tab(s) by mouth every 12 hours MDD:2  -- Indication: For pain    oxyCODONE 5 mg oral tablet  -- 1 tab(s) by mouth every 4 hours, As needed, Severe Pain (7 - 10) MDD:6  -- Indication: For pain    aspirin 81 mg oral tablet  -- 1 tab(s) by mouth once a day  -- Indication: For DVT    valsartan 80 mg oral tablet  -- 1 tab(s) by mouth once a day  -- Indication: For Lupus nephritis    Pradaxa 150 mg oral capsule  -- 1 cap(s) by mouth 2 times a day  -- Indication: For Antiphospholipid syndrome    Diflucan 100 mg oral tablet  -- 1 tab(s) by mouth once a day  -- Indication: For Oral thrush    Plaquenil 200 mg oral tablet  -- 1 tab(s) by mouth 2 times a day  -- Indication: For Lupus    lidocaine 5% topical film  -- Apply on skin to affected area once a day MDD:1  -- Indication: For Skin    famotidine 20 mg oral tablet  -- 1 tab(s) by mouth once a day, As Needed MDD:1  -- Indication: For GERD    polyethylene glycol 3350 oral powder for reconstitution  -- 17 gram(s) by mouth once a day (at bedtime), As needed, Constipation  -- Indication: For Constipation    sulfamethoxazole-trimethoprim 800 mg-160 mg oral tablet  -- 1 tab(s) by mouth every other day  -- Indication: For Anti-infective    Calcium 600+D 600 mg-200 intl units oral tablet  -- Take 1 tab by mouth daily  -- Indication: For Supplement I will START or STAY ON the medications listed below when I get home from the hospital:    methylPREDNISolone 4 mg oral tablet  -- 5 tab(s) by mouth 2 times a day  -- Indication: For Lupus    OxyCONTIN 15 mg oral tablet, extended release  -- 1 tab(s) by mouth every 12 hours MDD:2  -- Indication: For pain    oxyCODONE 5 mg oral tablet  -- 1 tab(s) by mouth every 4 hours, As needed, Severe Pain (7 - 10) MDD:6  -- Indication: For pain    aspirin 81 mg oral tablet  -- 1 tab(s) by mouth once a day  -- Indication: For DVT    valsartan 80 mg oral tablet  -- 1 tab(s) by mouth once a day  -- Indication: For Lupus nephritis    Pradaxa 150 mg oral capsule  -- 1 cap(s) by mouth 2 times a day  -- Indication: For Antiphospholipid syndrome    Plaquenil 200 mg oral tablet  -- 1 tab(s) by mouth 2 times a day  -- Indication: For Lupus    lidocaine 5% topical film  -- Apply on skin to affected area once a day MDD:1  -- Indication: For Skin    famotidine 20 mg oral tablet  -- 1 tab(s) by mouth once a day, As Needed MDD:1  -- Indication: For GERD    polyethylene glycol 3350 oral powder for reconstitution  -- 17 gram(s) by mouth once a day (at bedtime), As needed, Constipation  -- Indication: For Constipation    sulfamethoxazole-trimethoprim 800 mg-160 mg oral tablet  -- 1 tab(s) by mouth every other day  -- Indication: For Anti-infective    Calcium 600+D 600 mg-200 intl units oral tablet  -- Take 1 tab by mouth daily  -- Indication: For Supplement

## 2017-02-24 NOTE — DISCHARGE NOTE ADULT - SECONDARY DIAGNOSIS.
Membranous glomerulonephritis, stage 5 Systemic lupus erythematosus Headache APS (antiphospholipid syndrome)

## 2017-02-24 NOTE — DISCHARGE NOTE ADULT - MEDICATION SUMMARY - MEDICATIONS TO CHANGE
I will SWITCH the dose or number of times a day I take the medications listed below when I get home from the hospital:  None I will SWITCH the dose or number of times a day I take the medications listed below when I get home from the hospital:    oxyCODONE 10 mg oral tablet  -- 1 tab(s) by mouth every 6 hours MDD:4

## 2017-02-24 NOTE — DISCHARGE NOTE ADULT - CARE PLAN
Principal Discharge DX:	Intercostal pain  Goal:	Management  Instructions for follow-up, activity and diet:	Pain control during the hospital stay. You were tapered to your home dosage for pain control. Your chest pain is likely secondary to MSK etiology. R/o ACS on admission.  Secondary Diagnosis:	Membranous glomerulonephritis, stage 5  Goal:	Management  Instructions for follow-up, activity and diet:	Started on valsartan 80mg daily. Please continue to take this medication and follow up with your nephrologist as outpatient.  Secondary Diagnosis:	Systemic lupus erythematosus  Goal:	Management  Instructions for follow-up, activity and diet:	Continue to take planequil and steroids as prescribed. F/u with your rheumatologist in 1-2 wks after discharge. Principal Discharge DX:	Intercostal pain  Goal:	Management  Instructions for follow-up, activity and diet:	Pain control during the hospital stay. You were tapered to your home dosage for pain control. Your chest pain is likely secondary to Muscular skeletal. Not due to cardiac.  Secondary Diagnosis:	Membranous glomerulonephritis, stage 5  Goal:	Management  Instructions for follow-up, activity and diet:	Started on valsartan 80mg daily. Please continue to take this medication and follow up with your nephrologist as outpatient.  Secondary Diagnosis:	Systemic lupus erythematosus  Goal:	Management  Instructions for follow-up, activity and diet:	Continue to take planequil and steroids as prescribed. F/u with your rheumatologist in 1-2 wks after discharge. Principal Discharge DX:	Intercostal pain  Goal:	Management  Instructions for follow-up, activity and diet:	Pain control during the hospital stay. You were tapered to your home dosage for pain control. Your chest pain is likely secondary to Muscular skeletal. Not due to cardiac.  Secondary Diagnosis:	Membranous glomerulonephritis, stage 5  Goal:	Management  Instructions for follow-up, activity and diet:	Started on valsartan 80mg daily. Please continue to take this medication and follow up with your nephrologist as outpatient.  Secondary Diagnosis:	Systemic lupus erythematosus  Goal:	Management  Instructions for follow-up, activity and diet:	Continue to take Plaquenil and steroids as prescribed. F/u with your rheumatologist in 1-2 wks after discharge. Principal Discharge DX:	Intercostal pain  Goal:	Management  Instructions for follow-up, activity and diet:	Pain control during the hospital stay. You were tapered to your home dosage for pain control. Your chest pain is likely secondary to Muscular skeletal. Not due to cardiac.  Secondary Diagnosis:	Membranous glomerulonephritis, stage 5  Goal:	Management  Instructions for follow-up, activity and diet:	Started on valsartan 80mg daily. Please continue to take this medication and follow up with your nephrologist as outpatient.  Secondary Diagnosis:	Systemic lupus erythematosus  Goal:	Management  Instructions for follow-up, activity and diet:	Continue to take Plaquenil and steroids as prescribed. F/u with your rheumatologist in 1-2 wks after discharge.  Secondary Diagnosis:	Headache  Goal:	Resolved  Instructions for follow-up, activity and diet:	Headache and blurry vision resolved  Refused MRI for further work up per neurology  Follow up with Neurologist - Dr. Monsivais in 1 -2 weeks if you decide to do further work as outpatient  Follow up Principal Discharge DX:	Intercostal pain  Goal:	Management  Instructions for follow-up, activity and diet:	Pain control during the hospital stay. You were tapered to your home dosage for pain control. Your chest pain is likely secondary to Muscular skeletal. Not due to cardiac.  Secondary Diagnosis:	Membranous glomerulonephritis, stage 5  Goal:	Management  Instructions for follow-up, activity and diet:	Started on valsartan 80mg daily. Please continue to take this medication and follow up with your nephrologist as outpatient.  Secondary Diagnosis:	Systemic lupus erythematosus  Goal:	Management  Instructions for follow-up, activity and diet:	Continue to take Plaquenil and steroids as prescribed. F/u with your rheumatologist in 1-2 wks after discharge.  Secondary Diagnosis:	Headache  Goal:	Resolved  Instructions for follow-up, activity and diet:	Headache and blurry vision resolved  Refused MRI for further work up per neurology  Follow up with Neurologist - Dr. Monsivais in 1 -2 weeks if you decide to do further work as outpatient  Follow up  Secondary Diagnosis:	APS (antiphospholipid syndrome)  Instructions for follow-up, activity and diet:	With history of PE/DVT0 S/P IVC filter  Continue Pradaxa and Aspirin

## 2017-02-24 NOTE — DISCHARGE NOTE ADULT - ADDITIONAL INSTRUCTIONS
Follow up with Rheumatologist in 1 week  Follow up with Kidney specialist in 1 week - call for appointment  Follow up with PMD in 1 week -call for appointment Follow up with Rheumatologist - Dr. Estrella in 1 week  Follow up with Kidney specialist in 1 week - call for appointment  Follow up with PMD in 1 week -call for appointment Follow up with Rheumatologist - Dr. Estrella on 3/2/17  Follow up with Kidney specialist - Dr. Bender in 1 week - call for appointment  Follow up with Neurologist - Dr. Monsivais in 1 -2 weeks if you decide to do further work as outpatient

## 2017-02-25 LAB
ALBUMIN SERPL ELPH-MCNC: 2.3 G/DL — LOW (ref 3.3–5)
ALP SERPL-CCNC: 61 U/L — SIGNIFICANT CHANGE UP (ref 40–120)
ALT FLD-CCNC: 13 U/L RC — SIGNIFICANT CHANGE UP (ref 10–45)
ANION GAP SERPL CALC-SCNC: 12 MMOL/L — SIGNIFICANT CHANGE UP (ref 5–17)
APTT BLD: 48.6 SEC — HIGH (ref 27.5–37.4)
AST SERPL-CCNC: 17 U/L — SIGNIFICANT CHANGE UP (ref 10–40)
BILIRUB SERPL-MCNC: <0.1 MG/DL — LOW (ref 0.2–1.2)
BUN SERPL-MCNC: 5 MG/DL — LOW (ref 7–23)
CALCIUM SERPL-MCNC: 8.9 MG/DL — SIGNIFICANT CHANGE UP (ref 8.4–10.5)
CHLORIDE SERPL-SCNC: 100 MMOL/L — SIGNIFICANT CHANGE UP (ref 96–108)
CO2 SERPL-SCNC: 25 MMOL/L — SIGNIFICANT CHANGE UP (ref 22–31)
CREAT SERPL-MCNC: 0.66 MG/DL — SIGNIFICANT CHANGE UP (ref 0.5–1.3)
GLUCOSE SERPL-MCNC: 95 MG/DL — SIGNIFICANT CHANGE UP (ref 70–99)
HCT VFR BLD CALC: 28.1 % — LOW (ref 39–50)
HGB BLD-MCNC: 9.3 G/DL — LOW (ref 13–17)
INR BLD: 0.96 RATIO — SIGNIFICANT CHANGE UP (ref 0.88–1.16)
MAGNESIUM SERPL-MCNC: 1.6 MG/DL — SIGNIFICANT CHANGE UP (ref 1.6–2.6)
MCHC RBC-ENTMCNC: 26.8 PG — LOW (ref 27–34)
MCHC RBC-ENTMCNC: 33 GM/DL — SIGNIFICANT CHANGE UP (ref 32–36)
MCV RBC AUTO: 81.2 FL — SIGNIFICANT CHANGE UP (ref 80–100)
PHOSPHATE SERPL-MCNC: 5.1 MG/DL — HIGH (ref 2.5–4.5)
PLATELET # BLD AUTO: 297 K/UL — SIGNIFICANT CHANGE UP (ref 150–400)
POTASSIUM SERPL-MCNC: 4.2 MMOL/L — SIGNIFICANT CHANGE UP (ref 3.5–5.3)
POTASSIUM SERPL-SCNC: 4.2 MMOL/L — SIGNIFICANT CHANGE UP (ref 3.5–5.3)
PROT SERPL-MCNC: 5.4 G/DL — LOW (ref 6–8.3)
PROTHROM AB SERPL-ACNC: 10.5 SEC — SIGNIFICANT CHANGE UP (ref 10–13.1)
RBC # BLD: 3.46 M/UL — LOW (ref 4.2–5.8)
RBC # FLD: 15.8 % — HIGH (ref 10.3–14.5)
SODIUM SERPL-SCNC: 137 MMOL/L — SIGNIFICANT CHANGE UP (ref 135–145)
WBC # BLD: 4.2 K/UL — SIGNIFICANT CHANGE UP (ref 3.8–10.5)
WBC # FLD AUTO: 4.2 K/UL — SIGNIFICANT CHANGE UP (ref 3.8–10.5)

## 2017-02-25 PROCEDURE — 99233 SBSQ HOSP IP/OBS HIGH 50: CPT

## 2017-02-25 PROCEDURE — 99223 1ST HOSP IP/OBS HIGH 75: CPT | Mod: GC

## 2017-02-25 RX ORDER — HYDROMORPHONE HYDROCHLORIDE 2 MG/ML
2 INJECTION INTRAMUSCULAR; INTRAVENOUS; SUBCUTANEOUS EVERY 4 HOURS
Qty: 0 | Refills: 0 | Status: DISCONTINUED | OUTPATIENT
Start: 2017-02-25 | End: 2017-02-26

## 2017-02-25 RX ORDER — OXYCODONE HYDROCHLORIDE 5 MG/1
15 TABLET ORAL EVERY 12 HOURS
Qty: 0 | Refills: 0 | Status: DISCONTINUED | OUTPATIENT
Start: 2017-02-25 | End: 2017-02-27

## 2017-02-25 RX ORDER — ONDANSETRON 8 MG/1
4 TABLET, FILM COATED ORAL ONCE
Qty: 0 | Refills: 0 | Status: COMPLETED | OUTPATIENT
Start: 2017-02-25 | End: 2017-02-25

## 2017-02-25 RX ORDER — HYDROMORPHONE HYDROCHLORIDE 2 MG/ML
1 INJECTION INTRAMUSCULAR; INTRAVENOUS; SUBCUTANEOUS EVERY 4 HOURS
Qty: 0 | Refills: 0 | Status: DISCONTINUED | OUTPATIENT
Start: 2017-02-25 | End: 2017-02-26

## 2017-02-25 RX ADMIN — OXYCODONE HYDROCHLORIDE 15 MILLIGRAM(S): 5 TABLET ORAL at 07:00

## 2017-02-25 RX ADMIN — FAMOTIDINE 20 MILLIGRAM(S): 10 INJECTION INTRAVENOUS at 06:20

## 2017-02-25 RX ADMIN — HYDROMORPHONE HYDROCHLORIDE 4 MILLIGRAM(S): 2 INJECTION INTRAMUSCULAR; INTRAVENOUS; SUBCUTANEOUS at 09:16

## 2017-02-25 RX ADMIN — DABIGATRAN ETEXILATE MESYLATE 150 MILLIGRAM(S): 150 CAPSULE ORAL at 18:23

## 2017-02-25 RX ADMIN — HYDROMORPHONE HYDROCHLORIDE 4 MILLIGRAM(S): 2 INJECTION INTRAMUSCULAR; INTRAVENOUS; SUBCUTANEOUS at 09:45

## 2017-02-25 RX ADMIN — HYDROMORPHONE HYDROCHLORIDE 2 MILLIGRAM(S): 2 INJECTION INTRAMUSCULAR; INTRAVENOUS; SUBCUTANEOUS at 11:50

## 2017-02-25 RX ADMIN — Medication 1 PATCH: at 11:53

## 2017-02-25 RX ADMIN — OXYCODONE HYDROCHLORIDE 15 MILLIGRAM(S): 5 TABLET ORAL at 13:34

## 2017-02-25 RX ADMIN — HYDROMORPHONE HYDROCHLORIDE 2 MILLIGRAM(S): 2 INJECTION INTRAMUSCULAR; INTRAVENOUS; SUBCUTANEOUS at 18:20

## 2017-02-25 RX ADMIN — HYDROMORPHONE HYDROCHLORIDE 2 MILLIGRAM(S): 2 INJECTION INTRAMUSCULAR; INTRAVENOUS; SUBCUTANEOUS at 12:15

## 2017-02-25 RX ADMIN — Medication 200 MILLIGRAM(S): at 18:27

## 2017-02-25 RX ADMIN — HYDROMORPHONE HYDROCHLORIDE 2 MILLIGRAM(S): 2 INJECTION INTRAMUSCULAR; INTRAVENOUS; SUBCUTANEOUS at 18:49

## 2017-02-25 RX ADMIN — HYDROMORPHONE HYDROCHLORIDE 2 MILLIGRAM(S): 2 INJECTION INTRAMUSCULAR; INTRAVENOUS; SUBCUTANEOUS at 06:19

## 2017-02-25 RX ADMIN — OXYCODONE HYDROCHLORIDE 15 MILLIGRAM(S): 5 TABLET ORAL at 14:30

## 2017-02-25 RX ADMIN — HYDROMORPHONE HYDROCHLORIDE 2 MILLIGRAM(S): 2 INJECTION INTRAMUSCULAR; INTRAVENOUS; SUBCUTANEOUS at 14:00

## 2017-02-25 RX ADMIN — HYDROMORPHONE HYDROCHLORIDE 4 MILLIGRAM(S): 2 INJECTION INTRAMUSCULAR; INTRAVENOUS; SUBCUTANEOUS at 04:50

## 2017-02-25 RX ADMIN — Medication 81 MILLIGRAM(S): at 11:53

## 2017-02-25 RX ADMIN — HYDROMORPHONE HYDROCHLORIDE 2 MILLIGRAM(S): 2 INJECTION INTRAMUSCULAR; INTRAVENOUS; SUBCUTANEOUS at 22:46

## 2017-02-25 RX ADMIN — HYDROMORPHONE HYDROCHLORIDE 4 MILLIGRAM(S): 2 INJECTION INTRAMUSCULAR; INTRAVENOUS; SUBCUTANEOUS at 04:22

## 2017-02-25 RX ADMIN — FLUCONAZOLE 100 MILLIGRAM(S): 150 TABLET ORAL at 11:53

## 2017-02-25 RX ADMIN — HYDROMORPHONE HYDROCHLORIDE 2 MILLIGRAM(S): 2 INJECTION INTRAMUSCULAR; INTRAVENOUS; SUBCUTANEOUS at 13:35

## 2017-02-25 RX ADMIN — VALSARTAN 80 MILLIGRAM(S): 80 TABLET ORAL at 06:20

## 2017-02-25 RX ADMIN — FAMOTIDINE 20 MILLIGRAM(S): 10 INJECTION INTRAVENOUS at 18:24

## 2017-02-25 RX ADMIN — HYDROMORPHONE HYDROCHLORIDE 1 MILLIGRAM(S): 2 INJECTION INTRAMUSCULAR; INTRAVENOUS; SUBCUTANEOUS at 19:47

## 2017-02-25 RX ADMIN — HYDROMORPHONE HYDROCHLORIDE 1 MILLIGRAM(S): 2 INJECTION INTRAMUSCULAR; INTRAVENOUS; SUBCUTANEOUS at 15:30

## 2017-02-25 RX ADMIN — Medication 20 MILLIGRAM(S): at 18:24

## 2017-02-25 RX ADMIN — ONDANSETRON 4 MILLIGRAM(S): 8 TABLET, FILM COATED ORAL at 14:42

## 2017-02-25 RX ADMIN — DABIGATRAN ETEXILATE MESYLATE 150 MILLIGRAM(S): 150 CAPSULE ORAL at 06:20

## 2017-02-25 RX ADMIN — HYDROMORPHONE HYDROCHLORIDE 1 MILLIGRAM(S): 2 INJECTION INTRAMUSCULAR; INTRAVENOUS; SUBCUTANEOUS at 20:05

## 2017-02-25 RX ADMIN — HYDROMORPHONE HYDROCHLORIDE 1 MILLIGRAM(S): 2 INJECTION INTRAMUSCULAR; INTRAVENOUS; SUBCUTANEOUS at 15:02

## 2017-02-25 RX ADMIN — HYDROMORPHONE HYDROCHLORIDE 2 MILLIGRAM(S): 2 INJECTION INTRAMUSCULAR; INTRAVENOUS; SUBCUTANEOUS at 06:55

## 2017-02-25 RX ADMIN — HYDROMORPHONE HYDROCHLORIDE 2 MILLIGRAM(S): 2 INJECTION INTRAMUSCULAR; INTRAVENOUS; SUBCUTANEOUS at 22:31

## 2017-02-25 RX ADMIN — Medication 200 MILLIGRAM(S): at 09:19

## 2017-02-25 NOTE — PROVIDER CONTACT NOTE (OTHER) - SITUATION
patient admitted with chest pain head ache,patient has lupus that cause chest pain,pt is on dilaudid iv for pain,

## 2017-02-26 LAB
ALBUMIN SERPL ELPH-MCNC: 2.1 G/DL — LOW (ref 3.3–5)
ALP SERPL-CCNC: 58 U/L — SIGNIFICANT CHANGE UP (ref 40–120)
ALT FLD-CCNC: 8 U/L — LOW (ref 10–45)
ANION GAP SERPL CALC-SCNC: 14 MMOL/L — SIGNIFICANT CHANGE UP (ref 5–17)
AST SERPL-CCNC: 14 U/L — SIGNIFICANT CHANGE UP (ref 10–40)
BILIRUB SERPL-MCNC: <0.2 MG/DL — SIGNIFICANT CHANGE UP (ref 0.2–1.2)
BUN SERPL-MCNC: 6 MG/DL — LOW (ref 7–23)
CALCIUM SERPL-MCNC: 8.9 MG/DL — SIGNIFICANT CHANGE UP (ref 8.4–10.5)
CHLORIDE SERPL-SCNC: 104 MMOL/L — SIGNIFICANT CHANGE UP (ref 96–108)
CO2 SERPL-SCNC: 22 MMOL/L — SIGNIFICANT CHANGE UP (ref 22–31)
CREAT SERPL-MCNC: 0.58 MG/DL — SIGNIFICANT CHANGE UP (ref 0.5–1.3)
GLUCOSE SERPL-MCNC: 111 MG/DL — HIGH (ref 70–99)
HCT VFR BLD CALC: 26.1 % — LOW (ref 39–50)
HGB BLD-MCNC: 8.5 G/DL — LOW (ref 13–17)
MAGNESIUM SERPL-MCNC: 1.6 MG/DL — SIGNIFICANT CHANGE UP (ref 1.6–2.6)
MCHC RBC-ENTMCNC: 25.9 PG — LOW (ref 27–34)
MCHC RBC-ENTMCNC: 32.6 GM/DL — SIGNIFICANT CHANGE UP (ref 32–36)
MCV RBC AUTO: 79.6 FL — LOW (ref 80–100)
PHOSPHATE SERPL-MCNC: 4.6 MG/DL — HIGH (ref 2.5–4.5)
PLATELET # BLD AUTO: 358 K/UL — SIGNIFICANT CHANGE UP (ref 150–400)
POTASSIUM SERPL-MCNC: 4.1 MMOL/L — SIGNIFICANT CHANGE UP (ref 3.5–5.3)
POTASSIUM SERPL-SCNC: 4.1 MMOL/L — SIGNIFICANT CHANGE UP (ref 3.5–5.3)
PROT SERPL-MCNC: 5.2 G/DL — LOW (ref 6–8.3)
RBC # BLD: 3.28 M/UL — LOW (ref 4.2–5.8)
RBC # FLD: 15.8 % — HIGH (ref 10.3–14.5)
SODIUM SERPL-SCNC: 140 MMOL/L — SIGNIFICANT CHANGE UP (ref 135–145)
WBC # BLD: 2.35 K/UL — LOW (ref 3.8–10.5)
WBC # FLD AUTO: 2.35 K/UL — LOW (ref 3.8–10.5)

## 2017-02-26 PROCEDURE — 99233 SBSQ HOSP IP/OBS HIGH 50: CPT

## 2017-02-26 RX ORDER — OXYCODONE HYDROCHLORIDE 5 MG/1
5 TABLET ORAL EVERY 4 HOURS
Qty: 0 | Refills: 0 | Status: DISCONTINUED | OUTPATIENT
Start: 2017-02-26 | End: 2017-02-27

## 2017-02-26 RX ADMIN — FAMOTIDINE 20 MILLIGRAM(S): 10 INJECTION INTRAVENOUS at 05:28

## 2017-02-26 RX ADMIN — OXYCODONE HYDROCHLORIDE 5 MILLIGRAM(S): 5 TABLET ORAL at 13:36

## 2017-02-26 RX ADMIN — OXYCODONE HYDROCHLORIDE 15 MILLIGRAM(S): 5 TABLET ORAL at 05:27

## 2017-02-26 RX ADMIN — FAMOTIDINE 20 MILLIGRAM(S): 10 INJECTION INTRAVENOUS at 17:11

## 2017-02-26 RX ADMIN — OXYCODONE HYDROCHLORIDE 15 MILLIGRAM(S): 5 TABLET ORAL at 17:16

## 2017-02-26 RX ADMIN — HYDROMORPHONE HYDROCHLORIDE 1 MILLIGRAM(S): 2 INJECTION INTRAMUSCULAR; INTRAVENOUS; SUBCUTANEOUS at 10:43

## 2017-02-26 RX ADMIN — OXYCODONE HYDROCHLORIDE 5 MILLIGRAM(S): 5 TABLET ORAL at 14:36

## 2017-02-26 RX ADMIN — OXYCODONE HYDROCHLORIDE 5 MILLIGRAM(S): 5 TABLET ORAL at 22:21

## 2017-02-26 RX ADMIN — HYDROMORPHONE HYDROCHLORIDE 2 MILLIGRAM(S): 2 INJECTION INTRAMUSCULAR; INTRAVENOUS; SUBCUTANEOUS at 10:53

## 2017-02-26 RX ADMIN — Medication 200 MILLIGRAM(S): at 17:11

## 2017-02-26 RX ADMIN — POLYETHYLENE GLYCOL 3350 17 GRAM(S): 17 POWDER, FOR SOLUTION ORAL at 13:37

## 2017-02-26 RX ADMIN — HYDROMORPHONE HYDROCHLORIDE 1 MILLIGRAM(S): 2 INJECTION INTRAMUSCULAR; INTRAVENOUS; SUBCUTANEOUS at 09:26

## 2017-02-26 RX ADMIN — HYDROMORPHONE HYDROCHLORIDE 2 MILLIGRAM(S): 2 INJECTION INTRAMUSCULAR; INTRAVENOUS; SUBCUTANEOUS at 11:44

## 2017-02-26 RX ADMIN — PANTOPRAZOLE SODIUM 40 MILLIGRAM(S): 20 TABLET, DELAYED RELEASE ORAL at 13:36

## 2017-02-26 RX ADMIN — DABIGATRAN ETEXILATE MESYLATE 150 MILLIGRAM(S): 150 CAPSULE ORAL at 05:28

## 2017-02-26 RX ADMIN — OXYCODONE HYDROCHLORIDE 15 MILLIGRAM(S): 5 TABLET ORAL at 06:15

## 2017-02-26 RX ADMIN — HYDROMORPHONE HYDROCHLORIDE 1 MILLIGRAM(S): 2 INJECTION INTRAMUSCULAR; INTRAVENOUS; SUBCUTANEOUS at 00:19

## 2017-02-26 RX ADMIN — Medication 1 PATCH: at 13:42

## 2017-02-26 RX ADMIN — Medication 200 MILLIGRAM(S): at 09:27

## 2017-02-26 RX ADMIN — OXYCODONE HYDROCHLORIDE 15 MILLIGRAM(S): 5 TABLET ORAL at 18:10

## 2017-02-26 RX ADMIN — HYDROMORPHONE HYDROCHLORIDE 1 MILLIGRAM(S): 2 INJECTION INTRAMUSCULAR; INTRAVENOUS; SUBCUTANEOUS at 00:35

## 2017-02-26 RX ADMIN — Medication 1 PATCH: at 13:35

## 2017-02-26 RX ADMIN — OXYCODONE HYDROCHLORIDE 5 MILLIGRAM(S): 5 TABLET ORAL at 21:21

## 2017-02-26 RX ADMIN — DABIGATRAN ETEXILATE MESYLATE 150 MILLIGRAM(S): 150 CAPSULE ORAL at 17:11

## 2017-02-26 RX ADMIN — Medication 100 MILLIGRAM(S): at 17:11

## 2017-02-26 RX ADMIN — VALSARTAN 80 MILLIGRAM(S): 80 TABLET ORAL at 05:28

## 2017-02-26 RX ADMIN — Medication 81 MILLIGRAM(S): at 13:35

## 2017-02-27 VITALS
RESPIRATION RATE: 18 BRPM | OXYGEN SATURATION: 100 % | DIASTOLIC BLOOD PRESSURE: 85 MMHG | TEMPERATURE: 98 F | SYSTOLIC BLOOD PRESSURE: 128 MMHG | HEART RATE: 102 BPM

## 2017-02-27 LAB
ANION GAP SERPL CALC-SCNC: 9 MMOL/L — SIGNIFICANT CHANGE UP (ref 5–17)
BUN SERPL-MCNC: 7 MG/DL — SIGNIFICANT CHANGE UP (ref 7–23)
CALCIUM SERPL-MCNC: 9.1 MG/DL — SIGNIFICANT CHANGE UP (ref 8.4–10.5)
CHLORIDE SERPL-SCNC: 106 MMOL/L — SIGNIFICANT CHANGE UP (ref 96–108)
CO2 SERPL-SCNC: 27 MMOL/L — SIGNIFICANT CHANGE UP (ref 22–31)
CREAT SERPL-MCNC: 0.77 MG/DL — SIGNIFICANT CHANGE UP (ref 0.5–1.3)
GLUCOSE SERPL-MCNC: 82 MG/DL — SIGNIFICANT CHANGE UP (ref 70–99)
HCT VFR BLD CALC: 26.7 % — LOW (ref 39–50)
HGB BLD-MCNC: 8.8 G/DL — LOW (ref 13–17)
MCHC RBC-ENTMCNC: 26.7 PG — LOW (ref 27–34)
MCHC RBC-ENTMCNC: 32.9 GM/DL — SIGNIFICANT CHANGE UP (ref 32–36)
MCV RBC AUTO: 81.1 FL — SIGNIFICANT CHANGE UP (ref 80–100)
PLATELET # BLD AUTO: 322 K/UL — SIGNIFICANT CHANGE UP (ref 150–400)
POTASSIUM SERPL-MCNC: 4.2 MMOL/L — SIGNIFICANT CHANGE UP (ref 3.5–5.3)
POTASSIUM SERPL-SCNC: 4.2 MMOL/L — SIGNIFICANT CHANGE UP (ref 3.5–5.3)
RBC # BLD: 3.29 M/UL — LOW (ref 4.2–5.8)
RBC # FLD: 15.6 % — HIGH (ref 10.3–14.5)
SODIUM SERPL-SCNC: 142 MMOL/L — SIGNIFICANT CHANGE UP (ref 135–145)
WBC # BLD: 2.8 K/UL — LOW (ref 3.8–10.5)
WBC # FLD AUTO: 2.8 K/UL — LOW (ref 3.8–10.5)

## 2017-02-27 PROCEDURE — 99239 HOSP IP/OBS DSCHRG MGMT >30: CPT

## 2017-02-27 RX ORDER — OXYCODONE HYDROCHLORIDE 5 MG/1
2 TABLET ORAL
Qty: 18 | Refills: 0 | COMMUNITY
Start: 2017-02-27 | End: 2017-03-02

## 2017-02-27 RX ORDER — OXYCODONE HYDROCHLORIDE 5 MG/1
1 TABLET ORAL
Qty: 6 | Refills: 0 | OUTPATIENT
Start: 2017-02-27 | End: 2017-03-02

## 2017-02-27 RX ORDER — VALSARTAN 80 MG/1
1 TABLET ORAL
Qty: 30 | Refills: 0 | OUTPATIENT
Start: 2017-02-27 | End: 2017-03-29

## 2017-02-27 RX ORDER — OXYCODONE HYDROCHLORIDE 5 MG/1
1 TABLET ORAL
Qty: 18 | Refills: 0 | OUTPATIENT
Start: 2017-02-27 | End: 2017-03-02

## 2017-02-27 RX ORDER — OXYCODONE HYDROCHLORIDE 5 MG/1
1 TABLET ORAL
Qty: 0 | Refills: 0 | COMMUNITY
Start: 2017-02-27

## 2017-02-27 RX ADMIN — OXYCODONE HYDROCHLORIDE 15 MILLIGRAM(S): 5 TABLET ORAL at 10:00

## 2017-02-27 RX ADMIN — FAMOTIDINE 20 MILLIGRAM(S): 10 INJECTION INTRAVENOUS at 10:01

## 2017-02-27 RX ADMIN — Medication 81 MILLIGRAM(S): at 12:42

## 2017-02-27 RX ADMIN — VALSARTAN 80 MILLIGRAM(S): 80 TABLET ORAL at 10:01

## 2017-02-27 RX ADMIN — Medication 1 PATCH: at 12:45

## 2017-02-27 RX ADMIN — Medication 200 MILLIGRAM(S): at 10:01

## 2017-02-27 RX ADMIN — OXYCODONE HYDROCHLORIDE 15 MILLIGRAM(S): 5 TABLET ORAL at 10:11

## 2017-02-27 RX ADMIN — DABIGATRAN ETEXILATE MESYLATE 150 MILLIGRAM(S): 150 CAPSULE ORAL at 10:01

## 2017-03-02 ENCOUNTER — APPOINTMENT (OUTPATIENT)
Dept: RHEUMATOLOGY | Facility: CLINIC | Age: 27
End: 2017-03-02

## 2017-03-20 ENCOUNTER — APPOINTMENT (OUTPATIENT)
Dept: PAIN MANAGEMENT | Facility: CLINIC | Age: 27
End: 2017-03-20

## 2017-03-20 VITALS
HEART RATE: 109 BPM | SYSTOLIC BLOOD PRESSURE: 118 MMHG | BODY MASS INDEX: 31.35 KG/M2 | HEIGHT: 70 IN | WEIGHT: 219 LBS | DIASTOLIC BLOOD PRESSURE: 84 MMHG

## 2017-03-27 ENCOUNTER — INPATIENT (INPATIENT)
Facility: HOSPITAL | Age: 27
LOS: 10 days | Discharge: HOME | DRG: 176 | End: 2017-04-07
Attending: INTERNAL MEDICINE | Admitting: HOSPITALIST
Payer: MEDICAID

## 2017-03-27 VITALS
HEART RATE: 109 BPM | SYSTOLIC BLOOD PRESSURE: 126 MMHG | TEMPERATURE: 99 F | RESPIRATION RATE: 18 BRPM | DIASTOLIC BLOOD PRESSURE: 69 MMHG | OXYGEN SATURATION: 100 %

## 2017-03-27 DIAGNOSIS — Z98.890 OTHER SPECIFIED POSTPROCEDURAL STATES: Chronic | ICD-10-CM

## 2017-03-27 DIAGNOSIS — Z95.828 PRESENCE OF OTHER VASCULAR IMPLANTS AND GRAFTS: Chronic | ICD-10-CM

## 2017-03-27 LAB
ALBUMIN SERPL ELPH-MCNC: 2.7 G/DL — LOW (ref 3.3–5)
ALP SERPL-CCNC: 107 U/L — SIGNIFICANT CHANGE UP (ref 40–120)
ALT FLD-CCNC: 12 U/L RC — SIGNIFICANT CHANGE UP (ref 10–45)
ANION GAP SERPL CALC-SCNC: 14 MMOL/L — SIGNIFICANT CHANGE UP (ref 5–17)
APTT BLD: 38.7 SEC — HIGH (ref 27.5–37.4)
AST SERPL-CCNC: 21 U/L — SIGNIFICANT CHANGE UP (ref 10–40)
BASOPHILS # BLD AUTO: 0 K/UL — SIGNIFICANT CHANGE UP (ref 0–0.2)
BASOPHILS NFR BLD AUTO: 0 % — SIGNIFICANT CHANGE UP (ref 0–2)
BILIRUB SERPL-MCNC: <0.1 MG/DL — LOW (ref 0.2–1.2)
BUN SERPL-MCNC: 4 MG/DL — LOW (ref 7–23)
CALCIUM SERPL-MCNC: 8.7 MG/DL — SIGNIFICANT CHANGE UP (ref 8.4–10.5)
CHLORIDE SERPL-SCNC: 106 MMOL/L — SIGNIFICANT CHANGE UP (ref 96–108)
CK MB BLD-MCNC: 1.7 % — SIGNIFICANT CHANGE UP (ref 0–3.5)
CK MB CFR SERPL CALC: 1 NG/ML — SIGNIFICANT CHANGE UP (ref 0–6.7)
CK SERPL-CCNC: 58 U/L — SIGNIFICANT CHANGE UP (ref 30–200)
CO2 SERPL-SCNC: 23 MMOL/L — SIGNIFICANT CHANGE UP (ref 22–31)
CREAT SERPL-MCNC: 0.71 MG/DL — SIGNIFICANT CHANGE UP (ref 0.5–1.3)
EOSINOPHIL # BLD AUTO: 0.2 K/UL — SIGNIFICANT CHANGE UP (ref 0–0.5)
EOSINOPHIL NFR BLD AUTO: 3.5 % — SIGNIFICANT CHANGE UP (ref 0–6)
GAS PNL BLDV: SIGNIFICANT CHANGE UP
GLUCOSE SERPL-MCNC: 83 MG/DL — SIGNIFICANT CHANGE UP (ref 70–99)
HCT VFR BLD CALC: 32.8 % — LOW (ref 39–50)
HGB BLD-MCNC: 10.6 G/DL — LOW (ref 13–17)
INR BLD: 0.98 RATIO — SIGNIFICANT CHANGE UP (ref 0.88–1.16)
LYMPHOCYTES # BLD AUTO: 0.8 K/UL — LOW (ref 1–3.3)
LYMPHOCYTES # BLD AUTO: 18.2 % — SIGNIFICANT CHANGE UP (ref 13–44)
MCHC RBC-ENTMCNC: 24.4 PG — LOW (ref 27–34)
MCHC RBC-ENTMCNC: 32.4 GM/DL — SIGNIFICANT CHANGE UP (ref 32–36)
MCV RBC AUTO: 75.3 FL — LOW (ref 80–100)
MONOCYTES # BLD AUTO: 0.6 K/UL — SIGNIFICANT CHANGE UP (ref 0–0.9)
MONOCYTES NFR BLD AUTO: 12.4 % — SIGNIFICANT CHANGE UP (ref 2–14)
NEUTROPHILS # BLD AUTO: 3.1 K/UL — SIGNIFICANT CHANGE UP (ref 1.8–7.4)
NEUTROPHILS NFR BLD AUTO: 65.8 % — SIGNIFICANT CHANGE UP (ref 43–77)
NT-PROBNP SERPL-SCNC: 21 PG/ML — SIGNIFICANT CHANGE UP (ref 0–300)
PLATELET # BLD AUTO: 321 K/UL — SIGNIFICANT CHANGE UP (ref 150–400)
POTASSIUM SERPL-MCNC: 4.2 MMOL/L — SIGNIFICANT CHANGE UP (ref 3.5–5.3)
POTASSIUM SERPL-SCNC: 4.2 MMOL/L — SIGNIFICANT CHANGE UP (ref 3.5–5.3)
PROT SERPL-MCNC: 6.7 G/DL — SIGNIFICANT CHANGE UP (ref 6–8.3)
PROTHROM AB SERPL-ACNC: 10.6 SEC — SIGNIFICANT CHANGE UP (ref 9.8–12.7)
RBC # BLD: 4.36 M/UL — SIGNIFICANT CHANGE UP (ref 4.2–5.8)
RBC # FLD: 18.7 % — HIGH (ref 10.3–14.5)
SODIUM SERPL-SCNC: 143 MMOL/L — SIGNIFICANT CHANGE UP (ref 135–145)
TROPONIN T SERPL-MCNC: <0.01 NG/ML — SIGNIFICANT CHANGE UP (ref 0–0.06)
WBC # BLD: 4.7 K/UL — SIGNIFICANT CHANGE UP (ref 3.8–10.5)
WBC # FLD AUTO: 4.7 K/UL — SIGNIFICANT CHANGE UP (ref 3.8–10.5)

## 2017-03-27 PROCEDURE — 71010: CPT | Mod: 26

## 2017-03-27 PROCEDURE — 99285 EMERGENCY DEPT VISIT HI MDM: CPT | Mod: 25

## 2017-03-27 RX ORDER — HYDROMORPHONE HYDROCHLORIDE 2 MG/ML
1 INJECTION INTRAMUSCULAR; INTRAVENOUS; SUBCUTANEOUS ONCE
Qty: 0 | Refills: 0 | Status: DISCONTINUED | OUTPATIENT
Start: 2017-03-27 | End: 2017-03-27

## 2017-03-27 RX ORDER — SODIUM CHLORIDE 9 MG/ML
1000 INJECTION INTRAMUSCULAR; INTRAVENOUS; SUBCUTANEOUS ONCE
Qty: 0 | Refills: 0 | Status: COMPLETED | OUTPATIENT
Start: 2017-03-27 | End: 2017-03-27

## 2017-03-27 RX ADMIN — SODIUM CHLORIDE 2000 MILLILITER(S): 9 INJECTION INTRAMUSCULAR; INTRAVENOUS; SUBCUTANEOUS at 23:40

## 2017-03-27 RX ADMIN — HYDROMORPHONE HYDROCHLORIDE 1 MILLIGRAM(S): 2 INJECTION INTRAMUSCULAR; INTRAVENOUS; SUBCUTANEOUS at 23:36

## 2017-03-27 NOTE — ED PROVIDER NOTE - NS ED ROS FT
Constitutional: No fever or chills  Eyes: No visual changes  HEENT: No throat pain  CV: + chest pain  Resp: + SOB no cough  GI: No abd pain, nausea or vomiting  : No dysuria  MSK: No musculoskeletal pain  Skin: No rash  Neuro: No headache

## 2017-03-27 NOTE — ED PROVIDER NOTE - SHIFT CHANGE DETAILS
Patient signed out to incoming physician.  All decisions regarding the progression of care will be made at their discretion.

## 2017-03-27 NOTE — ED ADULT NURSE NOTE - OBJECTIVE STATEMENT
25 y/o M, PMH Lupus with nephritis, multiple past PEs/DVTs, pt failed with coumadin, Xarelto, eloquis, now on Pradaxa, with an IVC filter present presents to ED c/o chest pain and SOB s/p episode of vomiting at home. Pt reports n/v and abd cramping since yesterday, wife is sick with stomach virus. Pt currently denies headache, dizziness, abdominal pain, n/v/d, fevers, chills at this time. Aunt at bedside, safety maintained.

## 2017-03-27 NOTE — ED PROVIDER NOTE - MEDICAL DECISION MAKING DETAILS
26M hx sle multiple DVT/PEs presents with cp/sob nausea and vomiting. Will obtain labs xray ekg CE V/Q scan  given anaphylaxis to contrast and reassess

## 2017-03-27 NOTE — ED PROVIDER NOTE - PHYSICAL EXAMINATION
Constitutional: moderate distress  Eyes: PERRLA EOMI  Head: Normocephalic atraumatic  Cardiac: tachycardic   Resp: Lungs CTAB  GI: Abd s/nt/nd  Neuro: CN2-12 intact  Skin: No rashes

## 2017-03-27 NOTE — ED PROVIDER NOTE - OBJECTIVE STATEMENT
26M Hx SLE complicated by nephritis multiple PEs/DVTs failed coumadin, eloquis, xarelto now on pradaxa with an IVC filter presents with cp and sob. Pt has been having nausea vomiting and abdominal cramping for the past day. Wife at home has stomach virus. After having a few episodes of vomiting started having cp and sob. No abd pain ha dizziness at this time.

## 2017-03-27 NOTE — ED PROVIDER NOTE - ATTENDING CONTRIBUTION TO CARE
I have seen and evaluated this patient with the resident.   I agree with the findings  unless other wise stated.  After my face to face bedside evaluation, I am not[age] year old male with extensive history presents with sob, cp. PE: att exam: patient awake alert mild distress. LUNGS CTAB no wheeze no crackle. CARD +s1s2, tachycardic, no m/r/g.  Abdomen soft NT ND no rebound no guarding no CVA tenderness. EXT WWP no edema no calf tenderness CV 2+DP/PT bilaterally. neuro A&Ox3 gait normal.  skin warm and dry no rash

## 2017-03-28 DIAGNOSIS — R11.10 VOMITING, UNSPECIFIED: ICD-10-CM

## 2017-03-28 DIAGNOSIS — I26.99 OTHER PULMONARY EMBOLISM WITHOUT ACUTE COR PULMONALE: ICD-10-CM

## 2017-03-28 DIAGNOSIS — Z41.8 ENCOUNTER FOR OTHER PROCEDURES FOR PURPOSES OTHER THAN REMEDYING HEALTH STATE: ICD-10-CM

## 2017-03-28 DIAGNOSIS — M32.9 SYSTEMIC LUPUS ERYTHEMATOSUS, UNSPECIFIED: ICD-10-CM

## 2017-03-28 DIAGNOSIS — R19.7 DIARRHEA, UNSPECIFIED: ICD-10-CM

## 2017-03-28 DIAGNOSIS — N05.2 UNSPECIFIED NEPHRITIC SYNDROME WITH DIFFUSE MEMBRANOUS GLOMERULONEPHRITIS: ICD-10-CM

## 2017-03-28 LAB
APTT BLD: 78.3 SEC — HIGH (ref 27.5–37.4)
APTT BLD: >200 SEC — CRITICAL HIGH (ref 27.5–37.4)
CK MB CFR SERPL CALC: 1 NG/ML — SIGNIFICANT CHANGE UP (ref 0–6.7)
CK SERPL-CCNC: 31 U/L — SIGNIFICANT CHANGE UP (ref 30–200)
HCT VFR BLD CALC: 29.9 % — LOW (ref 39–50)
HGB BLD-MCNC: 9.7 G/DL — LOW (ref 13–17)
MCHC RBC-ENTMCNC: 24.5 PG — LOW (ref 27–34)
MCHC RBC-ENTMCNC: 32.4 GM/DL — SIGNIFICANT CHANGE UP (ref 32–36)
MCV RBC AUTO: 75.7 FL — LOW (ref 80–100)
PLATELET # BLD AUTO: 284 K/UL — SIGNIFICANT CHANGE UP (ref 150–400)
RBC # BLD: 3.95 M/UL — LOW (ref 4.2–5.8)
RBC # FLD: 18 % — HIGH (ref 10.3–14.5)
TROPONIN T SERPL-MCNC: <0.01 NG/ML — SIGNIFICANT CHANGE UP (ref 0–0.06)
WBC # BLD: 4.2 K/UL — SIGNIFICANT CHANGE UP (ref 3.8–10.5)
WBC # FLD AUTO: 4.2 K/UL — SIGNIFICANT CHANGE UP (ref 3.8–10.5)

## 2017-03-28 PROCEDURE — 99284 EMERGENCY DEPT VISIT MOD MDM: CPT | Mod: GC

## 2017-03-28 PROCEDURE — 99223 1ST HOSP IP/OBS HIGH 75: CPT | Mod: AI,GC

## 2017-03-28 PROCEDURE — 99254 IP/OBS CNSLTJ NEW/EST MOD 60: CPT | Mod: GC

## 2017-03-28 PROCEDURE — 78582 LUNG VENTILAT&PERFUS IMAGING: CPT | Mod: 26

## 2017-03-28 RX ORDER — HYDROXYCHLOROQUINE SULFATE 200 MG
200 TABLET ORAL
Qty: 0 | Refills: 0 | Status: DISCONTINUED | OUTPATIENT
Start: 2017-03-28 | End: 2017-04-07

## 2017-03-28 RX ORDER — FAMOTIDINE 10 MG/ML
20 INJECTION INTRAVENOUS DAILY
Qty: 0 | Refills: 0 | Status: DISCONTINUED | OUTPATIENT
Start: 2017-03-28 | End: 2017-03-29

## 2017-03-28 RX ORDER — HYDROMORPHONE HYDROCHLORIDE 2 MG/ML
1 INJECTION INTRAMUSCULAR; INTRAVENOUS; SUBCUTANEOUS ONCE
Qty: 0 | Refills: 0 | Status: DISCONTINUED | OUTPATIENT
Start: 2017-03-28 | End: 2017-03-28

## 2017-03-28 RX ORDER — HEPARIN SODIUM 5000 [USP'U]/ML
INJECTION INTRAVENOUS; SUBCUTANEOUS
Qty: 25000 | Refills: 0 | Status: DISCONTINUED | OUTPATIENT
Start: 2017-03-28 | End: 2017-03-29

## 2017-03-28 RX ORDER — HEPARIN SODIUM 5000 [USP'U]/ML
8000 INJECTION INTRAVENOUS; SUBCUTANEOUS ONCE
Qty: 0 | Refills: 0 | Status: COMPLETED | OUTPATIENT
Start: 2017-03-28 | End: 2017-03-28

## 2017-03-28 RX ORDER — HEPARIN SODIUM 5000 [USP'U]/ML
INJECTION INTRAVENOUS; SUBCUTANEOUS
Qty: 25000 | Refills: 0 | Status: DISCONTINUED | OUTPATIENT
Start: 2017-03-28 | End: 2017-03-28

## 2017-03-28 RX ORDER — ONDANSETRON 8 MG/1
4 TABLET, FILM COATED ORAL EVERY 8 HOURS
Qty: 0 | Refills: 0 | Status: DISCONTINUED | OUTPATIENT
Start: 2017-03-28 | End: 2017-04-07

## 2017-03-28 RX ORDER — HYDROMORPHONE HYDROCHLORIDE 2 MG/ML
2 INJECTION INTRAMUSCULAR; INTRAVENOUS; SUBCUTANEOUS EVERY 4 HOURS
Qty: 0 | Refills: 0 | Status: DISCONTINUED | OUTPATIENT
Start: 2017-03-28 | End: 2017-03-29

## 2017-03-28 RX ORDER — HYDROMORPHONE HYDROCHLORIDE 2 MG/ML
4 INJECTION INTRAMUSCULAR; INTRAVENOUS; SUBCUTANEOUS EVERY 4 HOURS
Qty: 0 | Refills: 0 | Status: DISCONTINUED | OUTPATIENT
Start: 2017-03-28 | End: 2017-03-29

## 2017-03-28 RX ORDER — HEPARIN SODIUM 5000 [USP'U]/ML
4000 INJECTION INTRAVENOUS; SUBCUTANEOUS EVERY 6 HOURS
Qty: 0 | Refills: 0 | Status: DISCONTINUED | OUTPATIENT
Start: 2017-03-28 | End: 2017-03-29

## 2017-03-28 RX ORDER — HEPARIN SODIUM 5000 [USP'U]/ML
4000 INJECTION INTRAVENOUS; SUBCUTANEOUS EVERY 6 HOURS
Qty: 0 | Refills: 0 | Status: DISCONTINUED | OUTPATIENT
Start: 2017-03-28 | End: 2017-03-28

## 2017-03-28 RX ORDER — HEPARIN SODIUM 5000 [USP'U]/ML
8000 INJECTION INTRAVENOUS; SUBCUTANEOUS EVERY 6 HOURS
Qty: 0 | Refills: 0 | Status: DISCONTINUED | OUTPATIENT
Start: 2017-03-28 | End: 2017-03-28

## 2017-03-28 RX ORDER — ASPIRIN/CALCIUM CARB/MAGNESIUM 324 MG
81 TABLET ORAL DAILY
Qty: 0 | Refills: 0 | Status: DISCONTINUED | OUTPATIENT
Start: 2017-03-28 | End: 2017-04-07

## 2017-03-28 RX ORDER — SODIUM CHLORIDE 9 MG/ML
1000 INJECTION INTRAMUSCULAR; INTRAVENOUS; SUBCUTANEOUS
Qty: 0 | Refills: 0 | Status: COMPLETED | OUTPATIENT
Start: 2017-03-28 | End: 2017-03-28

## 2017-03-28 RX ORDER — HEPARIN SODIUM 5000 [USP'U]/ML
8000 INJECTION INTRAVENOUS; SUBCUTANEOUS EVERY 6 HOURS
Qty: 0 | Refills: 0 | Status: DISCONTINUED | OUTPATIENT
Start: 2017-03-28 | End: 2017-03-29

## 2017-03-28 RX ADMIN — HYDROMORPHONE HYDROCHLORIDE 1 MILLIGRAM(S): 2 INJECTION INTRAMUSCULAR; INTRAVENOUS; SUBCUTANEOUS at 04:24

## 2017-03-28 RX ADMIN — HEPARIN SODIUM 0 UNIT(S)/HR: 5000 INJECTION INTRAVENOUS; SUBCUTANEOUS at 09:49

## 2017-03-28 RX ADMIN — HYDROMORPHONE HYDROCHLORIDE 4 MILLIGRAM(S): 2 INJECTION INTRAMUSCULAR; INTRAVENOUS; SUBCUTANEOUS at 14:45

## 2017-03-28 RX ADMIN — HYDROMORPHONE HYDROCHLORIDE 2 MILLIGRAM(S): 2 INJECTION INTRAMUSCULAR; INTRAVENOUS; SUBCUTANEOUS at 21:16

## 2017-03-28 RX ADMIN — HYDROMORPHONE HYDROCHLORIDE 4 MILLIGRAM(S): 2 INJECTION INTRAMUSCULAR; INTRAVENOUS; SUBCUTANEOUS at 22:53

## 2017-03-28 RX ADMIN — HYDROMORPHONE HYDROCHLORIDE 2 MILLIGRAM(S): 2 INJECTION INTRAMUSCULAR; INTRAVENOUS; SUBCUTANEOUS at 17:23

## 2017-03-28 RX ADMIN — ONDANSETRON 4 MILLIGRAM(S): 8 TABLET, FILM COATED ORAL at 08:53

## 2017-03-28 RX ADMIN — HYDROMORPHONE HYDROCHLORIDE 1 MILLIGRAM(S): 2 INJECTION INTRAMUSCULAR; INTRAVENOUS; SUBCUTANEOUS at 05:43

## 2017-03-28 RX ADMIN — Medication 200 MILLIGRAM(S): at 17:20

## 2017-03-28 RX ADMIN — SODIUM CHLORIDE 75 MILLILITER(S): 9 INJECTION INTRAMUSCULAR; INTRAVENOUS; SUBCUTANEOUS at 09:30

## 2017-03-28 RX ADMIN — HYDROMORPHONE HYDROCHLORIDE 1 MILLIGRAM(S): 2 INJECTION INTRAMUSCULAR; INTRAVENOUS; SUBCUTANEOUS at 04:21

## 2017-03-28 RX ADMIN — HYDROMORPHONE HYDROCHLORIDE 4 MILLIGRAM(S): 2 INJECTION INTRAMUSCULAR; INTRAVENOUS; SUBCUTANEOUS at 23:08

## 2017-03-28 RX ADMIN — HYDROMORPHONE HYDROCHLORIDE 1 MILLIGRAM(S): 2 INJECTION INTRAMUSCULAR; INTRAVENOUS; SUBCUTANEOUS at 06:04

## 2017-03-28 RX ADMIN — HEPARIN SODIUM 8000 UNIT(S): 5000 INJECTION INTRAVENOUS; SUBCUTANEOUS at 02:54

## 2017-03-28 RX ADMIN — Medication 1 TABLET(S): at 11:34

## 2017-03-28 RX ADMIN — HYDROMORPHONE HYDROCHLORIDE 4 MILLIGRAM(S): 2 INJECTION INTRAMUSCULAR; INTRAVENOUS; SUBCUTANEOUS at 18:59

## 2017-03-28 RX ADMIN — FAMOTIDINE 20 MILLIGRAM(S): 10 INJECTION INTRAVENOUS at 11:34

## 2017-03-28 RX ADMIN — HYDROMORPHONE HYDROCHLORIDE 1 MILLIGRAM(S): 2 INJECTION INTRAMUSCULAR; INTRAVENOUS; SUBCUTANEOUS at 02:11

## 2017-03-28 RX ADMIN — HYDROMORPHONE HYDROCHLORIDE 1 MILLIGRAM(S): 2 INJECTION INTRAMUSCULAR; INTRAVENOUS; SUBCUTANEOUS at 01:33

## 2017-03-28 RX ADMIN — HEPARIN SODIUM 1800 UNIT(S)/HR: 5000 INJECTION INTRAVENOUS; SUBCUTANEOUS at 02:53

## 2017-03-28 RX ADMIN — Medication 200 MILLIGRAM(S): at 11:33

## 2017-03-28 RX ADMIN — HYDROMORPHONE HYDROCHLORIDE 4 MILLIGRAM(S): 2 INJECTION INTRAMUSCULAR; INTRAVENOUS; SUBCUTANEOUS at 18:52

## 2017-03-28 RX ADMIN — HYDROMORPHONE HYDROCHLORIDE 2 MILLIGRAM(S): 2 INJECTION INTRAMUSCULAR; INTRAVENOUS; SUBCUTANEOUS at 11:29

## 2017-03-28 RX ADMIN — HYDROMORPHONE HYDROCHLORIDE 2 MILLIGRAM(S): 2 INJECTION INTRAMUSCULAR; INTRAVENOUS; SUBCUTANEOUS at 11:45

## 2017-03-28 RX ADMIN — HYDROMORPHONE HYDROCHLORIDE 4 MILLIGRAM(S): 2 INJECTION INTRAMUSCULAR; INTRAVENOUS; SUBCUTANEOUS at 14:01

## 2017-03-28 RX ADMIN — HYDROMORPHONE HYDROCHLORIDE 2 MILLIGRAM(S): 2 INJECTION INTRAMUSCULAR; INTRAVENOUS; SUBCUTANEOUS at 16:49

## 2017-03-28 RX ADMIN — Medication 20 MILLIGRAM(S): at 11:34

## 2017-03-28 RX ADMIN — Medication 81 MILLIGRAM(S): at 11:33

## 2017-03-28 RX ADMIN — HYDROMORPHONE HYDROCHLORIDE 2 MILLIGRAM(S): 2 INJECTION INTRAMUSCULAR; INTRAVENOUS; SUBCUTANEOUS at 21:01

## 2017-03-28 RX ADMIN — HYDROMORPHONE HYDROCHLORIDE 1 MILLIGRAM(S): 2 INJECTION INTRAMUSCULAR; INTRAVENOUS; SUBCUTANEOUS at 08:53

## 2017-03-28 NOTE — H&P ADULT. - ASSESSMENT
26M w/ PMH of SLE c/b recurrent pleural effusions (requiring multiple chest tubes), lupus nephritis (class V, failed cellcept), seronegative APS, DVT/PE s/p RLE IVC filter placement, hx of failed AC (Xarelto, coumadin, Lovenox and Arixtra), b/l renal vein thrombosis found to have abnormal V/Q scan concerning for possible PE.

## 2017-03-28 NOTE — H&P ADULT. - PROBLEM SELECTOR PLAN 1
- V/Q scan with Moderate-sized partially mismatched/partially matched perfusion defect in posterior basal segment of left lower lobe, new or more conspicuous as compared to prior study dated 1/3/2017. Intermediate  probability  of  pulmonary  embolus.  - Started on heparin gtt   - Unclear if this is new PE on pradaxa, indicating possible AC failure   - Recommend heme consult in AM for further management   - Will c/w heparin gtt at this time    - O2 as needed - V/Q scan with Moderate-sized partially mismatched/partially matched perfusion defect in posterior basal segment of left lower lobe, new or more conspicuous as compared to prior study dated 1/3/2017. Intermediate  probability  of  pulmonary  embolus.  - Started on heparin gtt   - Unclear if this is new PE on pradaxa, indicating possible AC failure vs. malabsorption of pradaxa 2/2 recent vomiting   - Recommend heme consult in AM for further management   - Will c/w heparin gtt at this time    - O2 as needed

## 2017-03-28 NOTE — ED ADULT NURSE REASSESSMENT NOTE - NS ED NURSE REASSESS COMMENT FT1
pt given pain medication as per md villafana order. admitting MD at bedside. safety maintained. family at bedside. will continue to monitor.

## 2017-03-28 NOTE — H&P ADULT. - PROBLEM SELECTOR PLAN 4
- Likely related to viral gastroenteritis   - Gentle IVF   - Will consider Cdiff and stool studies if persists.

## 2017-03-28 NOTE — H&P ADULT. - PROBLEM SELECTOR PLAN 2
- C/w home plaquenil and solumedrol  - Recommend rheum consult in AM for further management   - Will convert pain to IV pain medication as patient not tolerating PO - C/w home plaquenil and solumedrol  - C3, C4, dsDNA to eval for SLE flare   - Recommend rheum consult in AM for further management   - Will convert pain to IV pain medication as patient not tolerating PO, consider pain management consult in AM for further managemetn

## 2017-03-28 NOTE — H&P ADULT. - HISTORY OF PRESENT ILLNESS
26M w/ PMH of SLE c/b recurrent pleural effusions (requiring multiple chest tubes), lupus nephritis (class V, failed cellcept), seronegative APS, DVT/PE s/p RLE IVC filter placement, hx of failed AC (Xarelto, coumadin, Lovenox and Arixtra), b/l renal vein thrombosis and recent admission 2/17-2/27 for chest pain and headache presenting with 2 days of vomiting, diarrhea and chest tightness. Patient states that his daughter and wife both have a stomach virus, which he thinks he caught.  States that he has had 5-6 loose watery non-bloody BMs per day for the last 2 days.  Also states that he has had non-bloody vomiting associated with PO intake and decreased appetite.  States that these symptoms were also associated with chest tightness, which prompted him to come to ED.  States that chest tightness is worse with deep breathing. Denies cough or SOB.  Denies palpitations or chest pain.  He is also endorsing a headaches and "bones aching". He denies skipping any doses of Pradaxa and states that he is compliant with all this medications.     ED Course:   T 98.7, , /69, RR 18, SpO2 100% RA.   Patient had V/Q scan which showed Moderate-sized partially mismatched/partially matched perfusion defect in posterior basal segment of left lower lobe, new or more conspicuous as compared to prior study dated 1/3/2017. Patient started on heparin gtt. Also received dilaudid 1mg IVP x3. 26M w/ PMH of SLE c/b recurrent pleural effusions (requiring multiple chest tubes), lupus nephritis (class V, failed cellcept), seronegative APS, DVT/PE s/p RLE IVC filter placement, hx of failed AC (Xarelto, coumadin, Lovenox and Arixtra), b/l renal vein thrombosis and recent admission 2/17-2/27 for chest pain and headache presenting with 2 days of vomiting, diarrhea and chest tightness. Patient states that his daughter and wife both have a stomach virus, which he thinks he caught.  States that he has had 5-6 loose watery non-bloody BMs per day for the last 2 days.  Also states that he has had non-bloody vomiting associated with PO intake and decreased appetite.  States that these symptoms were also associated with chest tightness, which prompted him to come to ED.  States that chest tightness is worse with deep breathing. Denies cough or SOB.  Denies palpitations or chest pain.  He is also endorsing a headaches and "bones aching". He denies skipping any doses of Pradaxa and states that he is compliant with all this medications.     ED Course:   T 98.7, , /69, RR 18, SpO2 100% RA.   Patient had V/Q scan which showed Moderate-sized partially mismatched/partially matched perfusion defect in posterior basal segment of left lower lobe, new or more conspicuous as compared to prior study dated 1/3/2017. Patient started on heparin gtt. Also received dilaudid 1mg IVP x3.   iStop reference#: [id number]

## 2017-03-28 NOTE — ED ADULT NURSE REASSESSMENT NOTE - NS ED NURSE REASSESS COMMENT FT1
pt states he is in pain and is shaking "the pain is unbearable this is wrong I shouldn't have to wait this long for pain medication". md mcclellan aware of situation.

## 2017-03-28 NOTE — H&P ADULT. - ATTENDING COMMENTS
26M w/ PMH of SLE c/b recurrent pleural effusions (requiring multiple chest tubes), lupus nephritis (class V, failed cellcept), seronegative APS, DVT/PE s/p RLE IVC filter placement, hx of failed AC (Xarelto, coumadin, Lovenox and Arixtra), b/l renal vein thrombosis, now presenting with chest pain in the setting of gastroenteritis, reporting sick contact with similar symptoms, V/Q scan with indeterminate prob for PE , currently on heparin drip, would obtain hematology input for further recommendations regarding anticoagulation given history of multiple failed agents,  findings may be secondary to poor absorption of oral medication in the setting of GI illness. Will continue pain regimen as in past and have pain management follow. GI symptoms likely self limited will provide supportive care and IV hydration until diet advanced.

## 2017-03-28 NOTE — ED ADULT NURSE REASSESSMENT NOTE - NS ED NURSE REASSESS COMMENT FT1
0255: Pt c/o no change in chest pain, reports pain is still an 8/10 s/p 2nd pain medication administration ordered as per MD. MD Bailey aware.  0305: Pt demanding to speak with Dr, demanding more pain medication, MD made aware.  0330: Pt raising his voice and demanding more pain meds, pt is becoming verbally abusive to staff, pt is complaining that staff is not addressing his pain properly, pt states that "upstairs I receive 4mg of Dilaudid every 4 hours with 2mg breakers in between." RN explains to pt that doctor needs to reassess pt in between each pain medication dose in order to correctly medicate him, pt verbalizes understanding. MD Bailey made aware that pt wishes to speak him.  0400: MD Bailey at bedside speaking kelly 0255: Pt c/o no change in chest pain, reports pain is still an 8/10 s/p 2nd pain medication administration ordered as per MD. MD Bailey aware that pt is requesting more pain meds.  0300: Pt's Aunt at nurses station, tells RN pt is still in pain and why has RN not given meds yet, RN explains to pt and pt's Aunt that the order from the Doctor must be in the computer prior to RN administering the med, pt and pt's Aunt verbalize understanding.  0305: Pt demanding to speak with Dr, demanding more pain medication, MD Bailey made aware.  0330: Pt's Aunt presents to nursing station asking why RN has not brought in pain meds yet, RN explains to pt's Aunt that MD must reassess pt before ordering more pain meds, pt's Aunt verbalizes understanding.   0340: Pt raising his voice and demanding more pain meds, pt is becoming verbally abusive to staff, pt is complaining that staff is not addressing his pain properly, pt states that "upstairs I receive 4mg of Dilaudid every 4 hours with 2mg breakers in between." RN explains to pt that doctor needs to reassess pt in between each pain medication dose in order to correctly medicate him, pt verbalizes understanding. MD Bailey made aware that pt wishes to speak him.  0410: MD Bailey at bedside speaking with pt.  0420: Pt medicated as ordered by MD. 0255: Pt c/o no change in chest pain, reports pain is still an 8/10 s/p 2nd pain medication administration ordered as per MD. MD Bailey aware that pt is requesting more pain meds.  0300: Pt's Aunt at nurses station, tells RN pt is still in pain and why has RN not given meds yet, RN explains to pt and pt's Aunt that the order from the Doctor must be in the computer prior to RN administering the med, pt and pt's Aunt verbalize understanding.  0305: Pt demanding to speak with Dr, demanding more pain medication, MD Bailey made aware.  0330: Pt's Aunt presents to nursing station asking why RN has not brought in pain meds yet, RN explains to pt's Aunt and pt that MD must reassess pt before ordering more pain meds, pt's Aunt and pt verbalize understanding.   0340: Pt raising his voice and demanding more pain meds, pt is becoming verbally abusive to staff, pt is complaining that staff is not addressing his pain properly, pt states that "upstairs I receive 4mg of Dilaudid every 4 hours with 2mg breakers in between." RN explains to pt that doctor needs to reassess pt in between each pain medication dose in order to correctly medicate him, pt verbalizes understanding. MD Bailey made aware that pt wishes to speak him.  0410: MD Bailey at bedside speaking with pt.  0420: Pt medicated as ordered by MD.

## 2017-03-28 NOTE — H&P ADULT. - RS GEN PE MLT RESP DETAILS PC
airway patent/breath sounds equal/respirations non-labored/clear to auscultation bilaterally/good air movement

## 2017-03-28 NOTE — H&P ADULT. - PROBLEM SELECTOR PLAN 5
- Likely related to viral gastroenteritis  - Monitor   - Will give gentle IVF  - Zofran prn - Likely related to viral gastroenteritis  - Will give gentle IVF  - Zofran prn

## 2017-03-29 LAB
ANION GAP SERPL CALC-SCNC: 12 MMOL/L — SIGNIFICANT CHANGE UP (ref 5–17)
APTT BLD: 92.4 SEC — HIGH (ref 27.5–37.4)
APTT BLD: 96.8 SEC — HIGH (ref 27.5–37.4)
BASOPHILS # BLD AUTO: 0 K/UL — SIGNIFICANT CHANGE UP (ref 0–0.2)
BASOPHILS NFR BLD AUTO: 1 % — SIGNIFICANT CHANGE UP (ref 0–2)
BUN SERPL-MCNC: 8 MG/DL — SIGNIFICANT CHANGE UP (ref 7–23)
C3 SERPL-MCNC: 110 MG/DL — SIGNIFICANT CHANGE UP (ref 80–180)
C4 SERPL-MCNC: 26 MG/DL — SIGNIFICANT CHANGE UP (ref 10–45)
CALCIUM SERPL-MCNC: 8.7 MG/DL — SIGNIFICANT CHANGE UP (ref 8.4–10.5)
CHLORIDE SERPL-SCNC: 102 MMOL/L — SIGNIFICANT CHANGE UP (ref 96–108)
CO2 SERPL-SCNC: 25 MMOL/L — SIGNIFICANT CHANGE UP (ref 22–31)
CREAT SERPL-MCNC: 0.56 MG/DL — SIGNIFICANT CHANGE UP (ref 0.5–1.3)
CRP SERPL-MCNC: 0.83 MG/DL — HIGH (ref 0–0.4)
EOSINOPHIL # BLD AUTO: 0.1 K/UL — SIGNIFICANT CHANGE UP (ref 0–0.5)
EOSINOPHIL NFR BLD AUTO: 2.1 % — SIGNIFICANT CHANGE UP (ref 0–6)
GLUCOSE SERPL-MCNC: 88 MG/DL — SIGNIFICANT CHANGE UP (ref 70–99)
HCT VFR BLD CALC: 29 % — LOW (ref 39–50)
HCT VFR BLD CALC: 29 % — LOW (ref 39–50)
HGB BLD-MCNC: 9.4 G/DL — LOW (ref 13–17)
HGB BLD-MCNC: 9.6 G/DL — LOW (ref 13–17)
IGA FLD-MCNC: 543 MG/DL — HIGH (ref 68–378)
IGG FLD-MCNC: 829 MG/DL — SIGNIFICANT CHANGE UP (ref 694–1618)
IGM SERPL-MCNC: 25 MG/DL — LOW (ref 40–230)
KAPPA LC SER QL IFE: 6.67 MG/DL — HIGH (ref 0.33–1.94)
KAPPA/LAMBDA FREE LIGHT CHAIN RATIO, SERUM: 2.07 RATIO — HIGH (ref 0.26–1.65)
LAMBDA LC SER QL IFE: 3.22 MG/DL — HIGH (ref 0.57–2.63)
LYMPHOCYTES # BLD AUTO: 1 K/UL — SIGNIFICANT CHANGE UP (ref 1–3.3)
LYMPHOCYTES # BLD AUTO: 24.7 % — SIGNIFICANT CHANGE UP (ref 13–44)
MAGNESIUM SERPL-MCNC: 1.7 MG/DL — SIGNIFICANT CHANGE UP (ref 1.6–2.6)
MCHC RBC-ENTMCNC: 24.6 PG — LOW (ref 27–34)
MCHC RBC-ENTMCNC: 24.9 PG — LOW (ref 27–34)
MCHC RBC-ENTMCNC: 32.6 GM/DL — SIGNIFICANT CHANGE UP (ref 32–36)
MCHC RBC-ENTMCNC: 33.1 GM/DL — SIGNIFICANT CHANGE UP (ref 32–36)
MCV RBC AUTO: 75.3 FL — LOW (ref 80–100)
MCV RBC AUTO: 75.4 FL — LOW (ref 80–100)
MONOCYTES # BLD AUTO: 0.4 K/UL — SIGNIFICANT CHANGE UP (ref 0–0.9)
MONOCYTES NFR BLD AUTO: 9.8 % — SIGNIFICANT CHANGE UP (ref 2–14)
NEUTROPHILS # BLD AUTO: 2.4 K/UL — SIGNIFICANT CHANGE UP (ref 1.8–7.4)
NEUTROPHILS NFR BLD AUTO: 62.4 % — SIGNIFICANT CHANGE UP (ref 43–77)
PHOSPHATE SERPL-MCNC: 4.5 MG/DL — SIGNIFICANT CHANGE UP (ref 2.5–4.5)
PLATELET # BLD AUTO: 277 K/UL — SIGNIFICANT CHANGE UP (ref 150–400)
PLATELET # BLD AUTO: 309 K/UL — SIGNIFICANT CHANGE UP (ref 150–400)
POTASSIUM SERPL-MCNC: 4 MMOL/L — SIGNIFICANT CHANGE UP (ref 3.5–5.3)
POTASSIUM SERPL-SCNC: 4 MMOL/L — SIGNIFICANT CHANGE UP (ref 3.5–5.3)
RBC # BLD: 3.84 M/UL — LOW (ref 4.2–5.8)
RBC # BLD: 3.85 M/UL — LOW (ref 4.2–5.8)
RBC # FLD: 17.2 % — HIGH (ref 10.3–14.5)
RBC # FLD: 18.9 % — HIGH (ref 10.3–14.5)
SODIUM SERPL-SCNC: 139 MMOL/L — SIGNIFICANT CHANGE UP (ref 135–145)
WBC # BLD: 3.9 K/UL — SIGNIFICANT CHANGE UP (ref 3.8–10.5)
WBC # BLD: 4.6 K/UL — SIGNIFICANT CHANGE UP (ref 3.8–10.5)
WBC # FLD AUTO: 3.9 K/UL — SIGNIFICANT CHANGE UP (ref 3.8–10.5)
WBC # FLD AUTO: 4.6 K/UL — SIGNIFICANT CHANGE UP (ref 3.8–10.5)

## 2017-03-29 PROCEDURE — 99233 SBSQ HOSP IP/OBS HIGH 50: CPT | Mod: GC

## 2017-03-29 RX ORDER — FAMOTIDINE 10 MG/ML
20 INJECTION INTRAVENOUS
Qty: 0 | Refills: 0 | Status: DISCONTINUED | OUTPATIENT
Start: 2017-03-29 | End: 2017-04-07

## 2017-03-29 RX ORDER — FAMOTIDINE 10 MG/ML
20 INJECTION INTRAVENOUS ONCE
Qty: 0 | Refills: 0 | Status: DISCONTINUED | OUTPATIENT
Start: 2017-03-29 | End: 2017-03-29

## 2017-03-29 RX ORDER — HEPARIN SODIUM 5000 [USP'U]/ML
1500 INJECTION INTRAVENOUS; SUBCUTANEOUS
Qty: 25000 | Refills: 0 | Status: DISCONTINUED | OUTPATIENT
Start: 2017-03-29 | End: 2017-03-31

## 2017-03-29 RX ORDER — HYDROMORPHONE HYDROCHLORIDE 2 MG/ML
3 INJECTION INTRAMUSCULAR; INTRAVENOUS; SUBCUTANEOUS EVERY 4 HOURS
Qty: 0 | Refills: 0 | Status: DISCONTINUED | OUTPATIENT
Start: 2017-03-29 | End: 2017-03-31

## 2017-03-29 RX ORDER — ONDANSETRON 8 MG/1
4 TABLET, FILM COATED ORAL ONCE
Qty: 0 | Refills: 0 | Status: COMPLETED | OUTPATIENT
Start: 2017-03-29 | End: 2017-03-29

## 2017-03-29 RX ORDER — HYDROMORPHONE HYDROCHLORIDE 2 MG/ML
5 INJECTION INTRAMUSCULAR; INTRAVENOUS; SUBCUTANEOUS EVERY 4 HOURS
Qty: 0 | Refills: 0 | Status: DISCONTINUED | OUTPATIENT
Start: 2017-03-29 | End: 2017-03-31

## 2017-03-29 RX ORDER — CHLORPROMAZINE HCL 10 MG
25 TABLET ORAL ONCE
Qty: 0 | Refills: 0 | Status: COMPLETED | OUTPATIENT
Start: 2017-03-29 | End: 2017-03-29

## 2017-03-29 RX ADMIN — HYDROMORPHONE HYDROCHLORIDE 2 MILLIGRAM(S): 2 INJECTION INTRAMUSCULAR; INTRAVENOUS; SUBCUTANEOUS at 09:40

## 2017-03-29 RX ADMIN — HYDROMORPHONE HYDROCHLORIDE 5 MILLIGRAM(S): 2 INJECTION INTRAMUSCULAR; INTRAVENOUS; SUBCUTANEOUS at 23:45

## 2017-03-29 RX ADMIN — Medication 81 MILLIGRAM(S): at 11:26

## 2017-03-29 RX ADMIN — HYDROMORPHONE HYDROCHLORIDE 5 MILLIGRAM(S): 2 INJECTION INTRAMUSCULAR; INTRAVENOUS; SUBCUTANEOUS at 16:00

## 2017-03-29 RX ADMIN — Medication 20 MILLIGRAM(S): at 17:43

## 2017-03-29 RX ADMIN — HYDROMORPHONE HYDROCHLORIDE 4 MILLIGRAM(S): 2 INJECTION INTRAMUSCULAR; INTRAVENOUS; SUBCUTANEOUS at 07:40

## 2017-03-29 RX ADMIN — HYDROMORPHONE HYDROCHLORIDE 5 MILLIGRAM(S): 2 INJECTION INTRAMUSCULAR; INTRAVENOUS; SUBCUTANEOUS at 19:23

## 2017-03-29 RX ADMIN — HYDROMORPHONE HYDROCHLORIDE 3 MILLIGRAM(S): 2 INJECTION INTRAMUSCULAR; INTRAVENOUS; SUBCUTANEOUS at 18:18

## 2017-03-29 RX ADMIN — HYDROMORPHONE HYDROCHLORIDE 4 MILLIGRAM(S): 2 INJECTION INTRAMUSCULAR; INTRAVENOUS; SUBCUTANEOUS at 03:40

## 2017-03-29 RX ADMIN — HYDROMORPHONE HYDROCHLORIDE 4 MILLIGRAM(S): 2 INJECTION INTRAMUSCULAR; INTRAVENOUS; SUBCUTANEOUS at 03:25

## 2017-03-29 RX ADMIN — HYDROMORPHONE HYDROCHLORIDE 3 MILLIGRAM(S): 2 INJECTION INTRAMUSCULAR; INTRAVENOUS; SUBCUTANEOUS at 21:34

## 2017-03-29 RX ADMIN — Medication 25 MILLIGRAM(S): at 23:22

## 2017-03-29 RX ADMIN — HYDROMORPHONE HYDROCHLORIDE 2 MILLIGRAM(S): 2 INJECTION INTRAMUSCULAR; INTRAVENOUS; SUBCUTANEOUS at 01:19

## 2017-03-29 RX ADMIN — HYDROMORPHONE HYDROCHLORIDE 5 MILLIGRAM(S): 2 INJECTION INTRAMUSCULAR; INTRAVENOUS; SUBCUTANEOUS at 15:21

## 2017-03-29 RX ADMIN — HYDROMORPHONE HYDROCHLORIDE 5 MILLIGRAM(S): 2 INJECTION INTRAMUSCULAR; INTRAVENOUS; SUBCUTANEOUS at 23:22

## 2017-03-29 RX ADMIN — HYDROMORPHONE HYDROCHLORIDE 5 MILLIGRAM(S): 2 INJECTION INTRAMUSCULAR; INTRAVENOUS; SUBCUTANEOUS at 11:45

## 2017-03-29 RX ADMIN — HEPARIN SODIUM 1500 UNIT(S)/HR: 5000 INJECTION INTRAVENOUS; SUBCUTANEOUS at 03:30

## 2017-03-29 RX ADMIN — FAMOTIDINE 20 MILLIGRAM(S): 10 INJECTION INTRAVENOUS at 18:33

## 2017-03-29 RX ADMIN — HEPARIN SODIUM 1500 UNIT(S)/HR: 5000 INJECTION INTRAVENOUS; SUBCUTANEOUS at 14:17

## 2017-03-29 RX ADMIN — HYDROMORPHONE HYDROCHLORIDE 3 MILLIGRAM(S): 2 INJECTION INTRAMUSCULAR; INTRAVENOUS; SUBCUTANEOUS at 13:40

## 2017-03-29 RX ADMIN — HYDROMORPHONE HYDROCHLORIDE 3 MILLIGRAM(S): 2 INJECTION INTRAMUSCULAR; INTRAVENOUS; SUBCUTANEOUS at 21:54

## 2017-03-29 RX ADMIN — FAMOTIDINE 20 MILLIGRAM(S): 10 INJECTION INTRAVENOUS at 05:28

## 2017-03-29 RX ADMIN — HYDROMORPHONE HYDROCHLORIDE 2 MILLIGRAM(S): 2 INJECTION INTRAMUSCULAR; INTRAVENOUS; SUBCUTANEOUS at 09:19

## 2017-03-29 RX ADMIN — Medication 20 MILLIGRAM(S): at 06:38

## 2017-03-29 RX ADMIN — Medication 200 MILLIGRAM(S): at 17:43

## 2017-03-29 RX ADMIN — HYDROMORPHONE HYDROCHLORIDE 4 MILLIGRAM(S): 2 INJECTION INTRAMUSCULAR; INTRAVENOUS; SUBCUTANEOUS at 07:22

## 2017-03-29 RX ADMIN — HYDROMORPHONE HYDROCHLORIDE 5 MILLIGRAM(S): 2 INJECTION INTRAMUSCULAR; INTRAVENOUS; SUBCUTANEOUS at 11:22

## 2017-03-29 RX ADMIN — HYDROMORPHONE HYDROCHLORIDE 3 MILLIGRAM(S): 2 INJECTION INTRAMUSCULAR; INTRAVENOUS; SUBCUTANEOUS at 17:34

## 2017-03-29 RX ADMIN — HYDROMORPHONE HYDROCHLORIDE 2 MILLIGRAM(S): 2 INJECTION INTRAMUSCULAR; INTRAVENOUS; SUBCUTANEOUS at 05:19

## 2017-03-29 RX ADMIN — HYDROMORPHONE HYDROCHLORIDE 2 MILLIGRAM(S): 2 INJECTION INTRAMUSCULAR; INTRAVENOUS; SUBCUTANEOUS at 01:34

## 2017-03-29 RX ADMIN — HYDROMORPHONE HYDROCHLORIDE 2 MILLIGRAM(S): 2 INJECTION INTRAMUSCULAR; INTRAVENOUS; SUBCUTANEOUS at 05:34

## 2017-03-29 RX ADMIN — HYDROMORPHONE HYDROCHLORIDE 3 MILLIGRAM(S): 2 INJECTION INTRAMUSCULAR; INTRAVENOUS; SUBCUTANEOUS at 13:18

## 2017-03-29 RX ADMIN — HYDROMORPHONE HYDROCHLORIDE 5 MILLIGRAM(S): 2 INJECTION INTRAMUSCULAR; INTRAVENOUS; SUBCUTANEOUS at 19:45

## 2017-03-29 RX ADMIN — ONDANSETRON 4 MILLIGRAM(S): 8 TABLET, FILM COATED ORAL at 08:28

## 2017-03-30 LAB
4/8 RATIO: 1.1 RATIO — SIGNIFICANT CHANGE UP (ref 0.9–3.6)
ABS CD8: 376 /UL — SIGNIFICANT CHANGE UP (ref 136–757)
ANION GAP SERPL CALC-SCNC: 16 MMOL/L — SIGNIFICANT CHANGE UP (ref 5–17)
APTT BLD: 146 SEC — CRITICAL HIGH (ref 27.5–37.4)
APTT BLD: 45.2 SEC — HIGH (ref 27.5–37.4)
BUN SERPL-MCNC: 9 MG/DL — SIGNIFICANT CHANGE UP (ref 7–23)
CALCIUM SERPL-MCNC: 9.3 MG/DL — SIGNIFICANT CHANGE UP (ref 8.4–10.5)
CD16+CD56+ CELLS NFR BLD: 11 % — SIGNIFICANT CHANGE UP (ref 4–25)
CD16+CD56+ CELLS NFR SPEC: 99 /UL — SIGNIFICANT CHANGE UP (ref 64–494)
CD19 BLASTS SPEC-ACNC: 3 % — LOW (ref 5–22)
CD19 BLASTS SPEC-ACNC: 30 /UL — LOW (ref 68–528)
CD3 BLASTS SPEC-ACNC: 787 /UL — LOW (ref 799–2171)
CD3 BLASTS SPEC-ACNC: 85 % — SIGNIFICANT CHANGE UP (ref 59–85)
CD4 %: 44 % — SIGNIFICANT CHANGE UP (ref 36–65)
CD8 %: 40 % — HIGH (ref 11–36)
CHLORIDE SERPL-SCNC: 101 MMOL/L — SIGNIFICANT CHANGE UP (ref 96–108)
CO2 SERPL-SCNC: 21 MMOL/L — LOW (ref 22–31)
CREAT SERPL-MCNC: 0.54 MG/DL — SIGNIFICANT CHANGE UP (ref 0.5–1.3)
DSDNA AB SER-ACNC: 261 IU/ML — HIGH
GLUCOSE SERPL-MCNC: 97 MG/DL — SIGNIFICANT CHANGE UP (ref 70–99)
HCT VFR BLD CALC: 30.5 % — LOW (ref 39–50)
HGB BLD-MCNC: 10.1 G/DL — LOW (ref 13–17)
MAGNESIUM SERPL-MCNC: 1.7 MG/DL — SIGNIFICANT CHANGE UP (ref 1.6–2.6)
MCHC RBC-ENTMCNC: 25.1 PG — LOW (ref 27–34)
MCHC RBC-ENTMCNC: 33.3 GM/DL — SIGNIFICANT CHANGE UP (ref 32–36)
MCV RBC AUTO: 75.3 FL — LOW (ref 80–100)
PHOSPHATE SERPL-MCNC: 4.3 MG/DL — SIGNIFICANT CHANGE UP (ref 2.5–4.5)
PLATELET # BLD AUTO: 278 K/UL — SIGNIFICANT CHANGE UP (ref 150–400)
POTASSIUM SERPL-MCNC: 4.1 MMOL/L — SIGNIFICANT CHANGE UP (ref 3.5–5.3)
POTASSIUM SERPL-SCNC: 4.1 MMOL/L — SIGNIFICANT CHANGE UP (ref 3.5–5.3)
RBC # BLD: 4.04 M/UL — LOW (ref 4.2–5.8)
RBC # FLD: 19.2 % — HIGH (ref 10.3–14.5)
SODIUM SERPL-SCNC: 138 MMOL/L — SIGNIFICANT CHANGE UP (ref 135–145)
T-CELL CD4 SUBSET PNL BLD: 414 /UL — LOW (ref 489–1457)
WBC # BLD: 4.6 K/UL — SIGNIFICANT CHANGE UP (ref 3.8–10.5)
WBC # FLD AUTO: 4.6 K/UL — SIGNIFICANT CHANGE UP (ref 3.8–10.5)

## 2017-03-30 PROCEDURE — 99233 SBSQ HOSP IP/OBS HIGH 50: CPT | Mod: GC

## 2017-03-30 RX ORDER — CHLORPROMAZINE HCL 10 MG
25 TABLET ORAL EVERY 6 HOURS
Qty: 0 | Refills: 0 | Status: DISCONTINUED | OUTPATIENT
Start: 2017-03-30 | End: 2017-04-07

## 2017-03-30 RX ORDER — HEPARIN SODIUM 5000 [USP'U]/ML
4000 INJECTION INTRAVENOUS; SUBCUTANEOUS EVERY 6 HOURS
Qty: 0 | Refills: 0 | Status: DISCONTINUED | OUTPATIENT
Start: 2017-03-30 | End: 2017-03-31

## 2017-03-30 RX ORDER — HEPARIN SODIUM 5000 [USP'U]/ML
8000 INJECTION INTRAVENOUS; SUBCUTANEOUS EVERY 6 HOURS
Qty: 0 | Refills: 0 | Status: DISCONTINUED | OUTPATIENT
Start: 2017-03-30 | End: 2017-03-31

## 2017-03-30 RX ADMIN — HYDROMORPHONE HYDROCHLORIDE 5 MILLIGRAM(S): 2 INJECTION INTRAMUSCULAR; INTRAVENOUS; SUBCUTANEOUS at 08:17

## 2017-03-30 RX ADMIN — HYDROMORPHONE HYDROCHLORIDE 3 MILLIGRAM(S): 2 INJECTION INTRAMUSCULAR; INTRAVENOUS; SUBCUTANEOUS at 02:15

## 2017-03-30 RX ADMIN — HYDROMORPHONE HYDROCHLORIDE 5 MILLIGRAM(S): 2 INJECTION INTRAMUSCULAR; INTRAVENOUS; SUBCUTANEOUS at 20:53

## 2017-03-30 RX ADMIN — HYDROMORPHONE HYDROCHLORIDE 3 MILLIGRAM(S): 2 INJECTION INTRAMUSCULAR; INTRAVENOUS; SUBCUTANEOUS at 19:24

## 2017-03-30 RX ADMIN — Medication 25 MILLIGRAM(S): at 21:26

## 2017-03-30 RX ADMIN — HYDROMORPHONE HYDROCHLORIDE 5 MILLIGRAM(S): 2 INJECTION INTRAMUSCULAR; INTRAVENOUS; SUBCUTANEOUS at 17:11

## 2017-03-30 RX ADMIN — HYDROMORPHONE HYDROCHLORIDE 5 MILLIGRAM(S): 2 INJECTION INTRAMUSCULAR; INTRAVENOUS; SUBCUTANEOUS at 16:54

## 2017-03-30 RX ADMIN — HYDROMORPHONE HYDROCHLORIDE 3 MILLIGRAM(S): 2 INJECTION INTRAMUSCULAR; INTRAVENOUS; SUBCUTANEOUS at 11:15

## 2017-03-30 RX ADMIN — HYDROMORPHONE HYDROCHLORIDE 3 MILLIGRAM(S): 2 INJECTION INTRAMUSCULAR; INTRAVENOUS; SUBCUTANEOUS at 06:09

## 2017-03-30 RX ADMIN — HYDROMORPHONE HYDROCHLORIDE 3 MILLIGRAM(S): 2 INJECTION INTRAMUSCULAR; INTRAVENOUS; SUBCUTANEOUS at 15:13

## 2017-03-30 RX ADMIN — HEPARIN SODIUM 1700 UNIT(S)/HR: 5000 INJECTION INTRAVENOUS; SUBCUTANEOUS at 14:34

## 2017-03-30 RX ADMIN — FAMOTIDINE 20 MILLIGRAM(S): 10 INJECTION INTRAVENOUS at 17:20

## 2017-03-30 RX ADMIN — Medication 200 MILLIGRAM(S): at 16:58

## 2017-03-30 RX ADMIN — HYDROMORPHONE HYDROCHLORIDE 5 MILLIGRAM(S): 2 INJECTION INTRAMUSCULAR; INTRAVENOUS; SUBCUTANEOUS at 21:18

## 2017-03-30 RX ADMIN — HEPARIN SODIUM 4000 UNIT(S): 5000 INJECTION INTRAVENOUS; SUBCUTANEOUS at 14:35

## 2017-03-30 RX ADMIN — HYDROMORPHONE HYDROCHLORIDE 3 MILLIGRAM(S): 2 INJECTION INTRAMUSCULAR; INTRAVENOUS; SUBCUTANEOUS at 23:59

## 2017-03-30 RX ADMIN — Medication 200 MILLIGRAM(S): at 10:52

## 2017-03-30 RX ADMIN — HYDROMORPHONE HYDROCHLORIDE 5 MILLIGRAM(S): 2 INJECTION INTRAMUSCULAR; INTRAVENOUS; SUBCUTANEOUS at 12:14

## 2017-03-30 RX ADMIN — Medication 81 MILLIGRAM(S): at 12:17

## 2017-03-30 RX ADMIN — HYDROMORPHONE HYDROCHLORIDE 3 MILLIGRAM(S): 2 INJECTION INTRAMUSCULAR; INTRAVENOUS; SUBCUTANEOUS at 10:45

## 2017-03-30 RX ADMIN — HEPARIN SODIUM 1400 UNIT(S)/HR: 5000 INJECTION INTRAVENOUS; SUBCUTANEOUS at 23:34

## 2017-03-30 RX ADMIN — HYDROMORPHONE HYDROCHLORIDE 5 MILLIGRAM(S): 2 INJECTION INTRAMUSCULAR; INTRAVENOUS; SUBCUTANEOUS at 04:04

## 2017-03-30 RX ADMIN — FAMOTIDINE 20 MILLIGRAM(S): 10 INJECTION INTRAVENOUS at 06:16

## 2017-03-30 RX ADMIN — HYDROMORPHONE HYDROCHLORIDE 5 MILLIGRAM(S): 2 INJECTION INTRAMUSCULAR; INTRAVENOUS; SUBCUTANEOUS at 12:44

## 2017-03-30 RX ADMIN — HYDROMORPHONE HYDROCHLORIDE 3 MILLIGRAM(S): 2 INJECTION INTRAMUSCULAR; INTRAVENOUS; SUBCUTANEOUS at 06:25

## 2017-03-30 RX ADMIN — HYDROMORPHONE HYDROCHLORIDE 5 MILLIGRAM(S): 2 INJECTION INTRAMUSCULAR; INTRAVENOUS; SUBCUTANEOUS at 07:47

## 2017-03-30 RX ADMIN — Medication 20 MILLIGRAM(S): at 06:16

## 2017-03-30 RX ADMIN — HYDROMORPHONE HYDROCHLORIDE 3 MILLIGRAM(S): 2 INJECTION INTRAMUSCULAR; INTRAVENOUS; SUBCUTANEOUS at 02:00

## 2017-03-30 RX ADMIN — HYDROMORPHONE HYDROCHLORIDE 3 MILLIGRAM(S): 2 INJECTION INTRAMUSCULAR; INTRAVENOUS; SUBCUTANEOUS at 23:32

## 2017-03-30 RX ADMIN — Medication 1 TABLET(S): at 12:17

## 2017-03-30 RX ADMIN — HYDROMORPHONE HYDROCHLORIDE 3 MILLIGRAM(S): 2 INJECTION INTRAMUSCULAR; INTRAVENOUS; SUBCUTANEOUS at 14:58

## 2017-03-30 RX ADMIN — HYDROMORPHONE HYDROCHLORIDE 5 MILLIGRAM(S): 2 INJECTION INTRAMUSCULAR; INTRAVENOUS; SUBCUTANEOUS at 03:44

## 2017-03-30 RX ADMIN — HEPARIN SODIUM 0 UNIT(S)/HR: 5000 INJECTION INTRAVENOUS; SUBCUTANEOUS at 22:29

## 2017-03-30 RX ADMIN — HYDROMORPHONE HYDROCHLORIDE 3 MILLIGRAM(S): 2 INJECTION INTRAMUSCULAR; INTRAVENOUS; SUBCUTANEOUS at 19:54

## 2017-03-30 RX ADMIN — Medication 20 MILLIGRAM(S): at 17:20

## 2017-03-30 NOTE — PROVIDER CONTACT NOTE (CRITICAL VALUE NOTIFICATION) - ACTION/TREATMENT ORDERED:
stop heparin drip for 60 minutes.  restart in 60 minutes at 14 ml/hr
MD aware. Will continue to monitor

## 2017-03-31 LAB
ANION GAP SERPL CALC-SCNC: 12 MMOL/L — SIGNIFICANT CHANGE UP (ref 5–17)
APTT BLD: 63.6 SEC — HIGH (ref 27.5–37.4)
APTT BLD: 73.1 SEC — HIGH (ref 27.5–37.4)
BUN SERPL-MCNC: 9 MG/DL — SIGNIFICANT CHANGE UP (ref 7–23)
CALCIUM SERPL-MCNC: 8.7 MG/DL — SIGNIFICANT CHANGE UP (ref 8.4–10.5)
CHLORIDE SERPL-SCNC: 100 MMOL/L — SIGNIFICANT CHANGE UP (ref 96–108)
CO2 SERPL-SCNC: 28 MMOL/L — SIGNIFICANT CHANGE UP (ref 22–31)
CREAT SERPL-MCNC: 0.63 MG/DL — SIGNIFICANT CHANGE UP (ref 0.5–1.3)
GLUCOSE SERPL-MCNC: 115 MG/DL — HIGH (ref 70–99)
HCT VFR BLD CALC: 28.3 % — LOW (ref 39–50)
HCT VFR BLD CALC: 30.2 % — LOW (ref 39–50)
HGB BLD-MCNC: 10.1 G/DL — LOW (ref 13–17)
HGB BLD-MCNC: 8.6 G/DL — LOW (ref 13–17)
MAGNESIUM SERPL-MCNC: 1.6 MG/DL — SIGNIFICANT CHANGE UP (ref 1.6–2.6)
MCHC RBC-ENTMCNC: 23.2 PG — LOW (ref 27–34)
MCHC RBC-ENTMCNC: 25 PG — LOW (ref 27–34)
MCHC RBC-ENTMCNC: 30.4 GM/DL — LOW (ref 32–36)
MCHC RBC-ENTMCNC: 33.4 GM/DL — SIGNIFICANT CHANGE UP (ref 32–36)
MCV RBC AUTO: 74.8 FL — LOW (ref 80–100)
MCV RBC AUTO: 76.3 FL — LOW (ref 80–100)
PHOSPHATE SERPL-MCNC: 4 MG/DL — SIGNIFICANT CHANGE UP (ref 2.5–4.5)
PLATELET # BLD AUTO: 340 K/UL — SIGNIFICANT CHANGE UP (ref 150–400)
PLATELET # BLD AUTO: 428 K/UL — HIGH (ref 150–400)
POTASSIUM SERPL-MCNC: 4.6 MMOL/L — SIGNIFICANT CHANGE UP (ref 3.5–5.3)
POTASSIUM SERPL-SCNC: 4.6 MMOL/L — SIGNIFICANT CHANGE UP (ref 3.5–5.3)
RBC # BLD: 3.71 M/UL — LOW (ref 4.2–5.8)
RBC # BLD: 4.03 M/UL — LOW (ref 4.2–5.8)
RBC # FLD: 17.4 % — HIGH (ref 10.3–14.5)
RBC # FLD: 19.2 % — HIGH (ref 10.3–14.5)
SODIUM SERPL-SCNC: 140 MMOL/L — SIGNIFICANT CHANGE UP (ref 135–145)
WBC # BLD: 4.6 K/UL — SIGNIFICANT CHANGE UP (ref 3.8–10.5)
WBC # BLD: 5.41 K/UL — SIGNIFICANT CHANGE UP (ref 3.8–10.5)
WBC # FLD AUTO: 4.6 K/UL — SIGNIFICANT CHANGE UP (ref 3.8–10.5)
WBC # FLD AUTO: 5.41 K/UL — SIGNIFICANT CHANGE UP (ref 3.8–10.5)

## 2017-03-31 PROCEDURE — 99232 SBSQ HOSP IP/OBS MODERATE 35: CPT | Mod: GC

## 2017-03-31 PROCEDURE — 99233 SBSQ HOSP IP/OBS HIGH 50: CPT | Mod: GC

## 2017-03-31 RX ORDER — HYDROMORPHONE HYDROCHLORIDE 2 MG/ML
2 INJECTION INTRAMUSCULAR; INTRAVENOUS; SUBCUTANEOUS EVERY 4 HOURS
Qty: 0 | Refills: 0 | Status: DISCONTINUED | OUTPATIENT
Start: 2017-03-31 | End: 2017-04-07

## 2017-03-31 RX ORDER — MAGNESIUM SULFATE 500 MG/ML
4 VIAL (ML) INJECTION ONCE
Qty: 0 | Refills: 0 | Status: DISCONTINUED | OUTPATIENT
Start: 2017-03-31 | End: 2017-03-31

## 2017-03-31 RX ORDER — DABIGATRAN ETEXILATE MESYLATE 150 MG/1
150 CAPSULE ORAL EVERY 12 HOURS
Qty: 0 | Refills: 0 | Status: DISCONTINUED | OUTPATIENT
Start: 2017-03-31 | End: 2017-04-07

## 2017-03-31 RX ORDER — HYDROMORPHONE HYDROCHLORIDE 2 MG/ML
4 INJECTION INTRAMUSCULAR; INTRAVENOUS; SUBCUTANEOUS EVERY 4 HOURS
Qty: 0 | Refills: 0 | Status: DISCONTINUED | OUTPATIENT
Start: 2017-03-31 | End: 2017-04-05

## 2017-03-31 RX ORDER — MAGNESIUM SULFATE 500 MG/ML
2 VIAL (ML) INJECTION ONCE
Qty: 0 | Refills: 0 | Status: COMPLETED | OUTPATIENT
Start: 2017-03-31 | End: 2017-03-31

## 2017-03-31 RX ADMIN — HYDROMORPHONE HYDROCHLORIDE 4 MILLIGRAM(S): 2 INJECTION INTRAMUSCULAR; INTRAVENOUS; SUBCUTANEOUS at 21:29

## 2017-03-31 RX ADMIN — HYDROMORPHONE HYDROCHLORIDE 3 MILLIGRAM(S): 2 INJECTION INTRAMUSCULAR; INTRAVENOUS; SUBCUTANEOUS at 11:46

## 2017-03-31 RX ADMIN — FAMOTIDINE 20 MILLIGRAM(S): 10 INJECTION INTRAVENOUS at 05:28

## 2017-03-31 RX ADMIN — Medication 200 MILLIGRAM(S): at 08:39

## 2017-03-31 RX ADMIN — HYDROMORPHONE HYDROCHLORIDE 5 MILLIGRAM(S): 2 INJECTION INTRAMUSCULAR; INTRAVENOUS; SUBCUTANEOUS at 09:42

## 2017-03-31 RX ADMIN — HYDROMORPHONE HYDROCHLORIDE 4 MILLIGRAM(S): 2 INJECTION INTRAMUSCULAR; INTRAVENOUS; SUBCUTANEOUS at 18:51

## 2017-03-31 RX ADMIN — HYDROMORPHONE HYDROCHLORIDE 2 MILLIGRAM(S): 2 INJECTION INTRAMUSCULAR; INTRAVENOUS; SUBCUTANEOUS at 23:32

## 2017-03-31 RX ADMIN — HYDROMORPHONE HYDROCHLORIDE 4 MILLIGRAM(S): 2 INJECTION INTRAMUSCULAR; INTRAVENOUS; SUBCUTANEOUS at 17:51

## 2017-03-31 RX ADMIN — HYDROMORPHONE HYDROCHLORIDE 3 MILLIGRAM(S): 2 INJECTION INTRAMUSCULAR; INTRAVENOUS; SUBCUTANEOUS at 04:22

## 2017-03-31 RX ADMIN — HYDROMORPHONE HYDROCHLORIDE 2 MILLIGRAM(S): 2 INJECTION INTRAMUSCULAR; INTRAVENOUS; SUBCUTANEOUS at 19:57

## 2017-03-31 RX ADMIN — HYDROMORPHONE HYDROCHLORIDE 3 MILLIGRAM(S): 2 INJECTION INTRAMUSCULAR; INTRAVENOUS; SUBCUTANEOUS at 03:53

## 2017-03-31 RX ADMIN — HYDROMORPHONE HYDROCHLORIDE 5 MILLIGRAM(S): 2 INJECTION INTRAMUSCULAR; INTRAVENOUS; SUBCUTANEOUS at 01:11

## 2017-03-31 RX ADMIN — Medication 50 GRAM(S): at 11:31

## 2017-03-31 RX ADMIN — HYDROMORPHONE HYDROCHLORIDE 2 MILLIGRAM(S): 2 INJECTION INTRAMUSCULAR; INTRAVENOUS; SUBCUTANEOUS at 15:34

## 2017-03-31 RX ADMIN — Medication 81 MILLIGRAM(S): at 11:31

## 2017-03-31 RX ADMIN — Medication 200 MILLIGRAM(S): at 17:51

## 2017-03-31 RX ADMIN — HYDROMORPHONE HYDROCHLORIDE 3 MILLIGRAM(S): 2 INJECTION INTRAMUSCULAR; INTRAVENOUS; SUBCUTANEOUS at 11:31

## 2017-03-31 RX ADMIN — DABIGATRAN ETEXILATE MESYLATE 150 MILLIGRAM(S): 150 CAPSULE ORAL at 16:41

## 2017-03-31 RX ADMIN — HYDROMORPHONE HYDROCHLORIDE 4 MILLIGRAM(S): 2 INJECTION INTRAMUSCULAR; INTRAVENOUS; SUBCUTANEOUS at 13:46

## 2017-03-31 RX ADMIN — HYDROMORPHONE HYDROCHLORIDE 5 MILLIGRAM(S): 2 INJECTION INTRAMUSCULAR; INTRAVENOUS; SUBCUTANEOUS at 05:58

## 2017-03-31 RX ADMIN — HYDROMORPHONE HYDROCHLORIDE 5 MILLIGRAM(S): 2 INJECTION INTRAMUSCULAR; INTRAVENOUS; SUBCUTANEOUS at 05:28

## 2017-03-31 RX ADMIN — HYDROMORPHONE HYDROCHLORIDE 4 MILLIGRAM(S): 2 INJECTION INTRAMUSCULAR; INTRAVENOUS; SUBCUTANEOUS at 21:59

## 2017-03-31 RX ADMIN — HYDROMORPHONE HYDROCHLORIDE 5 MILLIGRAM(S): 2 INJECTION INTRAMUSCULAR; INTRAVENOUS; SUBCUTANEOUS at 01:41

## 2017-03-31 RX ADMIN — Medication 25 MILLIGRAM(S): at 13:47

## 2017-03-31 RX ADMIN — Medication 25 MILLIGRAM(S): at 22:41

## 2017-03-31 RX ADMIN — HYDROMORPHONE HYDROCHLORIDE 2 MILLIGRAM(S): 2 INJECTION INTRAMUSCULAR; INTRAVENOUS; SUBCUTANEOUS at 19:27

## 2017-03-31 RX ADMIN — HEPARIN SODIUM 1400 UNIT(S)/HR: 5000 INJECTION INTRAVENOUS; SUBCUTANEOUS at 06:13

## 2017-03-31 RX ADMIN — Medication 20 MILLIGRAM(S): at 05:28

## 2017-03-31 RX ADMIN — HYDROMORPHONE HYDROCHLORIDE 5 MILLIGRAM(S): 2 INJECTION INTRAMUSCULAR; INTRAVENOUS; SUBCUTANEOUS at 10:00

## 2017-03-31 RX ADMIN — HYDROMORPHONE HYDROCHLORIDE 2 MILLIGRAM(S): 2 INJECTION INTRAMUSCULAR; INTRAVENOUS; SUBCUTANEOUS at 15:19

## 2017-03-31 RX ADMIN — HYDROMORPHONE HYDROCHLORIDE 4 MILLIGRAM(S): 2 INJECTION INTRAMUSCULAR; INTRAVENOUS; SUBCUTANEOUS at 14:01

## 2017-03-31 RX ADMIN — FAMOTIDINE 20 MILLIGRAM(S): 10 INJECTION INTRAVENOUS at 16:46

## 2017-04-01 LAB
ANION GAP SERPL CALC-SCNC: 13 MMOL/L — SIGNIFICANT CHANGE UP (ref 5–17)
BUN SERPL-MCNC: 10 MG/DL — SIGNIFICANT CHANGE UP (ref 7–23)
CALCIUM SERPL-MCNC: 8.9 MG/DL — SIGNIFICANT CHANGE UP (ref 8.4–10.5)
CHLORIDE SERPL-SCNC: 101 MMOL/L — SIGNIFICANT CHANGE UP (ref 96–108)
CO2 SERPL-SCNC: 26 MMOL/L — SIGNIFICANT CHANGE UP (ref 22–31)
CREAT SERPL-MCNC: 0.63 MG/DL — SIGNIFICANT CHANGE UP (ref 0.5–1.3)
GLUCOSE SERPL-MCNC: 95 MG/DL — SIGNIFICANT CHANGE UP (ref 70–99)
HCT VFR BLD CALC: 28.9 % — LOW (ref 39–50)
HGB BLD-MCNC: 8.8 G/DL — LOW (ref 13–17)
MAGNESIUM SERPL-MCNC: 1.6 MG/DL — SIGNIFICANT CHANGE UP (ref 1.6–2.6)
MCHC RBC-ENTMCNC: 23 PG — LOW (ref 27–34)
MCHC RBC-ENTMCNC: 30.4 GM/DL — LOW (ref 32–36)
MCV RBC AUTO: 75.7 FL — LOW (ref 80–100)
PHOSPHATE SERPL-MCNC: 4.7 MG/DL — HIGH (ref 2.5–4.5)
PLATELET # BLD AUTO: 397 K/UL — SIGNIFICANT CHANGE UP (ref 150–400)
POTASSIUM SERPL-MCNC: 4.5 MMOL/L — SIGNIFICANT CHANGE UP (ref 3.5–5.3)
POTASSIUM SERPL-SCNC: 4.5 MMOL/L — SIGNIFICANT CHANGE UP (ref 3.5–5.3)
RBC # BLD: 3.82 M/UL — LOW (ref 4.2–5.8)
RBC # FLD: 17.2 % — HIGH (ref 10.3–14.5)
SODIUM SERPL-SCNC: 140 MMOL/L — SIGNIFICANT CHANGE UP (ref 135–145)
WBC # BLD: 4.44 K/UL — SIGNIFICANT CHANGE UP (ref 3.8–10.5)
WBC # FLD AUTO: 4.44 K/UL — SIGNIFICANT CHANGE UP (ref 3.8–10.5)

## 2017-04-01 PROCEDURE — 99233 SBSQ HOSP IP/OBS HIGH 50: CPT | Mod: GC

## 2017-04-01 RX ADMIN — HYDROMORPHONE HYDROCHLORIDE 4 MILLIGRAM(S): 2 INJECTION INTRAMUSCULAR; INTRAVENOUS; SUBCUTANEOUS at 07:00

## 2017-04-01 RX ADMIN — HYDROMORPHONE HYDROCHLORIDE 4 MILLIGRAM(S): 2 INJECTION INTRAMUSCULAR; INTRAVENOUS; SUBCUTANEOUS at 10:35

## 2017-04-01 RX ADMIN — HYDROMORPHONE HYDROCHLORIDE 4 MILLIGRAM(S): 2 INJECTION INTRAMUSCULAR; INTRAVENOUS; SUBCUTANEOUS at 06:41

## 2017-04-01 RX ADMIN — HYDROMORPHONE HYDROCHLORIDE 4 MILLIGRAM(S): 2 INJECTION INTRAMUSCULAR; INTRAVENOUS; SUBCUTANEOUS at 23:49

## 2017-04-01 RX ADMIN — HYDROMORPHONE HYDROCHLORIDE 2 MILLIGRAM(S): 2 INJECTION INTRAMUSCULAR; INTRAVENOUS; SUBCUTANEOUS at 17:03

## 2017-04-01 RX ADMIN — HYDROMORPHONE HYDROCHLORIDE 2 MILLIGRAM(S): 2 INJECTION INTRAMUSCULAR; INTRAVENOUS; SUBCUTANEOUS at 12:35

## 2017-04-01 RX ADMIN — HYDROMORPHONE HYDROCHLORIDE 4 MILLIGRAM(S): 2 INJECTION INTRAMUSCULAR; INTRAVENOUS; SUBCUTANEOUS at 15:16

## 2017-04-01 RX ADMIN — Medication 1 TABLET(S): at 11:13

## 2017-04-01 RX ADMIN — HYDROMORPHONE HYDROCHLORIDE 4 MILLIGRAM(S): 2 INJECTION INTRAMUSCULAR; INTRAVENOUS; SUBCUTANEOUS at 20:01

## 2017-04-01 RX ADMIN — DABIGATRAN ETEXILATE MESYLATE 150 MILLIGRAM(S): 150 CAPSULE ORAL at 04:53

## 2017-04-01 RX ADMIN — HYDROMORPHONE HYDROCHLORIDE 2 MILLIGRAM(S): 2 INJECTION INTRAMUSCULAR; INTRAVENOUS; SUBCUTANEOUS at 21:38

## 2017-04-01 RX ADMIN — HYDROMORPHONE HYDROCHLORIDE 2 MILLIGRAM(S): 2 INJECTION INTRAMUSCULAR; INTRAVENOUS; SUBCUTANEOUS at 08:54

## 2017-04-01 RX ADMIN — HYDROMORPHONE HYDROCHLORIDE 4 MILLIGRAM(S): 2 INJECTION INTRAMUSCULAR; INTRAVENOUS; SUBCUTANEOUS at 15:01

## 2017-04-01 RX ADMIN — DABIGATRAN ETEXILATE MESYLATE 150 MILLIGRAM(S): 150 CAPSULE ORAL at 16:58

## 2017-04-01 RX ADMIN — HYDROMORPHONE HYDROCHLORIDE 2 MILLIGRAM(S): 2 INJECTION INTRAMUSCULAR; INTRAVENOUS; SUBCUTANEOUS at 09:09

## 2017-04-01 RX ADMIN — Medication 81 MILLIGRAM(S): at 11:13

## 2017-04-01 RX ADMIN — Medication 200 MILLIGRAM(S): at 16:58

## 2017-04-01 RX ADMIN — HYDROMORPHONE HYDROCHLORIDE 4 MILLIGRAM(S): 2 INJECTION INTRAMUSCULAR; INTRAVENOUS; SUBCUTANEOUS at 10:50

## 2017-04-01 RX ADMIN — FAMOTIDINE 20 MILLIGRAM(S): 10 INJECTION INTRAVENOUS at 06:11

## 2017-04-01 RX ADMIN — HYDROMORPHONE HYDROCHLORIDE 2 MILLIGRAM(S): 2 INJECTION INTRAMUSCULAR; INTRAVENOUS; SUBCUTANEOUS at 04:59

## 2017-04-01 RX ADMIN — Medication 200 MILLIGRAM(S): at 08:28

## 2017-04-01 RX ADMIN — HYDROMORPHONE HYDROCHLORIDE 2 MILLIGRAM(S): 2 INJECTION INTRAMUSCULAR; INTRAVENOUS; SUBCUTANEOUS at 00:00

## 2017-04-01 RX ADMIN — HYDROMORPHONE HYDROCHLORIDE 2 MILLIGRAM(S): 2 INJECTION INTRAMUSCULAR; INTRAVENOUS; SUBCUTANEOUS at 05:29

## 2017-04-01 RX ADMIN — HYDROMORPHONE HYDROCHLORIDE 4 MILLIGRAM(S): 2 INJECTION INTRAMUSCULAR; INTRAVENOUS; SUBCUTANEOUS at 19:46

## 2017-04-01 RX ADMIN — HYDROMORPHONE HYDROCHLORIDE 2 MILLIGRAM(S): 2 INJECTION INTRAMUSCULAR; INTRAVENOUS; SUBCUTANEOUS at 12:50

## 2017-04-01 RX ADMIN — HYDROMORPHONE HYDROCHLORIDE 4 MILLIGRAM(S): 2 INJECTION INTRAMUSCULAR; INTRAVENOUS; SUBCUTANEOUS at 02:30

## 2017-04-01 RX ADMIN — HYDROMORPHONE HYDROCHLORIDE 4 MILLIGRAM(S): 2 INJECTION INTRAMUSCULAR; INTRAVENOUS; SUBCUTANEOUS at 02:34

## 2017-04-01 RX ADMIN — HYDROMORPHONE HYDROCHLORIDE 2 MILLIGRAM(S): 2 INJECTION INTRAMUSCULAR; INTRAVENOUS; SUBCUTANEOUS at 18:03

## 2017-04-01 RX ADMIN — FAMOTIDINE 20 MILLIGRAM(S): 10 INJECTION INTRAVENOUS at 18:34

## 2017-04-01 RX ADMIN — HYDROMORPHONE HYDROCHLORIDE 2 MILLIGRAM(S): 2 INJECTION INTRAMUSCULAR; INTRAVENOUS; SUBCUTANEOUS at 21:13

## 2017-04-02 LAB
24R-OH-CALCIDIOL SERPL-MCNC: 13.6 NG/ML — LOW (ref 30–100)
ANION GAP SERPL CALC-SCNC: 14 MMOL/L — SIGNIFICANT CHANGE UP (ref 5–17)
BUN SERPL-MCNC: 8 MG/DL — SIGNIFICANT CHANGE UP (ref 7–23)
CALCIUM SERPL-MCNC: 8.5 MG/DL — SIGNIFICANT CHANGE UP (ref 8.4–10.5)
CHLORIDE SERPL-SCNC: 100 MMOL/L — SIGNIFICANT CHANGE UP (ref 96–108)
CK MB BLD-MCNC: 4.2 % — HIGH (ref 0–3.5)
CK MB CFR SERPL CALC: 1 NG/ML — SIGNIFICANT CHANGE UP (ref 0–6.7)
CK SERPL-CCNC: 24 U/L — LOW (ref 30–200)
CO2 SERPL-SCNC: 24 MMOL/L — SIGNIFICANT CHANGE UP (ref 22–31)
CREAT SERPL-MCNC: 0.65 MG/DL — SIGNIFICANT CHANGE UP (ref 0.5–1.3)
GLUCOSE SERPL-MCNC: 80 MG/DL — SIGNIFICANT CHANGE UP (ref 70–99)
HCT VFR BLD CALC: 27.2 % — LOW (ref 39–50)
HGB BLD-MCNC: 8.3 G/DL — LOW (ref 13–17)
MAGNESIUM SERPL-MCNC: 1.6 MG/DL — SIGNIFICANT CHANGE UP (ref 1.6–2.6)
MCHC RBC-ENTMCNC: 22.8 PG — LOW (ref 27–34)
MCHC RBC-ENTMCNC: 30.5 GM/DL — LOW (ref 32–36)
MCV RBC AUTO: 74.7 FL — LOW (ref 80–100)
PHOSPHATE SERPL-MCNC: 4.4 MG/DL — SIGNIFICANT CHANGE UP (ref 2.5–4.5)
PLATELET # BLD AUTO: 309 K/UL — SIGNIFICANT CHANGE UP (ref 150–400)
POTASSIUM SERPL-MCNC: 4.4 MMOL/L — SIGNIFICANT CHANGE UP (ref 3.5–5.3)
POTASSIUM SERPL-SCNC: 4.4 MMOL/L — SIGNIFICANT CHANGE UP (ref 3.5–5.3)
RBC # BLD: 3.64 M/UL — LOW (ref 4.2–5.8)
RBC # FLD: 17.2 % — HIGH (ref 10.3–14.5)
SODIUM SERPL-SCNC: 138 MMOL/L — SIGNIFICANT CHANGE UP (ref 135–145)
TROPONIN T SERPL-MCNC: <0.01 NG/ML — SIGNIFICANT CHANGE UP (ref 0–0.06)
WBC # BLD: 3.83 K/UL — SIGNIFICANT CHANGE UP (ref 3.8–10.5)
WBC # FLD AUTO: 3.83 K/UL — SIGNIFICANT CHANGE UP (ref 3.8–10.5)

## 2017-04-02 PROCEDURE — 99233 SBSQ HOSP IP/OBS HIGH 50: CPT | Mod: GC

## 2017-04-02 PROCEDURE — 93010 ELECTROCARDIOGRAM REPORT: CPT

## 2017-04-02 RX ORDER — SUCRALFATE 1 G
1 TABLET ORAL ONCE
Qty: 0 | Refills: 0 | Status: COMPLETED | OUTPATIENT
Start: 2017-04-02 | End: 2017-04-02

## 2017-04-02 RX ORDER — METOCLOPRAMIDE HCL 10 MG
5 TABLET ORAL EVERY 6 HOURS
Qty: 0 | Refills: 0 | Status: DISCONTINUED | OUTPATIENT
Start: 2017-04-02 | End: 2017-04-04

## 2017-04-02 RX ORDER — HYDROMORPHONE HYDROCHLORIDE 2 MG/ML
2 INJECTION INTRAMUSCULAR; INTRAVENOUS; SUBCUTANEOUS ONCE
Qty: 0 | Refills: 0 | Status: DISCONTINUED | OUTPATIENT
Start: 2017-04-02 | End: 2017-04-02

## 2017-04-02 RX ORDER — PANTOPRAZOLE SODIUM 20 MG/1
40 TABLET, DELAYED RELEASE ORAL ONCE
Qty: 0 | Refills: 0 | Status: COMPLETED | OUTPATIENT
Start: 2017-04-02 | End: 2017-04-02

## 2017-04-02 RX ORDER — ONDANSETRON 8 MG/1
8 TABLET, FILM COATED ORAL ONCE
Qty: 0 | Refills: 0 | Status: COMPLETED | OUTPATIENT
Start: 2017-04-02 | End: 2017-04-02

## 2017-04-02 RX ADMIN — PANTOPRAZOLE SODIUM 40 MILLIGRAM(S): 20 TABLET, DELAYED RELEASE ORAL at 20:10

## 2017-04-02 RX ADMIN — Medication 200 MILLIGRAM(S): at 17:31

## 2017-04-02 RX ADMIN — HYDROMORPHONE HYDROCHLORIDE 4 MILLIGRAM(S): 2 INJECTION INTRAMUSCULAR; INTRAVENOUS; SUBCUTANEOUS at 19:15

## 2017-04-02 RX ADMIN — HYDROMORPHONE HYDROCHLORIDE 2 MILLIGRAM(S): 2 INJECTION INTRAMUSCULAR; INTRAVENOUS; SUBCUTANEOUS at 16:47

## 2017-04-02 RX ADMIN — DABIGATRAN ETEXILATE MESYLATE 150 MILLIGRAM(S): 150 CAPSULE ORAL at 04:35

## 2017-04-02 RX ADMIN — ONDANSETRON 8 MILLIGRAM(S): 8 TABLET, FILM COATED ORAL at 20:05

## 2017-04-02 RX ADMIN — HYDROMORPHONE HYDROCHLORIDE 2 MILLIGRAM(S): 2 INJECTION INTRAMUSCULAR; INTRAVENOUS; SUBCUTANEOUS at 17:45

## 2017-04-02 RX ADMIN — HYDROMORPHONE HYDROCHLORIDE 2 MILLIGRAM(S): 2 INJECTION INTRAMUSCULAR; INTRAVENOUS; SUBCUTANEOUS at 21:30

## 2017-04-02 RX ADMIN — HYDROMORPHONE HYDROCHLORIDE 4 MILLIGRAM(S): 2 INJECTION INTRAMUSCULAR; INTRAVENOUS; SUBCUTANEOUS at 00:10

## 2017-04-02 RX ADMIN — HYDROMORPHONE HYDROCHLORIDE 2 MILLIGRAM(S): 2 INJECTION INTRAMUSCULAR; INTRAVENOUS; SUBCUTANEOUS at 10:30

## 2017-04-02 RX ADMIN — DABIGATRAN ETEXILATE MESYLATE 150 MILLIGRAM(S): 150 CAPSULE ORAL at 17:31

## 2017-04-02 RX ADMIN — Medication 25 MILLIGRAM(S): at 15:20

## 2017-04-02 RX ADMIN — Medication 25 MILLIGRAM(S): at 01:16

## 2017-04-02 RX ADMIN — HYDROMORPHONE HYDROCHLORIDE 2 MILLIGRAM(S): 2 INJECTION INTRAMUSCULAR; INTRAVENOUS; SUBCUTANEOUS at 14:00

## 2017-04-02 RX ADMIN — HYDROMORPHONE HYDROCHLORIDE 4 MILLIGRAM(S): 2 INJECTION INTRAMUSCULAR; INTRAVENOUS; SUBCUTANEOUS at 15:38

## 2017-04-02 RX ADMIN — HYDROMORPHONE HYDROCHLORIDE 2 MILLIGRAM(S): 2 INJECTION INTRAMUSCULAR; INTRAVENOUS; SUBCUTANEOUS at 01:27

## 2017-04-02 RX ADMIN — HYDROMORPHONE HYDROCHLORIDE 4 MILLIGRAM(S): 2 INJECTION INTRAMUSCULAR; INTRAVENOUS; SUBCUTANEOUS at 08:47

## 2017-04-02 RX ADMIN — HYDROMORPHONE HYDROCHLORIDE 4 MILLIGRAM(S): 2 INJECTION INTRAMUSCULAR; INTRAVENOUS; SUBCUTANEOUS at 12:48

## 2017-04-02 RX ADMIN — HYDROMORPHONE HYDROCHLORIDE 2 MILLIGRAM(S): 2 INJECTION INTRAMUSCULAR; INTRAVENOUS; SUBCUTANEOUS at 21:45

## 2017-04-02 RX ADMIN — HYDROMORPHONE HYDROCHLORIDE 2 MILLIGRAM(S): 2 INJECTION INTRAMUSCULAR; INTRAVENOUS; SUBCUTANEOUS at 09:41

## 2017-04-02 RX ADMIN — Medication 200 MILLIGRAM(S): at 08:48

## 2017-04-02 RX ADMIN — HYDROMORPHONE HYDROCHLORIDE 2 MILLIGRAM(S): 2 INJECTION INTRAMUSCULAR; INTRAVENOUS; SUBCUTANEOUS at 17:30

## 2017-04-02 RX ADMIN — HYDROMORPHONE HYDROCHLORIDE 4 MILLIGRAM(S): 2 INJECTION INTRAMUSCULAR; INTRAVENOUS; SUBCUTANEOUS at 08:45

## 2017-04-02 RX ADMIN — HYDROMORPHONE HYDROCHLORIDE 4 MILLIGRAM(S): 2 INJECTION INTRAMUSCULAR; INTRAVENOUS; SUBCUTANEOUS at 02:20

## 2017-04-02 RX ADMIN — HYDROMORPHONE HYDROCHLORIDE 4 MILLIGRAM(S): 2 INJECTION INTRAMUSCULAR; INTRAVENOUS; SUBCUTANEOUS at 15:36

## 2017-04-02 RX ADMIN — FAMOTIDINE 20 MILLIGRAM(S): 10 INJECTION INTRAVENOUS at 06:35

## 2017-04-02 RX ADMIN — HYDROMORPHONE HYDROCHLORIDE 2 MILLIGRAM(S): 2 INJECTION INTRAMUSCULAR; INTRAVENOUS; SUBCUTANEOUS at 13:44

## 2017-04-02 RX ADMIN — HYDROMORPHONE HYDROCHLORIDE 2 MILLIGRAM(S): 2 INJECTION INTRAMUSCULAR; INTRAVENOUS; SUBCUTANEOUS at 17:34

## 2017-04-02 RX ADMIN — HYDROMORPHONE HYDROCHLORIDE 2 MILLIGRAM(S): 2 INJECTION INTRAMUSCULAR; INTRAVENOUS; SUBCUTANEOUS at 01:12

## 2017-04-02 RX ADMIN — Medication 81 MILLIGRAM(S): at 11:38

## 2017-04-02 RX ADMIN — Medication 25 MILLIGRAM(S): at 20:45

## 2017-04-02 RX ADMIN — HYDROMORPHONE HYDROCHLORIDE 4 MILLIGRAM(S): 2 INJECTION INTRAMUSCULAR; INTRAVENOUS; SUBCUTANEOUS at 13:01

## 2017-04-02 RX ADMIN — Medication 25 MILLIGRAM(S): at 09:43

## 2017-04-02 RX ADMIN — Medication 5 MILLIGRAM(S): at 21:30

## 2017-04-03 LAB
ANION GAP SERPL CALC-SCNC: 14 MMOL/L — SIGNIFICANT CHANGE UP (ref 5–17)
BUN SERPL-MCNC: 9 MG/DL — SIGNIFICANT CHANGE UP (ref 7–23)
CALCIUM SERPL-MCNC: 9.4 MG/DL — SIGNIFICANT CHANGE UP (ref 8.4–10.5)
CHLORIDE SERPL-SCNC: 96 MMOL/L — SIGNIFICANT CHANGE UP (ref 96–108)
CK MB BLD-MCNC: 4.2 % — HIGH (ref 0–3.5)
CK MB CFR SERPL CALC: 1 NG/ML — SIGNIFICANT CHANGE UP (ref 0–6.7)
CK MB CFR SERPL CALC: 1 NG/ML — SIGNIFICANT CHANGE UP (ref 0–6.7)
CK SERPL-CCNC: 23 U/L — LOW (ref 30–200)
CK SERPL-CCNC: 24 U/L — LOW (ref 30–200)
CO2 SERPL-SCNC: 23 MMOL/L — SIGNIFICANT CHANGE UP (ref 22–31)
CREAT SERPL-MCNC: 0.76 MG/DL — SIGNIFICANT CHANGE UP (ref 0.5–1.3)
GLUCOSE SERPL-MCNC: 121 MG/DL — HIGH (ref 70–99)
HCT VFR BLD CALC: 27.6 % — LOW (ref 39–50)
HGB BLD-MCNC: 8.9 G/DL — LOW (ref 13–17)
MAGNESIUM SERPL-MCNC: 1.5 MG/DL — LOW (ref 1.6–2.6)
MCHC RBC-ENTMCNC: 24 PG — LOW (ref 27–34)
MCHC RBC-ENTMCNC: 32.4 GM/DL — SIGNIFICANT CHANGE UP (ref 32–36)
MCV RBC AUTO: 74 FL — LOW (ref 80–100)
PHOSPHATE SERPL-MCNC: 4.3 MG/DL — SIGNIFICANT CHANGE UP (ref 2.5–4.5)
PLATELET # BLD AUTO: 339 K/UL — SIGNIFICANT CHANGE UP (ref 150–400)
POTASSIUM SERPL-MCNC: 3.9 MMOL/L — SIGNIFICANT CHANGE UP (ref 3.5–5.3)
POTASSIUM SERPL-SCNC: 3.9 MMOL/L — SIGNIFICANT CHANGE UP (ref 3.5–5.3)
RBC # BLD: 3.72 M/UL — LOW (ref 4.2–5.8)
RBC # FLD: 19.6 % — HIGH (ref 10.3–14.5)
SODIUM SERPL-SCNC: 133 MMOL/L — LOW (ref 135–145)
TROPONIN T SERPL-MCNC: <0.01 NG/ML — SIGNIFICANT CHANGE UP (ref 0–0.06)
TROPONIN T SERPL-MCNC: <0.01 NG/ML — SIGNIFICANT CHANGE UP (ref 0–0.06)
WBC # BLD: 4.4 K/UL — SIGNIFICANT CHANGE UP (ref 3.8–10.5)
WBC # FLD AUTO: 4.4 K/UL — SIGNIFICANT CHANGE UP (ref 3.8–10.5)

## 2017-04-03 PROCEDURE — 99233 SBSQ HOSP IP/OBS HIGH 50: CPT | Mod: GC

## 2017-04-03 PROCEDURE — 93010 ELECTROCARDIOGRAM REPORT: CPT

## 2017-04-03 RX ORDER — OXYCODONE HYDROCHLORIDE 5 MG/1
15 TABLET ORAL EVERY 12 HOURS
Qty: 0 | Refills: 0 | Status: DISCONTINUED | OUTPATIENT
Start: 2017-04-03 | End: 2017-04-07

## 2017-04-03 RX ORDER — METOCLOPRAMIDE HCL 10 MG
10 TABLET ORAL ONCE
Qty: 0 | Refills: 0 | Status: COMPLETED | OUTPATIENT
Start: 2017-04-03 | End: 2017-04-04

## 2017-04-03 RX ORDER — MAGNESIUM SULFATE 500 MG/ML
2 VIAL (ML) INJECTION ONCE
Qty: 0 | Refills: 0 | Status: COMPLETED | OUTPATIENT
Start: 2017-04-03 | End: 2017-04-03

## 2017-04-03 RX ADMIN — HYDROMORPHONE HYDROCHLORIDE 4 MILLIGRAM(S): 2 INJECTION INTRAMUSCULAR; INTRAVENOUS; SUBCUTANEOUS at 09:47

## 2017-04-03 RX ADMIN — Medication 200 MILLIGRAM(S): at 18:02

## 2017-04-03 RX ADMIN — Medication 1 TABLET(S): at 11:19

## 2017-04-03 RX ADMIN — HYDROMORPHONE HYDROCHLORIDE 4 MILLIGRAM(S): 2 INJECTION INTRAMUSCULAR; INTRAVENOUS; SUBCUTANEOUS at 17:59

## 2017-04-03 RX ADMIN — OXYCODONE HYDROCHLORIDE 15 MILLIGRAM(S): 5 TABLET ORAL at 17:59

## 2017-04-03 RX ADMIN — Medication 81 MILLIGRAM(S): at 11:20

## 2017-04-03 RX ADMIN — Medication 5 MILLIGRAM(S): at 23:20

## 2017-04-03 RX ADMIN — Medication 25 MILLIGRAM(S): at 20:36

## 2017-04-03 RX ADMIN — HYDROMORPHONE HYDROCHLORIDE 4 MILLIGRAM(S): 2 INJECTION INTRAMUSCULAR; INTRAVENOUS; SUBCUTANEOUS at 14:25

## 2017-04-03 RX ADMIN — HYDROMORPHONE HYDROCHLORIDE 2 MILLIGRAM(S): 2 INJECTION INTRAMUSCULAR; INTRAVENOUS; SUBCUTANEOUS at 16:00

## 2017-04-03 RX ADMIN — FAMOTIDINE 20 MILLIGRAM(S): 10 INJECTION INTRAVENOUS at 21:10

## 2017-04-03 RX ADMIN — HYDROMORPHONE HYDROCHLORIDE 2 MILLIGRAM(S): 2 INJECTION INTRAMUSCULAR; INTRAVENOUS; SUBCUTANEOUS at 20:19

## 2017-04-03 RX ADMIN — DABIGATRAN ETEXILATE MESYLATE 150 MILLIGRAM(S): 150 CAPSULE ORAL at 18:02

## 2017-04-03 RX ADMIN — HYDROMORPHONE HYDROCHLORIDE 2 MILLIGRAM(S): 2 INJECTION INTRAMUSCULAR; INTRAVENOUS; SUBCUTANEOUS at 19:49

## 2017-04-03 RX ADMIN — Medication 200 MILLIGRAM(S): at 11:23

## 2017-04-03 RX ADMIN — HYDROMORPHONE HYDROCHLORIDE 4 MILLIGRAM(S): 2 INJECTION INTRAMUSCULAR; INTRAVENOUS; SUBCUTANEOUS at 01:37

## 2017-04-03 RX ADMIN — FAMOTIDINE 20 MILLIGRAM(S): 10 INJECTION INTRAVENOUS at 06:48

## 2017-04-03 RX ADMIN — HYDROMORPHONE HYDROCHLORIDE 4 MILLIGRAM(S): 2 INJECTION INTRAMUSCULAR; INTRAVENOUS; SUBCUTANEOUS at 18:13

## 2017-04-03 RX ADMIN — HYDROMORPHONE HYDROCHLORIDE 2 MILLIGRAM(S): 2 INJECTION INTRAMUSCULAR; INTRAVENOUS; SUBCUTANEOUS at 06:51

## 2017-04-03 RX ADMIN — DABIGATRAN ETEXILATE MESYLATE 150 MILLIGRAM(S): 150 CAPSULE ORAL at 05:45

## 2017-04-03 RX ADMIN — HYDROMORPHONE HYDROCHLORIDE 4 MILLIGRAM(S): 2 INJECTION INTRAMUSCULAR; INTRAVENOUS; SUBCUTANEOUS at 05:33

## 2017-04-03 RX ADMIN — HYDROMORPHONE HYDROCHLORIDE 4 MILLIGRAM(S): 2 INJECTION INTRAMUSCULAR; INTRAVENOUS; SUBCUTANEOUS at 21:05

## 2017-04-03 RX ADMIN — HYDROMORPHONE HYDROCHLORIDE 4 MILLIGRAM(S): 2 INJECTION INTRAMUSCULAR; INTRAVENOUS; SUBCUTANEOUS at 05:48

## 2017-04-03 RX ADMIN — FAMOTIDINE 20 MILLIGRAM(S): 10 INJECTION INTRAVENOUS at 18:04

## 2017-04-03 RX ADMIN — HYDROMORPHONE HYDROCHLORIDE 2 MILLIGRAM(S): 2 INJECTION INTRAMUSCULAR; INTRAVENOUS; SUBCUTANEOUS at 11:32

## 2017-04-03 RX ADMIN — HYDROMORPHONE HYDROCHLORIDE 2 MILLIGRAM(S): 2 INJECTION INTRAMUSCULAR; INTRAVENOUS; SUBCUTANEOUS at 11:20

## 2017-04-03 RX ADMIN — Medication 5 MILLIGRAM(S): at 11:19

## 2017-04-03 RX ADMIN — HYDROMORPHONE HYDROCHLORIDE 4 MILLIGRAM(S): 2 INJECTION INTRAMUSCULAR; INTRAVENOUS; SUBCUTANEOUS at 09:57

## 2017-04-03 RX ADMIN — Medication 50 GRAM(S): at 14:14

## 2017-04-03 RX ADMIN — HYDROMORPHONE HYDROCHLORIDE 2 MILLIGRAM(S): 2 INJECTION INTRAMUSCULAR; INTRAVENOUS; SUBCUTANEOUS at 15:43

## 2017-04-03 RX ADMIN — Medication 5 MILLIGRAM(S): at 05:47

## 2017-04-03 RX ADMIN — HYDROMORPHONE HYDROCHLORIDE 4 MILLIGRAM(S): 2 INJECTION INTRAMUSCULAR; INTRAVENOUS; SUBCUTANEOUS at 22:00

## 2017-04-03 RX ADMIN — HYDROMORPHONE HYDROCHLORIDE 4 MILLIGRAM(S): 2 INJECTION INTRAMUSCULAR; INTRAVENOUS; SUBCUTANEOUS at 14:12

## 2017-04-03 RX ADMIN — HYDROMORPHONE HYDROCHLORIDE 4 MILLIGRAM(S): 2 INJECTION INTRAMUSCULAR; INTRAVENOUS; SUBCUTANEOUS at 01:22

## 2017-04-03 RX ADMIN — HYDROMORPHONE HYDROCHLORIDE 2 MILLIGRAM(S): 2 INJECTION INTRAMUSCULAR; INTRAVENOUS; SUBCUTANEOUS at 23:39

## 2017-04-04 LAB
ANION GAP SERPL CALC-SCNC: 10 MMOL/L — SIGNIFICANT CHANGE UP (ref 5–17)
BUN SERPL-MCNC: 9 MG/DL — SIGNIFICANT CHANGE UP (ref 7–23)
CALCIUM SERPL-MCNC: 9 MG/DL — SIGNIFICANT CHANGE UP (ref 8.4–10.5)
CHLORIDE SERPL-SCNC: 100 MMOL/L — SIGNIFICANT CHANGE UP (ref 96–108)
CO2 SERPL-SCNC: 26 MMOL/L — SIGNIFICANT CHANGE UP (ref 22–31)
CREAT SERPL-MCNC: 0.68 MG/DL — SIGNIFICANT CHANGE UP (ref 0.5–1.3)
GLUCOSE SERPL-MCNC: 87 MG/DL — SIGNIFICANT CHANGE UP (ref 70–99)
HCT VFR BLD CALC: 26.9 % — LOW (ref 39–50)
HGB BLD-MCNC: 8.5 G/DL — LOW (ref 13–17)
MAGNESIUM SERPL-MCNC: 1.6 MG/DL — SIGNIFICANT CHANGE UP (ref 1.6–2.6)
MCHC RBC-ENTMCNC: 23.5 PG — LOW (ref 27–34)
MCHC RBC-ENTMCNC: 31.6 GM/DL — LOW (ref 32–36)
MCV RBC AUTO: 74.4 FL — LOW (ref 80–100)
PHOSPHATE SERPL-MCNC: 5.1 MG/DL — HIGH (ref 2.5–4.5)
PLATELET # BLD AUTO: 246 K/UL — SIGNIFICANT CHANGE UP (ref 150–400)
POTASSIUM SERPL-MCNC: 4.2 MMOL/L — SIGNIFICANT CHANGE UP (ref 3.5–5.3)
POTASSIUM SERPL-SCNC: 4.2 MMOL/L — SIGNIFICANT CHANGE UP (ref 3.5–5.3)
RBC # BLD: 3.62 M/UL — LOW (ref 4.2–5.8)
RBC # FLD: 19.1 % — HIGH (ref 10.3–14.5)
SODIUM SERPL-SCNC: 136 MMOL/L — SIGNIFICANT CHANGE UP (ref 135–145)
WBC # BLD: 3.5 K/UL — LOW (ref 3.8–10.5)
WBC # FLD AUTO: 3.5 K/UL — LOW (ref 3.8–10.5)

## 2017-04-04 PROCEDURE — 99233 SBSQ HOSP IP/OBS HIGH 50: CPT

## 2017-04-04 RX ORDER — SENNA PLUS 8.6 MG/1
2 TABLET ORAL AT BEDTIME
Qty: 0 | Refills: 0 | Status: DISCONTINUED | OUTPATIENT
Start: 2017-04-04 | End: 2017-04-07

## 2017-04-04 RX ORDER — METOCLOPRAMIDE HCL 10 MG
10 TABLET ORAL EVERY 12 HOURS
Qty: 0 | Refills: 0 | Status: DISCONTINUED | OUTPATIENT
Start: 2017-04-04 | End: 2017-04-05

## 2017-04-04 RX ORDER — SENNA PLUS 8.6 MG/1
1 TABLET ORAL DAILY
Qty: 0 | Refills: 0 | Status: DISCONTINUED | OUTPATIENT
Start: 2017-04-04 | End: 2017-04-04

## 2017-04-04 RX ORDER — POLYETHYLENE GLYCOL 3350 17 G/17G
17 POWDER, FOR SOLUTION ORAL DAILY
Qty: 0 | Refills: 0 | Status: DISCONTINUED | OUTPATIENT
Start: 2017-04-04 | End: 2017-04-07

## 2017-04-04 RX ORDER — DOCUSATE SODIUM 100 MG
100 CAPSULE ORAL DAILY
Qty: 0 | Refills: 0 | Status: DISCONTINUED | OUTPATIENT
Start: 2017-04-04 | End: 2017-04-04

## 2017-04-04 RX ORDER — DOCUSATE SODIUM 100 MG
100 CAPSULE ORAL THREE TIMES A DAY
Qty: 0 | Refills: 0 | Status: DISCONTINUED | OUTPATIENT
Start: 2017-04-04 | End: 2017-04-07

## 2017-04-04 RX ADMIN — HYDROMORPHONE HYDROCHLORIDE 4 MILLIGRAM(S): 2 INJECTION INTRAMUSCULAR; INTRAVENOUS; SUBCUTANEOUS at 21:21

## 2017-04-04 RX ADMIN — HYDROMORPHONE HYDROCHLORIDE 2 MILLIGRAM(S): 2 INJECTION INTRAMUSCULAR; INTRAVENOUS; SUBCUTANEOUS at 16:34

## 2017-04-04 RX ADMIN — Medication 5 MILLIGRAM(S): at 05:37

## 2017-04-04 RX ADMIN — OXYCODONE HYDROCHLORIDE 15 MILLIGRAM(S): 5 TABLET ORAL at 17:15

## 2017-04-04 RX ADMIN — HYDROMORPHONE HYDROCHLORIDE 2 MILLIGRAM(S): 2 INJECTION INTRAMUSCULAR; INTRAVENOUS; SUBCUTANEOUS at 12:00

## 2017-04-04 RX ADMIN — HYDROMORPHONE HYDROCHLORIDE 4 MILLIGRAM(S): 2 INJECTION INTRAMUSCULAR; INTRAVENOUS; SUBCUTANEOUS at 17:30

## 2017-04-04 RX ADMIN — Medication 81 MILLIGRAM(S): at 11:44

## 2017-04-04 RX ADMIN — OXYCODONE HYDROCHLORIDE 15 MILLIGRAM(S): 5 TABLET ORAL at 06:05

## 2017-04-04 RX ADMIN — HYDROMORPHONE HYDROCHLORIDE 4 MILLIGRAM(S): 2 INJECTION INTRAMUSCULAR; INTRAVENOUS; SUBCUTANEOUS at 05:35

## 2017-04-04 RX ADMIN — HYDROMORPHONE HYDROCHLORIDE 4 MILLIGRAM(S): 2 INJECTION INTRAMUSCULAR; INTRAVENOUS; SUBCUTANEOUS at 17:15

## 2017-04-04 RX ADMIN — DABIGATRAN ETEXILATE MESYLATE 150 MILLIGRAM(S): 150 CAPSULE ORAL at 17:15

## 2017-04-04 RX ADMIN — HYDROMORPHONE HYDROCHLORIDE 2 MILLIGRAM(S): 2 INJECTION INTRAMUSCULAR; INTRAVENOUS; SUBCUTANEOUS at 08:19

## 2017-04-04 RX ADMIN — HYDROMORPHONE HYDROCHLORIDE 2 MILLIGRAM(S): 2 INJECTION INTRAMUSCULAR; INTRAVENOUS; SUBCUTANEOUS at 20:46

## 2017-04-04 RX ADMIN — POLYETHYLENE GLYCOL 3350 17 GRAM(S): 17 POWDER, FOR SOLUTION ORAL at 11:45

## 2017-04-04 RX ADMIN — DABIGATRAN ETEXILATE MESYLATE 150 MILLIGRAM(S): 150 CAPSULE ORAL at 05:38

## 2017-04-04 RX ADMIN — FAMOTIDINE 20 MILLIGRAM(S): 10 INJECTION INTRAVENOUS at 21:27

## 2017-04-04 RX ADMIN — HYDROMORPHONE HYDROCHLORIDE 4 MILLIGRAM(S): 2 INJECTION INTRAMUSCULAR; INTRAVENOUS; SUBCUTANEOUS at 09:55

## 2017-04-04 RX ADMIN — Medication 200 MILLIGRAM(S): at 08:03

## 2017-04-04 RX ADMIN — HYDROMORPHONE HYDROCHLORIDE 2 MILLIGRAM(S): 2 INJECTION INTRAMUSCULAR; INTRAVENOUS; SUBCUTANEOUS at 04:03

## 2017-04-04 RX ADMIN — SENNA PLUS 1 TABLET(S): 8.6 TABLET ORAL at 11:45

## 2017-04-04 RX ADMIN — Medication 200 MILLIGRAM(S): at 17:15

## 2017-04-04 RX ADMIN — HYDROMORPHONE HYDROCHLORIDE 4 MILLIGRAM(S): 2 INJECTION INTRAMUSCULAR; INTRAVENOUS; SUBCUTANEOUS at 13:51

## 2017-04-04 RX ADMIN — HYDROMORPHONE HYDROCHLORIDE 2 MILLIGRAM(S): 2 INJECTION INTRAMUSCULAR; INTRAVENOUS; SUBCUTANEOUS at 20:16

## 2017-04-04 RX ADMIN — SENNA PLUS 2 TABLET(S): 8.6 TABLET ORAL at 21:27

## 2017-04-04 RX ADMIN — Medication 25 MILLIGRAM(S): at 09:42

## 2017-04-04 RX ADMIN — HYDROMORPHONE HYDROCHLORIDE 2 MILLIGRAM(S): 2 INJECTION INTRAMUSCULAR; INTRAVENOUS; SUBCUTANEOUS at 11:45

## 2017-04-04 RX ADMIN — HYDROMORPHONE HYDROCHLORIDE 4 MILLIGRAM(S): 2 INJECTION INTRAMUSCULAR; INTRAVENOUS; SUBCUTANEOUS at 06:05

## 2017-04-04 RX ADMIN — Medication 25 MILLIGRAM(S): at 20:29

## 2017-04-04 RX ADMIN — HYDROMORPHONE HYDROCHLORIDE 4 MILLIGRAM(S): 2 INJECTION INTRAMUSCULAR; INTRAVENOUS; SUBCUTANEOUS at 01:01

## 2017-04-04 RX ADMIN — FAMOTIDINE 20 MILLIGRAM(S): 10 INJECTION INTRAVENOUS at 07:48

## 2017-04-04 RX ADMIN — HYDROMORPHONE HYDROCHLORIDE 2 MILLIGRAM(S): 2 INJECTION INTRAMUSCULAR; INTRAVENOUS; SUBCUTANEOUS at 08:04

## 2017-04-04 RX ADMIN — Medication 100 MILLIGRAM(S): at 11:45

## 2017-04-04 RX ADMIN — HYDROMORPHONE HYDROCHLORIDE 4 MILLIGRAM(S): 2 INJECTION INTRAMUSCULAR; INTRAVENOUS; SUBCUTANEOUS at 21:51

## 2017-04-04 RX ADMIN — HYDROMORPHONE HYDROCHLORIDE 2 MILLIGRAM(S): 2 INJECTION INTRAMUSCULAR; INTRAVENOUS; SUBCUTANEOUS at 16:17

## 2017-04-04 RX ADMIN — Medication 10 MILLIGRAM(S): at 01:01

## 2017-04-04 RX ADMIN — OXYCODONE HYDROCHLORIDE 15 MILLIGRAM(S): 5 TABLET ORAL at 05:37

## 2017-04-04 RX ADMIN — Medication 100 MILLIGRAM(S): at 21:27

## 2017-04-04 RX ADMIN — HYDROMORPHONE HYDROCHLORIDE 2 MILLIGRAM(S): 2 INJECTION INTRAMUSCULAR; INTRAVENOUS; SUBCUTANEOUS at 04:18

## 2017-04-04 RX ADMIN — HYDROMORPHONE HYDROCHLORIDE 4 MILLIGRAM(S): 2 INJECTION INTRAMUSCULAR; INTRAVENOUS; SUBCUTANEOUS at 14:16

## 2017-04-04 RX ADMIN — HYDROMORPHONE HYDROCHLORIDE 4 MILLIGRAM(S): 2 INJECTION INTRAMUSCULAR; INTRAVENOUS; SUBCUTANEOUS at 09:40

## 2017-04-04 RX ADMIN — HYDROMORPHONE HYDROCHLORIDE 2 MILLIGRAM(S): 2 INJECTION INTRAMUSCULAR; INTRAVENOUS; SUBCUTANEOUS at 00:09

## 2017-04-04 RX ADMIN — HYDROMORPHONE HYDROCHLORIDE 4 MILLIGRAM(S): 2 INJECTION INTRAMUSCULAR; INTRAVENOUS; SUBCUTANEOUS at 01:31

## 2017-04-05 PROCEDURE — 99233 SBSQ HOSP IP/OBS HIGH 50: CPT

## 2017-04-05 RX ORDER — SUCRALFATE 1 G
1 TABLET ORAL EVERY 12 HOURS
Qty: 0 | Refills: 0 | Status: DISCONTINUED | OUTPATIENT
Start: 2017-04-05 | End: 2017-04-07

## 2017-04-05 RX ORDER — PANTOPRAZOLE SODIUM 20 MG/1
40 TABLET, DELAYED RELEASE ORAL ONCE
Qty: 0 | Refills: 0 | Status: COMPLETED | OUTPATIENT
Start: 2017-04-05 | End: 2017-04-05

## 2017-04-05 RX ORDER — PANTOPRAZOLE SODIUM 20 MG/1
40 TABLET, DELAYED RELEASE ORAL
Qty: 0 | Refills: 0 | Status: DISCONTINUED | OUTPATIENT
Start: 2017-04-05 | End: 2017-04-07

## 2017-04-05 RX ORDER — LACTULOSE 10 G/15ML
20 SOLUTION ORAL
Qty: 0 | Refills: 0 | Status: DISCONTINUED | OUTPATIENT
Start: 2017-04-05 | End: 2017-04-07

## 2017-04-05 RX ORDER — HYDROMORPHONE HYDROCHLORIDE 2 MG/ML
2 INJECTION INTRAMUSCULAR; INTRAVENOUS; SUBCUTANEOUS EVERY 4 HOURS
Qty: 0 | Refills: 0 | Status: DISCONTINUED | OUTPATIENT
Start: 2017-04-05 | End: 2017-04-07

## 2017-04-05 RX ORDER — MULTIVIT WITH MIN/MFOLATE/K2 340-15/3 G
200 POWDER (GRAM) ORAL ONCE
Qty: 0 | Refills: 0 | Status: COMPLETED | OUTPATIENT
Start: 2017-04-05 | End: 2017-04-05

## 2017-04-05 RX ADMIN — FAMOTIDINE 20 MILLIGRAM(S): 10 INJECTION INTRAVENOUS at 10:51

## 2017-04-05 RX ADMIN — HYDROMORPHONE HYDROCHLORIDE 2 MILLIGRAM(S): 2 INJECTION INTRAMUSCULAR; INTRAVENOUS; SUBCUTANEOUS at 05:25

## 2017-04-05 RX ADMIN — Medication 1 GRAM(S): at 12:27

## 2017-04-05 RX ADMIN — HYDROMORPHONE HYDROCHLORIDE 2 MILLIGRAM(S): 2 INJECTION INTRAMUSCULAR; INTRAVENOUS; SUBCUTANEOUS at 04:55

## 2017-04-05 RX ADMIN — HYDROMORPHONE HYDROCHLORIDE 4 MILLIGRAM(S): 2 INJECTION INTRAMUSCULAR; INTRAVENOUS; SUBCUTANEOUS at 03:06

## 2017-04-05 RX ADMIN — PANTOPRAZOLE SODIUM 40 MILLIGRAM(S): 20 TABLET, DELAYED RELEASE ORAL at 12:27

## 2017-04-05 RX ADMIN — HYDROMORPHONE HYDROCHLORIDE 2 MILLIGRAM(S): 2 INJECTION INTRAMUSCULAR; INTRAVENOUS; SUBCUTANEOUS at 17:45

## 2017-04-05 RX ADMIN — OXYCODONE HYDROCHLORIDE 15 MILLIGRAM(S): 5 TABLET ORAL at 05:09

## 2017-04-05 RX ADMIN — HYDROMORPHONE HYDROCHLORIDE 2 MILLIGRAM(S): 2 INJECTION INTRAMUSCULAR; INTRAVENOUS; SUBCUTANEOUS at 09:15

## 2017-04-05 RX ADMIN — Medication 100 MILLIGRAM(S): at 21:19

## 2017-04-05 RX ADMIN — HYDROMORPHONE HYDROCHLORIDE 2 MILLIGRAM(S): 2 INJECTION INTRAMUSCULAR; INTRAVENOUS; SUBCUTANEOUS at 15:34

## 2017-04-05 RX ADMIN — Medication 1 DROP(S): at 06:03

## 2017-04-05 RX ADMIN — Medication 100 MILLIGRAM(S): at 05:09

## 2017-04-05 RX ADMIN — HYDROMORPHONE HYDROCHLORIDE 4 MILLIGRAM(S): 2 INJECTION INTRAMUSCULAR; INTRAVENOUS; SUBCUTANEOUS at 06:46

## 2017-04-05 RX ADMIN — Medication 12 MILLIGRAM(S): at 12:27

## 2017-04-05 RX ADMIN — OXYCODONE HYDROCHLORIDE 15 MILLIGRAM(S): 5 TABLET ORAL at 05:25

## 2017-04-05 RX ADMIN — LACTULOSE 20 GRAM(S): 10 SOLUTION ORAL at 17:03

## 2017-04-05 RX ADMIN — HYDROMORPHONE HYDROCHLORIDE 2 MILLIGRAM(S): 2 INJECTION INTRAMUSCULAR; INTRAVENOUS; SUBCUTANEOUS at 08:57

## 2017-04-05 RX ADMIN — HYDROMORPHONE HYDROCHLORIDE 2 MILLIGRAM(S): 2 INJECTION INTRAMUSCULAR; INTRAVENOUS; SUBCUTANEOUS at 13:01

## 2017-04-05 RX ADMIN — Medication 10 MILLIGRAM(S): at 05:05

## 2017-04-05 RX ADMIN — Medication 200 MILLIGRAM(S): at 08:58

## 2017-04-05 RX ADMIN — HYDROMORPHONE HYDROCHLORIDE 2 MILLIGRAM(S): 2 INJECTION INTRAMUSCULAR; INTRAVENOUS; SUBCUTANEOUS at 21:30

## 2017-04-05 RX ADMIN — Medication 25 MILLIGRAM(S): at 19:38

## 2017-04-05 RX ADMIN — HYDROMORPHONE HYDROCHLORIDE 2 MILLIGRAM(S): 2 INJECTION INTRAMUSCULAR; INTRAVENOUS; SUBCUTANEOUS at 00:58

## 2017-04-05 RX ADMIN — Medication 1 DROP(S): at 17:04

## 2017-04-05 RX ADMIN — POLYETHYLENE GLYCOL 3350 17 GRAM(S): 17 POWDER, FOR SOLUTION ORAL at 10:57

## 2017-04-05 RX ADMIN — HYDROMORPHONE HYDROCHLORIDE 4 MILLIGRAM(S): 2 INJECTION INTRAMUSCULAR; INTRAVENOUS; SUBCUTANEOUS at 10:50

## 2017-04-05 RX ADMIN — HYDROMORPHONE HYDROCHLORIDE 2 MILLIGRAM(S): 2 INJECTION INTRAMUSCULAR; INTRAVENOUS; SUBCUTANEOUS at 19:50

## 2017-04-05 RX ADMIN — HYDROMORPHONE HYDROCHLORIDE 4 MILLIGRAM(S): 2 INJECTION INTRAMUSCULAR; INTRAVENOUS; SUBCUTANEOUS at 02:36

## 2017-04-05 RX ADMIN — HYDROMORPHONE HYDROCHLORIDE 4 MILLIGRAM(S): 2 INJECTION INTRAMUSCULAR; INTRAVENOUS; SUBCUTANEOUS at 07:16

## 2017-04-05 RX ADMIN — HYDROMORPHONE HYDROCHLORIDE 2 MILLIGRAM(S): 2 INJECTION INTRAMUSCULAR; INTRAVENOUS; SUBCUTANEOUS at 00:28

## 2017-04-05 RX ADMIN — FAMOTIDINE 20 MILLIGRAM(S): 10 INJECTION INTRAVENOUS at 21:18

## 2017-04-05 RX ADMIN — Medication 200 MILLILITER(S): at 12:27

## 2017-04-05 RX ADMIN — DABIGATRAN ETEXILATE MESYLATE 150 MILLIGRAM(S): 150 CAPSULE ORAL at 17:04

## 2017-04-05 RX ADMIN — Medication 81 MILLIGRAM(S): at 10:52

## 2017-04-05 RX ADMIN — HYDROMORPHONE HYDROCHLORIDE 2 MILLIGRAM(S): 2 INJECTION INTRAMUSCULAR; INTRAVENOUS; SUBCUTANEOUS at 19:35

## 2017-04-05 RX ADMIN — Medication 1 TABLET(S): at 10:52

## 2017-04-05 RX ADMIN — Medication 25 MILLIGRAM(S): at 10:51

## 2017-04-05 RX ADMIN — HYDROMORPHONE HYDROCHLORIDE 2 MILLIGRAM(S): 2 INJECTION INTRAMUSCULAR; INTRAVENOUS; SUBCUTANEOUS at 23:10

## 2017-04-05 RX ADMIN — Medication 100 MILLIGRAM(S): at 13:01

## 2017-04-05 RX ADMIN — HYDROMORPHONE HYDROCHLORIDE 2 MILLIGRAM(S): 2 INJECTION INTRAMUSCULAR; INTRAVENOUS; SUBCUTANEOUS at 17:28

## 2017-04-05 RX ADMIN — Medication 1 GRAM(S): at 21:18

## 2017-04-05 RX ADMIN — HYDROMORPHONE HYDROCHLORIDE 2 MILLIGRAM(S): 2 INJECTION INTRAMUSCULAR; INTRAVENOUS; SUBCUTANEOUS at 15:19

## 2017-04-05 RX ADMIN — HYDROMORPHONE HYDROCHLORIDE 2 MILLIGRAM(S): 2 INJECTION INTRAMUSCULAR; INTRAVENOUS; SUBCUTANEOUS at 13:16

## 2017-04-05 RX ADMIN — Medication 200 MILLIGRAM(S): at 17:05

## 2017-04-05 RX ADMIN — HYDROMORPHONE HYDROCHLORIDE 2 MILLIGRAM(S): 2 INJECTION INTRAMUSCULAR; INTRAVENOUS; SUBCUTANEOUS at 23:25

## 2017-04-05 RX ADMIN — OXYCODONE HYDROCHLORIDE 15 MILLIGRAM(S): 5 TABLET ORAL at 17:03

## 2017-04-05 RX ADMIN — HYDROMORPHONE HYDROCHLORIDE 2 MILLIGRAM(S): 2 INJECTION INTRAMUSCULAR; INTRAVENOUS; SUBCUTANEOUS at 21:45

## 2017-04-05 RX ADMIN — HYDROMORPHONE HYDROCHLORIDE 4 MILLIGRAM(S): 2 INJECTION INTRAMUSCULAR; INTRAVENOUS; SUBCUTANEOUS at 11:05

## 2017-04-05 RX ADMIN — SENNA PLUS 2 TABLET(S): 8.6 TABLET ORAL at 21:18

## 2017-04-05 RX ADMIN — DABIGATRAN ETEXILATE MESYLATE 150 MILLIGRAM(S): 150 CAPSULE ORAL at 05:09

## 2017-04-06 LAB
ANION GAP SERPL CALC-SCNC: 11 MMOL/L — SIGNIFICANT CHANGE UP (ref 5–17)
BUN SERPL-MCNC: 8 MG/DL — SIGNIFICANT CHANGE UP (ref 7–23)
CALCIUM SERPL-MCNC: 8.8 MG/DL — SIGNIFICANT CHANGE UP (ref 8.4–10.5)
CHLORIDE SERPL-SCNC: 98 MMOL/L — SIGNIFICANT CHANGE UP (ref 96–108)
CO2 SERPL-SCNC: 27 MMOL/L — SIGNIFICANT CHANGE UP (ref 22–31)
CREAT SERPL-MCNC: 0.78 MG/DL — SIGNIFICANT CHANGE UP (ref 0.5–1.3)
GLUCOSE SERPL-MCNC: 78 MG/DL — SIGNIFICANT CHANGE UP (ref 70–99)
HCT VFR BLD CALC: 25 % — LOW (ref 39–50)
HGB BLD-MCNC: 8.1 G/DL — LOW (ref 13–17)
MAGNESIUM SERPL-MCNC: 2.2 MG/DL — SIGNIFICANT CHANGE UP (ref 1.6–2.6)
MCHC RBC-ENTMCNC: 23.9 PG — LOW (ref 27–34)
MCHC RBC-ENTMCNC: 32.4 GM/DL — SIGNIFICANT CHANGE UP (ref 32–36)
MCV RBC AUTO: 73.8 FL — LOW (ref 80–100)
PHOSPHATE SERPL-MCNC: 4.6 MG/DL — HIGH (ref 2.5–4.5)
PLATELET # BLD AUTO: 311 K/UL — SIGNIFICANT CHANGE UP (ref 150–400)
POTASSIUM SERPL-MCNC: 4.8 MMOL/L — SIGNIFICANT CHANGE UP (ref 3.5–5.3)
POTASSIUM SERPL-SCNC: 4.8 MMOL/L — SIGNIFICANT CHANGE UP (ref 3.5–5.3)
RBC # BLD: 3.39 M/UL — LOW (ref 4.2–5.8)
RBC # FLD: 19.2 % — HIGH (ref 10.3–14.5)
SODIUM SERPL-SCNC: 136 MMOL/L — SIGNIFICANT CHANGE UP (ref 135–145)
WBC # BLD: 4.1 K/UL — SIGNIFICANT CHANGE UP (ref 3.8–10.5)
WBC # FLD AUTO: 4.1 K/UL — SIGNIFICANT CHANGE UP (ref 3.8–10.5)

## 2017-04-06 PROCEDURE — 99232 SBSQ HOSP IP/OBS MODERATE 35: CPT | Mod: GC

## 2017-04-06 PROCEDURE — 99233 SBSQ HOSP IP/OBS HIGH 50: CPT | Mod: GC

## 2017-04-06 RX ADMIN — HYDROMORPHONE HYDROCHLORIDE 2 MILLIGRAM(S): 2 INJECTION INTRAMUSCULAR; INTRAVENOUS; SUBCUTANEOUS at 08:47

## 2017-04-06 RX ADMIN — Medication 1 GRAM(S): at 16:53

## 2017-04-06 RX ADMIN — HYDROMORPHONE HYDROCHLORIDE 2 MILLIGRAM(S): 2 INJECTION INTRAMUSCULAR; INTRAVENOUS; SUBCUTANEOUS at 12:17

## 2017-04-06 RX ADMIN — Medication 81 MILLIGRAM(S): at 12:17

## 2017-04-06 RX ADMIN — HYDROMORPHONE HYDROCHLORIDE 2 MILLIGRAM(S): 2 INJECTION INTRAMUSCULAR; INTRAVENOUS; SUBCUTANEOUS at 10:18

## 2017-04-06 RX ADMIN — HYDROMORPHONE HYDROCHLORIDE 2 MILLIGRAM(S): 2 INJECTION INTRAMUSCULAR; INTRAVENOUS; SUBCUTANEOUS at 01:11

## 2017-04-06 RX ADMIN — OXYCODONE HYDROCHLORIDE 15 MILLIGRAM(S): 5 TABLET ORAL at 05:32

## 2017-04-06 RX ADMIN — HYDROMORPHONE HYDROCHLORIDE 2 MILLIGRAM(S): 2 INJECTION INTRAMUSCULAR; INTRAVENOUS; SUBCUTANEOUS at 22:43

## 2017-04-06 RX ADMIN — HYDROMORPHONE HYDROCHLORIDE 2 MILLIGRAM(S): 2 INJECTION INTRAMUSCULAR; INTRAVENOUS; SUBCUTANEOUS at 18:47

## 2017-04-06 RX ADMIN — HYDROMORPHONE HYDROCHLORIDE 2 MILLIGRAM(S): 2 INJECTION INTRAMUSCULAR; INTRAVENOUS; SUBCUTANEOUS at 08:17

## 2017-04-06 RX ADMIN — HYDROMORPHONE HYDROCHLORIDE 2 MILLIGRAM(S): 2 INJECTION INTRAMUSCULAR; INTRAVENOUS; SUBCUTANEOUS at 17:01

## 2017-04-06 RX ADMIN — Medication 1 DROP(S): at 05:32

## 2017-04-06 RX ADMIN — FAMOTIDINE 20 MILLIGRAM(S): 10 INJECTION INTRAVENOUS at 21:17

## 2017-04-06 RX ADMIN — Medication 1 DROP(S): at 16:53

## 2017-04-06 RX ADMIN — HYDROMORPHONE HYDROCHLORIDE 2 MILLIGRAM(S): 2 INJECTION INTRAMUSCULAR; INTRAVENOUS; SUBCUTANEOUS at 16:46

## 2017-04-06 RX ADMIN — HYDROMORPHONE HYDROCHLORIDE 2 MILLIGRAM(S): 2 INJECTION INTRAMUSCULAR; INTRAVENOUS; SUBCUTANEOUS at 06:16

## 2017-04-06 RX ADMIN — Medication 12 MILLIGRAM(S): at 16:52

## 2017-04-06 RX ADMIN — HYDROMORPHONE HYDROCHLORIDE 2 MILLIGRAM(S): 2 INJECTION INTRAMUSCULAR; INTRAVENOUS; SUBCUTANEOUS at 12:40

## 2017-04-06 RX ADMIN — Medication 1 GRAM(S): at 05:32

## 2017-04-06 RX ADMIN — HYDROMORPHONE HYDROCHLORIDE 2 MILLIGRAM(S): 2 INJECTION INTRAMUSCULAR; INTRAVENOUS; SUBCUTANEOUS at 00:56

## 2017-04-06 RX ADMIN — HYDROMORPHONE HYDROCHLORIDE 2 MILLIGRAM(S): 2 INJECTION INTRAMUSCULAR; INTRAVENOUS; SUBCUTANEOUS at 21:18

## 2017-04-06 RX ADMIN — FAMOTIDINE 20 MILLIGRAM(S): 10 INJECTION INTRAVENOUS at 08:15

## 2017-04-06 RX ADMIN — HYDROMORPHONE HYDROCHLORIDE 2 MILLIGRAM(S): 2 INJECTION INTRAMUSCULAR; INTRAVENOUS; SUBCUTANEOUS at 19:10

## 2017-04-06 RX ADMIN — HYDROMORPHONE HYDROCHLORIDE 2 MILLIGRAM(S): 2 INJECTION INTRAMUSCULAR; INTRAVENOUS; SUBCUTANEOUS at 22:57

## 2017-04-06 RX ADMIN — Medication 200 MILLIGRAM(S): at 08:15

## 2017-04-06 RX ADMIN — DABIGATRAN ETEXILATE MESYLATE 150 MILLIGRAM(S): 150 CAPSULE ORAL at 04:26

## 2017-04-06 RX ADMIN — Medication 200 MILLIGRAM(S): at 16:53

## 2017-04-06 RX ADMIN — HYDROMORPHONE HYDROCHLORIDE 2 MILLIGRAM(S): 2 INJECTION INTRAMUSCULAR; INTRAVENOUS; SUBCUTANEOUS at 10:42

## 2017-04-06 RX ADMIN — HYDROMORPHONE HYDROCHLORIDE 2 MILLIGRAM(S): 2 INJECTION INTRAMUSCULAR; INTRAVENOUS; SUBCUTANEOUS at 06:31

## 2017-04-06 RX ADMIN — OXYCODONE HYDROCHLORIDE 15 MILLIGRAM(S): 5 TABLET ORAL at 16:53

## 2017-04-06 RX ADMIN — HYDROMORPHONE HYDROCHLORIDE 2 MILLIGRAM(S): 2 INJECTION INTRAMUSCULAR; INTRAVENOUS; SUBCUTANEOUS at 21:35

## 2017-04-06 RX ADMIN — DABIGATRAN ETEXILATE MESYLATE 150 MILLIGRAM(S): 150 CAPSULE ORAL at 16:52

## 2017-04-06 RX ADMIN — HYDROMORPHONE HYDROCHLORIDE 2 MILLIGRAM(S): 2 INJECTION INTRAMUSCULAR; INTRAVENOUS; SUBCUTANEOUS at 14:45

## 2017-04-06 RX ADMIN — HYDROMORPHONE HYDROCHLORIDE 2 MILLIGRAM(S): 2 INJECTION INTRAMUSCULAR; INTRAVENOUS; SUBCUTANEOUS at 15:20

## 2017-04-06 NOTE — PROVIDER CONTACT NOTE (OTHER) - ACTION/TREATMENT ORDERED:
Ekg completed and reported back per md request. MD to see patient after finished in ED.
MD made aware. Please give dilauidid early
MD made aware. Will continue to monitor
MD to look into chart and will order something to help hiccoughs

## 2017-04-06 NOTE — PROVIDER CONTACT NOTE (OTHER) - BACKGROUND
Pulmonary Embolism, Hx of DVTs and Lupus
Cardiac enzymes ordered
Medicated for pain at 2310 and requesting breakthrough med that's not due until 130. SLE, DVT, PE.   Pt in the day had pain meds titrated from 4mg to 2mg.

## 2017-04-06 NOTE — PROVIDER CONTACT NOTE (OTHER) - REASON
Patient complaining of pain 9/10
Pt refusing labs to be drawn at this time
pain unrelieved by meds
Rapid HR

## 2017-04-07 ENCOUNTER — TRANSCRIPTION ENCOUNTER (OUTPATIENT)
Age: 27
End: 2017-04-07

## 2017-04-07 VITALS
OXYGEN SATURATION: 100 % | DIASTOLIC BLOOD PRESSURE: 70 MMHG | TEMPERATURE: 98 F | HEART RATE: 96 BPM | RESPIRATION RATE: 18 BRPM | SYSTOLIC BLOOD PRESSURE: 107 MMHG

## 2017-04-07 PROCEDURE — 82306 VITAMIN D 25 HYDROXY: CPT

## 2017-04-07 PROCEDURE — 83880 ASSAY OF NATRIURETIC PEPTIDE: CPT

## 2017-04-07 PROCEDURE — 82553 CREATINE MB FRACTION: CPT

## 2017-04-07 PROCEDURE — 85014 HEMATOCRIT: CPT

## 2017-04-07 PROCEDURE — A9567: CPT

## 2017-04-07 PROCEDURE — 84295 ASSAY OF SERUM SODIUM: CPT

## 2017-04-07 PROCEDURE — 96376 TX/PRO/DX INJ SAME DRUG ADON: CPT

## 2017-04-07 PROCEDURE — 82330 ASSAY OF CALCIUM: CPT

## 2017-04-07 PROCEDURE — 78582 LUNG VENTILAT&PERFUS IMAGING: CPT

## 2017-04-07 PROCEDURE — 82784 ASSAY IGA/IGD/IGG/IGM EACH: CPT

## 2017-04-07 PROCEDURE — 99285 EMERGENCY DEPT VISIT HI MDM: CPT | Mod: 25

## 2017-04-07 PROCEDURE — 96374 THER/PROPH/DIAG INJ IV PUSH: CPT

## 2017-04-07 PROCEDURE — 84484 ASSAY OF TROPONIN QUANT: CPT

## 2017-04-07 PROCEDURE — 86357 NK CELLS TOTAL COUNT: CPT

## 2017-04-07 PROCEDURE — 82550 ASSAY OF CK (CPK): CPT

## 2017-04-07 PROCEDURE — 93005 ELECTROCARDIOGRAM TRACING: CPT

## 2017-04-07 PROCEDURE — 82947 ASSAY GLUCOSE BLOOD QUANT: CPT

## 2017-04-07 PROCEDURE — 84100 ASSAY OF PHOSPHORUS: CPT

## 2017-04-07 PROCEDURE — 83735 ASSAY OF MAGNESIUM: CPT

## 2017-04-07 PROCEDURE — 80048 BASIC METABOLIC PNL TOTAL CA: CPT

## 2017-04-07 PROCEDURE — 80053 COMPREHEN METABOLIC PANEL: CPT

## 2017-04-07 PROCEDURE — 83605 ASSAY OF LACTIC ACID: CPT

## 2017-04-07 PROCEDURE — 86160 COMPLEMENT ANTIGEN: CPT

## 2017-04-07 PROCEDURE — 85610 PROTHROMBIN TIME: CPT

## 2017-04-07 PROCEDURE — 71045 X-RAY EXAM CHEST 1 VIEW: CPT

## 2017-04-07 PROCEDURE — 96375 TX/PRO/DX INJ NEW DRUG ADDON: CPT

## 2017-04-07 PROCEDURE — 85027 COMPLETE CBC AUTOMATED: CPT

## 2017-04-07 PROCEDURE — 85730 THROMBOPLASTIN TIME PARTIAL: CPT

## 2017-04-07 PROCEDURE — 86355 B CELLS TOTAL COUNT: CPT

## 2017-04-07 PROCEDURE — 84132 ASSAY OF SERUM POTASSIUM: CPT

## 2017-04-07 PROCEDURE — 82803 BLOOD GASES ANY COMBINATION: CPT

## 2017-04-07 PROCEDURE — 86225 DNA ANTIBODY NATIVE: CPT

## 2017-04-07 PROCEDURE — A9540: CPT

## 2017-04-07 PROCEDURE — 99239 HOSP IP/OBS DSCHRG MGMT >30: CPT

## 2017-04-07 PROCEDURE — 82435 ASSAY OF BLOOD CHLORIDE: CPT

## 2017-04-07 PROCEDURE — 86140 C-REACTIVE PROTEIN: CPT

## 2017-04-07 RX ORDER — DABIGATRAN ETEXILATE MESYLATE 150 MG/1
1 CAPSULE ORAL
Qty: 60 | Refills: 0 | OUTPATIENT
Start: 2017-04-07 | End: 2017-05-07

## 2017-04-07 RX ORDER — OXYCODONE HYDROCHLORIDE 5 MG/1
1 TABLET ORAL
Qty: 14 | Refills: 0 | OUTPATIENT
Start: 2017-04-07 | End: 2017-04-14

## 2017-04-07 RX ORDER — DABIGATRAN ETEXILATE MESYLATE 150 MG/1
1 CAPSULE ORAL
Qty: 0 | Refills: 0 | COMMUNITY

## 2017-04-07 RX ORDER — OXYCODONE HYDROCHLORIDE 5 MG/1
1 TABLET ORAL
Qty: 0 | Refills: 0 | COMMUNITY
Start: 2017-04-07

## 2017-04-07 RX ORDER — OXYCODONE HYDROCHLORIDE 5 MG/1
5 TABLET ORAL
Qty: 0 | Refills: 0 | Status: DISCONTINUED | OUTPATIENT
Start: 2017-04-07 | End: 2017-04-07

## 2017-04-07 RX ADMIN — Medication 200 MILLIGRAM(S): at 09:06

## 2017-04-07 RX ADMIN — Medication 1 GRAM(S): at 05:22

## 2017-04-07 RX ADMIN — HYDROMORPHONE HYDROCHLORIDE 2 MILLIGRAM(S): 2 INJECTION INTRAMUSCULAR; INTRAVENOUS; SUBCUTANEOUS at 01:25

## 2017-04-07 RX ADMIN — HYDROMORPHONE HYDROCHLORIDE 2 MILLIGRAM(S): 2 INJECTION INTRAMUSCULAR; INTRAVENOUS; SUBCUTANEOUS at 07:43

## 2017-04-07 RX ADMIN — OXYCODONE HYDROCHLORIDE 15 MILLIGRAM(S): 5 TABLET ORAL at 05:25

## 2017-04-07 RX ADMIN — Medication 81 MILLIGRAM(S): at 11:33

## 2017-04-07 RX ADMIN — HYDROMORPHONE HYDROCHLORIDE 2 MILLIGRAM(S): 2 INJECTION INTRAMUSCULAR; INTRAVENOUS; SUBCUTANEOUS at 05:25

## 2017-04-07 RX ADMIN — Medication 1 DROP(S): at 05:22

## 2017-04-07 RX ADMIN — HYDROMORPHONE HYDROCHLORIDE 2 MILLIGRAM(S): 2 INJECTION INTRAMUSCULAR; INTRAVENOUS; SUBCUTANEOUS at 03:55

## 2017-04-07 RX ADMIN — DABIGATRAN ETEXILATE MESYLATE 150 MILLIGRAM(S): 150 CAPSULE ORAL at 04:51

## 2017-04-07 RX ADMIN — HYDROMORPHONE HYDROCHLORIDE 2 MILLIGRAM(S): 2 INJECTION INTRAMUSCULAR; INTRAVENOUS; SUBCUTANEOUS at 01:40

## 2017-04-07 RX ADMIN — HYDROMORPHONE HYDROCHLORIDE 2 MILLIGRAM(S): 2 INJECTION INTRAMUSCULAR; INTRAVENOUS; SUBCUTANEOUS at 03:40

## 2017-04-07 RX ADMIN — OXYCODONE HYDROCHLORIDE 5 MILLIGRAM(S): 5 TABLET ORAL at 09:10

## 2017-04-07 RX ADMIN — HYDROMORPHONE HYDROCHLORIDE 2 MILLIGRAM(S): 2 INJECTION INTRAMUSCULAR; INTRAVENOUS; SUBCUTANEOUS at 08:13

## 2017-04-07 NOTE — DISCHARGE NOTE ADULT - CARE PROVIDER_API CALL
Annabella Estrella (DO), Internal Medicine; Rheumatology  [address] [place] [address]  Phone: [phone]  Fax: [phone]

## 2017-04-07 NOTE — DISCHARGE NOTE ADULT - CARE PLAN
Principal Discharge DX:	Other acute pulmonary embolism without acute cor pulmonale  Goal:	Please take your medications as prescribed  Instructions for follow-up, activity and diet:	You had a clot in your lungs from not taking your medications. It is very important for you to take your medications everyday. Please follow up with your rheumatologist and hematologist within one week of discharge  Secondary Diagnosis:	Systemic lupus erythematosus  Goal:	Please take your medications as prescribed.  Instructions for follow-up, activity and diet:	Please take your medications as prescribed to prevent lupus flare. Please follow up with your rheumatologist and hematologist within one week of discharge.

## 2017-04-07 NOTE — DISCHARGE NOTE ADULT - PLAN OF CARE
Please take your medications as prescribed You had a clot in your lungs from not taking your medications. It is very important for you to take your medications everyday. Please follow up with your rheumatologist and hematologist within one week of discharge Please take your medications as prescribed. Please take your medications as prescribed to prevent lupus flare. Please follow up with your rheumatologist and hematologist within one week of discharge.

## 2017-04-07 NOTE — DISCHARGE NOTE ADULT - MEDICATION SUMMARY - MEDICATIONS TO TAKE
I will START or STAY ON the medications listed below when I get home from the hospital:    methylPREDNISolone 4 mg oral tablet  -- 3 tab(s) by mouth 2 times a day  -- Indication: For Systemic lupus erythematosus    oxyCODONE 15 mg oral tablet, extended release  -- 1 tab(s) by mouth every 12 hours  Indication: for Pulmonary Embolism and SLE  MDD: 30mg  -- Indication: For Pulmonary embolism    oxyCODONE 5 mg oral tablet  -- 1 tab(s) by mouth 2 times a day, As needed, Moderate Pain (4 - 6)  MDD: 10 mg  Indication: pulmonary Embolism and SLE  -- Indication: For Pulmonary embolism    aspirin 81 mg oral tablet  -- 1 tab(s) by mouth once a day  -- Indication: For Pulmonary embolism    Pradaxa 150 mg oral capsule  -- 1 cap(s) by mouth 2 times a day  -- Indication: For Pulmonary embolism    Plaquenil 200 mg oral tablet  -- 1 tab(s) by mouth 2 times a day  -- Indication: For Systemic lupus erythematosus    famotidine 20 mg oral tablet  -- 1 tab(s) by mouth once a day, As Needed MDD:1  -- Indication: For for long term steroid use    Calcium 600+D 600 mg-200 intl units oral tablet  -- Take 1 tab by mouth daily  -- Indication: For MVI

## 2017-04-07 NOTE — DISCHARGE NOTE ADULT - HOSPITAL COURSE
26M w/ PMH of SLE c/b recurrent pleural effusions (requiring multiple chest tubes), lupus nephritis (class V, failed cellcept), seronegative APS, DVT/PE s/p RLE IVC filter placement, hx of failed AC (Xarelto, coumadin, Lovenox and Arixtra), b/l renal vein thrombosis and recent admission 2/17-2/27 for chest pain and headache admitted to medicine for pulmonary emboli seen on V/Q scan. Rheum and hematology was consulted, patient placed on hep gtt, and IV pain regimen to control the pain. During hospital course, patient had no respiratory complications, and no right heart strain. As per rheum and heme, continued on steroid dosing 20 mg BID and placed on home regimen meds for SLE. Upon further discussion with patient, it was found that patient has been NON-COMPLIANT with his anti-coagulation medication of pradaxa. Primary team, rheum., and hematology talked to patient about placing patient on coumadin-which he refused. Patient was placed back on pradaxa. Hospital course was also complicated by complaints of chest pain-cardiogenic sources were ruled out-CE x 3 negative, EKG with no acute changes. GI was consulted-as per GI, complaints of pain most likely secondary to GERD, patient to remain on PPI and to follow up outpatient as necessary. Patient did not want to take medrol 20mg BID, changed to 12mg BID-d/w with rheum.  Patient was slowly titrated down on IV pain regimen to po pain meds, and is stable for discharge.   Patient to follow up with rheum, heme, and pain management for further follow-up within one week of discharge. Patient to be discharged on 7 days of pain regimen tables (ER oxycontin 15mg q 12 7 day supply, and oxycodone 5 mg (two tabs as needed daily)- 7 day supply and c/w medrol 12mg BID and to follow up with rheum outpatient.    V/Q scan results: Moderate-sized partially mismatched/partially matched perfusion defect in posterior basal segment of left lower lobe, new or more conspicuous as compared to prior study dated 1/3/2017  iStop reference#: [id number]

## 2017-04-07 NOTE — DISCHARGE NOTE ADULT - CARE PROVIDERS DIRECT ADDRESSES
[email or web address]

## 2017-04-07 NOTE — DISCHARGE NOTE ADULT - MEDICATION SUMMARY - MEDICATIONS TO CHANGE
I will SWITCH the dose or number of times a day I take the medications listed below when I get home from the hospital:    methylPREDNISolone 4 mg oral tablet  -- 5 tab(s) by mouth 2 times a day

## 2017-04-07 NOTE — DISCHARGE NOTE ADULT - MEDICATION SUMMARY - MEDICATIONS TO STOP TAKING
I will STOP taking the medications listed below when I get home from the hospital:    sulfamethoxazole-trimethoprim 800 mg-160 mg oral tablet  -- 1 tab(s) by mouth every other day

## 2017-04-07 NOTE — DISCHARGE NOTE ADULT - PATIENT PORTAL LINK FT
“You can access the FollowHealth Patient Portal, offered by [facility], by registering with the following website: [email or web address]”

## 2017-04-17 ENCOUNTER — RX RENEWAL (OUTPATIENT)
Age: 27
End: 2017-04-17

## 2017-04-24 ENCOUNTER — INPATIENT (INPATIENT)
Facility: HOSPITAL | Age: 27
LOS: 15 days | Discharge: HOME | DRG: 176 | End: 2017-05-10
Attending: STUDENT IN AN ORGANIZED HEALTH CARE EDUCATION/TRAINING PROGRAM | Admitting: INTERNAL MEDICINE
Payer: MEDICAID

## 2017-04-24 ENCOUNTER — APPOINTMENT (OUTPATIENT)
Dept: PAIN MANAGEMENT | Facility: CLINIC | Age: 27
End: 2017-04-24

## 2017-04-24 VITALS
RESPIRATION RATE: 18 BRPM | DIASTOLIC BLOOD PRESSURE: 81 MMHG | OXYGEN SATURATION: 100 % | SYSTOLIC BLOOD PRESSURE: 138 MMHG | TEMPERATURE: 98 F | HEART RATE: 117 BPM

## 2017-04-24 VITALS
WEIGHT: 219 LBS | HEIGHT: 70 IN | SYSTOLIC BLOOD PRESSURE: 151 MMHG | HEART RATE: 125 BPM | BODY MASS INDEX: 31.35 KG/M2 | DIASTOLIC BLOOD PRESSURE: 87 MMHG

## 2017-04-24 DIAGNOSIS — Z95.828 PRESENCE OF OTHER VASCULAR IMPLANTS AND GRAFTS: Chronic | ICD-10-CM

## 2017-04-24 DIAGNOSIS — Z29.9 ENCOUNTER FOR PROPHYLACTIC MEASURES, UNSPECIFIED: ICD-10-CM

## 2017-04-24 DIAGNOSIS — I82.409 ACUTE EMBOLISM AND THROMBOSIS OF UNSPECIFIED DEEP VEINS OF UNSPECIFIED LOWER EXTREMITY: ICD-10-CM

## 2017-04-24 DIAGNOSIS — R07.9 CHEST PAIN, UNSPECIFIED: ICD-10-CM

## 2017-04-24 DIAGNOSIS — M32.9 SYSTEMIC LUPUS ERYTHEMATOSUS, UNSPECIFIED: ICD-10-CM

## 2017-04-24 DIAGNOSIS — Z98.890 OTHER SPECIFIED POSTPROCEDURAL STATES: Chronic | ICD-10-CM

## 2017-04-24 LAB
BASOPHILS # BLD AUTO: 0 K/UL — SIGNIFICANT CHANGE UP (ref 0–0.2)
BASOPHILS NFR BLD AUTO: 0.8 % — SIGNIFICANT CHANGE UP (ref 0–2)
CK MB BLD-MCNC: 1.9 % — SIGNIFICANT CHANGE UP (ref 0–3.5)
CK MB CFR SERPL CALC: 1 NG/ML — SIGNIFICANT CHANGE UP (ref 0–6.7)
CK MB CFR SERPL CALC: 1 NG/ML — SIGNIFICANT CHANGE UP (ref 0–6.7)
CK SERPL-CCNC: 36 U/L — SIGNIFICANT CHANGE UP (ref 30–200)
CK SERPL-CCNC: 52 U/L — SIGNIFICANT CHANGE UP (ref 30–200)
EOSINOPHIL # BLD AUTO: 0.1 K/UL — SIGNIFICANT CHANGE UP (ref 0–0.5)
EOSINOPHIL NFR BLD AUTO: 1.7 % — SIGNIFICANT CHANGE UP (ref 0–6)
GAS PNL BLDV: SIGNIFICANT CHANGE UP
HCT VFR BLD CALC: 24.1 % — LOW (ref 39–50)
HGB BLD-MCNC: 7.7 G/DL — LOW (ref 13–17)
INR BLD: 1.07 RATIO — SIGNIFICANT CHANGE UP (ref 0.88–1.16)
LYMPHOCYTES # BLD AUTO: 0.9 K/UL — LOW (ref 1–3.3)
LYMPHOCYTES # BLD AUTO: 18 % — SIGNIFICANT CHANGE UP (ref 13–44)
MCHC RBC-ENTMCNC: 22.4 PG — LOW (ref 27–34)
MCHC RBC-ENTMCNC: 32.1 GM/DL — SIGNIFICANT CHANGE UP (ref 32–36)
MCV RBC AUTO: 69.8 FL — LOW (ref 80–100)
MONOCYTES # BLD AUTO: 0.6 K/UL — SIGNIFICANT CHANGE UP (ref 0–0.9)
MONOCYTES NFR BLD AUTO: 12 % — SIGNIFICANT CHANGE UP (ref 2–14)
NEUTROPHILS # BLD AUTO: 3.2 K/UL — SIGNIFICANT CHANGE UP (ref 1.8–7.4)
NEUTROPHILS NFR BLD AUTO: 67.4 % — SIGNIFICANT CHANGE UP (ref 43–77)
NT-PROBNP SERPL-SCNC: 43 PG/ML — SIGNIFICANT CHANGE UP (ref 0–300)
PLATELET # BLD AUTO: 330 K/UL — SIGNIFICANT CHANGE UP (ref 150–400)
PROTHROM AB SERPL-ACNC: 11.7 SEC — SIGNIFICANT CHANGE UP (ref 9.8–12.7)
RBC # BLD: 3.45 M/UL — LOW (ref 4.2–5.8)
RBC # FLD: 20.6 % — HIGH (ref 10.3–14.5)
TROPONIN T SERPL-MCNC: <0.01 NG/ML — SIGNIFICANT CHANGE UP (ref 0–0.06)
TROPONIN T SERPL-MCNC: <0.01 NG/ML — SIGNIFICANT CHANGE UP (ref 0–0.06)
WBC # BLD: 4.8 K/UL — SIGNIFICANT CHANGE UP (ref 3.8–10.5)
WBC # FLD AUTO: 4.8 K/UL — SIGNIFICANT CHANGE UP (ref 3.8–10.5)

## 2017-04-24 PROCEDURE — 93010 ELECTROCARDIOGRAM REPORT: CPT

## 2017-04-24 PROCEDURE — 99285 EMERGENCY DEPT VISIT HI MDM: CPT | Mod: 25

## 2017-04-24 PROCEDURE — 93308 TTE F-UP OR LMTD: CPT | Mod: 26

## 2017-04-24 PROCEDURE — 99223 1ST HOSP IP/OBS HIGH 75: CPT | Mod: GC

## 2017-04-24 PROCEDURE — 71010: CPT | Mod: 26

## 2017-04-24 RX ORDER — HYDROMORPHONE HYDROCHLORIDE 2 MG/ML
1 INJECTION INTRAMUSCULAR; INTRAVENOUS; SUBCUTANEOUS ONCE
Qty: 0 | Refills: 0 | Status: DISCONTINUED | OUTPATIENT
Start: 2017-04-24 | End: 2017-04-24

## 2017-04-24 RX ORDER — HYDROMORPHONE HYDROCHLORIDE 2 MG/ML
2 INJECTION INTRAMUSCULAR; INTRAVENOUS; SUBCUTANEOUS EVERY 4 HOURS
Qty: 0 | Refills: 0 | Status: DISCONTINUED | OUTPATIENT
Start: 2017-04-24 | End: 2017-04-24

## 2017-04-24 RX ORDER — KETOROLAC TROMETHAMINE 30 MG/ML
15 SYRINGE (ML) INJECTION EVERY 8 HOURS
Qty: 0 | Refills: 0 | Status: DISCONTINUED | OUTPATIENT
Start: 2017-04-24 | End: 2017-04-25

## 2017-04-24 RX ORDER — OXYCODONE HYDROCHLORIDE 5 MG/1
15 TABLET ORAL EVERY 12 HOURS
Qty: 0 | Refills: 0 | Status: DISCONTINUED | OUTPATIENT
Start: 2017-04-24 | End: 2017-05-01

## 2017-04-24 RX ORDER — DILTIAZEM HYDROCHLORIDE 30 MG/1
30 TABLET ORAL
Qty: 60 | Refills: 0 | Status: DISCONTINUED | COMMUNITY
Start: 2017-02-16 | End: 2017-04-24

## 2017-04-24 RX ORDER — HYDROMORPHONE HYDROCHLORIDE 2 MG/ML
4 INJECTION INTRAMUSCULAR; INTRAVENOUS; SUBCUTANEOUS EVERY 4 HOURS
Qty: 0 | Refills: 0 | Status: DISCONTINUED | OUTPATIENT
Start: 2017-04-24 | End: 2017-04-28

## 2017-04-24 RX ORDER — DABIGATRAN ETEXILATE MESYLATE 150 MG/1
150 CAPSULE ORAL EVERY 12 HOURS
Qty: 0 | Refills: 0 | Status: DISCONTINUED | OUTPATIENT
Start: 2017-04-24 | End: 2017-04-26

## 2017-04-24 RX ORDER — FAMOTIDINE 10 MG/ML
20 INJECTION INTRAVENOUS DAILY
Qty: 0 | Refills: 0 | Status: DISCONTINUED | OUTPATIENT
Start: 2017-04-24 | End: 2017-04-26

## 2017-04-24 RX ORDER — ASPIRIN/CALCIUM CARB/MAGNESIUM 324 MG
81 TABLET ORAL DAILY
Qty: 0 | Refills: 0 | Status: DISCONTINUED | OUTPATIENT
Start: 2017-04-24 | End: 2017-04-27

## 2017-04-24 RX ORDER — HYDROXYCHLOROQUINE SULFATE 200 MG
200 TABLET ORAL
Qty: 0 | Refills: 0 | Status: DISCONTINUED | OUTPATIENT
Start: 2017-04-24 | End: 2017-05-10

## 2017-04-24 RX ORDER — SODIUM CHLORIDE 9 MG/ML
500 INJECTION INTRAMUSCULAR; INTRAVENOUS; SUBCUTANEOUS ONCE
Qty: 0 | Refills: 0 | Status: COMPLETED | OUTPATIENT
Start: 2017-04-24 | End: 2017-04-24

## 2017-04-24 RX ORDER — RITUXIMAB 10 MG/ML
500 INJECTION, SOLUTION INTRAVENOUS
Qty: 100 | Refills: 1 | Status: DISCONTINUED | OUTPATIENT
Start: 2017-01-09 | End: 2017-04-24

## 2017-04-24 RX ORDER — HYDROMORPHONE HYDROCHLORIDE 2 MG/ML
2 INJECTION INTRAMUSCULAR; INTRAVENOUS; SUBCUTANEOUS EVERY 4 HOURS
Qty: 0 | Refills: 0 | Status: DISCONTINUED | OUTPATIENT
Start: 2017-04-24 | End: 2017-04-25

## 2017-04-24 RX ORDER — HYDROMORPHONE HYDROCHLORIDE 2 MG/ML
1 INJECTION INTRAMUSCULAR; INTRAVENOUS; SUBCUTANEOUS EVERY 4 HOURS
Qty: 0 | Refills: 0 | Status: DISCONTINUED | OUTPATIENT
Start: 2017-04-24 | End: 2017-04-24

## 2017-04-24 RX ADMIN — HYDROMORPHONE HYDROCHLORIDE 1 MILLIGRAM(S): 2 INJECTION INTRAMUSCULAR; INTRAVENOUS; SUBCUTANEOUS at 18:18

## 2017-04-24 RX ADMIN — HYDROMORPHONE HYDROCHLORIDE 2 MILLIGRAM(S): 2 INJECTION INTRAMUSCULAR; INTRAVENOUS; SUBCUTANEOUS at 22:09

## 2017-04-24 RX ADMIN — Medication 81 MILLIGRAM(S): at 23:19

## 2017-04-24 RX ADMIN — HYDROMORPHONE HYDROCHLORIDE 1 MILLIGRAM(S): 2 INJECTION INTRAMUSCULAR; INTRAVENOUS; SUBCUTANEOUS at 19:40

## 2017-04-24 RX ADMIN — HYDROMORPHONE HYDROCHLORIDE 1 MILLIGRAM(S): 2 INJECTION INTRAMUSCULAR; INTRAVENOUS; SUBCUTANEOUS at 19:10

## 2017-04-24 RX ADMIN — SODIUM CHLORIDE 1000 MILLILITER(S): 9 INJECTION INTRAMUSCULAR; INTRAVENOUS; SUBCUTANEOUS at 18:19

## 2017-04-24 RX ADMIN — Medication 12 MILLIGRAM(S): at 21:05

## 2017-04-24 RX ADMIN — Medication 200 MILLIGRAM(S): at 21:05

## 2017-04-24 RX ADMIN — HYDROMORPHONE HYDROCHLORIDE 2 MILLIGRAM(S): 2 INJECTION INTRAMUSCULAR; INTRAVENOUS; SUBCUTANEOUS at 21:39

## 2017-04-24 RX ADMIN — HYDROMORPHONE HYDROCHLORIDE 1 MILLIGRAM(S): 2 INJECTION INTRAMUSCULAR; INTRAVENOUS; SUBCUTANEOUS at 15:59

## 2017-04-24 RX ADMIN — HYDROMORPHONE HYDROCHLORIDE 1 MILLIGRAM(S): 2 INJECTION INTRAMUSCULAR; INTRAVENOUS; SUBCUTANEOUS at 15:10

## 2017-04-24 RX ADMIN — HYDROMORPHONE HYDROCHLORIDE 1 MILLIGRAM(S): 2 INJECTION INTRAMUSCULAR; INTRAVENOUS; SUBCUTANEOUS at 14:00

## 2017-04-24 RX ADMIN — HYDROMORPHONE HYDROCHLORIDE 1 MILLIGRAM(S): 2 INJECTION INTRAMUSCULAR; INTRAVENOUS; SUBCUTANEOUS at 15:50

## 2017-04-24 RX ADMIN — HYDROMORPHONE HYDROCHLORIDE 1 MILLIGRAM(S): 2 INJECTION INTRAMUSCULAR; INTRAVENOUS; SUBCUTANEOUS at 14:56

## 2017-04-24 RX ADMIN — FAMOTIDINE 20 MILLIGRAM(S): 10 INJECTION INTRAVENOUS at 23:19

## 2017-04-24 RX ADMIN — DABIGATRAN ETEXILATE MESYLATE 150 MILLIGRAM(S): 150 CAPSULE ORAL at 23:18

## 2017-04-24 NOTE — H&P ADULT. - PROBLEM SELECTOR PLAN 2
Pt s/p IVC filter. Will continue with Pradaxa for now. Re-addressed coumadin with patient, who is reluctant to restart coumadin, as he has failed it in the past.

## 2017-04-24 NOTE — ED ADULT NURSE REASSESSMENT NOTE - NS ED NURSE REASSESS COMMENT FT1
1908: Report received from FOZIA Mckeon on the Red side.  Patient has been clicked RTM in teletracking and pending bed assignment.  Patient given IV dilaudid as ordered by FOZIA Mckeon.

## 2017-04-24 NOTE — H&P ADULT. - PROBLEM SELECTOR PLAN 3
Will continue plaquenil for now. Will check an anti-DSDNA ab level in the morning to rule out lupus flare. Will reach out to rheumatologist in the morning. Will continue plaquenil and Medron for now. Will check an anti-DSDNA ab level in the morning to rule out lupus flare. Will reach out to rheumatologist in the morning. Will continue plaquenil and Medron for now. Will check an anti-DSDNA ab level in the morning to assess for disease activity/lupus flare. Will reach out to rheumatologist in the morning.

## 2017-04-24 NOTE — ED ADULT NURSE REASSESSMENT NOTE - NS ED NURSE REASSESS COMMENT FT1
Report called to FOZIA Acosta on 2 [facility].  Still waiting on medications from pharmacy, spoke with Marv again.  Still waiting.  RN upstairs aware of this. Report called to FOZIA Acosta on 2 [facility].  Still waiting on medications from pharmacy, spoke with Marv again.  Still waiting.  RN upstairs aware of this.    2030: Patient refused pain meds as ordered.  He states he wanted to see admitting MD first.  He also states his wife spoke to admitting MD on the phone and the MD is waiting for his arrival on the floor.  FOZIA Acosta aware of patient declining ordered pain meds.

## 2017-04-24 NOTE — H&P ADULT. - RADIOLOGY RESULTS AND INTERPRETATION
2D Echo showed no pericardial effusion or pleural effusions. 2D Echo showed no pericardial effusion or pleural effusions.    CXR reviewed- clear lungs

## 2017-04-24 NOTE — ED PROVIDER NOTE - PROGRESS NOTE DETAILS
[employer]:  Received signout from previous attending with patient in stable condition.  Plan TBA for lupus/PE hx on pradaxa/tachy with CP, echo/tba, no RV strain/pericardial effusion [employer]:  Pt stable for admission.  Resident discussed with Dr. Ross following signout.  Pt admitted with persistent tachycardia, anticoagulated.  Stable for admission.  Tachy improves with pain control.

## 2017-04-24 NOTE — H&P ADULT. - PROBLEM SELECTOR PLAN 1
Pt's chest pain most likely 2/2 inflammatory process such as pericarditis and less likely 2/2 recurrent PE given lack of hypoxia, shortness of breath, cough, or new DVT on exam.  - Pain control with home regimen, in addition will add PRN Dilaudid for breakthrough pain, and will order a trial of Toradol as kidney function is normal.   - Low suspicion for PE at this point, will hold off on VQ scan for now. Will contact rheumatologist in the morning Pt's chest pain most likely 2/2 inflammatory process such as pericarditis and less likely 2/2 recurrent PE given lack of hypoxia, shortness of breath, cough, or new DVT on exam.  - Pain control with home regimen, in addition will add PRN Dilaudid for breakthrough pain, and will order a trial of Toradol as kidney function is normal.   - Low suspicion for PE at this point, will hold off on VQ scan for now. Will contact rheumatologist in the morning  - Low suspicion for ACS, trops negative x1, will obtain another set tonight. Pt's chest pain most likely 2/2 inflammatory process such as pericarditis vs costochondritis vs ? lupus flare and less likely 2/2 recurrent PE given lack of hypoxia, shortness of breath, cough, or new DVT on exam. Also suspect component of chronic pain contributing to current presentation. Last admission pt was advised to switch to coumadin for AC and refused- discussed again on admission and pt continues to refuse.  - Pain control with home regimen, in addition will add PRN Dilaudid for breakthrough pain, and will order a trial of Toradol as kidney function is normal.   - Low suspicion for PE at this point, will hold off on VQ scan for now. Will contact rheumatologist in the morning  - Low suspicion for ACS, trops negative x1, will obtain another set tonight. EKG within normal limits other than sinus tachycardia  -will add NSAIDs to pain regimen given suspicion for inflammatory process

## 2017-04-24 NOTE — ED PROVIDER NOTE - PHYSICAL EXAMINATION
Gen: uncomfortable appearing, AOx3  Head: NCAT  Lung: CTAB, no respiratory distress, no wheezing, rales, rhonchi  CV: Tachycardic, regular rhythm, no murmurs, Normal perfusion, pulses 2+ throughout  Abd: soft, NTND  MSK: No edema, no visible deformities, full range of motion in all 4 extremities  Neuro: CN II-XII grossly intact, No focal neurologic deficits  Skin: No rash   Rectal: Patient declined rectal exam

## 2017-04-24 NOTE — ED PROVIDER NOTE - OBJECTIVE STATEMENT
27yo male PMH lupus, membranous glomerulonephritis, pulmonary embolism on pradaxa s/p IVC filter 2015, p/w chest pain worse with laying down    Pain management: Dr. Monsivais  PMD/Rheumatologist: Dr. Annabella Estrella 25yo male PMH lupus, membranous glomerulonephritis, pulmonary embolism on pradaxa s/p IVC filter 2015, p/w chest pain worse with laying down, feels sharp, left-sided, a/w shortness of breath. Patient has long history of pleural effusions and was recently discharged from [facility] for pleural effusion. No fevers or chills, no nausea or vomiting. Went to his pain management physician today who was concerned and sent him to ED for evaluation.     Pain management: Dr. Monsivais  PMD/Rheumatologist: Dr. Annabella Estrella

## 2017-04-24 NOTE — H&P ADULT. - HISTORY OF PRESENT ILLNESS
25 y/o M with PMH of lupus complicated by recurrent pleural effusions requiring multiple chest tubes, lupus nephritis (class V, failed cellcept therapy), seronegative APS, DVT/PE diagnosed in Feb 2017 s/p RLE IVC filter placement, hx of failed AC (Xarelto, Coumadin, Lovenox, and Arixtra), b/l renal vein thrombosis, with many recent hospitalizations for chest pain, now presenting with left sided chest pain. Pt states that his chest pain worsened last night, it is left sided, and sharp in nature with no radiation. Pt's pain is exacerbated by lying flat and by taking deep breaths. Pain is somewhat relieved when pt is sitting forward. Pt states that he went to see his pain management doctor today, who sent him into the hospital due to his severe pain. Patient is otherwise not having any fevers, chills, shortness of breath, lower extremity edema, nausea, vomiting, diarrhea, or constipation.     Pt is requesting IV dilaudid at this time as he does during each hospitalization.     In the ER, pt received 4mg IV dilaudid and a 500 cc IV bolus.   In the ER, pt's vitals were 98.2, , /79, RR 14, satting 100% on room air.

## 2017-04-24 NOTE — H&P ADULT. - EKG AND INTERPRETATION
EKG showed sinus tachycardia at 113. EKG reviewed showed sinus tachycardia at 113. no acute ischemic changes

## 2017-04-24 NOTE — ED ADULT NURSE REASSESSMENT NOTE - NS ED NURSE REASSESS COMMENT FT1
Pt still c/o chest pain rated 7 and RN adm another dose Dilaudid 1mg IVP (total dose this far is 3mg).

## 2017-04-24 NOTE — ED ADULT NURSE REASSESSMENT NOTE - NS ED NURSE REASSESS COMMENT FT1
Pt to [facility]. Transport at bedside to take patient to nuclear medicine.  ED Resident at bedside.

## 2017-04-24 NOTE — H&P ADULT. - RS GEN PE MLT RESP DETAILS PC
good air movement/no rales/breath sounds equal/no wheezes/no rhonchi/clear to auscultation bilaterally/respirations non-labored

## 2017-04-24 NOTE — ED PROVIDER NOTE - MEDICAL DECISION MAKING DETAILS
[age] yo male, lupus glomerulonephritis pe on pradaxa, ivc filter presents with pleuritic chest pain tachycardia; sob; no fevers; on exam mild distress, lungs cta, tachycardia; acw tender to palpation, nontender abdomen, no le edema; concern for worsening pe; bedside echo, labs, troponin, cxr, v/q, admission

## 2017-04-25 PROCEDURE — 99233 SBSQ HOSP IP/OBS HIGH 50: CPT | Mod: GC

## 2017-04-25 RX ORDER — HYDROMORPHONE HYDROCHLORIDE 2 MG/ML
2 INJECTION INTRAMUSCULAR; INTRAVENOUS; SUBCUTANEOUS EVERY 4 HOURS
Qty: 0 | Refills: 0 | Status: DISCONTINUED | OUTPATIENT
Start: 2017-04-25 | End: 2017-05-02

## 2017-04-25 RX ORDER — PROCHLORPERAZINE MALEATE 5 MG
10 TABLET ORAL ONCE
Qty: 0 | Refills: 0 | Status: COMPLETED | OUTPATIENT
Start: 2017-04-25 | End: 2017-04-25

## 2017-04-25 RX ADMIN — HYDROMORPHONE HYDROCHLORIDE 4 MILLIGRAM(S): 2 INJECTION INTRAMUSCULAR; INTRAVENOUS; SUBCUTANEOUS at 14:44

## 2017-04-25 RX ADMIN — HYDROMORPHONE HYDROCHLORIDE 2 MILLIGRAM(S): 2 INJECTION INTRAMUSCULAR; INTRAVENOUS; SUBCUTANEOUS at 22:25

## 2017-04-25 RX ADMIN — HYDROMORPHONE HYDROCHLORIDE 2 MILLIGRAM(S): 2 INJECTION INTRAMUSCULAR; INTRAVENOUS; SUBCUTANEOUS at 12:39

## 2017-04-25 RX ADMIN — HYDROMORPHONE HYDROCHLORIDE 2 MILLIGRAM(S): 2 INJECTION INTRAMUSCULAR; INTRAVENOUS; SUBCUTANEOUS at 17:08

## 2017-04-25 RX ADMIN — HYDROMORPHONE HYDROCHLORIDE 2 MILLIGRAM(S): 2 INJECTION INTRAMUSCULAR; INTRAVENOUS; SUBCUTANEOUS at 07:45

## 2017-04-25 RX ADMIN — HYDROMORPHONE HYDROCHLORIDE 4 MILLIGRAM(S): 2 INJECTION INTRAMUSCULAR; INTRAVENOUS; SUBCUTANEOUS at 10:21

## 2017-04-25 RX ADMIN — DABIGATRAN ETEXILATE MESYLATE 150 MILLIGRAM(S): 150 CAPSULE ORAL at 21:50

## 2017-04-25 RX ADMIN — HYDROMORPHONE HYDROCHLORIDE 4 MILLIGRAM(S): 2 INJECTION INTRAMUSCULAR; INTRAVENOUS; SUBCUTANEOUS at 23:55

## 2017-04-25 RX ADMIN — HYDROMORPHONE HYDROCHLORIDE 4 MILLIGRAM(S): 2 INJECTION INTRAMUSCULAR; INTRAVENOUS; SUBCUTANEOUS at 00:01

## 2017-04-25 RX ADMIN — HYDROMORPHONE HYDROCHLORIDE 4 MILLIGRAM(S): 2 INJECTION INTRAMUSCULAR; INTRAVENOUS; SUBCUTANEOUS at 06:11

## 2017-04-25 RX ADMIN — Medication 15 MILLIGRAM(S): at 09:22

## 2017-04-25 RX ADMIN — Medication 10 MILLIGRAM(S): at 18:47

## 2017-04-25 RX ADMIN — HYDROMORPHONE HYDROCHLORIDE 4 MILLIGRAM(S): 2 INJECTION INTRAMUSCULAR; INTRAVENOUS; SUBCUTANEOUS at 23:21

## 2017-04-25 RX ADMIN — HYDROMORPHONE HYDROCHLORIDE 4 MILLIGRAM(S): 2 INJECTION INTRAMUSCULAR; INTRAVENOUS; SUBCUTANEOUS at 05:41

## 2017-04-25 RX ADMIN — HYDROMORPHONE HYDROCHLORIDE 4 MILLIGRAM(S): 2 INJECTION INTRAMUSCULAR; INTRAVENOUS; SUBCUTANEOUS at 18:47

## 2017-04-25 RX ADMIN — Medication 81 MILLIGRAM(S): at 12:03

## 2017-04-25 RX ADMIN — Medication 12 MILLIGRAM(S): at 09:14

## 2017-04-25 RX ADMIN — Medication 200 MILLIGRAM(S): at 17:09

## 2017-04-25 RX ADMIN — HYDROMORPHONE HYDROCHLORIDE 4 MILLIGRAM(S): 2 INJECTION INTRAMUSCULAR; INTRAVENOUS; SUBCUTANEOUS at 19:20

## 2017-04-25 RX ADMIN — HYDROMORPHONE HYDROCHLORIDE 2 MILLIGRAM(S): 2 INJECTION INTRAMUSCULAR; INTRAVENOUS; SUBCUTANEOUS at 21:51

## 2017-04-25 RX ADMIN — HYDROMORPHONE HYDROCHLORIDE 4 MILLIGRAM(S): 2 INJECTION INTRAMUSCULAR; INTRAVENOUS; SUBCUTANEOUS at 00:31

## 2017-04-25 RX ADMIN — HYDROMORPHONE HYDROCHLORIDE 4 MILLIGRAM(S): 2 INJECTION INTRAMUSCULAR; INTRAVENOUS; SUBCUTANEOUS at 15:14

## 2017-04-25 RX ADMIN — HYDROMORPHONE HYDROCHLORIDE 2 MILLIGRAM(S): 2 INJECTION INTRAMUSCULAR; INTRAVENOUS; SUBCUTANEOUS at 13:09

## 2017-04-25 RX ADMIN — HYDROMORPHONE HYDROCHLORIDE 2 MILLIGRAM(S): 2 INJECTION INTRAMUSCULAR; INTRAVENOUS; SUBCUTANEOUS at 08:01

## 2017-04-25 RX ADMIN — DABIGATRAN ETEXILATE MESYLATE 150 MILLIGRAM(S): 150 CAPSULE ORAL at 12:03

## 2017-04-25 RX ADMIN — Medication 15 MILLIGRAM(S): at 09:52

## 2017-04-25 RX ADMIN — FAMOTIDINE 20 MILLIGRAM(S): 10 INJECTION INTRAVENOUS at 12:03

## 2017-04-25 RX ADMIN — HYDROMORPHONE HYDROCHLORIDE 2 MILLIGRAM(S): 2 INJECTION INTRAMUSCULAR; INTRAVENOUS; SUBCUTANEOUS at 17:38

## 2017-04-25 RX ADMIN — Medication 200 MILLIGRAM(S): at 09:14

## 2017-04-25 RX ADMIN — HYDROMORPHONE HYDROCHLORIDE 4 MILLIGRAM(S): 2 INJECTION INTRAMUSCULAR; INTRAVENOUS; SUBCUTANEOUS at 10:51

## 2017-04-25 RX ADMIN — Medication 12 MILLIGRAM(S): at 17:09

## 2017-04-26 LAB
ANION GAP SERPL CALC-SCNC: 11 MMOL/L — SIGNIFICANT CHANGE UP (ref 5–17)
APTT BLD: 44 SEC — HIGH (ref 27.5–37.4)
APTT BLD: 84.8 SEC — HIGH (ref 27.5–37.4)
BUN SERPL-MCNC: 14 MG/DL — SIGNIFICANT CHANGE UP (ref 7–23)
CALCIUM SERPL-MCNC: 8.6 MG/DL — SIGNIFICANT CHANGE UP (ref 8.4–10.5)
CHLORIDE SERPL-SCNC: 105 MMOL/L — SIGNIFICANT CHANGE UP (ref 96–108)
CO2 SERPL-SCNC: 26 MMOL/L — SIGNIFICANT CHANGE UP (ref 22–31)
CREAT SERPL-MCNC: 0.61 MG/DL — SIGNIFICANT CHANGE UP (ref 0.5–1.3)
DSDNA AB SER-ACNC: 646 IU/ML — HIGH
GLUCOSE SERPL-MCNC: 107 MG/DL — HIGH (ref 70–99)
HCT VFR BLD CALC: 24.9 % — LOW (ref 39–50)
HGB BLD-MCNC: 7.2 G/DL — LOW (ref 13–17)
MCHC RBC-ENTMCNC: 20.7 PG — LOW (ref 27–34)
MCHC RBC-ENTMCNC: 28.9 GM/DL — LOW (ref 32–36)
MCV RBC AUTO: 71.8 FL — LOW (ref 80–100)
PLATELET # BLD AUTO: 357 K/UL — SIGNIFICANT CHANGE UP (ref 150–400)
POTASSIUM SERPL-MCNC: 4.9 MMOL/L — SIGNIFICANT CHANGE UP (ref 3.5–5.3)
POTASSIUM SERPL-SCNC: 4.9 MMOL/L — SIGNIFICANT CHANGE UP (ref 3.5–5.3)
RBC # BLD: 3.47 M/UL — LOW (ref 4.2–5.8)
RBC # FLD: 18.7 % — HIGH (ref 10.3–14.5)
SODIUM SERPL-SCNC: 142 MMOL/L — SIGNIFICANT CHANGE UP (ref 135–145)
WBC # BLD: 4.24 K/UL — SIGNIFICANT CHANGE UP (ref 3.8–10.5)
WBC # FLD AUTO: 4.24 K/UL — SIGNIFICANT CHANGE UP (ref 3.8–10.5)

## 2017-04-26 PROCEDURE — 99233 SBSQ HOSP IP/OBS HIGH 50: CPT | Mod: GC

## 2017-04-26 RX ORDER — FAMOTIDINE 10 MG/ML
20 INJECTION INTRAVENOUS
Qty: 0 | Refills: 0 | Status: DISCONTINUED | OUTPATIENT
Start: 2017-04-26 | End: 2017-05-03

## 2017-04-26 RX ORDER — HEPARIN SODIUM 5000 [USP'U]/ML
4000 INJECTION INTRAVENOUS; SUBCUTANEOUS EVERY 6 HOURS
Qty: 0 | Refills: 0 | Status: DISCONTINUED | OUTPATIENT
Start: 2017-04-26 | End: 2017-05-08

## 2017-04-26 RX ORDER — HEPARIN SODIUM 5000 [USP'U]/ML
INJECTION INTRAVENOUS; SUBCUTANEOUS
Qty: 25000 | Refills: 0 | Status: DISCONTINUED | OUTPATIENT
Start: 2017-04-26 | End: 2017-05-03

## 2017-04-26 RX ORDER — FAMOTIDINE 10 MG/ML
20 INJECTION INTRAVENOUS DAILY
Qty: 0 | Refills: 0 | Status: DISCONTINUED | OUTPATIENT
Start: 2017-04-26 | End: 2017-04-26

## 2017-04-26 RX ORDER — FAMOTIDINE 10 MG/ML
20 INJECTION INTRAVENOUS DAILY
Qty: 0 | Refills: 0 | Status: DISCONTINUED | OUTPATIENT
Start: 2017-04-26 | End: 2017-04-27

## 2017-04-26 RX ORDER — HEPARIN SODIUM 5000 [USP'U]/ML
8000 INJECTION INTRAVENOUS; SUBCUTANEOUS EVERY 6 HOURS
Qty: 0 | Refills: 0 | Status: DISCONTINUED | OUTPATIENT
Start: 2017-04-26 | End: 2017-05-08

## 2017-04-26 RX ORDER — PROCHLORPERAZINE MALEATE 5 MG
10 TABLET ORAL ONCE
Qty: 0 | Refills: 0 | Status: COMPLETED | OUTPATIENT
Start: 2017-04-26 | End: 2017-04-26

## 2017-04-26 RX ADMIN — HYDROMORPHONE HYDROCHLORIDE 4 MILLIGRAM(S): 2 INJECTION INTRAMUSCULAR; INTRAVENOUS; SUBCUTANEOUS at 23:11

## 2017-04-26 RX ADMIN — HYDROMORPHONE HYDROCHLORIDE 4 MILLIGRAM(S): 2 INJECTION INTRAMUSCULAR; INTRAVENOUS; SUBCUTANEOUS at 04:10

## 2017-04-26 RX ADMIN — Medication 32 MILLIGRAM(S): at 21:52

## 2017-04-26 RX ADMIN — HYDROMORPHONE HYDROCHLORIDE 2 MILLIGRAM(S): 2 INJECTION INTRAMUSCULAR; INTRAVENOUS; SUBCUTANEOUS at 19:58

## 2017-04-26 RX ADMIN — FAMOTIDINE 20 MILLIGRAM(S): 10 INJECTION INTRAVENOUS at 22:03

## 2017-04-26 RX ADMIN — HYDROMORPHONE HYDROCHLORIDE 4 MILLIGRAM(S): 2 INJECTION INTRAMUSCULAR; INTRAVENOUS; SUBCUTANEOUS at 18:02

## 2017-04-26 RX ADMIN — HYDROMORPHONE HYDROCHLORIDE 4 MILLIGRAM(S): 2 INJECTION INTRAMUSCULAR; INTRAVENOUS; SUBCUTANEOUS at 23:45

## 2017-04-26 RX ADMIN — HEPARIN SODIUM 1800 UNIT(S)/HR: 5000 INJECTION INTRAVENOUS; SUBCUTANEOUS at 22:50

## 2017-04-26 RX ADMIN — HYDROMORPHONE HYDROCHLORIDE 4 MILLIGRAM(S): 2 INJECTION INTRAMUSCULAR; INTRAVENOUS; SUBCUTANEOUS at 13:02

## 2017-04-26 RX ADMIN — HYDROMORPHONE HYDROCHLORIDE 4 MILLIGRAM(S): 2 INJECTION INTRAMUSCULAR; INTRAVENOUS; SUBCUTANEOUS at 17:32

## 2017-04-26 RX ADMIN — OXYCODONE HYDROCHLORIDE 15 MILLIGRAM(S): 5 TABLET ORAL at 21:59

## 2017-04-26 RX ADMIN — HYDROMORPHONE HYDROCHLORIDE 2 MILLIGRAM(S): 2 INJECTION INTRAMUSCULAR; INTRAVENOUS; SUBCUTANEOUS at 06:31

## 2017-04-26 RX ADMIN — Medication 12 MILLIGRAM(S): at 17:32

## 2017-04-26 RX ADMIN — Medication 81 MILLIGRAM(S): at 11:10

## 2017-04-26 RX ADMIN — HYDROMORPHONE HYDROCHLORIDE 4 MILLIGRAM(S): 2 INJECTION INTRAMUSCULAR; INTRAVENOUS; SUBCUTANEOUS at 12:32

## 2017-04-26 RX ADMIN — HYDROMORPHONE HYDROCHLORIDE 2 MILLIGRAM(S): 2 INJECTION INTRAMUSCULAR; INTRAVENOUS; SUBCUTANEOUS at 20:30

## 2017-04-26 RX ADMIN — HYDROMORPHONE HYDROCHLORIDE 4 MILLIGRAM(S): 2 INJECTION INTRAMUSCULAR; INTRAVENOUS; SUBCUTANEOUS at 03:36

## 2017-04-26 RX ADMIN — OXYCODONE HYDROCHLORIDE 15 MILLIGRAM(S): 5 TABLET ORAL at 10:04

## 2017-04-26 RX ADMIN — HYDROMORPHONE HYDROCHLORIDE 4 MILLIGRAM(S): 2 INJECTION INTRAMUSCULAR; INTRAVENOUS; SUBCUTANEOUS at 08:30

## 2017-04-26 RX ADMIN — HYDROMORPHONE HYDROCHLORIDE 4 MILLIGRAM(S): 2 INJECTION INTRAMUSCULAR; INTRAVENOUS; SUBCUTANEOUS at 09:00

## 2017-04-26 RX ADMIN — HYDROMORPHONE HYDROCHLORIDE 2 MILLIGRAM(S): 2 INJECTION INTRAMUSCULAR; INTRAVENOUS; SUBCUTANEOUS at 15:45

## 2017-04-26 RX ADMIN — FAMOTIDINE 20 MILLIGRAM(S): 10 INJECTION INTRAVENOUS at 06:59

## 2017-04-26 RX ADMIN — HYDROMORPHONE HYDROCHLORIDE 2 MILLIGRAM(S): 2 INJECTION INTRAMUSCULAR; INTRAVENOUS; SUBCUTANEOUS at 01:52

## 2017-04-26 RX ADMIN — Medication 200 MILLIGRAM(S): at 08:33

## 2017-04-26 RX ADMIN — Medication 200 MILLIGRAM(S): at 17:32

## 2017-04-26 RX ADMIN — HYDROMORPHONE HYDROCHLORIDE 2 MILLIGRAM(S): 2 INJECTION INTRAMUSCULAR; INTRAVENOUS; SUBCUTANEOUS at 02:25

## 2017-04-26 RX ADMIN — DABIGATRAN ETEXILATE MESYLATE 150 MILLIGRAM(S): 150 CAPSULE ORAL at 06:59

## 2017-04-26 RX ADMIN — HYDROMORPHONE HYDROCHLORIDE 2 MILLIGRAM(S): 2 INJECTION INTRAMUSCULAR; INTRAVENOUS; SUBCUTANEOUS at 11:39

## 2017-04-26 RX ADMIN — Medication 10 MILLIGRAM(S): at 14:57

## 2017-04-26 RX ADMIN — HEPARIN SODIUM 1800 UNIT(S)/HR: 5000 INJECTION INTRAVENOUS; SUBCUTANEOUS at 15:42

## 2017-04-26 RX ADMIN — Medication 12 MILLIGRAM(S): at 06:59

## 2017-04-26 RX ADMIN — HYDROMORPHONE HYDROCHLORIDE 2 MILLIGRAM(S): 2 INJECTION INTRAMUSCULAR; INTRAVENOUS; SUBCUTANEOUS at 11:09

## 2017-04-26 RX ADMIN — OXYCODONE HYDROCHLORIDE 15 MILLIGRAM(S): 5 TABLET ORAL at 10:34

## 2017-04-26 RX ADMIN — HYDROMORPHONE HYDROCHLORIDE 2 MILLIGRAM(S): 2 INJECTION INTRAMUSCULAR; INTRAVENOUS; SUBCUTANEOUS at 07:01

## 2017-04-26 RX ADMIN — OXYCODONE HYDROCHLORIDE 15 MILLIGRAM(S): 5 TABLET ORAL at 22:35

## 2017-04-26 RX ADMIN — FAMOTIDINE 20 MILLIGRAM(S): 10 INJECTION INTRAVENOUS at 17:32

## 2017-04-26 RX ADMIN — HYDROMORPHONE HYDROCHLORIDE 2 MILLIGRAM(S): 2 INJECTION INTRAMUSCULAR; INTRAVENOUS; SUBCUTANEOUS at 16:15

## 2017-04-27 LAB
APPEARANCE UR: CLEAR — SIGNIFICANT CHANGE UP
APTT BLD: 90.7 SEC — HIGH (ref 27.5–37.4)
BILIRUB UR-MCNC: NEGATIVE — SIGNIFICANT CHANGE UP
COLOR SPEC: YELLOW — SIGNIFICANT CHANGE UP
DIFF PNL FLD: ABNORMAL
FERRITIN SERPL-MCNC: 6.2 NG/ML — LOW (ref 30–400)
GLUCOSE UR QL: NEGATIVE MG/DL — SIGNIFICANT CHANGE UP
HAPTOGLOB SERPL-MCNC: 332 MG/DL — HIGH (ref 34–200)
HCT VFR BLD CALC: 23.8 % — LOW (ref 39–50)
HGB BLD-MCNC: 7.1 G/DL — LOW (ref 13–17)
IRON SATN MFR SERPL: 17 UG/DL — LOW (ref 45–165)
IRON SATN MFR SERPL: 7 % — LOW (ref 16–55)
KETONES UR-MCNC: NEGATIVE — SIGNIFICANT CHANGE UP
LDH SERPL L TO P-CCNC: 148 U/L — SIGNIFICANT CHANGE UP (ref 50–242)
LEUKOCYTE ESTERASE UR-ACNC: NEGATIVE — SIGNIFICANT CHANGE UP
MCHC RBC-ENTMCNC: 21.3 PG — LOW (ref 27–34)
MCHC RBC-ENTMCNC: 29.8 GM/DL — LOW (ref 32–36)
MCV RBC AUTO: 71.3 FL — LOW (ref 80–100)
NITRITE UR-MCNC: NEGATIVE — SIGNIFICANT CHANGE UP
PH UR: 6.5 — SIGNIFICANT CHANGE UP (ref 5–8)
PLATELET # BLD AUTO: 458 K/UL — HIGH (ref 150–400)
PROT UR-MCNC: >300 MG/DL
RAPID RVP RESULT: SIGNIFICANT CHANGE UP
RBC # BLD: 3.34 M/UL — LOW (ref 4.2–5.8)
RBC # BLD: 3.34 M/UL — LOW (ref 4.2–5.8)
RBC # FLD: 18.9 % — HIGH (ref 10.3–14.5)
RETICS #: 78.5 K/UL — SIGNIFICANT CHANGE UP (ref 25–125)
RETICS/RBC NFR: 2.4 % — SIGNIFICANT CHANGE UP (ref 0.5–2.5)
SP GR SPEC: 1.05 — HIGH (ref 1.01–1.02)
TIBC SERPL-MCNC: 253 UG/DL — SIGNIFICANT CHANGE UP (ref 220–430)
UIBC SERPL-MCNC: 236 UG/DL — SIGNIFICANT CHANGE UP (ref 110–370)
UROBILINOGEN FLD QL: NEGATIVE MG/DL — SIGNIFICANT CHANGE UP
WBC # BLD: 6.07 K/UL — SIGNIFICANT CHANGE UP (ref 3.8–10.5)
WBC # FLD AUTO: 6.07 K/UL — SIGNIFICANT CHANGE UP (ref 3.8–10.5)

## 2017-04-27 PROCEDURE — 99222 1ST HOSP IP/OBS MODERATE 55: CPT | Mod: GC

## 2017-04-27 PROCEDURE — 99233 SBSQ HOSP IP/OBS HIGH 50: CPT | Mod: GC

## 2017-04-27 PROCEDURE — 99223 1ST HOSP IP/OBS HIGH 75: CPT | Mod: GC

## 2017-04-27 PROCEDURE — 71275 CT ANGIOGRAPHY CHEST: CPT | Mod: 26

## 2017-04-27 RX ORDER — WARFARIN SODIUM 2.5 MG/1
5 TABLET ORAL ONCE
Qty: 0 | Refills: 0 | Status: DISCONTINUED | OUTPATIENT
Start: 2017-04-27 | End: 2017-04-27

## 2017-04-27 RX ORDER — ASPIRIN/CALCIUM CARB/MAGNESIUM 324 MG
81 TABLET ORAL DAILY
Qty: 0 | Refills: 0 | Status: DISCONTINUED | OUTPATIENT
Start: 2017-04-27 | End: 2017-04-30

## 2017-04-27 RX ORDER — DIPHENHYDRAMINE HCL 50 MG
50 CAPSULE ORAL ONCE
Qty: 0 | Refills: 0 | Status: COMPLETED | OUTPATIENT
Start: 2017-04-27 | End: 2017-04-27

## 2017-04-27 RX ADMIN — HYDROMORPHONE HYDROCHLORIDE 4 MILLIGRAM(S): 2 INJECTION INTRAMUSCULAR; INTRAVENOUS; SUBCUTANEOUS at 14:52

## 2017-04-27 RX ADMIN — OXYCODONE HYDROCHLORIDE 15 MILLIGRAM(S): 5 TABLET ORAL at 16:59

## 2017-04-27 RX ADMIN — OXYCODONE HYDROCHLORIDE 15 MILLIGRAM(S): 5 TABLET ORAL at 11:02

## 2017-04-27 RX ADMIN — HYDROMORPHONE HYDROCHLORIDE 4 MILLIGRAM(S): 2 INJECTION INTRAMUSCULAR; INTRAVENOUS; SUBCUTANEOUS at 22:00

## 2017-04-27 RX ADMIN — HYDROMORPHONE HYDROCHLORIDE 2 MILLIGRAM(S): 2 INJECTION INTRAMUSCULAR; INTRAVENOUS; SUBCUTANEOUS at 23:44

## 2017-04-27 RX ADMIN — HYDROMORPHONE HYDROCHLORIDE 2 MILLIGRAM(S): 2 INJECTION INTRAMUSCULAR; INTRAVENOUS; SUBCUTANEOUS at 14:45

## 2017-04-27 RX ADMIN — HYDROMORPHONE HYDROCHLORIDE 2 MILLIGRAM(S): 2 INJECTION INTRAMUSCULAR; INTRAVENOUS; SUBCUTANEOUS at 19:22

## 2017-04-27 RX ADMIN — HYDROMORPHONE HYDROCHLORIDE 2 MILLIGRAM(S): 2 INJECTION INTRAMUSCULAR; INTRAVENOUS; SUBCUTANEOUS at 20:00

## 2017-04-27 RX ADMIN — Medication 50 MILLIGRAM(S): at 08:57

## 2017-04-27 RX ADMIN — HYDROMORPHONE HYDROCHLORIDE 4 MILLIGRAM(S): 2 INJECTION INTRAMUSCULAR; INTRAVENOUS; SUBCUTANEOUS at 04:23

## 2017-04-27 RX ADMIN — Medication 81 MILLIGRAM(S): at 11:02

## 2017-04-27 RX ADMIN — HYDROMORPHONE HYDROCHLORIDE 2 MILLIGRAM(S): 2 INJECTION INTRAMUSCULAR; INTRAVENOUS; SUBCUTANEOUS at 07:50

## 2017-04-27 RX ADMIN — Medication 81 MILLIGRAM(S): at 21:10

## 2017-04-27 RX ADMIN — HYDROMORPHONE HYDROCHLORIDE 4 MILLIGRAM(S): 2 INJECTION INTRAMUSCULAR; INTRAVENOUS; SUBCUTANEOUS at 09:30

## 2017-04-27 RX ADMIN — Medication 200 MILLIGRAM(S): at 11:02

## 2017-04-27 RX ADMIN — Medication 16 MILLIGRAM(S): at 16:59

## 2017-04-27 RX ADMIN — HYDROMORPHONE HYDROCHLORIDE 4 MILLIGRAM(S): 2 INJECTION INTRAMUSCULAR; INTRAVENOUS; SUBCUTANEOUS at 13:08

## 2017-04-27 RX ADMIN — HYDROMORPHONE HYDROCHLORIDE 2 MILLIGRAM(S): 2 INJECTION INTRAMUSCULAR; INTRAVENOUS; SUBCUTANEOUS at 02:25

## 2017-04-27 RX ADMIN — HYDROMORPHONE HYDROCHLORIDE 4 MILLIGRAM(S): 2 INJECTION INTRAMUSCULAR; INTRAVENOUS; SUBCUTANEOUS at 08:57

## 2017-04-27 RX ADMIN — FAMOTIDINE 20 MILLIGRAM(S): 10 INJECTION INTRAVENOUS at 16:58

## 2017-04-27 RX ADMIN — Medication 200 MILLIGRAM(S): at 16:58

## 2017-04-27 RX ADMIN — OXYCODONE HYDROCHLORIDE 15 MILLIGRAM(S): 5 TABLET ORAL at 18:55

## 2017-04-27 RX ADMIN — HYDROMORPHONE HYDROCHLORIDE 4 MILLIGRAM(S): 2 INJECTION INTRAMUSCULAR; INTRAVENOUS; SUBCUTANEOUS at 17:00

## 2017-04-27 RX ADMIN — HYDROMORPHONE HYDROCHLORIDE 2 MILLIGRAM(S): 2 INJECTION INTRAMUSCULAR; INTRAVENOUS; SUBCUTANEOUS at 23:59

## 2017-04-27 RX ADMIN — HYDROMORPHONE HYDROCHLORIDE 2 MILLIGRAM(S): 2 INJECTION INTRAMUSCULAR; INTRAVENOUS; SUBCUTANEOUS at 17:05

## 2017-04-27 RX ADMIN — OXYCODONE HYDROCHLORIDE 15 MILLIGRAM(S): 5 TABLET ORAL at 11:58

## 2017-04-27 RX ADMIN — Medication 32 MILLIGRAM(S): at 07:50

## 2017-04-27 RX ADMIN — HYDROMORPHONE HYDROCHLORIDE 2 MILLIGRAM(S): 2 INJECTION INTRAMUSCULAR; INTRAVENOUS; SUBCUTANEOUS at 02:56

## 2017-04-27 RX ADMIN — FAMOTIDINE 20 MILLIGRAM(S): 10 INJECTION INTRAVENOUS at 07:50

## 2017-04-27 RX ADMIN — HEPARIN SODIUM 1800 UNIT(S)/HR: 5000 INJECTION INTRAVENOUS; SUBCUTANEOUS at 06:30

## 2017-04-27 RX ADMIN — HYDROMORPHONE HYDROCHLORIDE 2 MILLIGRAM(S): 2 INJECTION INTRAMUSCULAR; INTRAVENOUS; SUBCUTANEOUS at 08:10

## 2017-04-27 RX ADMIN — HYDROMORPHONE HYDROCHLORIDE 4 MILLIGRAM(S): 2 INJECTION INTRAMUSCULAR; INTRAVENOUS; SUBCUTANEOUS at 21:09

## 2017-04-27 RX ADMIN — HYDROMORPHONE HYDROCHLORIDE 4 MILLIGRAM(S): 2 INJECTION INTRAMUSCULAR; INTRAVENOUS; SUBCUTANEOUS at 04:55

## 2017-04-27 RX ADMIN — HYDROMORPHONE HYDROCHLORIDE 4 MILLIGRAM(S): 2 INJECTION INTRAMUSCULAR; INTRAVENOUS; SUBCUTANEOUS at 17:30

## 2017-04-28 LAB
4/8 RATIO: 1.06 RATIO — SIGNIFICANT CHANGE UP (ref 0.9–3.6)
ABS CD8: 594 /UL — SIGNIFICANT CHANGE UP (ref 136–757)
APTT BLD: 123.5 SEC — CRITICAL HIGH (ref 27.5–37.4)
APTT BLD: 90.7 SEC — HIGH (ref 27.5–37.4)
BLD GP AB SCN SERPL QL: NEGATIVE — SIGNIFICANT CHANGE UP
CD16+CD56+ CELLS NFR BLD: 9 % — SIGNIFICANT CHANGE UP (ref 4–25)
CD16+CD56+ CELLS NFR SPEC: 146 /UL — SIGNIFICANT CHANGE UP (ref 64–494)
CD19 BLASTS SPEC-ACNC: 15 % — SIGNIFICANT CHANGE UP (ref 5–22)
CD19 BLASTS SPEC-ACNC: 244 /UL — SIGNIFICANT CHANGE UP (ref 68–528)
CD3 BLASTS SPEC-ACNC: 1235 /UL — SIGNIFICANT CHANGE UP (ref 799–2171)
CD3 BLASTS SPEC-ACNC: 74 % — SIGNIFICANT CHANGE UP (ref 59–85)
CD4 %: 37 % — SIGNIFICANT CHANGE UP (ref 36–65)
CD8 %: 35 % — SIGNIFICANT CHANGE UP (ref 11–36)
CREAT ?TM UR-MCNC: 94 MG/DL — SIGNIFICANT CHANGE UP
HAPTOGLOB SERPL-MCNC: 311 MG/DL — HIGH (ref 34–200)
HCT VFR BLD CALC: 22.9 % — LOW (ref 39–50)
HGB BLD-MCNC: 6.8 G/DL — CRITICAL LOW (ref 13–17)
INR BLD: 0.92 RATIO — SIGNIFICANT CHANGE UP (ref 0.88–1.16)
LDH SERPL L TO P-CCNC: 205 U/L — SIGNIFICANT CHANGE UP (ref 50–242)
MCHC RBC-ENTMCNC: 21.2 PG — LOW (ref 27–34)
MCHC RBC-ENTMCNC: 29.7 GM/DL — LOW (ref 32–36)
MCV RBC AUTO: 71.3 FL — LOW (ref 80–100)
PLATELET # BLD AUTO: 512 K/UL — HIGH (ref 150–400)
PROT ?TM UR-MCNC: 812 MG/DL — HIGH (ref 0–12)
PROT/CREAT UR-RTO: 8.6 RATIO — HIGH (ref 0–0.2)
PROTHROM AB SERPL-ACNC: 10.4 SEC — SIGNIFICANT CHANGE UP (ref 10–13.1)
RBC # BLD: 3.21 M/UL — LOW (ref 4.2–5.8)
RBC # FLD: 19 % — HIGH (ref 10.3–14.5)
RH IG SCN BLD-IMP: POSITIVE — SIGNIFICANT CHANGE UP
T-CELL CD4 SUBSET PNL BLD: 631 /UL — SIGNIFICANT CHANGE UP (ref 489–1457)
WBC # BLD: 7.58 K/UL — SIGNIFICANT CHANGE UP (ref 3.8–10.5)
WBC # FLD AUTO: 7.58 K/UL — SIGNIFICANT CHANGE UP (ref 3.8–10.5)

## 2017-04-28 PROCEDURE — 99233 SBSQ HOSP IP/OBS HIGH 50: CPT | Mod: GC

## 2017-04-28 PROCEDURE — 99232 SBSQ HOSP IP/OBS MODERATE 35: CPT | Mod: GC

## 2017-04-28 RX ORDER — FERROUS SULFATE 325(65) MG
325 TABLET ORAL DAILY
Qty: 0 | Refills: 0 | Status: DISCONTINUED | OUTPATIENT
Start: 2017-04-28 | End: 2017-05-10

## 2017-04-28 RX ORDER — HYDROMORPHONE HYDROCHLORIDE 2 MG/ML
2 INJECTION INTRAMUSCULAR; INTRAVENOUS; SUBCUTANEOUS EVERY 4 HOURS
Qty: 0 | Refills: 0 | Status: DISCONTINUED | OUTPATIENT
Start: 2017-04-28 | End: 2017-04-28

## 2017-04-28 RX ORDER — HYDROMORPHONE HYDROCHLORIDE 2 MG/ML
4 INJECTION INTRAMUSCULAR; INTRAVENOUS; SUBCUTANEOUS EVERY 4 HOURS
Qty: 0 | Refills: 0 | Status: DISCONTINUED | OUTPATIENT
Start: 2017-04-28 | End: 2017-05-03

## 2017-04-28 RX ORDER — ASCORBIC ACID 60 MG
500 TABLET,CHEWABLE ORAL DAILY
Qty: 0 | Refills: 0 | Status: DISCONTINUED | OUTPATIENT
Start: 2017-04-28 | End: 2017-05-10

## 2017-04-28 RX ADMIN — Medication 325 MILLIGRAM(S): at 11:38

## 2017-04-28 RX ADMIN — HYDROMORPHONE HYDROCHLORIDE 2 MILLIGRAM(S): 2 INJECTION INTRAMUSCULAR; INTRAVENOUS; SUBCUTANEOUS at 04:47

## 2017-04-28 RX ADMIN — OXYCODONE HYDROCHLORIDE 15 MILLIGRAM(S): 5 TABLET ORAL at 09:15

## 2017-04-28 RX ADMIN — Medication 16 MILLIGRAM(S): at 05:53

## 2017-04-28 RX ADMIN — HYDROMORPHONE HYDROCHLORIDE 2 MILLIGRAM(S): 2 INJECTION INTRAMUSCULAR; INTRAVENOUS; SUBCUTANEOUS at 05:17

## 2017-04-28 RX ADMIN — HYDROMORPHONE HYDROCHLORIDE 4 MILLIGRAM(S): 2 INJECTION INTRAMUSCULAR; INTRAVENOUS; SUBCUTANEOUS at 21:35

## 2017-04-28 RX ADMIN — Medication 81 MILLIGRAM(S): at 11:38

## 2017-04-28 RX ADMIN — FAMOTIDINE 20 MILLIGRAM(S): 10 INJECTION INTRAVENOUS at 17:53

## 2017-04-28 RX ADMIN — HYDROMORPHONE HYDROCHLORIDE 2 MILLIGRAM(S): 2 INJECTION INTRAMUSCULAR; INTRAVENOUS; SUBCUTANEOUS at 23:36

## 2017-04-28 RX ADMIN — HYDROMORPHONE HYDROCHLORIDE 4 MILLIGRAM(S): 2 INJECTION INTRAMUSCULAR; INTRAVENOUS; SUBCUTANEOUS at 12:50

## 2017-04-28 RX ADMIN — HEPARIN SODIUM 1600 UNIT(S)/HR: 5000 INJECTION INTRAVENOUS; SUBCUTANEOUS at 10:27

## 2017-04-28 RX ADMIN — HYDROMORPHONE HYDROCHLORIDE 2 MILLIGRAM(S): 2 INJECTION INTRAMUSCULAR; INTRAVENOUS; SUBCUTANEOUS at 15:08

## 2017-04-28 RX ADMIN — Medication 500 MILLIGRAM(S): at 11:38

## 2017-04-28 RX ADMIN — HYDROMORPHONE HYDROCHLORIDE 2 MILLIGRAM(S): 2 INJECTION INTRAMUSCULAR; INTRAVENOUS; SUBCUTANEOUS at 09:05

## 2017-04-28 RX ADMIN — HYDROMORPHONE HYDROCHLORIDE 2 MILLIGRAM(S): 2 INJECTION INTRAMUSCULAR; INTRAVENOUS; SUBCUTANEOUS at 19:30

## 2017-04-28 RX ADMIN — FAMOTIDINE 20 MILLIGRAM(S): 10 INJECTION INTRAVENOUS at 05:52

## 2017-04-28 RX ADMIN — HYDROMORPHONE HYDROCHLORIDE 4 MILLIGRAM(S): 2 INJECTION INTRAMUSCULAR; INTRAVENOUS; SUBCUTANEOUS at 01:31

## 2017-04-28 RX ADMIN — HYDROMORPHONE HYDROCHLORIDE 4 MILLIGRAM(S): 2 INJECTION INTRAMUSCULAR; INTRAVENOUS; SUBCUTANEOUS at 07:20

## 2017-04-28 RX ADMIN — HYDROMORPHONE HYDROCHLORIDE 4 MILLIGRAM(S): 2 INJECTION INTRAMUSCULAR; INTRAVENOUS; SUBCUTANEOUS at 02:15

## 2017-04-28 RX ADMIN — Medication 200 MILLIGRAM(S): at 16:53

## 2017-04-28 RX ADMIN — HYDROMORPHONE HYDROCHLORIDE 2 MILLIGRAM(S): 2 INJECTION INTRAMUSCULAR; INTRAVENOUS; SUBCUTANEOUS at 11:05

## 2017-04-28 RX ADMIN — HYDROMORPHONE HYDROCHLORIDE 2 MILLIGRAM(S): 2 INJECTION INTRAMUSCULAR; INTRAVENOUS; SUBCUTANEOUS at 14:51

## 2017-04-28 RX ADMIN — HYDROMORPHONE HYDROCHLORIDE 4 MILLIGRAM(S): 2 INJECTION INTRAMUSCULAR; INTRAVENOUS; SUBCUTANEOUS at 16:51

## 2017-04-28 RX ADMIN — HYDROMORPHONE HYDROCHLORIDE 4 MILLIGRAM(S): 2 INJECTION INTRAMUSCULAR; INTRAVENOUS; SUBCUTANEOUS at 17:05

## 2017-04-28 RX ADMIN — HYDROMORPHONE HYDROCHLORIDE 2 MILLIGRAM(S): 2 INJECTION INTRAMUSCULAR; INTRAVENOUS; SUBCUTANEOUS at 18:52

## 2017-04-28 RX ADMIN — Medication 200 MILLIGRAM(S): at 07:53

## 2017-04-28 RX ADMIN — HYDROMORPHONE HYDROCHLORIDE 2 MILLIGRAM(S): 2 INJECTION INTRAMUSCULAR; INTRAVENOUS; SUBCUTANEOUS at 10:51

## 2017-04-28 RX ADMIN — HYDROMORPHONE HYDROCHLORIDE 4 MILLIGRAM(S): 2 INJECTION INTRAMUSCULAR; INTRAVENOUS; SUBCUTANEOUS at 13:05

## 2017-04-28 RX ADMIN — Medication 16 MILLIGRAM(S): at 17:53

## 2017-04-28 RX ADMIN — HYDROMORPHONE HYDROCHLORIDE 4 MILLIGRAM(S): 2 INJECTION INTRAMUSCULAR; INTRAVENOUS; SUBCUTANEOUS at 06:33

## 2017-04-28 RX ADMIN — OXYCODONE HYDROCHLORIDE 15 MILLIGRAM(S): 5 TABLET ORAL at 18:13

## 2017-04-28 RX ADMIN — HYDROMORPHONE HYDROCHLORIDE 2 MILLIGRAM(S): 2 INJECTION INTRAMUSCULAR; INTRAVENOUS; SUBCUTANEOUS at 08:48

## 2017-04-28 RX ADMIN — OXYCODONE HYDROCHLORIDE 15 MILLIGRAM(S): 5 TABLET ORAL at 07:53

## 2017-04-28 RX ADMIN — HYDROMORPHONE HYDROCHLORIDE 4 MILLIGRAM(S): 2 INJECTION INTRAMUSCULAR; INTRAVENOUS; SUBCUTANEOUS at 21:16

## 2017-04-28 RX ADMIN — OXYCODONE HYDROCHLORIDE 15 MILLIGRAM(S): 5 TABLET ORAL at 17:53

## 2017-04-29 LAB
APTT BLD: 87.2 SEC — HIGH (ref 27.5–37.4)
C3 SERPL-MCNC: 110 MG/DL — SIGNIFICANT CHANGE UP (ref 80–180)
C4 SERPL-MCNC: 20 MG/DL — SIGNIFICANT CHANGE UP (ref 10–45)
HCT VFR BLD CALC: 26.4 % — LOW (ref 39–50)
HGB BLD-MCNC: 7.7 G/DL — LOW (ref 13–17)
INR BLD: 0.91 RATIO — SIGNIFICANT CHANGE UP (ref 0.88–1.16)
MCHC RBC-ENTMCNC: 20.7 PG — LOW (ref 27–34)
MCHC RBC-ENTMCNC: 29.2 GM/DL — LOW (ref 32–36)
MCV RBC AUTO: 71 FL — LOW (ref 80–100)
OB PNL STL: POSITIVE
PLATELET # BLD AUTO: 479 K/UL — HIGH (ref 150–400)
PROTHROM AB SERPL-ACNC: 10.3 SEC — SIGNIFICANT CHANGE UP (ref 10–13.1)
RBC # BLD: 3.72 M/UL — LOW (ref 4.2–5.8)
RBC # FLD: 18.8 % — HIGH (ref 10.3–14.5)
WBC # BLD: 8.57 K/UL — SIGNIFICANT CHANGE UP (ref 3.8–10.5)
WBC # FLD AUTO: 8.57 K/UL — SIGNIFICANT CHANGE UP (ref 3.8–10.5)

## 2017-04-29 PROCEDURE — 99232 SBSQ HOSP IP/OBS MODERATE 35: CPT | Mod: GC

## 2017-04-29 PROCEDURE — 99233 SBSQ HOSP IP/OBS HIGH 50: CPT | Mod: GC

## 2017-04-29 RX ORDER — POLYETHYLENE GLYCOL 3350 17 G/17G
17 POWDER, FOR SOLUTION ORAL
Qty: 0 | Refills: 0 | Status: DISCONTINUED | OUTPATIENT
Start: 2017-04-29 | End: 2017-05-10

## 2017-04-29 RX ORDER — CHLORPROMAZINE HCL 10 MG
25 TABLET ORAL THREE TIMES A DAY
Qty: 0 | Refills: 0 | Status: DISCONTINUED | OUTPATIENT
Start: 2017-04-29 | End: 2017-04-30

## 2017-04-29 RX ORDER — WARFARIN SODIUM 2.5 MG/1
5 TABLET ORAL ONCE
Qty: 0 | Refills: 0 | Status: COMPLETED | OUTPATIENT
Start: 2017-04-29 | End: 2017-04-29

## 2017-04-29 RX ORDER — PANTOPRAZOLE SODIUM 20 MG/1
40 TABLET, DELAYED RELEASE ORAL
Qty: 0 | Refills: 0 | Status: DISCONTINUED | OUTPATIENT
Start: 2017-04-29 | End: 2017-05-02

## 2017-04-29 RX ORDER — POLYETHYLENE GLYCOL 3350 17 G/17G
17 POWDER, FOR SOLUTION ORAL DAILY
Qty: 0 | Refills: 0 | Status: DISCONTINUED | OUTPATIENT
Start: 2017-04-29 | End: 2017-04-29

## 2017-04-29 RX ADMIN — HYDROMORPHONE HYDROCHLORIDE 4 MILLIGRAM(S): 2 INJECTION INTRAMUSCULAR; INTRAVENOUS; SUBCUTANEOUS at 06:06

## 2017-04-29 RX ADMIN — HYDROMORPHONE HYDROCHLORIDE 4 MILLIGRAM(S): 2 INJECTION INTRAMUSCULAR; INTRAVENOUS; SUBCUTANEOUS at 14:20

## 2017-04-29 RX ADMIN — OXYCODONE HYDROCHLORIDE 15 MILLIGRAM(S): 5 TABLET ORAL at 17:30

## 2017-04-29 RX ADMIN — OXYCODONE HYDROCHLORIDE 15 MILLIGRAM(S): 5 TABLET ORAL at 17:54

## 2017-04-29 RX ADMIN — FAMOTIDINE 20 MILLIGRAM(S): 10 INJECTION INTRAVENOUS at 05:43

## 2017-04-29 RX ADMIN — POLYETHYLENE GLYCOL 3350 17 GRAM(S): 17 POWDER, FOR SOLUTION ORAL at 10:34

## 2017-04-29 RX ADMIN — Medication 200 MILLIGRAM(S): at 08:01

## 2017-04-29 RX ADMIN — Medication 25 MILLIGRAM(S): at 22:45

## 2017-04-29 RX ADMIN — WARFARIN SODIUM 5 MILLIGRAM(S): 2.5 TABLET ORAL at 22:45

## 2017-04-29 RX ADMIN — HYDROMORPHONE HYDROCHLORIDE 2 MILLIGRAM(S): 2 INJECTION INTRAMUSCULAR; INTRAVENOUS; SUBCUTANEOUS at 04:20

## 2017-04-29 RX ADMIN — HYDROMORPHONE HYDROCHLORIDE 2 MILLIGRAM(S): 2 INJECTION INTRAMUSCULAR; INTRAVENOUS; SUBCUTANEOUS at 17:00

## 2017-04-29 RX ADMIN — HYDROMORPHONE HYDROCHLORIDE 2 MILLIGRAM(S): 2 INJECTION INTRAMUSCULAR; INTRAVENOUS; SUBCUTANEOUS at 12:19

## 2017-04-29 RX ADMIN — Medication 81 MILLIGRAM(S): at 10:31

## 2017-04-29 RX ADMIN — HYDROMORPHONE HYDROCHLORIDE 2 MILLIGRAM(S): 2 INJECTION INTRAMUSCULAR; INTRAVENOUS; SUBCUTANEOUS at 04:03

## 2017-04-29 RX ADMIN — HYDROMORPHONE HYDROCHLORIDE 4 MILLIGRAM(S): 2 INJECTION INTRAMUSCULAR; INTRAVENOUS; SUBCUTANEOUS at 10:07

## 2017-04-29 RX ADMIN — HYDROMORPHONE HYDROCHLORIDE 4 MILLIGRAM(S): 2 INJECTION INTRAMUSCULAR; INTRAVENOUS; SUBCUTANEOUS at 01:49

## 2017-04-29 RX ADMIN — HYDROMORPHONE HYDROCHLORIDE 4 MILLIGRAM(S): 2 INJECTION INTRAMUSCULAR; INTRAVENOUS; SUBCUTANEOUS at 10:30

## 2017-04-29 RX ADMIN — HYDROMORPHONE HYDROCHLORIDE 4 MILLIGRAM(S): 2 INJECTION INTRAMUSCULAR; INTRAVENOUS; SUBCUTANEOUS at 02:10

## 2017-04-29 RX ADMIN — HYDROMORPHONE HYDROCHLORIDE 4 MILLIGRAM(S): 2 INJECTION INTRAMUSCULAR; INTRAVENOUS; SUBCUTANEOUS at 23:02

## 2017-04-29 RX ADMIN — HYDROMORPHONE HYDROCHLORIDE 2 MILLIGRAM(S): 2 INJECTION INTRAMUSCULAR; INTRAVENOUS; SUBCUTANEOUS at 00:00

## 2017-04-29 RX ADMIN — HYDROMORPHONE HYDROCHLORIDE 4 MILLIGRAM(S): 2 INJECTION INTRAMUSCULAR; INTRAVENOUS; SUBCUTANEOUS at 06:21

## 2017-04-29 RX ADMIN — HYDROMORPHONE HYDROCHLORIDE 4 MILLIGRAM(S): 2 INJECTION INTRAMUSCULAR; INTRAVENOUS; SUBCUTANEOUS at 18:30

## 2017-04-29 RX ADMIN — HYDROMORPHONE HYDROCHLORIDE 4 MILLIGRAM(S): 2 INJECTION INTRAMUSCULAR; INTRAVENOUS; SUBCUTANEOUS at 14:45

## 2017-04-29 RX ADMIN — HYDROMORPHONE HYDROCHLORIDE 2 MILLIGRAM(S): 2 INJECTION INTRAMUSCULAR; INTRAVENOUS; SUBCUTANEOUS at 08:06

## 2017-04-29 RX ADMIN — HYDROMORPHONE HYDROCHLORIDE 4 MILLIGRAM(S): 2 INJECTION INTRAMUSCULAR; INTRAVENOUS; SUBCUTANEOUS at 22:41

## 2017-04-29 RX ADMIN — HYDROMORPHONE HYDROCHLORIDE 2 MILLIGRAM(S): 2 INJECTION INTRAMUSCULAR; INTRAVENOUS; SUBCUTANEOUS at 20:45

## 2017-04-29 RX ADMIN — HEPARIN SODIUM 1600 UNIT(S)/HR: 5000 INJECTION INTRAVENOUS; SUBCUTANEOUS at 08:02

## 2017-04-29 RX ADMIN — HYDROMORPHONE HYDROCHLORIDE 2 MILLIGRAM(S): 2 INJECTION INTRAMUSCULAR; INTRAVENOUS; SUBCUTANEOUS at 16:32

## 2017-04-29 RX ADMIN — FAMOTIDINE 20 MILLIGRAM(S): 10 INJECTION INTRAVENOUS at 17:48

## 2017-04-29 RX ADMIN — Medication 500 MILLIGRAM(S): at 10:30

## 2017-04-29 RX ADMIN — Medication 16 MILLIGRAM(S): at 05:43

## 2017-04-29 RX ADMIN — HYDROMORPHONE HYDROCHLORIDE 4 MILLIGRAM(S): 2 INJECTION INTRAMUSCULAR; INTRAVENOUS; SUBCUTANEOUS at 19:00

## 2017-04-29 RX ADMIN — Medication 16 MILLIGRAM(S): at 17:48

## 2017-04-29 RX ADMIN — Medication 200 MILLIGRAM(S): at 20:53

## 2017-04-29 RX ADMIN — HYDROMORPHONE HYDROCHLORIDE 2 MILLIGRAM(S): 2 INJECTION INTRAMUSCULAR; INTRAVENOUS; SUBCUTANEOUS at 20:30

## 2017-04-29 RX ADMIN — HYDROMORPHONE HYDROCHLORIDE 2 MILLIGRAM(S): 2 INJECTION INTRAMUSCULAR; INTRAVENOUS; SUBCUTANEOUS at 12:30

## 2017-04-29 RX ADMIN — HYDROMORPHONE HYDROCHLORIDE 2 MILLIGRAM(S): 2 INJECTION INTRAMUSCULAR; INTRAVENOUS; SUBCUTANEOUS at 08:30

## 2017-04-29 RX ADMIN — POLYETHYLENE GLYCOL 3350 17 GRAM(S): 17 POWDER, FOR SOLUTION ORAL at 17:54

## 2017-04-29 RX ADMIN — Medication 325 MILLIGRAM(S): at 10:31

## 2017-04-30 LAB
APTT BLD: 93.2 SEC — HIGH (ref 27.5–37.4)
HCT VFR BLD CALC: 29.4 % — LOW (ref 39–50)
HGB BLD-MCNC: 8.5 G/DL — LOW (ref 13–17)
INR BLD: 0.89 RATIO — SIGNIFICANT CHANGE UP (ref 0.88–1.16)
MCHC RBC-ENTMCNC: 20.6 PG — LOW (ref 27–34)
MCHC RBC-ENTMCNC: 28.9 GM/DL — LOW (ref 32–36)
MCV RBC AUTO: 71.4 FL — LOW (ref 80–100)
PLATELET # BLD AUTO: 494 K/UL — HIGH (ref 150–400)
PROTHROM AB SERPL-ACNC: 10 SEC — SIGNIFICANT CHANGE UP (ref 10–13.1)
RBC # BLD: 4.12 M/UL — LOW (ref 4.2–5.8)
RBC # FLD: 18.8 % — HIGH (ref 10.3–14.5)
WBC # BLD: 6.83 K/UL — SIGNIFICANT CHANGE UP (ref 3.8–10.5)
WBC # FLD AUTO: 6.83 K/UL — SIGNIFICANT CHANGE UP (ref 3.8–10.5)

## 2017-04-30 PROCEDURE — 99233 SBSQ HOSP IP/OBS HIGH 50: CPT | Mod: GC

## 2017-04-30 RX ADMIN — HYDROMORPHONE HYDROCHLORIDE 4 MILLIGRAM(S): 2 INJECTION INTRAMUSCULAR; INTRAVENOUS; SUBCUTANEOUS at 08:58

## 2017-04-30 RX ADMIN — Medication 500 MILLIGRAM(S): at 11:59

## 2017-04-30 RX ADMIN — HYDROMORPHONE HYDROCHLORIDE 4 MILLIGRAM(S): 2 INJECTION INTRAMUSCULAR; INTRAVENOUS; SUBCUTANEOUS at 04:26

## 2017-04-30 RX ADMIN — HYDROMORPHONE HYDROCHLORIDE 4 MILLIGRAM(S): 2 INJECTION INTRAMUSCULAR; INTRAVENOUS; SUBCUTANEOUS at 20:59

## 2017-04-30 RX ADMIN — HYDROMORPHONE HYDROCHLORIDE 2 MILLIGRAM(S): 2 INJECTION INTRAMUSCULAR; INTRAVENOUS; SUBCUTANEOUS at 10:45

## 2017-04-30 RX ADMIN — HYDROMORPHONE HYDROCHLORIDE 2 MILLIGRAM(S): 2 INJECTION INTRAMUSCULAR; INTRAVENOUS; SUBCUTANEOUS at 10:55

## 2017-04-30 RX ADMIN — Medication 200 MILLIGRAM(S): at 09:00

## 2017-04-30 RX ADMIN — HYDROMORPHONE HYDROCHLORIDE 4 MILLIGRAM(S): 2 INJECTION INTRAMUSCULAR; INTRAVENOUS; SUBCUTANEOUS at 20:31

## 2017-04-30 RX ADMIN — Medication 25 MILLIGRAM(S): at 14:32

## 2017-04-30 RX ADMIN — HEPARIN SODIUM 1600 UNIT(S)/HR: 5000 INJECTION INTRAVENOUS; SUBCUTANEOUS at 14:33

## 2017-04-30 RX ADMIN — HYDROMORPHONE HYDROCHLORIDE 2 MILLIGRAM(S): 2 INJECTION INTRAMUSCULAR; INTRAVENOUS; SUBCUTANEOUS at 21:54

## 2017-04-30 RX ADMIN — Medication 16 MILLIGRAM(S): at 05:54

## 2017-04-30 RX ADMIN — HYDROMORPHONE HYDROCHLORIDE 4 MILLIGRAM(S): 2 INJECTION INTRAMUSCULAR; INTRAVENOUS; SUBCUTANEOUS at 05:53

## 2017-04-30 RX ADMIN — HYDROMORPHONE HYDROCHLORIDE 2 MILLIGRAM(S): 2 INJECTION INTRAMUSCULAR; INTRAVENOUS; SUBCUTANEOUS at 17:55

## 2017-04-30 RX ADMIN — HYDROMORPHONE HYDROCHLORIDE 2 MILLIGRAM(S): 2 INJECTION INTRAMUSCULAR; INTRAVENOUS; SUBCUTANEOUS at 01:20

## 2017-04-30 RX ADMIN — Medication 325 MILLIGRAM(S): at 11:58

## 2017-04-30 RX ADMIN — HYDROMORPHONE HYDROCHLORIDE 4 MILLIGRAM(S): 2 INJECTION INTRAMUSCULAR; INTRAVENOUS; SUBCUTANEOUS at 12:00

## 2017-04-30 RX ADMIN — HYDROMORPHONE HYDROCHLORIDE 4 MILLIGRAM(S): 2 INJECTION INTRAMUSCULAR; INTRAVENOUS; SUBCUTANEOUS at 08:30

## 2017-04-30 RX ADMIN — HYDROMORPHONE HYDROCHLORIDE 2 MILLIGRAM(S): 2 INJECTION INTRAMUSCULAR; INTRAVENOUS; SUBCUTANEOUS at 22:20

## 2017-04-30 RX ADMIN — Medication 25 MILLIGRAM(S): at 05:54

## 2017-04-30 RX ADMIN — HYDROMORPHONE HYDROCHLORIDE 2 MILLIGRAM(S): 2 INJECTION INTRAMUSCULAR; INTRAVENOUS; SUBCUTANEOUS at 06:02

## 2017-04-30 RX ADMIN — PANTOPRAZOLE SODIUM 40 MILLIGRAM(S): 20 TABLET, DELAYED RELEASE ORAL at 05:57

## 2017-04-30 RX ADMIN — HYDROMORPHONE HYDROCHLORIDE 4 MILLIGRAM(S): 2 INJECTION INTRAMUSCULAR; INTRAVENOUS; SUBCUTANEOUS at 12:56

## 2017-04-30 RX ADMIN — HYDROMORPHONE HYDROCHLORIDE 2 MILLIGRAM(S): 2 INJECTION INTRAMUSCULAR; INTRAVENOUS; SUBCUTANEOUS at 02:10

## 2017-04-30 RX ADMIN — HYDROMORPHONE HYDROCHLORIDE 4 MILLIGRAM(S): 2 INJECTION INTRAMUSCULAR; INTRAVENOUS; SUBCUTANEOUS at 16:30

## 2017-04-30 RX ADMIN — FAMOTIDINE 20 MILLIGRAM(S): 10 INJECTION INTRAVENOUS at 05:55

## 2017-04-30 RX ADMIN — Medication 200 MILLIGRAM(S): at 17:00

## 2017-04-30 RX ADMIN — HYDROMORPHONE HYDROCHLORIDE 2 MILLIGRAM(S): 2 INJECTION INTRAMUSCULAR; INTRAVENOUS; SUBCUTANEOUS at 17:12

## 2017-04-30 RX ADMIN — HYDROMORPHONE HYDROCHLORIDE 2 MILLIGRAM(S): 2 INJECTION INTRAMUSCULAR; INTRAVENOUS; SUBCUTANEOUS at 06:20

## 2017-04-30 RX ADMIN — POLYETHYLENE GLYCOL 3350 17 GRAM(S): 17 POWDER, FOR SOLUTION ORAL at 05:55

## 2017-05-01 ENCOUNTER — RESULT REVIEW (OUTPATIENT)
Age: 27
End: 2017-05-01

## 2017-05-01 LAB — APTT BLD: 108.4 SEC — HIGH (ref 27.5–37.4)

## 2017-05-01 PROCEDURE — 99222 1ST HOSP IP/OBS MODERATE 55: CPT | Mod: GC

## 2017-05-01 PROCEDURE — 99232 SBSQ HOSP IP/OBS MODERATE 35: CPT | Mod: GC

## 2017-05-01 PROCEDURE — 99233 SBSQ HOSP IP/OBS HIGH 50: CPT | Mod: GC

## 2017-05-01 RX ORDER — COLCHICINE 0.6 MG
0.6 TABLET ORAL
Qty: 0 | Refills: 0 | Status: DISCONTINUED | OUTPATIENT
Start: 2017-05-01 | End: 2017-05-03

## 2017-05-01 RX ORDER — SOD SULF/SODIUM/NAHCO3/KCL/PEG
1000 SOLUTION, RECONSTITUTED, ORAL ORAL EVERY 4 HOURS
Qty: 0 | Refills: 0 | Status: COMPLETED | OUTPATIENT
Start: 2017-05-01 | End: 2017-05-01

## 2017-05-01 RX ADMIN — Medication 200 MILLIGRAM(S): at 11:40

## 2017-05-01 RX ADMIN — HEPARIN SODIUM 1800 UNIT(S)/HR: 5000 INJECTION INTRAVENOUS; SUBCUTANEOUS at 11:15

## 2017-05-01 RX ADMIN — HYDROMORPHONE HYDROCHLORIDE 4 MILLIGRAM(S): 2 INJECTION INTRAMUSCULAR; INTRAVENOUS; SUBCUTANEOUS at 09:00

## 2017-05-01 RX ADMIN — Medication 500 MILLIGRAM(S): at 11:40

## 2017-05-01 RX ADMIN — HYDROMORPHONE HYDROCHLORIDE 2 MILLIGRAM(S): 2 INJECTION INTRAMUSCULAR; INTRAVENOUS; SUBCUTANEOUS at 20:13

## 2017-05-01 RX ADMIN — HYDROMORPHONE HYDROCHLORIDE 4 MILLIGRAM(S): 2 INJECTION INTRAMUSCULAR; INTRAVENOUS; SUBCUTANEOUS at 05:49

## 2017-05-01 RX ADMIN — Medication 325 MILLIGRAM(S): at 11:40

## 2017-05-01 RX ADMIN — Medication 1000 MILLILITER(S): at 19:39

## 2017-05-01 RX ADMIN — HYDROMORPHONE HYDROCHLORIDE 4 MILLIGRAM(S): 2 INJECTION INTRAMUSCULAR; INTRAVENOUS; SUBCUTANEOUS at 21:08

## 2017-05-01 RX ADMIN — HYDROMORPHONE HYDROCHLORIDE 4 MILLIGRAM(S): 2 INJECTION INTRAMUSCULAR; INTRAVENOUS; SUBCUTANEOUS at 13:02

## 2017-05-01 RX ADMIN — PANTOPRAZOLE SODIUM 40 MILLIGRAM(S): 20 TABLET, DELAYED RELEASE ORAL at 05:46

## 2017-05-01 RX ADMIN — HYDROMORPHONE HYDROCHLORIDE 4 MILLIGRAM(S): 2 INJECTION INTRAMUSCULAR; INTRAVENOUS; SUBCUTANEOUS at 17:33

## 2017-05-01 RX ADMIN — HYDROMORPHONE HYDROCHLORIDE 2 MILLIGRAM(S): 2 INJECTION INTRAMUSCULAR; INTRAVENOUS; SUBCUTANEOUS at 11:30

## 2017-05-01 RX ADMIN — HYDROMORPHONE HYDROCHLORIDE 4 MILLIGRAM(S): 2 INJECTION INTRAMUSCULAR; INTRAVENOUS; SUBCUTANEOUS at 08:30

## 2017-05-01 RX ADMIN — Medication 16 MILLIGRAM(S): at 05:46

## 2017-05-01 RX ADMIN — HYDROMORPHONE HYDROCHLORIDE 2 MILLIGRAM(S): 2 INJECTION INTRAMUSCULAR; INTRAVENOUS; SUBCUTANEOUS at 19:36

## 2017-05-01 RX ADMIN — Medication 16 MILLIGRAM(S): at 17:02

## 2017-05-01 RX ADMIN — HEPARIN SODIUM 1600 UNIT(S)/HR: 5000 INJECTION INTRAVENOUS; SUBCUTANEOUS at 21:08

## 2017-05-01 RX ADMIN — HYDROMORPHONE HYDROCHLORIDE 4 MILLIGRAM(S): 2 INJECTION INTRAMUSCULAR; INTRAVENOUS; SUBCUTANEOUS at 17:03

## 2017-05-01 RX ADMIN — FAMOTIDINE 20 MILLIGRAM(S): 10 INJECTION INTRAVENOUS at 17:03

## 2017-05-01 RX ADMIN — Medication 1000 MILLILITER(S): at 21:17

## 2017-05-01 RX ADMIN — HYDROMORPHONE HYDROCHLORIDE 2 MILLIGRAM(S): 2 INJECTION INTRAMUSCULAR; INTRAVENOUS; SUBCUTANEOUS at 11:00

## 2017-05-01 RX ADMIN — HYDROMORPHONE HYDROCHLORIDE 2 MILLIGRAM(S): 2 INJECTION INTRAMUSCULAR; INTRAVENOUS; SUBCUTANEOUS at 06:51

## 2017-05-01 RX ADMIN — HYDROMORPHONE HYDROCHLORIDE 2 MILLIGRAM(S): 2 INJECTION INTRAMUSCULAR; INTRAVENOUS; SUBCUTANEOUS at 15:48

## 2017-05-01 RX ADMIN — HYDROMORPHONE HYDROCHLORIDE 4 MILLIGRAM(S): 2 INJECTION INTRAMUSCULAR; INTRAVENOUS; SUBCUTANEOUS at 00:51

## 2017-05-01 RX ADMIN — Medication 0.6 MILLIGRAM(S): at 17:02

## 2017-05-01 RX ADMIN — HYDROMORPHONE HYDROCHLORIDE 4 MILLIGRAM(S): 2 INJECTION INTRAMUSCULAR; INTRAVENOUS; SUBCUTANEOUS at 04:32

## 2017-05-01 RX ADMIN — HYDROMORPHONE HYDROCHLORIDE 2 MILLIGRAM(S): 2 INJECTION INTRAMUSCULAR; INTRAVENOUS; SUBCUTANEOUS at 02:42

## 2017-05-01 RX ADMIN — FAMOTIDINE 20 MILLIGRAM(S): 10 INJECTION INTRAVENOUS at 05:46

## 2017-05-01 RX ADMIN — HYDROMORPHONE HYDROCHLORIDE 4 MILLIGRAM(S): 2 INJECTION INTRAMUSCULAR; INTRAVENOUS; SUBCUTANEOUS at 01:37

## 2017-05-01 RX ADMIN — HYDROMORPHONE HYDROCHLORIDE 4 MILLIGRAM(S): 2 INJECTION INTRAMUSCULAR; INTRAVENOUS; SUBCUTANEOUS at 13:32

## 2017-05-01 RX ADMIN — HYDROMORPHONE HYDROCHLORIDE 4 MILLIGRAM(S): 2 INJECTION INTRAMUSCULAR; INTRAVENOUS; SUBCUTANEOUS at 22:05

## 2017-05-01 RX ADMIN — Medication 200 MILLIGRAM(S): at 17:02

## 2017-05-01 RX ADMIN — HYDROMORPHONE HYDROCHLORIDE 2 MILLIGRAM(S): 2 INJECTION INTRAMUSCULAR; INTRAVENOUS; SUBCUTANEOUS at 15:18

## 2017-05-01 RX ADMIN — HYDROMORPHONE HYDROCHLORIDE 2 MILLIGRAM(S): 2 INJECTION INTRAMUSCULAR; INTRAVENOUS; SUBCUTANEOUS at 23:33

## 2017-05-01 RX ADMIN — HYDROMORPHONE HYDROCHLORIDE 2 MILLIGRAM(S): 2 INJECTION INTRAMUSCULAR; INTRAVENOUS; SUBCUTANEOUS at 03:10

## 2017-05-01 NOTE — DIETITIAN INITIAL EVALUATION ADULT. - PROBLEM SELECTOR PLAN 3
Will continue plaquenil and Medron for now. Will check an anti-DSDNA ab level in the morning to assess for disease activity/lupus flare. Will reach out to rheumatologist in the morning.

## 2017-05-01 NOTE — DIETITIAN INITIAL EVALUATION ADULT. - PROBLEM SELECTOR PLAN 1
Pt's chest pain most likely 2/2 inflammatory process such as pericarditis vs costochondritis vs ? lupus flare and less likely 2/2 recurrent PE given lack of hypoxia, shortness of breath, cough, or new DVT on exam. Also suspect component of chronic pain contributing to current presentation. Last admission pt was advised to switch to coumadin for AC and refused- discussed again on admission and pt continues to refuse.  - Pain control with home regimen, in addition will add PRN Dilaudid for breakthrough pain, and will order a trial of Toradol as kidney function is normal.   - Low suspicion for PE at this point, will hold off on VQ scan for now. Will contact rheumatologist in the morning  - Low suspicion for ACS, trops negative x1, will obtain another set tonight. EKG within normal limits other than sinus tachycardia  -will add NSAIDs to pain regimen given suspicion for inflammatory process

## 2017-05-01 NOTE — DIETITIAN INITIAL EVALUATION ADULT. - OTHER INFO
Pt seen for LOS. Pt declines interview at this time as he states he "is overwhelmed" given recent hospital course, just requests his space. Writer expressed understanding and agreed to come back at a better time. Pt denies specific meal preferences, shellfish allergy per [facility] program.

## 2017-05-01 NOTE — DIETITIAN INITIAL EVALUATION ADULT. - NS AS NUTRI INTERV ED CONTENT
1) Pt declined nutrition education at this time as he is feeling overwhelmed; RD to remain available for f/u as pt willing/receptive to education

## 2017-05-01 NOTE — DIETITIAN INITIAL EVALUATION ADULT. - ENERGY NEEDS
ht: 70 inches. wt: 209.6 pounds (current, standing). BMI: 30.1 kG/m2. UBW: 212 pounds per previous RD note 1/2017. IBW: 166 pounds +/- 10%  %IBW: 127%  Other pertinent objective information: 26 year old male pt c PMH lupus c/b recurrent pleural effusions, lupus nephritis (class V c nephrotic range proteinuria), APS, DVT/PE s/p RLE IVC presented c chest pain found to have chronic/?refractory PE c/b (+)FOBT. Capsule study ordered, capsule placed today 5/1, plan for EGD and colonoscopy tomorrow 5/2. Per GI advance diet to full liquids. B/L leg edema +1 noted. No pressure injuries.

## 2017-05-02 LAB
ANION GAP SERPL CALC-SCNC: 9 MMOL/L — SIGNIFICANT CHANGE UP (ref 5–17)
APTT BLD: 29.1 SEC — SIGNIFICANT CHANGE UP (ref 27.5–37.4)
APTT BLD: 68.1 SEC — HIGH (ref 27.5–37.4)
BUN SERPL-MCNC: 9 MG/DL — SIGNIFICANT CHANGE UP (ref 7–23)
CALCIUM SERPL-MCNC: 8.7 MG/DL — SIGNIFICANT CHANGE UP (ref 8.4–10.5)
CHLORIDE SERPL-SCNC: 102 MMOL/L — SIGNIFICANT CHANGE UP (ref 96–108)
CK MB CFR SERPL CALC: 1 NG/ML — SIGNIFICANT CHANGE UP (ref 0–6.7)
CK SERPL-CCNC: 11 U/L — LOW (ref 30–200)
CO2 SERPL-SCNC: 26 MMOL/L — SIGNIFICANT CHANGE UP (ref 22–31)
CREAT SERPL-MCNC: 0.46 MG/DL — LOW (ref 0.5–1.3)
GLUCOSE SERPL-MCNC: 100 MG/DL — HIGH (ref 70–99)
HCT VFR BLD CALC: 24.3 % — LOW (ref 39–50)
HGB BLD-MCNC: 7.7 G/DL — LOW (ref 13–17)
IGA FLD-MCNC: 514 MG/DL — HIGH (ref 68–378)
IGG FLD-MCNC: 712 MG/DL — SIGNIFICANT CHANGE UP (ref 694–1618)
IGM SERPL-MCNC: 34 MG/DL — LOW (ref 40–230)
KAPPA LC SER QL IFE: 4.51 MG/DL — HIGH (ref 0.33–1.94)
KAPPA/LAMBDA FREE LIGHT CHAIN RATIO, SERUM: 1.17 RATIO — SIGNIFICANT CHANGE UP (ref 0.26–1.65)
LAMBDA LC SER QL IFE: 3.84 MG/DL — HIGH (ref 0.57–2.63)
MCHC RBC-ENTMCNC: 22 PG — LOW (ref 27–34)
MCHC RBC-ENTMCNC: 31.8 GM/DL — LOW (ref 32–36)
MCV RBC AUTO: 69.3 FL — LOW (ref 80–100)
PLATELET # BLD AUTO: 317 K/UL — SIGNIFICANT CHANGE UP (ref 150–400)
POTASSIUM SERPL-MCNC: 4.2 MMOL/L — SIGNIFICANT CHANGE UP (ref 3.5–5.3)
POTASSIUM SERPL-SCNC: 4.2 MMOL/L — SIGNIFICANT CHANGE UP (ref 3.5–5.3)
RBC # BLD: 3.51 M/UL — LOW (ref 4.2–5.8)
RBC # FLD: 21.2 % — HIGH (ref 10.3–14.5)
SODIUM SERPL-SCNC: 137 MMOL/L — SIGNIFICANT CHANGE UP (ref 135–145)
TROPONIN T SERPL-MCNC: <0.01 NG/ML — SIGNIFICANT CHANGE UP (ref 0–0.06)
WBC # BLD: 6.2 K/UL — SIGNIFICANT CHANGE UP (ref 3.8–10.5)
WBC # FLD AUTO: 6.2 K/UL — SIGNIFICANT CHANGE UP (ref 3.8–10.5)

## 2017-05-02 PROCEDURE — 99233 SBSQ HOSP IP/OBS HIGH 50: CPT | Mod: GC

## 2017-05-02 PROCEDURE — 45378 DIAGNOSTIC COLONOSCOPY: CPT | Mod: GC

## 2017-05-02 PROCEDURE — 43239 EGD BIOPSY SINGLE/MULTIPLE: CPT | Mod: GC

## 2017-05-02 PROCEDURE — 88305 TISSUE EXAM BY PATHOLOGIST: CPT | Mod: 26

## 2017-05-02 PROCEDURE — 93010 ELECTROCARDIOGRAM REPORT: CPT

## 2017-05-02 RX ORDER — PANTOPRAZOLE SODIUM 20 MG/1
40 TABLET, DELAYED RELEASE ORAL
Qty: 0 | Refills: 0 | Status: DISCONTINUED | OUTPATIENT
Start: 2017-05-02 | End: 2017-05-10

## 2017-05-02 RX ADMIN — HYDROMORPHONE HYDROCHLORIDE 2 MILLIGRAM(S): 2 INJECTION INTRAMUSCULAR; INTRAVENOUS; SUBCUTANEOUS at 07:57

## 2017-05-02 RX ADMIN — HYDROMORPHONE HYDROCHLORIDE 4 MILLIGRAM(S): 2 INJECTION INTRAMUSCULAR; INTRAVENOUS; SUBCUTANEOUS at 14:34

## 2017-05-02 RX ADMIN — HYDROMORPHONE HYDROCHLORIDE 2 MILLIGRAM(S): 2 INJECTION INTRAMUSCULAR; INTRAVENOUS; SUBCUTANEOUS at 18:12

## 2017-05-02 RX ADMIN — HYDROMORPHONE HYDROCHLORIDE 4 MILLIGRAM(S): 2 INJECTION INTRAMUSCULAR; INTRAVENOUS; SUBCUTANEOUS at 18:59

## 2017-05-02 RX ADMIN — HEPARIN SODIUM 2000 UNIT(S)/HR: 5000 INJECTION INTRAVENOUS; SUBCUTANEOUS at 14:34

## 2017-05-02 RX ADMIN — HYDROMORPHONE HYDROCHLORIDE 4 MILLIGRAM(S): 2 INJECTION INTRAMUSCULAR; INTRAVENOUS; SUBCUTANEOUS at 01:17

## 2017-05-02 RX ADMIN — HYDROMORPHONE HYDROCHLORIDE 2 MILLIGRAM(S): 2 INJECTION INTRAMUSCULAR; INTRAVENOUS; SUBCUTANEOUS at 09:07

## 2017-05-02 RX ADMIN — Medication 200 MILLIGRAM(S): at 17:20

## 2017-05-02 RX ADMIN — HYDROMORPHONE HYDROCHLORIDE 2 MILLIGRAM(S): 2 INJECTION INTRAMUSCULAR; INTRAVENOUS; SUBCUTANEOUS at 01:06

## 2017-05-02 RX ADMIN — FAMOTIDINE 20 MILLIGRAM(S): 10 INJECTION INTRAVENOUS at 06:06

## 2017-05-02 RX ADMIN — HYDROMORPHONE HYDROCHLORIDE 4 MILLIGRAM(S): 2 INJECTION INTRAMUSCULAR; INTRAVENOUS; SUBCUTANEOUS at 23:06

## 2017-05-02 RX ADMIN — FAMOTIDINE 20 MILLIGRAM(S): 10 INJECTION INTRAVENOUS at 17:20

## 2017-05-02 RX ADMIN — HYDROMORPHONE HYDROCHLORIDE 4 MILLIGRAM(S): 2 INJECTION INTRAMUSCULAR; INTRAVENOUS; SUBCUTANEOUS at 14:55

## 2017-05-02 RX ADMIN — Medication 325 MILLIGRAM(S): at 17:20

## 2017-05-02 RX ADMIN — PANTOPRAZOLE SODIUM 40 MILLIGRAM(S): 20 TABLET, DELAYED RELEASE ORAL at 06:06

## 2017-05-02 RX ADMIN — HYDROMORPHONE HYDROCHLORIDE 4 MILLIGRAM(S): 2 INJECTION INTRAMUSCULAR; INTRAVENOUS; SUBCUTANEOUS at 20:03

## 2017-05-02 RX ADMIN — Medication 0.6 MILLIGRAM(S): at 06:06

## 2017-05-02 RX ADMIN — Medication 16 MILLIGRAM(S): at 17:20

## 2017-05-02 RX ADMIN — HYDROMORPHONE HYDROCHLORIDE 2 MILLIGRAM(S): 2 INJECTION INTRAMUSCULAR; INTRAVENOUS; SUBCUTANEOUS at 22:15

## 2017-05-02 RX ADMIN — Medication 500 MILLIGRAM(S): at 17:20

## 2017-05-02 RX ADMIN — HYDROMORPHONE HYDROCHLORIDE 2 MILLIGRAM(S): 2 INJECTION INTRAMUSCULAR; INTRAVENOUS; SUBCUTANEOUS at 03:51

## 2017-05-02 RX ADMIN — PANTOPRAZOLE SODIUM 40 MILLIGRAM(S): 20 TABLET, DELAYED RELEASE ORAL at 17:20

## 2017-05-02 RX ADMIN — HYDROMORPHONE HYDROCHLORIDE 4 MILLIGRAM(S): 2 INJECTION INTRAMUSCULAR; INTRAVENOUS; SUBCUTANEOUS at 23:40

## 2017-05-02 RX ADMIN — HYDROMORPHONE HYDROCHLORIDE 4 MILLIGRAM(S): 2 INJECTION INTRAMUSCULAR; INTRAVENOUS; SUBCUTANEOUS at 06:01

## 2017-05-02 RX ADMIN — Medication 0.6 MILLIGRAM(S): at 17:19

## 2017-05-02 RX ADMIN — HYDROMORPHONE HYDROCHLORIDE 2 MILLIGRAM(S): 2 INJECTION INTRAMUSCULAR; INTRAVENOUS; SUBCUTANEOUS at 04:39

## 2017-05-02 RX ADMIN — HYDROMORPHONE HYDROCHLORIDE 4 MILLIGRAM(S): 2 INJECTION INTRAMUSCULAR; INTRAVENOUS; SUBCUTANEOUS at 01:46

## 2017-05-02 RX ADMIN — HYDROMORPHONE HYDROCHLORIDE 2 MILLIGRAM(S): 2 INJECTION INTRAMUSCULAR; INTRAVENOUS; SUBCUTANEOUS at 17:19

## 2017-05-02 RX ADMIN — HYDROMORPHONE HYDROCHLORIDE 4 MILLIGRAM(S): 2 INJECTION INTRAMUSCULAR; INTRAVENOUS; SUBCUTANEOUS at 07:37

## 2017-05-02 RX ADMIN — HYDROMORPHONE HYDROCHLORIDE 2 MILLIGRAM(S): 2 INJECTION INTRAMUSCULAR; INTRAVENOUS; SUBCUTANEOUS at 21:39

## 2017-05-02 RX ADMIN — Medication 16 MILLIGRAM(S): at 06:06

## 2017-05-02 NOTE — PROVIDER CONTACT NOTE (OTHER) - BACKGROUND
Labs & CE drawn for ST elevation on tele & EKG, CBC for H/H value due to occult blood, PTT for heparin gtt

## 2017-05-02 NOTE — PROVIDER CONTACT NOTE (OTHER) - ASSESSMENT
Pt A&Ox4, VS stable, 146/82, 97 HR, 100% RA, 22 RR, no changes in CP, no SOB. Pt in bathroom during episode. Pt A&Ox4, VS stable, 146/82, 97 HR, 100% RA, 22 RR, no c/o new CP, no SOB. Pt in bathroom during episode.

## 2017-05-02 NOTE — PROVIDER CONTACT NOTE (OTHER) - BACKGROUND
anemia, + occult stool, chronic CP treated with dilaudid 2/4 mg. Heparin gtt @ 16 cc/hr. Pt receiving moviprep for colonoscopy

## 2017-05-02 NOTE — PROVIDER CONTACT NOTE (OTHER) - ACTION/TREATMENT ORDERED:
MD Mirza visited pt and attempted arterial draw, unsuccessful. MICU visited with ultrasound to attempt blood draw & pt refused

## 2017-05-02 NOTE — PROVIDER CONTACT NOTE (OTHER) - SITUATION
Pt had ST elevation on tele & EKG. cardiac enzymes & labs attempted to be draw by 3 different healthcare workers. Unable to draw labs.

## 2017-05-03 LAB
ANION GAP SERPL CALC-SCNC: 14 MMOL/L — SIGNIFICANT CHANGE UP (ref 5–17)
APTT BLD: 151.3 SEC — CRITICAL HIGH (ref 27.5–37.4)
APTT BLD: 56 SEC — HIGH (ref 27.5–37.4)
BUN SERPL-MCNC: 13 MG/DL — SIGNIFICANT CHANGE UP (ref 7–23)
CALCIUM SERPL-MCNC: 9.1 MG/DL — SIGNIFICANT CHANGE UP (ref 8.4–10.5)
CHLORIDE SERPL-SCNC: 98 MMOL/L — SIGNIFICANT CHANGE UP (ref 96–108)
CO2 SERPL-SCNC: 25 MMOL/L — SIGNIFICANT CHANGE UP (ref 22–31)
CREAT SERPL-MCNC: 0.71 MG/DL — SIGNIFICANT CHANGE UP (ref 0.5–1.3)
GLUCOSE SERPL-MCNC: 91 MG/DL — SIGNIFICANT CHANGE UP (ref 70–99)
HCT VFR BLD CALC: 27 % — LOW (ref 39–50)
HGB BLD-MCNC: 7.9 G/DL — LOW (ref 13–17)
INR BLD: 0.97 RATIO — SIGNIFICANT CHANGE UP (ref 0.88–1.16)
MCHC RBC-ENTMCNC: 21.1 PG — LOW (ref 27–34)
MCHC RBC-ENTMCNC: 29.3 GM/DL — LOW (ref 32–36)
MCV RBC AUTO: 72.2 FL — LOW (ref 80–100)
PLATELET # BLD AUTO: 418 K/UL — HIGH (ref 150–400)
POTASSIUM SERPL-MCNC: 4.3 MMOL/L — SIGNIFICANT CHANGE UP (ref 3.5–5.3)
POTASSIUM SERPL-SCNC: 4.3 MMOL/L — SIGNIFICANT CHANGE UP (ref 3.5–5.3)
PROTHROM AB SERPL-ACNC: 11 SEC — SIGNIFICANT CHANGE UP (ref 10–13.1)
RBC # BLD: 3.74 M/UL — LOW (ref 4.2–5.8)
RBC # FLD: 20 % — HIGH (ref 10.3–14.5)
SODIUM SERPL-SCNC: 137 MMOL/L — SIGNIFICANT CHANGE UP (ref 135–145)
WBC # BLD: 5.95 K/UL — SIGNIFICANT CHANGE UP (ref 3.8–10.5)
WBC # FLD AUTO: 5.95 K/UL — SIGNIFICANT CHANGE UP (ref 3.8–10.5)

## 2017-05-03 PROCEDURE — 99232 SBSQ HOSP IP/OBS MODERATE 35: CPT | Mod: GC

## 2017-05-03 PROCEDURE — 99233 SBSQ HOSP IP/OBS HIGH 50: CPT | Mod: GC

## 2017-05-03 RX ORDER — HYDROMORPHONE HYDROCHLORIDE 2 MG/ML
2 INJECTION INTRAMUSCULAR; INTRAVENOUS; SUBCUTANEOUS EVERY 4 HOURS
Qty: 0 | Refills: 0 | Status: DISCONTINUED | OUTPATIENT
Start: 2017-05-03 | End: 2017-05-04

## 2017-05-03 RX ORDER — HYDROMORPHONE HYDROCHLORIDE 2 MG/ML
4 INJECTION INTRAMUSCULAR; INTRAVENOUS; SUBCUTANEOUS EVERY 4 HOURS
Qty: 0 | Refills: 0 | Status: DISCONTINUED | OUTPATIENT
Start: 2017-05-03 | End: 2017-05-04

## 2017-05-03 RX ORDER — WARFARIN SODIUM 2.5 MG/1
5 TABLET ORAL ONCE
Qty: 0 | Refills: 0 | Status: COMPLETED | OUTPATIENT
Start: 2017-05-03 | End: 2017-05-03

## 2017-05-03 RX ORDER — HEPARIN SODIUM 5000 [USP'U]/ML
INJECTION INTRAVENOUS; SUBCUTANEOUS
Qty: 25000 | Refills: 0 | Status: DISCONTINUED | OUTPATIENT
Start: 2017-05-03 | End: 2017-05-08

## 2017-05-03 RX ORDER — SUCRALFATE 1 G
1 TABLET ORAL EVERY 12 HOURS
Qty: 0 | Refills: 0 | Status: DISCONTINUED | OUTPATIENT
Start: 2017-05-03 | End: 2017-05-10

## 2017-05-03 RX ADMIN — HYDROMORPHONE HYDROCHLORIDE 4 MILLIGRAM(S): 2 INJECTION INTRAMUSCULAR; INTRAVENOUS; SUBCUTANEOUS at 20:34

## 2017-05-03 RX ADMIN — HYDROMORPHONE HYDROCHLORIDE 2 MILLIGRAM(S): 2 INJECTION INTRAMUSCULAR; INTRAVENOUS; SUBCUTANEOUS at 18:26

## 2017-05-03 RX ADMIN — HYDROMORPHONE HYDROCHLORIDE 2 MILLIGRAM(S): 2 INJECTION INTRAMUSCULAR; INTRAVENOUS; SUBCUTANEOUS at 11:44

## 2017-05-03 RX ADMIN — Medication 16 MILLIGRAM(S): at 17:55

## 2017-05-03 RX ADMIN — HYDROMORPHONE HYDROCHLORIDE 2 MILLIGRAM(S): 2 INJECTION INTRAMUSCULAR; INTRAVENOUS; SUBCUTANEOUS at 14:41

## 2017-05-03 RX ADMIN — Medication 325 MILLIGRAM(S): at 12:02

## 2017-05-03 RX ADMIN — HYDROMORPHONE HYDROCHLORIDE 4 MILLIGRAM(S): 2 INJECTION INTRAMUSCULAR; INTRAVENOUS; SUBCUTANEOUS at 12:49

## 2017-05-03 RX ADMIN — PANTOPRAZOLE SODIUM 40 MILLIGRAM(S): 20 TABLET, DELAYED RELEASE ORAL at 17:55

## 2017-05-03 RX ADMIN — HYDROMORPHONE HYDROCHLORIDE 4 MILLIGRAM(S): 2 INJECTION INTRAMUSCULAR; INTRAVENOUS; SUBCUTANEOUS at 09:44

## 2017-05-03 RX ADMIN — HYDROMORPHONE HYDROCHLORIDE 4 MILLIGRAM(S): 2 INJECTION INTRAMUSCULAR; INTRAVENOUS; SUBCUTANEOUS at 17:00

## 2017-05-03 RX ADMIN — FAMOTIDINE 20 MILLIGRAM(S): 10 INJECTION INTRAVENOUS at 17:55

## 2017-05-03 RX ADMIN — HYDROMORPHONE HYDROCHLORIDE 2 MILLIGRAM(S): 2 INJECTION INTRAMUSCULAR; INTRAVENOUS; SUBCUTANEOUS at 14:21

## 2017-05-03 RX ADMIN — HYDROMORPHONE HYDROCHLORIDE 2 MILLIGRAM(S): 2 INJECTION INTRAMUSCULAR; INTRAVENOUS; SUBCUTANEOUS at 22:29

## 2017-05-03 RX ADMIN — HEPARIN SODIUM 1500 UNIT(S)/HR: 5000 INJECTION INTRAVENOUS; SUBCUTANEOUS at 10:26

## 2017-05-03 RX ADMIN — HEPARIN SODIUM 1700 UNIT(S)/HR: 5000 INJECTION INTRAVENOUS; SUBCUTANEOUS at 18:21

## 2017-05-03 RX ADMIN — Medication 1 GRAM(S): at 17:55

## 2017-05-03 RX ADMIN — HYDROMORPHONE HYDROCHLORIDE 4 MILLIGRAM(S): 2 INJECTION INTRAMUSCULAR; INTRAVENOUS; SUBCUTANEOUS at 21:31

## 2017-05-03 RX ADMIN — HEPARIN SODIUM 4000 UNIT(S): 5000 INJECTION INTRAVENOUS; SUBCUTANEOUS at 18:22

## 2017-05-03 RX ADMIN — HEPARIN SODIUM 0 UNIT(S)/HR: 5000 INJECTION INTRAVENOUS; SUBCUTANEOUS at 08:12

## 2017-05-03 RX ADMIN — HYDROMORPHONE HYDROCHLORIDE 2 MILLIGRAM(S): 2 INJECTION INTRAMUSCULAR; INTRAVENOUS; SUBCUTANEOUS at 10:26

## 2017-05-03 RX ADMIN — FAMOTIDINE 20 MILLIGRAM(S): 10 INJECTION INTRAVENOUS at 06:25

## 2017-05-03 RX ADMIN — HYDROMORPHONE HYDROCHLORIDE 4 MILLIGRAM(S): 2 INJECTION INTRAMUSCULAR; INTRAVENOUS; SUBCUTANEOUS at 08:32

## 2017-05-03 RX ADMIN — Medication 500 MILLIGRAM(S): at 12:02

## 2017-05-03 RX ADMIN — HYDROMORPHONE HYDROCHLORIDE 4 MILLIGRAM(S): 2 INJECTION INTRAMUSCULAR; INTRAVENOUS; SUBCUTANEOUS at 16:36

## 2017-05-03 RX ADMIN — PANTOPRAZOLE SODIUM 40 MILLIGRAM(S): 20 TABLET, DELAYED RELEASE ORAL at 06:28

## 2017-05-03 RX ADMIN — HYDROMORPHONE HYDROCHLORIDE 4 MILLIGRAM(S): 2 INJECTION INTRAMUSCULAR; INTRAVENOUS; SUBCUTANEOUS at 04:11

## 2017-05-03 RX ADMIN — HEPARIN SODIUM 1800 UNIT(S)/HR: 5000 INJECTION INTRAVENOUS; SUBCUTANEOUS at 00:55

## 2017-05-03 RX ADMIN — HYDROMORPHONE HYDROCHLORIDE 4 MILLIGRAM(S): 2 INJECTION INTRAMUSCULAR; INTRAVENOUS; SUBCUTANEOUS at 03:41

## 2017-05-03 RX ADMIN — WARFARIN SODIUM 5 MILLIGRAM(S): 2.5 TABLET ORAL at 21:27

## 2017-05-03 RX ADMIN — HYDROMORPHONE HYDROCHLORIDE 2 MILLIGRAM(S): 2 INJECTION INTRAMUSCULAR; INTRAVENOUS; SUBCUTANEOUS at 19:05

## 2017-05-03 RX ADMIN — Medication 200 MILLIGRAM(S): at 17:55

## 2017-05-03 RX ADMIN — HYDROMORPHONE HYDROCHLORIDE 2 MILLIGRAM(S): 2 INJECTION INTRAMUSCULAR; INTRAVENOUS; SUBCUTANEOUS at 23:29

## 2017-05-03 RX ADMIN — Medication 0.6 MILLIGRAM(S): at 06:24

## 2017-05-03 RX ADMIN — Medication 200 MILLIGRAM(S): at 08:10

## 2017-05-03 RX ADMIN — HYDROMORPHONE HYDROCHLORIDE 4 MILLIGRAM(S): 2 INJECTION INTRAMUSCULAR; INTRAVENOUS; SUBCUTANEOUS at 13:14

## 2017-05-03 RX ADMIN — Medication 16 MILLIGRAM(S): at 06:25

## 2017-05-03 RX ADMIN — HYDROMORPHONE HYDROCHLORIDE 2 MILLIGRAM(S): 2 INJECTION INTRAMUSCULAR; INTRAVENOUS; SUBCUTANEOUS at 06:20

## 2017-05-04 LAB
APTT BLD: 107.2 SEC — HIGH (ref 27.5–37.4)
APTT BLD: 156.3 SEC — CRITICAL HIGH (ref 27.5–37.4)
APTT BLD: 54.5 SEC — HIGH (ref 27.5–37.4)
APTT BLD: 74.9 SEC — HIGH (ref 27.5–37.4)
BLD GP AB SCN SERPL QL: NEGATIVE — SIGNIFICANT CHANGE UP
HCT VFR BLD CALC: 26.1 % — LOW (ref 39–50)
HGB BLD-MCNC: 7.9 G/DL — LOW (ref 13–17)
INR BLD: 0.93 RATIO — SIGNIFICANT CHANGE UP (ref 0.88–1.16)
MCHC RBC-ENTMCNC: 21.8 PG — LOW (ref 27–34)
MCHC RBC-ENTMCNC: 30.3 GM/DL — LOW (ref 32–36)
MCV RBC AUTO: 71.9 FL — LOW (ref 80–100)
PLATELET # BLD AUTO: 300 K/UL — SIGNIFICANT CHANGE UP (ref 150–400)
PROTHROM AB SERPL-ACNC: 10.5 SEC — SIGNIFICANT CHANGE UP (ref 10–13.1)
RBC # BLD: 3.63 M/UL — LOW (ref 4.2–5.8)
RBC # FLD: 19.7 % — HIGH (ref 10.3–14.5)
RH IG SCN BLD-IMP: POSITIVE — SIGNIFICANT CHANGE UP
WBC # BLD: 6.81 K/UL — SIGNIFICANT CHANGE UP (ref 3.8–10.5)
WBC # FLD AUTO: 6.81 K/UL — SIGNIFICANT CHANGE UP (ref 3.8–10.5)

## 2017-05-04 PROCEDURE — 99233 SBSQ HOSP IP/OBS HIGH 50: CPT | Mod: GC

## 2017-05-04 RX ORDER — HYDROMORPHONE HYDROCHLORIDE 2 MG/ML
2 INJECTION INTRAMUSCULAR; INTRAVENOUS; SUBCUTANEOUS EVERY 4 HOURS
Qty: 0 | Refills: 0 | Status: DISCONTINUED | OUTPATIENT
Start: 2017-05-04 | End: 2017-05-05

## 2017-05-04 RX ORDER — WARFARIN SODIUM 2.5 MG/1
5 TABLET ORAL ONCE
Qty: 0 | Refills: 0 | Status: COMPLETED | OUTPATIENT
Start: 2017-05-04 | End: 2017-05-04

## 2017-05-04 RX ORDER — HYDROMORPHONE HYDROCHLORIDE 2 MG/ML
1 INJECTION INTRAMUSCULAR; INTRAVENOUS; SUBCUTANEOUS EVERY 4 HOURS
Qty: 0 | Refills: 0 | Status: DISCONTINUED | OUTPATIENT
Start: 2017-05-04 | End: 2017-05-07

## 2017-05-04 RX ADMIN — HYDROMORPHONE HYDROCHLORIDE 2 MILLIGRAM(S): 2 INJECTION INTRAMUSCULAR; INTRAVENOUS; SUBCUTANEOUS at 20:05

## 2017-05-04 RX ADMIN — HYDROMORPHONE HYDROCHLORIDE 2 MILLIGRAM(S): 2 INJECTION INTRAMUSCULAR; INTRAVENOUS; SUBCUTANEOUS at 11:51

## 2017-05-04 RX ADMIN — Medication 200 MILLIGRAM(S): at 17:29

## 2017-05-04 RX ADMIN — HEPARIN SODIUM 0 UNIT(S)/HR: 5000 INJECTION INTRAVENOUS; SUBCUTANEOUS at 22:14

## 2017-05-04 RX ADMIN — HYDROMORPHONE HYDROCHLORIDE 2 MILLIGRAM(S): 2 INJECTION INTRAMUSCULAR; INTRAVENOUS; SUBCUTANEOUS at 23:43

## 2017-05-04 RX ADMIN — HYDROMORPHONE HYDROCHLORIDE 1 MILLIGRAM(S): 2 INJECTION INTRAMUSCULAR; INTRAVENOUS; SUBCUTANEOUS at 14:15

## 2017-05-04 RX ADMIN — Medication 1 GRAM(S): at 05:11

## 2017-05-04 RX ADMIN — Medication 12 MILLIGRAM(S): at 17:29

## 2017-05-04 RX ADMIN — HEPARIN SODIUM 4000 UNIT(S): 5000 INJECTION INTRAVENOUS; SUBCUTANEOUS at 14:51

## 2017-05-04 RX ADMIN — Medication 500 MILLIGRAM(S): at 11:52

## 2017-05-04 RX ADMIN — HYDROMORPHONE HYDROCHLORIDE 1 MILLIGRAM(S): 2 INJECTION INTRAMUSCULAR; INTRAVENOUS; SUBCUTANEOUS at 14:01

## 2017-05-04 RX ADMIN — HYDROMORPHONE HYDROCHLORIDE 1 MILLIGRAM(S): 2 INJECTION INTRAMUSCULAR; INTRAVENOUS; SUBCUTANEOUS at 22:20

## 2017-05-04 RX ADMIN — HYDROMORPHONE HYDROCHLORIDE 1 MILLIGRAM(S): 2 INJECTION INTRAMUSCULAR; INTRAVENOUS; SUBCUTANEOUS at 22:50

## 2017-05-04 RX ADMIN — HYDROMORPHONE HYDROCHLORIDE 2 MILLIGRAM(S): 2 INJECTION INTRAMUSCULAR; INTRAVENOUS; SUBCUTANEOUS at 03:48

## 2017-05-04 RX ADMIN — HYDROMORPHONE HYDROCHLORIDE 1 MILLIGRAM(S): 2 INJECTION INTRAMUSCULAR; INTRAVENOUS; SUBCUTANEOUS at 18:11

## 2017-05-04 RX ADMIN — Medication 200 MILLIGRAM(S): at 07:57

## 2017-05-04 RX ADMIN — HYDROMORPHONE HYDROCHLORIDE 2 MILLIGRAM(S): 2 INJECTION INTRAMUSCULAR; INTRAVENOUS; SUBCUTANEOUS at 15:08

## 2017-05-04 RX ADMIN — HEPARIN SODIUM 1400 UNIT(S)/HR: 5000 INJECTION INTRAVENOUS; SUBCUTANEOUS at 23:20

## 2017-05-04 RX ADMIN — HYDROMORPHONE HYDROCHLORIDE 4 MILLIGRAM(S): 2 INJECTION INTRAMUSCULAR; INTRAVENOUS; SUBCUTANEOUS at 09:45

## 2017-05-04 RX ADMIN — HYDROMORPHONE HYDROCHLORIDE 4 MILLIGRAM(S): 2 INJECTION INTRAMUSCULAR; INTRAVENOUS; SUBCUTANEOUS at 09:26

## 2017-05-04 RX ADMIN — PANTOPRAZOLE SODIUM 40 MILLIGRAM(S): 20 TABLET, DELAYED RELEASE ORAL at 05:11

## 2017-05-04 RX ADMIN — HEPARIN SODIUM 1500 UNIT(S)/HR: 5000 INJECTION INTRAVENOUS; SUBCUTANEOUS at 07:43

## 2017-05-04 RX ADMIN — HYDROMORPHONE HYDROCHLORIDE 2 MILLIGRAM(S): 2 INJECTION INTRAMUSCULAR; INTRAVENOUS; SUBCUTANEOUS at 11:10

## 2017-05-04 RX ADMIN — HYDROMORPHONE HYDROCHLORIDE 2 MILLIGRAM(S): 2 INJECTION INTRAMUSCULAR; INTRAVENOUS; SUBCUTANEOUS at 07:55

## 2017-05-04 RX ADMIN — Medication 12 MILLIGRAM(S): at 05:42

## 2017-05-04 RX ADMIN — HYDROMORPHONE HYDROCHLORIDE 4 MILLIGRAM(S): 2 INJECTION INTRAMUSCULAR; INTRAVENOUS; SUBCUTANEOUS at 01:30

## 2017-05-04 RX ADMIN — HYDROMORPHONE HYDROCHLORIDE 2 MILLIGRAM(S): 2 INJECTION INTRAMUSCULAR; INTRAVENOUS; SUBCUTANEOUS at 15:27

## 2017-05-04 RX ADMIN — HYDROMORPHONE HYDROCHLORIDE 2 MILLIGRAM(S): 2 INJECTION INTRAMUSCULAR; INTRAVENOUS; SUBCUTANEOUS at 08:16

## 2017-05-04 RX ADMIN — HEPARIN SODIUM 1500 UNIT(S)/HR: 5000 INJECTION INTRAVENOUS; SUBCUTANEOUS at 01:06

## 2017-05-04 RX ADMIN — WARFARIN SODIUM 5 MILLIGRAM(S): 2.5 TABLET ORAL at 21:34

## 2017-05-04 RX ADMIN — HYDROMORPHONE HYDROCHLORIDE 4 MILLIGRAM(S): 2 INJECTION INTRAMUSCULAR; INTRAVENOUS; SUBCUTANEOUS at 05:09

## 2017-05-04 RX ADMIN — HEPARIN SODIUM 1700 UNIT(S)/HR: 5000 INJECTION INTRAVENOUS; SUBCUTANEOUS at 14:50

## 2017-05-04 RX ADMIN — HYDROMORPHONE HYDROCHLORIDE 2 MILLIGRAM(S): 2 INJECTION INTRAMUSCULAR; INTRAVENOUS; SUBCUTANEOUS at 04:40

## 2017-05-04 RX ADMIN — HYDROMORPHONE HYDROCHLORIDE 2 MILLIGRAM(S): 2 INJECTION INTRAMUSCULAR; INTRAVENOUS; SUBCUTANEOUS at 19:35

## 2017-05-04 RX ADMIN — HYDROMORPHONE HYDROCHLORIDE 4 MILLIGRAM(S): 2 INJECTION INTRAMUSCULAR; INTRAVENOUS; SUBCUTANEOUS at 00:36

## 2017-05-04 RX ADMIN — Medication 1 GRAM(S): at 17:29

## 2017-05-04 RX ADMIN — Medication 325 MILLIGRAM(S): at 11:52

## 2017-05-04 RX ADMIN — HYDROMORPHONE HYDROCHLORIDE 1 MILLIGRAM(S): 2 INJECTION INTRAMUSCULAR; INTRAVENOUS; SUBCUTANEOUS at 18:30

## 2017-05-04 RX ADMIN — HYDROMORPHONE HYDROCHLORIDE 4 MILLIGRAM(S): 2 INJECTION INTRAMUSCULAR; INTRAVENOUS; SUBCUTANEOUS at 05:43

## 2017-05-04 RX ADMIN — PANTOPRAZOLE SODIUM 40 MILLIGRAM(S): 20 TABLET, DELAYED RELEASE ORAL at 17:29

## 2017-05-04 NOTE — PATIENT PROFILE ADULT. - PAIN, CHRONIC: FACTORS THAT AGGRAVATE, PROFILE
I personally performed the services described in the documentation, reviewed the documentation recorded by the scribe in my presence and it accurately and completely records my words and action. activity

## 2017-05-05 LAB
APTT BLD: 138 SEC — CRITICAL HIGH (ref 27.5–37.4)
APTT BLD: 26.7 SEC — LOW (ref 27.5–37.4)
APTT BLD: 61.9 SEC — HIGH (ref 27.5–37.4)
APTT BLD: > 200 SEC (ref 27.5–37.4)
HCT VFR BLD CALC: 26.8 % — LOW (ref 39–50)
HGB BLD-MCNC: 8 G/DL — LOW (ref 13–17)
INR BLD: 1.12 RATIO — SIGNIFICANT CHANGE UP (ref 0.88–1.16)
MCHC RBC-ENTMCNC: 21.6 PG — LOW (ref 27–34)
MCHC RBC-ENTMCNC: 29.9 GM/DL — LOW (ref 32–36)
MCV RBC AUTO: 72.4 FL — LOW (ref 80–100)
PLATELET # BLD AUTO: 359 K/UL — SIGNIFICANT CHANGE UP (ref 150–400)
PROTHROM AB SERPL-ACNC: 12.7 SEC — SIGNIFICANT CHANGE UP (ref 10–13.1)
RBC # BLD: 3.7 M/UL — LOW (ref 4.2–5.8)
RBC # FLD: 19.6 % — HIGH (ref 10.3–14.5)
WBC # BLD: 6.67 K/UL — SIGNIFICANT CHANGE UP (ref 3.8–10.5)
WBC # FLD AUTO: 6.67 K/UL — SIGNIFICANT CHANGE UP (ref 3.8–10.5)

## 2017-05-05 PROCEDURE — 99233 SBSQ HOSP IP/OBS HIGH 50: CPT | Mod: GC

## 2017-05-05 RX ORDER — HYDROMORPHONE HYDROCHLORIDE 2 MG/ML
4 INJECTION INTRAMUSCULAR; INTRAVENOUS; SUBCUTANEOUS ONCE
Qty: 0 | Refills: 0 | Status: DISCONTINUED | OUTPATIENT
Start: 2017-05-05 | End: 2017-05-05

## 2017-05-05 RX ORDER — WARFARIN SODIUM 2.5 MG/1
7.5 TABLET ORAL ONCE
Qty: 0 | Refills: 0 | Status: COMPLETED | OUTPATIENT
Start: 2017-05-05 | End: 2017-05-05

## 2017-05-05 RX ORDER — HYDROMORPHONE HYDROCHLORIDE 2 MG/ML
2 INJECTION INTRAMUSCULAR; INTRAVENOUS; SUBCUTANEOUS
Qty: 0 | Refills: 0 | Status: DISCONTINUED | OUTPATIENT
Start: 2017-05-05 | End: 2017-05-05

## 2017-05-05 RX ORDER — HYDROMORPHONE HYDROCHLORIDE 2 MG/ML
2 INJECTION INTRAMUSCULAR; INTRAVENOUS; SUBCUTANEOUS EVERY 4 HOURS
Qty: 0 | Refills: 0 | Status: DISCONTINUED | OUTPATIENT
Start: 2017-05-05 | End: 2017-05-07

## 2017-05-05 RX ADMIN — HYDROMORPHONE HYDROCHLORIDE 1 MILLIGRAM(S): 2 INJECTION INTRAMUSCULAR; INTRAVENOUS; SUBCUTANEOUS at 19:36

## 2017-05-05 RX ADMIN — HYDROMORPHONE HYDROCHLORIDE 2 MILLIGRAM(S): 2 INJECTION INTRAMUSCULAR; INTRAVENOUS; SUBCUTANEOUS at 06:45

## 2017-05-05 RX ADMIN — HYDROMORPHONE HYDROCHLORIDE 2 MILLIGRAM(S): 2 INJECTION INTRAMUSCULAR; INTRAVENOUS; SUBCUTANEOUS at 17:11

## 2017-05-05 RX ADMIN — HYDROMORPHONE HYDROCHLORIDE 2 MILLIGRAM(S): 2 INJECTION INTRAMUSCULAR; INTRAVENOUS; SUBCUTANEOUS at 22:11

## 2017-05-05 RX ADMIN — Medication 200 MILLIGRAM(S): at 08:36

## 2017-05-05 RX ADMIN — Medication 325 MILLIGRAM(S): at 11:55

## 2017-05-05 RX ADMIN — HYDROMORPHONE HYDROCHLORIDE 1 MILLIGRAM(S): 2 INJECTION INTRAMUSCULAR; INTRAVENOUS; SUBCUTANEOUS at 14:58

## 2017-05-05 RX ADMIN — Medication 12 MILLIGRAM(S): at 05:43

## 2017-05-05 RX ADMIN — HEPARIN SODIUM 0 UNIT(S)/HR: 5000 INJECTION INTRAVENOUS; SUBCUTANEOUS at 14:20

## 2017-05-05 RX ADMIN — Medication 500 MILLIGRAM(S): at 11:55

## 2017-05-05 RX ADMIN — HYDROMORPHONE HYDROCHLORIDE 1 MILLIGRAM(S): 2 INJECTION INTRAMUSCULAR; INTRAVENOUS; SUBCUTANEOUS at 04:29

## 2017-05-05 RX ADMIN — HYDROMORPHONE HYDROCHLORIDE 1 MILLIGRAM(S): 2 INJECTION INTRAMUSCULAR; INTRAVENOUS; SUBCUTANEOUS at 08:45

## 2017-05-05 RX ADMIN — HYDROMORPHONE HYDROCHLORIDE 1 MILLIGRAM(S): 2 INJECTION INTRAMUSCULAR; INTRAVENOUS; SUBCUTANEOUS at 08:30

## 2017-05-05 RX ADMIN — PANTOPRAZOLE SODIUM 40 MILLIGRAM(S): 20 TABLET, DELAYED RELEASE ORAL at 06:15

## 2017-05-05 RX ADMIN — HEPARIN SODIUM 1500 UNIT(S)/HR: 5000 INJECTION INTRAVENOUS; SUBCUTANEOUS at 15:20

## 2017-05-05 RX ADMIN — HYDROMORPHONE HYDROCHLORIDE 2 MILLIGRAM(S): 2 INJECTION INTRAMUSCULAR; INTRAVENOUS; SUBCUTANEOUS at 10:39

## 2017-05-05 RX ADMIN — Medication 200 MILLIGRAM(S): at 17:40

## 2017-05-05 RX ADMIN — HYDROMORPHONE HYDROCHLORIDE 1 MILLIGRAM(S): 2 INJECTION INTRAMUSCULAR; INTRAVENOUS; SUBCUTANEOUS at 15:13

## 2017-05-05 RX ADMIN — POLYETHYLENE GLYCOL 3350 17 GRAM(S): 17 POWDER, FOR SOLUTION ORAL at 17:09

## 2017-05-05 RX ADMIN — HEPARIN SODIUM 0 UNIT(S)/HR: 5000 INJECTION INTRAVENOUS; SUBCUTANEOUS at 21:57

## 2017-05-05 RX ADMIN — HEPARIN SODIUM 1800 UNIT(S)/HR: 5000 INJECTION INTRAVENOUS; SUBCUTANEOUS at 06:55

## 2017-05-05 RX ADMIN — HYDROMORPHONE HYDROCHLORIDE 2 MILLIGRAM(S): 2 INJECTION INTRAMUSCULAR; INTRAVENOUS; SUBCUTANEOUS at 06:14

## 2017-05-05 RX ADMIN — HYDROMORPHONE HYDROCHLORIDE 1 MILLIGRAM(S): 2 INJECTION INTRAMUSCULAR; INTRAVENOUS; SUBCUTANEOUS at 05:00

## 2017-05-05 RX ADMIN — HYDROMORPHONE HYDROCHLORIDE 2 MILLIGRAM(S): 2 INJECTION INTRAMUSCULAR; INTRAVENOUS; SUBCUTANEOUS at 10:24

## 2017-05-05 RX ADMIN — HYDROMORPHONE HYDROCHLORIDE 1 MILLIGRAM(S): 2 INJECTION INTRAMUSCULAR; INTRAVENOUS; SUBCUTANEOUS at 23:52

## 2017-05-05 RX ADMIN — Medication 1 GRAM(S): at 17:09

## 2017-05-05 RX ADMIN — HYDROMORPHONE HYDROCHLORIDE 2 MILLIGRAM(S): 2 INJECTION INTRAMUSCULAR; INTRAVENOUS; SUBCUTANEOUS at 21:15

## 2017-05-05 RX ADMIN — HYDROMORPHONE HYDROCHLORIDE 4 MILLIGRAM(S): 2 INJECTION INTRAMUSCULAR; INTRAVENOUS; SUBCUTANEOUS at 13:00

## 2017-05-05 RX ADMIN — HYDROMORPHONE HYDROCHLORIDE 1 MILLIGRAM(S): 2 INJECTION INTRAMUSCULAR; INTRAVENOUS; SUBCUTANEOUS at 20:00

## 2017-05-05 RX ADMIN — HEPARIN SODIUM 8000 UNIT(S): 5000 INJECTION INTRAVENOUS; SUBCUTANEOUS at 06:58

## 2017-05-05 RX ADMIN — Medication 1 GRAM(S): at 05:42

## 2017-05-05 RX ADMIN — PANTOPRAZOLE SODIUM 40 MILLIGRAM(S): 20 TABLET, DELAYED RELEASE ORAL at 17:09

## 2017-05-05 RX ADMIN — Medication 12 MILLIGRAM(S): at 17:08

## 2017-05-05 RX ADMIN — HEPARIN SODIUM 1200 UNIT(S)/HR: 5000 INJECTION INTRAVENOUS; SUBCUTANEOUS at 23:02

## 2017-05-05 RX ADMIN — HYDROMORPHONE HYDROCHLORIDE 4 MILLIGRAM(S): 2 INJECTION INTRAMUSCULAR; INTRAVENOUS; SUBCUTANEOUS at 12:45

## 2017-05-05 RX ADMIN — WARFARIN SODIUM 7.5 MILLIGRAM(S): 2.5 TABLET ORAL at 21:24

## 2017-05-06 LAB
APTT BLD: 60.7 SEC — HIGH (ref 27.5–37.4)
APTT BLD: 76.8 SEC — HIGH (ref 27.5–37.4)
HCT VFR BLD CALC: 27 % — LOW (ref 39–50)
HGB BLD-MCNC: 7.9 G/DL — LOW (ref 13–17)
MCHC RBC-ENTMCNC: 21.2 PG — LOW (ref 27–34)
MCHC RBC-ENTMCNC: 29.3 GM/DL — LOW (ref 32–36)
MCV RBC AUTO: 72.4 FL — LOW (ref 80–100)
PLATELET # BLD AUTO: 410 K/UL — HIGH (ref 150–400)
RBC # BLD: 3.73 M/UL — LOW (ref 4.2–5.8)
RBC # FLD: 20.1 % — HIGH (ref 10.3–14.5)
WBC # BLD: 8.21 K/UL — SIGNIFICANT CHANGE UP (ref 3.8–10.5)
WBC # FLD AUTO: 8.21 K/UL — SIGNIFICANT CHANGE UP (ref 3.8–10.5)

## 2017-05-06 PROCEDURE — 99233 SBSQ HOSP IP/OBS HIGH 50: CPT

## 2017-05-06 RX ORDER — WARFARIN SODIUM 2.5 MG/1
5 TABLET ORAL ONCE
Qty: 0 | Refills: 0 | Status: COMPLETED | OUTPATIENT
Start: 2017-05-06 | End: 2017-05-06

## 2017-05-06 RX ORDER — WARFARIN SODIUM 2.5 MG/1
7.5 TABLET ORAL ONCE
Qty: 0 | Refills: 0 | Status: DISCONTINUED | OUTPATIENT
Start: 2017-05-06 | End: 2017-05-06

## 2017-05-06 RX ADMIN — HYDROMORPHONE HYDROCHLORIDE 1 MILLIGRAM(S): 2 INJECTION INTRAMUSCULAR; INTRAVENOUS; SUBCUTANEOUS at 21:10

## 2017-05-06 RX ADMIN — HYDROMORPHONE HYDROCHLORIDE 2 MILLIGRAM(S): 2 INJECTION INTRAMUSCULAR; INTRAVENOUS; SUBCUTANEOUS at 15:00

## 2017-05-06 RX ADMIN — Medication 12 MILLIGRAM(S): at 17:19

## 2017-05-06 RX ADMIN — HYDROMORPHONE HYDROCHLORIDE 2 MILLIGRAM(S): 2 INJECTION INTRAMUSCULAR; INTRAVENOUS; SUBCUTANEOUS at 11:00

## 2017-05-06 RX ADMIN — HYDROMORPHONE HYDROCHLORIDE 2 MILLIGRAM(S): 2 INJECTION INTRAMUSCULAR; INTRAVENOUS; SUBCUTANEOUS at 14:33

## 2017-05-06 RX ADMIN — HEPARIN SODIUM 1200 UNIT(S)/HR: 5000 INJECTION INTRAVENOUS; SUBCUTANEOUS at 14:02

## 2017-05-06 RX ADMIN — HYDROMORPHONE HYDROCHLORIDE 2 MILLIGRAM(S): 2 INJECTION INTRAMUSCULAR; INTRAVENOUS; SUBCUTANEOUS at 10:29

## 2017-05-06 RX ADMIN — HYDROMORPHONE HYDROCHLORIDE 1 MILLIGRAM(S): 2 INJECTION INTRAMUSCULAR; INTRAVENOUS; SUBCUTANEOUS at 09:32

## 2017-05-06 RX ADMIN — HYDROMORPHONE HYDROCHLORIDE 1 MILLIGRAM(S): 2 INJECTION INTRAMUSCULAR; INTRAVENOUS; SUBCUTANEOUS at 09:01

## 2017-05-06 RX ADMIN — PANTOPRAZOLE SODIUM 40 MILLIGRAM(S): 20 TABLET, DELAYED RELEASE ORAL at 17:18

## 2017-05-06 RX ADMIN — HYDROMORPHONE HYDROCHLORIDE 1 MILLIGRAM(S): 2 INJECTION INTRAMUSCULAR; INTRAVENOUS; SUBCUTANEOUS at 17:30

## 2017-05-06 RX ADMIN — HYDROMORPHONE HYDROCHLORIDE 2 MILLIGRAM(S): 2 INJECTION INTRAMUSCULAR; INTRAVENOUS; SUBCUTANEOUS at 01:39

## 2017-05-06 RX ADMIN — Medication 12 MILLIGRAM(S): at 05:01

## 2017-05-06 RX ADMIN — HYDROMORPHONE HYDROCHLORIDE 2 MILLIGRAM(S): 2 INJECTION INTRAMUSCULAR; INTRAVENOUS; SUBCUTANEOUS at 18:52

## 2017-05-06 RX ADMIN — HYDROMORPHONE HYDROCHLORIDE 2 MILLIGRAM(S): 2 INJECTION INTRAMUSCULAR; INTRAVENOUS; SUBCUTANEOUS at 06:03

## 2017-05-06 RX ADMIN — Medication 200 MILLIGRAM(S): at 08:34

## 2017-05-06 RX ADMIN — HYDROMORPHONE HYDROCHLORIDE 1 MILLIGRAM(S): 2 INJECTION INTRAMUSCULAR; INTRAVENOUS; SUBCUTANEOUS at 17:14

## 2017-05-06 RX ADMIN — HEPARIN SODIUM 1200 UNIT(S)/HR: 5000 INJECTION INTRAVENOUS; SUBCUTANEOUS at 06:04

## 2017-05-06 RX ADMIN — HYDROMORPHONE HYDROCHLORIDE 2 MILLIGRAM(S): 2 INJECTION INTRAMUSCULAR; INTRAVENOUS; SUBCUTANEOUS at 19:21

## 2017-05-06 RX ADMIN — HYDROMORPHONE HYDROCHLORIDE 1 MILLIGRAM(S): 2 INJECTION INTRAMUSCULAR; INTRAVENOUS; SUBCUTANEOUS at 13:16

## 2017-05-06 RX ADMIN — Medication 200 MILLIGRAM(S): at 17:18

## 2017-05-06 RX ADMIN — HYDROMORPHONE HYDROCHLORIDE 1 MILLIGRAM(S): 2 INJECTION INTRAMUSCULAR; INTRAVENOUS; SUBCUTANEOUS at 13:00

## 2017-05-06 RX ADMIN — PANTOPRAZOLE SODIUM 40 MILLIGRAM(S): 20 TABLET, DELAYED RELEASE ORAL at 08:34

## 2017-05-06 RX ADMIN — HYDROMORPHONE HYDROCHLORIDE 2 MILLIGRAM(S): 2 INJECTION INTRAMUSCULAR; INTRAVENOUS; SUBCUTANEOUS at 06:20

## 2017-05-06 RX ADMIN — Medication 325 MILLIGRAM(S): at 12:32

## 2017-05-06 RX ADMIN — Medication 500 MILLIGRAM(S): at 12:32

## 2017-05-06 RX ADMIN — HYDROMORPHONE HYDROCHLORIDE 1 MILLIGRAM(S): 2 INJECTION INTRAMUSCULAR; INTRAVENOUS; SUBCUTANEOUS at 00:30

## 2017-05-06 RX ADMIN — HYDROMORPHONE HYDROCHLORIDE 2 MILLIGRAM(S): 2 INJECTION INTRAMUSCULAR; INTRAVENOUS; SUBCUTANEOUS at 02:10

## 2017-05-06 RX ADMIN — Medication 1 GRAM(S): at 05:01

## 2017-05-06 RX ADMIN — HYDROMORPHONE HYDROCHLORIDE 1 MILLIGRAM(S): 2 INJECTION INTRAMUSCULAR; INTRAVENOUS; SUBCUTANEOUS at 05:00

## 2017-05-06 RX ADMIN — HYDROMORPHONE HYDROCHLORIDE 1 MILLIGRAM(S): 2 INJECTION INTRAMUSCULAR; INTRAVENOUS; SUBCUTANEOUS at 21:25

## 2017-05-06 RX ADMIN — HYDROMORPHONE HYDROCHLORIDE 1 MILLIGRAM(S): 2 INJECTION INTRAMUSCULAR; INTRAVENOUS; SUBCUTANEOUS at 05:30

## 2017-05-06 RX ADMIN — WARFARIN SODIUM 5 MILLIGRAM(S): 2.5 TABLET ORAL at 21:10

## 2017-05-06 RX ADMIN — HYDROMORPHONE HYDROCHLORIDE 2 MILLIGRAM(S): 2 INJECTION INTRAMUSCULAR; INTRAVENOUS; SUBCUTANEOUS at 23:11

## 2017-05-06 RX ADMIN — Medication 1 GRAM(S): at 17:18

## 2017-05-06 RX ADMIN — HYDROMORPHONE HYDROCHLORIDE 2 MILLIGRAM(S): 2 INJECTION INTRAMUSCULAR; INTRAVENOUS; SUBCUTANEOUS at 22:56

## 2017-05-07 LAB
APTT BLD: 82.3 SEC — HIGH (ref 27.5–37.4)
HCT VFR BLD CALC: 29.3 % — LOW (ref 39–50)
HGB BLD-MCNC: 8.6 G/DL — LOW (ref 13–17)
INR BLD: 3.11 RATIO — HIGH (ref 0.88–1.16)
MCHC RBC-ENTMCNC: 21 PG — LOW (ref 27–34)
MCHC RBC-ENTMCNC: 29.4 GM/DL — LOW (ref 32–36)
MCV RBC AUTO: 71.5 FL — LOW (ref 80–100)
PLATELET # BLD AUTO: 369 K/UL — SIGNIFICANT CHANGE UP (ref 150–400)
PROTHROM AB SERPL-ACNC: 36 SEC — HIGH (ref 10–13.1)
RBC # BLD: 4.1 M/UL — LOW (ref 4.2–5.8)
RBC # FLD: 19.6 % — HIGH (ref 10.3–14.5)
WBC # BLD: 7.88 K/UL — SIGNIFICANT CHANGE UP (ref 3.8–10.5)
WBC # FLD AUTO: 7.88 K/UL — SIGNIFICANT CHANGE UP (ref 3.8–10.5)

## 2017-05-07 PROCEDURE — 99233 SBSQ HOSP IP/OBS HIGH 50: CPT

## 2017-05-07 RX ORDER — WARFARIN SODIUM 2.5 MG/1
2.5 TABLET ORAL ONCE
Qty: 0 | Refills: 0 | Status: COMPLETED | OUTPATIENT
Start: 2017-05-07 | End: 2017-05-07

## 2017-05-07 RX ORDER — OXYCODONE HYDROCHLORIDE 5 MG/1
15 TABLET ORAL EVERY 12 HOURS
Qty: 0 | Refills: 0 | Status: DISCONTINUED | OUTPATIENT
Start: 2017-05-07 | End: 2017-05-07

## 2017-05-07 RX ORDER — HYDROMORPHONE HYDROCHLORIDE 2 MG/ML
1 INJECTION INTRAMUSCULAR; INTRAVENOUS; SUBCUTANEOUS EVERY 4 HOURS
Qty: 0 | Refills: 0 | Status: DISCONTINUED | OUTPATIENT
Start: 2017-05-07 | End: 2017-05-09

## 2017-05-07 RX ORDER — HYDROMORPHONE HYDROCHLORIDE 2 MG/ML
2 INJECTION INTRAMUSCULAR; INTRAVENOUS; SUBCUTANEOUS EVERY 4 HOURS
Qty: 0 | Refills: 0 | Status: DISCONTINUED | OUTPATIENT
Start: 2017-05-07 | End: 2017-05-09

## 2017-05-07 RX ORDER — HYDROMORPHONE HYDROCHLORIDE 2 MG/ML
1 INJECTION INTRAMUSCULAR; INTRAVENOUS; SUBCUTANEOUS ONCE
Qty: 0 | Refills: 0 | Status: DISCONTINUED | OUTPATIENT
Start: 2017-05-07 | End: 2017-05-07

## 2017-05-07 RX ORDER — OXYCODONE HYDROCHLORIDE 5 MG/1
5 TABLET ORAL
Qty: 0 | Refills: 0 | Status: DISCONTINUED | OUTPATIENT
Start: 2017-05-07 | End: 2017-05-07

## 2017-05-07 RX ORDER — WARFARIN SODIUM 2.5 MG/1
5 TABLET ORAL ONCE
Qty: 0 | Refills: 0 | Status: DISCONTINUED | OUTPATIENT
Start: 2017-05-07 | End: 2017-05-07

## 2017-05-07 RX ADMIN — Medication 200 MILLIGRAM(S): at 17:09

## 2017-05-07 RX ADMIN — HYDROMORPHONE HYDROCHLORIDE 2 MILLIGRAM(S): 2 INJECTION INTRAMUSCULAR; INTRAVENOUS; SUBCUTANEOUS at 03:10

## 2017-05-07 RX ADMIN — HYDROMORPHONE HYDROCHLORIDE 1 MILLIGRAM(S): 2 INJECTION INTRAMUSCULAR; INTRAVENOUS; SUBCUTANEOUS at 10:15

## 2017-05-07 RX ADMIN — HYDROMORPHONE HYDROCHLORIDE 1 MILLIGRAM(S): 2 INJECTION INTRAMUSCULAR; INTRAVENOUS; SUBCUTANEOUS at 01:20

## 2017-05-07 RX ADMIN — HYDROMORPHONE HYDROCHLORIDE 1 MILLIGRAM(S): 2 INJECTION INTRAMUSCULAR; INTRAVENOUS; SUBCUTANEOUS at 19:00

## 2017-05-07 RX ADMIN — HYDROMORPHONE HYDROCHLORIDE 2 MILLIGRAM(S): 2 INJECTION INTRAMUSCULAR; INTRAVENOUS; SUBCUTANEOUS at 07:02

## 2017-05-07 RX ADMIN — Medication 500 MILLIGRAM(S): at 13:17

## 2017-05-07 RX ADMIN — Medication 1 GRAM(S): at 05:38

## 2017-05-07 RX ADMIN — HYDROMORPHONE HYDROCHLORIDE 1 MILLIGRAM(S): 2 INJECTION INTRAMUSCULAR; INTRAVENOUS; SUBCUTANEOUS at 05:35

## 2017-05-07 RX ADMIN — HYDROMORPHONE HYDROCHLORIDE 2 MILLIGRAM(S): 2 INJECTION INTRAMUSCULAR; INTRAVENOUS; SUBCUTANEOUS at 07:30

## 2017-05-07 RX ADMIN — HEPARIN SODIUM 1200 UNIT(S)/HR: 5000 INJECTION INTRAVENOUS; SUBCUTANEOUS at 12:29

## 2017-05-07 RX ADMIN — OXYCODONE HYDROCHLORIDE 15 MILLIGRAM(S): 5 TABLET ORAL at 13:17

## 2017-05-07 RX ADMIN — HYDROMORPHONE HYDROCHLORIDE 1 MILLIGRAM(S): 2 INJECTION INTRAMUSCULAR; INTRAVENOUS; SUBCUTANEOUS at 10:00

## 2017-05-07 RX ADMIN — POLYETHYLENE GLYCOL 3350 17 GRAM(S): 17 POWDER, FOR SOLUTION ORAL at 05:39

## 2017-05-07 RX ADMIN — HYDROMORPHONE HYDROCHLORIDE 2 MILLIGRAM(S): 2 INJECTION INTRAMUSCULAR; INTRAVENOUS; SUBCUTANEOUS at 11:12

## 2017-05-07 RX ADMIN — OXYCODONE HYDROCHLORIDE 15 MILLIGRAM(S): 5 TABLET ORAL at 13:53

## 2017-05-07 RX ADMIN — HYDROMORPHONE HYDROCHLORIDE 2 MILLIGRAM(S): 2 INJECTION INTRAMUSCULAR; INTRAVENOUS; SUBCUTANEOUS at 16:42

## 2017-05-07 RX ADMIN — HYDROMORPHONE HYDROCHLORIDE 2 MILLIGRAM(S): 2 INJECTION INTRAMUSCULAR; INTRAVENOUS; SUBCUTANEOUS at 20:49

## 2017-05-07 RX ADMIN — HYDROMORPHONE HYDROCHLORIDE 1 MILLIGRAM(S): 2 INJECTION INTRAMUSCULAR; INTRAVENOUS; SUBCUTANEOUS at 23:05

## 2017-05-07 RX ADMIN — HYDROMORPHONE HYDROCHLORIDE 1 MILLIGRAM(S): 2 INJECTION INTRAMUSCULAR; INTRAVENOUS; SUBCUTANEOUS at 22:50

## 2017-05-07 RX ADMIN — Medication 325 MILLIGRAM(S): at 13:17

## 2017-05-07 RX ADMIN — Medication 1 GRAM(S): at 17:09

## 2017-05-07 RX ADMIN — HYDROMORPHONE HYDROCHLORIDE 2 MILLIGRAM(S): 2 INJECTION INTRAMUSCULAR; INTRAVENOUS; SUBCUTANEOUS at 11:30

## 2017-05-07 RX ADMIN — WARFARIN SODIUM 2.5 MILLIGRAM(S): 2.5 TABLET ORAL at 21:03

## 2017-05-07 RX ADMIN — HYDROMORPHONE HYDROCHLORIDE 1 MILLIGRAM(S): 2 INJECTION INTRAMUSCULAR; INTRAVENOUS; SUBCUTANEOUS at 15:30

## 2017-05-07 RX ADMIN — HYDROMORPHONE HYDROCHLORIDE 1 MILLIGRAM(S): 2 INJECTION INTRAMUSCULAR; INTRAVENOUS; SUBCUTANEOUS at 05:50

## 2017-05-07 RX ADMIN — HYDROMORPHONE HYDROCHLORIDE 1 MILLIGRAM(S): 2 INJECTION INTRAMUSCULAR; INTRAVENOUS; SUBCUTANEOUS at 15:10

## 2017-05-07 RX ADMIN — Medication 12 MILLIGRAM(S): at 05:37

## 2017-05-07 RX ADMIN — HYDROMORPHONE HYDROCHLORIDE 1 MILLIGRAM(S): 2 INJECTION INTRAMUSCULAR; INTRAVENOUS; SUBCUTANEOUS at 01:35

## 2017-05-07 RX ADMIN — HYDROMORPHONE HYDROCHLORIDE 2 MILLIGRAM(S): 2 INJECTION INTRAMUSCULAR; INTRAVENOUS; SUBCUTANEOUS at 17:00

## 2017-05-07 RX ADMIN — PANTOPRAZOLE SODIUM 40 MILLIGRAM(S): 20 TABLET, DELAYED RELEASE ORAL at 05:38

## 2017-05-07 RX ADMIN — Medication 200 MILLIGRAM(S): at 09:55

## 2017-05-07 RX ADMIN — PANTOPRAZOLE SODIUM 40 MILLIGRAM(S): 20 TABLET, DELAYED RELEASE ORAL at 17:09

## 2017-05-07 RX ADMIN — HYDROMORPHONE HYDROCHLORIDE 1 MILLIGRAM(S): 2 INJECTION INTRAMUSCULAR; INTRAVENOUS; SUBCUTANEOUS at 18:38

## 2017-05-07 RX ADMIN — HYDROMORPHONE HYDROCHLORIDE 2 MILLIGRAM(S): 2 INJECTION INTRAMUSCULAR; INTRAVENOUS; SUBCUTANEOUS at 21:04

## 2017-05-07 RX ADMIN — Medication 12 MILLIGRAM(S): at 17:09

## 2017-05-07 RX ADMIN — HYDROMORPHONE HYDROCHLORIDE 2 MILLIGRAM(S): 2 INJECTION INTRAMUSCULAR; INTRAVENOUS; SUBCUTANEOUS at 02:55

## 2017-05-07 NOTE — PROVIDER CONTACT NOTE (MEDICATION) - ASSESSMENT
pt a&ox4 able to make needs known vitals wdl, c/o 10/10 epigastric burning pain not relieved with po pain meds

## 2017-05-08 PROCEDURE — 99232 SBSQ HOSP IP/OBS MODERATE 35: CPT | Mod: GC

## 2017-05-08 RX ORDER — WARFARIN SODIUM 2.5 MG/1
5 TABLET ORAL ONCE
Qty: 0 | Refills: 0 | Status: COMPLETED | OUTPATIENT
Start: 2017-05-08 | End: 2017-05-08

## 2017-05-08 RX ORDER — WARFARIN SODIUM 2.5 MG/1
2.5 TABLET ORAL ONCE
Qty: 0 | Refills: 0 | Status: DISCONTINUED | OUTPATIENT
Start: 2017-05-08 | End: 2017-05-08

## 2017-05-08 RX ORDER — OXYCODONE HYDROCHLORIDE 5 MG/1
15 TABLET ORAL EVERY 12 HOURS
Qty: 0 | Refills: 0 | Status: DISCONTINUED | OUTPATIENT
Start: 2017-05-08 | End: 2017-05-10

## 2017-05-08 RX ADMIN — HYDROMORPHONE HYDROCHLORIDE 2 MILLIGRAM(S): 2 INJECTION INTRAMUSCULAR; INTRAVENOUS; SUBCUTANEOUS at 06:07

## 2017-05-08 RX ADMIN — HYDROMORPHONE HYDROCHLORIDE 1 MILLIGRAM(S): 2 INJECTION INTRAMUSCULAR; INTRAVENOUS; SUBCUTANEOUS at 20:15

## 2017-05-08 RX ADMIN — Medication 1 GRAM(S): at 18:27

## 2017-05-08 RX ADMIN — HYDROMORPHONE HYDROCHLORIDE 1 MILLIGRAM(S): 2 INJECTION INTRAMUSCULAR; INTRAVENOUS; SUBCUTANEOUS at 02:52

## 2017-05-08 RX ADMIN — PANTOPRAZOLE SODIUM 40 MILLIGRAM(S): 20 TABLET, DELAYED RELEASE ORAL at 18:26

## 2017-05-08 RX ADMIN — HYDROMORPHONE HYDROCHLORIDE 2 MILLIGRAM(S): 2 INJECTION INTRAMUSCULAR; INTRAVENOUS; SUBCUTANEOUS at 22:38

## 2017-05-08 RX ADMIN — Medication 1 GRAM(S): at 05:55

## 2017-05-08 RX ADMIN — HYDROMORPHONE HYDROCHLORIDE 2 MILLIGRAM(S): 2 INJECTION INTRAMUSCULAR; INTRAVENOUS; SUBCUTANEOUS at 15:17

## 2017-05-08 RX ADMIN — HYDROMORPHONE HYDROCHLORIDE 2 MILLIGRAM(S): 2 INJECTION INTRAMUSCULAR; INTRAVENOUS; SUBCUTANEOUS at 18:21

## 2017-05-08 RX ADMIN — WARFARIN SODIUM 5 MILLIGRAM(S): 2.5 TABLET ORAL at 21:36

## 2017-05-08 RX ADMIN — HYDROMORPHONE HYDROCHLORIDE 2 MILLIGRAM(S): 2 INJECTION INTRAMUSCULAR; INTRAVENOUS; SUBCUTANEOUS at 09:54

## 2017-05-08 RX ADMIN — HYDROMORPHONE HYDROCHLORIDE 1 MILLIGRAM(S): 2 INJECTION INTRAMUSCULAR; INTRAVENOUS; SUBCUTANEOUS at 15:24

## 2017-05-08 RX ADMIN — Medication 12 MILLIGRAM(S): at 05:56

## 2017-05-08 RX ADMIN — HYDROMORPHONE HYDROCHLORIDE 1 MILLIGRAM(S): 2 INJECTION INTRAMUSCULAR; INTRAVENOUS; SUBCUTANEOUS at 20:00

## 2017-05-08 RX ADMIN — POLYETHYLENE GLYCOL 3350 17 GRAM(S): 17 POWDER, FOR SOLUTION ORAL at 05:57

## 2017-05-08 RX ADMIN — HYDROMORPHONE HYDROCHLORIDE 1 MILLIGRAM(S): 2 INJECTION INTRAMUSCULAR; INTRAVENOUS; SUBCUTANEOUS at 11:24

## 2017-05-08 RX ADMIN — HYDROMORPHONE HYDROCHLORIDE 1 MILLIGRAM(S): 2 INJECTION INTRAMUSCULAR; INTRAVENOUS; SUBCUTANEOUS at 07:15

## 2017-05-08 RX ADMIN — PANTOPRAZOLE SODIUM 40 MILLIGRAM(S): 20 TABLET, DELAYED RELEASE ORAL at 05:56

## 2017-05-08 RX ADMIN — HYDROMORPHONE HYDROCHLORIDE 2 MILLIGRAM(S): 2 INJECTION INTRAMUSCULAR; INTRAVENOUS; SUBCUTANEOUS at 14:17

## 2017-05-08 RX ADMIN — Medication 12 MILLIGRAM(S): at 18:24

## 2017-05-08 RX ADMIN — HYDROMORPHONE HYDROCHLORIDE 2 MILLIGRAM(S): 2 INJECTION INTRAMUSCULAR; INTRAVENOUS; SUBCUTANEOUS at 01:10

## 2017-05-08 RX ADMIN — HYDROMORPHONE HYDROCHLORIDE 1 MILLIGRAM(S): 2 INJECTION INTRAMUSCULAR; INTRAVENOUS; SUBCUTANEOUS at 15:40

## 2017-05-08 RX ADMIN — HYDROMORPHONE HYDROCHLORIDE 2 MILLIGRAM(S): 2 INJECTION INTRAMUSCULAR; INTRAVENOUS; SUBCUTANEOUS at 22:23

## 2017-05-08 RX ADMIN — OXYCODONE HYDROCHLORIDE 15 MILLIGRAM(S): 5 TABLET ORAL at 19:00

## 2017-05-08 RX ADMIN — HYDROMORPHONE HYDROCHLORIDE 1 MILLIGRAM(S): 2 INJECTION INTRAMUSCULAR; INTRAVENOUS; SUBCUTANEOUS at 03:07

## 2017-05-08 RX ADMIN — HYDROMORPHONE HYDROCHLORIDE 1 MILLIGRAM(S): 2 INJECTION INTRAMUSCULAR; INTRAVENOUS; SUBCUTANEOUS at 07:00

## 2017-05-08 RX ADMIN — Medication 200 MILLIGRAM(S): at 09:49

## 2017-05-08 RX ADMIN — Medication 200 MILLIGRAM(S): at 18:25

## 2017-05-08 RX ADMIN — HYDROMORPHONE HYDROCHLORIDE 2 MILLIGRAM(S): 2 INJECTION INTRAMUSCULAR; INTRAVENOUS; SUBCUTANEOUS at 00:55

## 2017-05-08 RX ADMIN — HYDROMORPHONE HYDROCHLORIDE 2 MILLIGRAM(S): 2 INJECTION INTRAMUSCULAR; INTRAVENOUS; SUBCUTANEOUS at 05:52

## 2017-05-08 RX ADMIN — Medication 325 MILLIGRAM(S): at 11:27

## 2017-05-08 RX ADMIN — Medication 500 MILLIGRAM(S): at 11:27

## 2017-05-08 RX ADMIN — HYDROMORPHONE HYDROCHLORIDE 1 MILLIGRAM(S): 2 INJECTION INTRAMUSCULAR; INTRAVENOUS; SUBCUTANEOUS at 12:00

## 2017-05-08 RX ADMIN — HYDROMORPHONE HYDROCHLORIDE 2 MILLIGRAM(S): 2 INJECTION INTRAMUSCULAR; INTRAVENOUS; SUBCUTANEOUS at 18:45

## 2017-05-08 RX ADMIN — OXYCODONE HYDROCHLORIDE 15 MILLIGRAM(S): 5 TABLET ORAL at 18:30

## 2017-05-08 RX ADMIN — HYDROMORPHONE HYDROCHLORIDE 2 MILLIGRAM(S): 2 INJECTION INTRAMUSCULAR; INTRAVENOUS; SUBCUTANEOUS at 10:08

## 2017-05-08 NOTE — PROVIDER CONTACT NOTE (CRITICAL VALUE NOTIFICATION) - BACKGROUND
4/24 CP 4/28 Anemia s/p 1 unit PRBC 4/29 occult blood positive 5/2 colonoscopy no bleed medium size hiatus hernia
DVT
25 y/o M with PMH of lupus complicated by recurrent pleural effusions presenting with left sided chest pain, found to have no evidence of pericardial effusion or pleural effusions on echo, found to have tachycardia with no evidence of hypoxia.
Chest pain. DVT, IVC filter. pleural effusion, SLE, nephritis,HLD
DX Chest Pain ,Pericarditis, HX lupus, Seronegitive APS, DVT/PE, DX in FEB 2017 s/p IVC filter placement hx failed AC, B/L renal vein thrombosis
Patient admitted to [facility] on 4/24/17 with chest pain.
SLE, chest pain. DVT,IVC filter

## 2017-05-08 NOTE — PROVIDER CONTACT NOTE (CRITICAL VALUE NOTIFICATION) - ASSESSMENT
Pt asymptomatic, no signs of bleeding present, bleeding precautions maintained
no signs of bleeding
Heparin Infusion 17ml/hr , bleeding precautions maintained.
No signs and symptoms of acute distress noted. No acute bleeding noted
No signs and symptoms of acute distress noted. vital signs stable
Pt aptt result at 138 at 2130
patient is AxOx4; resting in bed; heparin drip infusing at 18ml/hr

## 2017-05-08 NOTE — PROVIDER CONTACT NOTE (CRITICAL VALUE NOTIFICATION) - RECOMMENDATIONS
Nomogram orders stop drip for one hour and decrease by 3ml to 15ml/hr
dc heparin
Follow heparin nomogram to pause heparin drip for 60 minutes & decrease rate to 12ml/hr
Stop Heparin Drip for 60min. Decrease by 3ml . Start infusion @ 14ml/hr. Infusion stopped @ 2215, will restart @ 2315
Team 1 will follow up
follow heparin nomogram
will continue to monitor; will follow heparin nomogram; nomogram states to stop heparin drip for one hour then restart at 15ml/hr

## 2017-05-08 NOTE — PROVIDER CONTACT NOTE (CRITICAL VALUE NOTIFICATION) - SITUATION
Pt on heparin gtt at 18ml/hr Full anticoagulation nomogram .3
ptt 129
 6.8/22.9
.3
.5
PTT >200
Pt aptt result came back at 138 at 2130

## 2017-05-08 NOTE — PROVIDER CONTACT NOTE (CRITICAL VALUE NOTIFICATION) - ACTION/TREATMENT ORDERED:
Follow Nomogram orders
dc heparin
Heparin drip decreased to 16ml/h from 18ml/h; will repeat PTT after 6 hours
MD Jewell made aware; stated to follow heparin nomogram; no further actions at this time.
Team made aware. Pause heparin drip for 60 minutes then decrease rate to 12ml/hr.
Will continue to assess and monitor pt's condition.
Will restart Heparin infusion @ 2315 @ 14ml/hr.

## 2017-05-08 NOTE — PROVIDER CONTACT NOTE (CRITICAL VALUE NOTIFICATION) - TEST AND RESULT REPORTED:
PTT >200
 6.8/22.9
.3
.3
Rich Jaramillo from the [facility] lab
aptt resulted at 138
ptt 129

## 2017-05-08 NOTE — PROVIDER CONTACT NOTE (CRITICAL VALUE NOTIFICATION) - NAME OF MD/NP/PA/DO NOTIFIED:
MD Jewell
Dr Becker
Dr Fleming
Dr Mirza Team 1
Dr. Patrica Bailey
Dr. Patrica villafana
Dr. Patrica Becker

## 2017-05-08 NOTE — PROVIDER CONTACT NOTE (CRITICAL VALUE NOTIFICATION) - PERSON GIVING RESULT:
APTT 123.5
Core lab Amber Ruiz
Karly Peraza
Karly Peraza
Ladi Reynaga from the [facility] lab
Vaishali Worthy/ Lab
Core Lab- Elham Ruiz

## 2017-05-09 LAB
APTT BLD: 42.8 SEC — HIGH (ref 27.5–37.4)
HCT VFR BLD CALC: 25.6 % — LOW (ref 39–50)
HGB BLD-MCNC: 8.6 G/DL — LOW (ref 13–17)
INR BLD: 2.79 RATIO — HIGH (ref 0.88–1.16)
MCHC RBC-ENTMCNC: 23 PG — LOW (ref 27–34)
MCHC RBC-ENTMCNC: 33.5 GM/DL — SIGNIFICANT CHANGE UP (ref 32–36)
MCV RBC AUTO: 68.9 FL — LOW (ref 80–100)
PLATELET # BLD AUTO: 236 K/UL — SIGNIFICANT CHANGE UP (ref 150–400)
PROTHROM AB SERPL-ACNC: 30.8 SEC — HIGH (ref 9.8–12.7)
RBC # BLD: 3.71 M/UL — LOW (ref 4.2–5.8)
RBC # FLD: 20.9 % — HIGH (ref 10.3–14.5)
WBC # BLD: 7.1 K/UL — SIGNIFICANT CHANGE UP (ref 3.8–10.5)
WBC # FLD AUTO: 7.1 K/UL — SIGNIFICANT CHANGE UP (ref 3.8–10.5)

## 2017-05-09 PROCEDURE — 99232 SBSQ HOSP IP/OBS MODERATE 35: CPT | Mod: GC

## 2017-05-09 RX ORDER — WARFARIN SODIUM 2.5 MG/1
5 TABLET ORAL ONCE
Qty: 0 | Refills: 0 | Status: COMPLETED | OUTPATIENT
Start: 2017-05-09 | End: 2017-05-09

## 2017-05-09 RX ORDER — OXYCODONE HYDROCHLORIDE 5 MG/1
5 TABLET ORAL
Qty: 0 | Refills: 0 | Status: DISCONTINUED | OUTPATIENT
Start: 2017-05-09 | End: 2017-05-10

## 2017-05-09 RX ORDER — OXYCODONE HYDROCHLORIDE 5 MG/1
5 TABLET ORAL ONCE
Qty: 0 | Refills: 0 | Status: DISCONTINUED | OUTPATIENT
Start: 2017-05-09 | End: 2017-05-09

## 2017-05-09 RX ADMIN — HYDROMORPHONE HYDROCHLORIDE 1 MILLIGRAM(S): 2 INJECTION INTRAMUSCULAR; INTRAVENOUS; SUBCUTANEOUS at 00:20

## 2017-05-09 RX ADMIN — PANTOPRAZOLE SODIUM 40 MILLIGRAM(S): 20 TABLET, DELAYED RELEASE ORAL at 17:26

## 2017-05-09 RX ADMIN — OXYCODONE HYDROCHLORIDE 15 MILLIGRAM(S): 5 TABLET ORAL at 18:36

## 2017-05-09 RX ADMIN — HYDROMORPHONE HYDROCHLORIDE 2 MILLIGRAM(S): 2 INJECTION INTRAMUSCULAR; INTRAVENOUS; SUBCUTANEOUS at 12:15

## 2017-05-09 RX ADMIN — OXYCODONE HYDROCHLORIDE 5 MILLIGRAM(S): 5 TABLET ORAL at 13:40

## 2017-05-09 RX ADMIN — OXYCODONE HYDROCHLORIDE 15 MILLIGRAM(S): 5 TABLET ORAL at 17:35

## 2017-05-09 RX ADMIN — OXYCODONE HYDROCHLORIDE 5 MILLIGRAM(S): 5 TABLET ORAL at 22:34

## 2017-05-09 RX ADMIN — Medication 12 MILLIGRAM(S): at 17:24

## 2017-05-09 RX ADMIN — Medication 500 MILLIGRAM(S): at 11:19

## 2017-05-09 RX ADMIN — HYDROMORPHONE HYDROCHLORIDE 2 MILLIGRAM(S): 2 INJECTION INTRAMUSCULAR; INTRAVENOUS; SUBCUTANEOUS at 07:08

## 2017-05-09 RX ADMIN — HYDROMORPHONE HYDROCHLORIDE 2 MILLIGRAM(S): 2 INJECTION INTRAMUSCULAR; INTRAVENOUS; SUBCUTANEOUS at 11:17

## 2017-05-09 RX ADMIN — WARFARIN SODIUM 5 MILLIGRAM(S): 2.5 TABLET ORAL at 21:42

## 2017-05-09 RX ADMIN — OXYCODONE HYDROCHLORIDE 15 MILLIGRAM(S): 5 TABLET ORAL at 07:01

## 2017-05-09 RX ADMIN — OXYCODONE HYDROCHLORIDE 5 MILLIGRAM(S): 5 TABLET ORAL at 19:38

## 2017-05-09 RX ADMIN — OXYCODONE HYDROCHLORIDE 5 MILLIGRAM(S): 5 TABLET ORAL at 23:30

## 2017-05-09 RX ADMIN — Medication 1 GRAM(S): at 17:26

## 2017-05-09 RX ADMIN — Medication 1 GRAM(S): at 05:56

## 2017-05-09 RX ADMIN — HYDROMORPHONE HYDROCHLORIDE 2 MILLIGRAM(S): 2 INJECTION INTRAMUSCULAR; INTRAVENOUS; SUBCUTANEOUS at 03:22

## 2017-05-09 RX ADMIN — OXYCODONE HYDROCHLORIDE 5 MILLIGRAM(S): 5 TABLET ORAL at 20:38

## 2017-05-09 RX ADMIN — PANTOPRAZOLE SODIUM 40 MILLIGRAM(S): 20 TABLET, DELAYED RELEASE ORAL at 05:56

## 2017-05-09 RX ADMIN — Medication 200 MILLIGRAM(S): at 17:25

## 2017-05-09 RX ADMIN — Medication 325 MILLIGRAM(S): at 11:20

## 2017-05-09 RX ADMIN — OXYCODONE HYDROCHLORIDE 5 MILLIGRAM(S): 5 TABLET ORAL at 12:54

## 2017-05-09 RX ADMIN — Medication 200 MILLIGRAM(S): at 09:58

## 2017-05-09 RX ADMIN — HYDROMORPHONE HYDROCHLORIDE 1 MILLIGRAM(S): 2 INJECTION INTRAMUSCULAR; INTRAVENOUS; SUBCUTANEOUS at 06:10

## 2017-05-09 RX ADMIN — Medication 12 MILLIGRAM(S): at 05:56

## 2017-05-09 RX ADMIN — HYDROMORPHONE HYDROCHLORIDE 2 MILLIGRAM(S): 2 INJECTION INTRAMUSCULAR; INTRAVENOUS; SUBCUTANEOUS at 07:30

## 2017-05-09 RX ADMIN — HYDROMORPHONE HYDROCHLORIDE 1 MILLIGRAM(S): 2 INJECTION INTRAMUSCULAR; INTRAVENOUS; SUBCUTANEOUS at 05:55

## 2017-05-09 RX ADMIN — HYDROMORPHONE HYDROCHLORIDE 1 MILLIGRAM(S): 2 INJECTION INTRAMUSCULAR; INTRAVENOUS; SUBCUTANEOUS at 00:05

## 2017-05-09 RX ADMIN — HYDROMORPHONE HYDROCHLORIDE 2 MILLIGRAM(S): 2 INJECTION INTRAMUSCULAR; INTRAVENOUS; SUBCUTANEOUS at 03:07

## 2017-05-09 RX ADMIN — HYDROMORPHONE HYDROCHLORIDE 1 MILLIGRAM(S): 2 INJECTION INTRAMUSCULAR; INTRAVENOUS; SUBCUTANEOUS at 10:00

## 2017-05-09 RX ADMIN — OXYCODONE HYDROCHLORIDE 15 MILLIGRAM(S): 5 TABLET ORAL at 05:56

## 2017-05-09 RX ADMIN — HYDROMORPHONE HYDROCHLORIDE 1 MILLIGRAM(S): 2 INJECTION INTRAMUSCULAR; INTRAVENOUS; SUBCUTANEOUS at 10:15

## 2017-05-10 ENCOUNTER — TRANSCRIPTION ENCOUNTER (OUTPATIENT)
Age: 27
End: 2017-05-10

## 2017-05-10 VITALS
DIASTOLIC BLOOD PRESSURE: 80 MMHG | HEART RATE: 101 BPM | SYSTOLIC BLOOD PRESSURE: 127 MMHG | OXYGEN SATURATION: 100 % | TEMPERATURE: 99 F | RESPIRATION RATE: 20 BRPM

## 2017-05-10 PROCEDURE — 87581 M.PNEUMON DNA AMP PROBE: CPT

## 2017-05-10 PROCEDURE — 83880 ASSAY OF NATRIURETIC PEPTIDE: CPT

## 2017-05-10 PROCEDURE — 86901 BLOOD TYPING SEROLOGIC RH(D): CPT

## 2017-05-10 PROCEDURE — 99285 EMERGENCY DEPT VISIT HI MDM: CPT | Mod: 25

## 2017-05-10 PROCEDURE — 87798 DETECT AGENT NOS DNA AMP: CPT

## 2017-05-10 PROCEDURE — 83550 IRON BINDING TEST: CPT

## 2017-05-10 PROCEDURE — 84132 ASSAY OF SERUM POTASSIUM: CPT

## 2017-05-10 PROCEDURE — 71045 X-RAY EXAM CHEST 1 VIEW: CPT

## 2017-05-10 PROCEDURE — 84156 ASSAY OF PROTEIN URINE: CPT

## 2017-05-10 PROCEDURE — 71275 CT ANGIOGRAPHY CHEST: CPT

## 2017-05-10 PROCEDURE — 85045 AUTOMATED RETICULOCYTE COUNT: CPT

## 2017-05-10 PROCEDURE — P9016: CPT

## 2017-05-10 PROCEDURE — 83010 ASSAY OF HAPTOGLOBIN QUANT: CPT

## 2017-05-10 PROCEDURE — 80053 COMPREHEN METABOLIC PANEL: CPT

## 2017-05-10 PROCEDURE — 82728 ASSAY OF FERRITIN: CPT

## 2017-05-10 PROCEDURE — 87486 CHLMYD PNEUM DNA AMP PROBE: CPT

## 2017-05-10 PROCEDURE — 86355 B CELLS TOTAL COUNT: CPT

## 2017-05-10 PROCEDURE — 85027 COMPLETE CBC AUTOMATED: CPT

## 2017-05-10 PROCEDURE — 36430 TRANSFUSION BLD/BLD COMPNT: CPT

## 2017-05-10 PROCEDURE — 85730 THROMBOPLASTIN TIME PARTIAL: CPT

## 2017-05-10 PROCEDURE — 82784 ASSAY IGA/IGD/IGG/IGM EACH: CPT

## 2017-05-10 PROCEDURE — 82330 ASSAY OF CALCIUM: CPT

## 2017-05-10 PROCEDURE — 88305 TISSUE EXAM BY PATHOLOGIST: CPT

## 2017-05-10 PROCEDURE — 85610 PROTHROMBIN TIME: CPT

## 2017-05-10 PROCEDURE — 86850 RBC ANTIBODY SCREEN: CPT

## 2017-05-10 PROCEDURE — 81001 URINALYSIS AUTO W/SCOPE: CPT

## 2017-05-10 PROCEDURE — 83615 LACTATE (LD) (LDH) ENZYME: CPT

## 2017-05-10 PROCEDURE — 84484 ASSAY OF TROPONIN QUANT: CPT

## 2017-05-10 PROCEDURE — 84295 ASSAY OF SERUM SODIUM: CPT

## 2017-05-10 PROCEDURE — 82272 OCCULT BLD FECES 1-3 TESTS: CPT

## 2017-05-10 PROCEDURE — 80048 BASIC METABOLIC PNL TOTAL CA: CPT

## 2017-05-10 PROCEDURE — 86923 COMPATIBILITY TEST ELECTRIC: CPT

## 2017-05-10 PROCEDURE — 83605 ASSAY OF LACTIC ACID: CPT

## 2017-05-10 PROCEDURE — 99239 HOSP IP/OBS DSCHRG MGMT >30: CPT

## 2017-05-10 PROCEDURE — 93308 TTE F-UP OR LMTD: CPT

## 2017-05-10 PROCEDURE — 82803 BLOOD GASES ANY COMBINATION: CPT

## 2017-05-10 PROCEDURE — 96376 TX/PRO/DX INJ SAME DRUG ADON: CPT

## 2017-05-10 PROCEDURE — 86160 COMPLEMENT ANTIGEN: CPT

## 2017-05-10 PROCEDURE — 86900 BLOOD TYPING SEROLOGIC ABO: CPT

## 2017-05-10 PROCEDURE — 85014 HEMATOCRIT: CPT

## 2017-05-10 PROCEDURE — 82553 CREATINE MB FRACTION: CPT

## 2017-05-10 PROCEDURE — 86357 NK CELLS TOTAL COUNT: CPT

## 2017-05-10 PROCEDURE — 82435 ASSAY OF BLOOD CHLORIDE: CPT

## 2017-05-10 PROCEDURE — 82550 ASSAY OF CK (CPK): CPT

## 2017-05-10 PROCEDURE — 96374 THER/PROPH/DIAG INJ IV PUSH: CPT

## 2017-05-10 PROCEDURE — 93005 ELECTROCARDIOGRAM TRACING: CPT

## 2017-05-10 PROCEDURE — 87633 RESP VIRUS 12-25 TARGETS: CPT

## 2017-05-10 PROCEDURE — 82947 ASSAY GLUCOSE BLOOD QUANT: CPT

## 2017-05-10 PROCEDURE — 86225 DNA ANTIBODY NATIVE: CPT

## 2017-05-10 RX ORDER — FERROUS SULFATE 325(65) MG
1 TABLET ORAL
Qty: 30 | Refills: 0 | OUTPATIENT
Start: 2017-05-10 | End: 2017-06-09

## 2017-05-10 RX ORDER — WARFARIN SODIUM 2.5 MG/1
1 TABLET ORAL
Qty: 7 | Refills: 0 | OUTPATIENT
Start: 2017-05-10 | End: 2017-05-17

## 2017-05-10 RX ORDER — PANTOPRAZOLE SODIUM 20 MG/1
1 TABLET, DELAYED RELEASE ORAL
Qty: 0 | Refills: 0 | COMMUNITY
Start: 2017-05-10

## 2017-05-10 RX ORDER — SUCRALFATE 1 G
1 TABLET ORAL
Qty: 60 | Refills: 0 | OUTPATIENT
Start: 2017-05-10 | End: 2017-06-09

## 2017-05-10 RX ORDER — FERROUS SULFATE 325(65) MG
1 TABLET ORAL
Qty: 90 | Refills: 0 | OUTPATIENT
Start: 2017-05-10 | End: 2017-06-09

## 2017-05-10 RX ORDER — PANTOPRAZOLE SODIUM 20 MG/1
1 TABLET, DELAYED RELEASE ORAL
Qty: 60 | Refills: 0 | OUTPATIENT
Start: 2017-05-10 | End: 2017-06-09

## 2017-05-10 RX ADMIN — Medication 1 GRAM(S): at 05:31

## 2017-05-10 RX ADMIN — Medication 12 MILLIGRAM(S): at 05:31

## 2017-05-10 RX ADMIN — Medication 200 MILLIGRAM(S): at 09:18

## 2017-05-10 RX ADMIN — Medication 325 MILLIGRAM(S): at 11:31

## 2017-05-10 RX ADMIN — OXYCODONE HYDROCHLORIDE 15 MILLIGRAM(S): 5 TABLET ORAL at 05:31

## 2017-05-10 RX ADMIN — OXYCODONE HYDROCHLORIDE 5 MILLIGRAM(S): 5 TABLET ORAL at 10:05

## 2017-05-10 RX ADMIN — PANTOPRAZOLE SODIUM 40 MILLIGRAM(S): 20 TABLET, DELAYED RELEASE ORAL at 05:31

## 2017-05-10 RX ADMIN — OXYCODONE HYDROCHLORIDE 5 MILLIGRAM(S): 5 TABLET ORAL at 09:20

## 2017-05-10 RX ADMIN — Medication 500 MILLIGRAM(S): at 11:31

## 2017-05-10 NOTE — DISCHARGE NOTE ADULT - CARE PROVIDER_API CALL
Annabella Estrella (DO), Internal Medicine; Rheumatology  [address] [place] [address]  Phone: [phone]  Fax: [phone]

## 2017-05-10 NOTE — DISCHARGE NOTE ADULT - MEDICATION SUMMARY - MEDICATIONS TO STOP TAKING
I will STOP taking the medications listed below when I get home from the hospital:    famotidine 20 mg oral tablet  -- 1 tab(s) by mouth once a day, As Needed MDD:1    Pradaxa 150 mg oral capsule  -- 1 cap(s) by mouth 2 times a day

## 2017-05-10 NOTE — DISCHARGE NOTE ADULT - ADDITIONAL INSTRUCTIONS
Please go to your INR appointment tomorrow with Dr. Francisco at 1:30 PM.   Please follow up with Dr. Estrella within 1-2 weeks of discharge.

## 2017-05-10 NOTE — DISCHARGE NOTE ADULT - PATIENT PORTAL LINK FT
“You can access the FollowHealth Patient Portal, offered by [facility], by registering with the following website: [email or web address]”

## 2017-05-10 NOTE — DISCHARGE NOTE ADULT - MEDICATION SUMMARY - MEDICATIONS TO TAKE
I will START or STAY ON the medications listed below when I get home from the hospital:    methylPREDNISolone 4 mg oral tablet  -- 3 tab(s) by mouth 2 times a day  -- Indication: For Systemic lupus erythematosus    oxyCODONE 15 mg oral tablet, extended release  -- 1 tab(s) by mouth every 12 hours  Indication: for Pulmonary Embolism and SLE  MDD: 30mg  -- Indication: For Chest pain    oxyCODONE 5 mg oral tablet  -- 1 tab(s) by mouth 2 times a day, As needed, Moderate Pain (4 - 6)  MDD: 10 mg  Indication: pulmonary Embolism and SLE  -- Indication: For Chest pain    aspirin 81 mg oral tablet  -- 1 tab(s) by mouth once a day  -- Indication: For Need for prophylactic measure    Coumadin 5 mg oral tablet  -- 1 tab(s) by mouth once a day (at bedtime)  -- Do not take this drug if you are pregnant.  It is very important that you take or use this exactly as directed.  Do not skip doses or discontinue unless directed by your doctor.  Obtain medical advice before taking any non-prescription drugs as some may affect the action of this medication.    -- Indication: For Chronic PE    Plaquenil 200 mg oral tablet  -- 1 tab(s) by mouth 2 times a day  -- Indication: For Systemic lupus erythematosus    ferrous sulfate 325 mg (65 mg elemental iron) oral tablet  -- 1 tab(s) by mouth once a day  -- Indication: For Iron deficiency     pantoprazole 40 mg oral delayed release tablet  -- 1 tab(s) by mouth 2 times a day (before meals)  -- Indication: For Esophagitis     Calcium 600+D 600 mg-200 intl units oral tablet  -- Take 1 tab by mouth daily  -- Indication: For Need for prophylactic measure I will START or STAY ON the medications listed below when I get home from the hospital:    methylPREDNISolone 4 mg oral tablet  -- 3 tab(s) by mouth 2 times a day  -- Indication: For Systemic lupus erythematosus    oxyCODONE 15 mg oral tablet, extended release  -- 1 tab(s) by mouth every 12 hours  Indication: for Pulmonary Embolism and SLE  MDD: 30mg  -- Indication: For Chest pain    oxyCODONE 5 mg oral tablet  -- 1 tab(s) by mouth 2 times a day, As needed, Moderate Pain (4 - 6)  MDD: 10 mg  Indication: pulmonary Embolism and SLE  -- Indication: For Chest pain    aspirin 81 mg oral tablet  -- 1 tab(s) by mouth once a day  -- Indication: For Need for prophylactic measure    Coumadin 5 mg oral tablet  -- 1 tab(s) by mouth once a day (at bedtime)  -- Do not take this drug if you are pregnant.  It is very important that you take or use this exactly as directed.  Do not skip doses or discontinue unless directed by your doctor.  Obtain medical advice before taking any non-prescription drugs as some may affect the action of this medication.    -- Indication: For Chronic PE    Plaquenil 200 mg oral tablet  -- 1 tab(s) by mouth 2 times a day  -- Indication: For Systemic lupus erythematosus    ferrous sulfate 325 mg (65 mg elemental iron) oral tablet  -- 1 tab(s) by mouth once a day  -- Indication: For Iron deficiency     sucralfate 1 g oral tablet  -- 1 tab(s) by mouth every 12 hours  -- Indication: For Esophagitis on biopsy    pantoprazole 40 mg oral delayed release tablet  -- 1 tab(s) by mouth 2 times a day (before meals)  -- Indication: For Esophagitis     Calcium 600+D 600 mg-200 intl units oral tablet  -- Take 1 tab by mouth daily  -- Indication: For Need for prophylactic measure

## 2017-05-10 NOTE — DISCHARGE NOTE ADULT - HOSPITAL COURSE
History of Present Illness		  27 y/o M with PMH of lupus complicated by recurrent pleural effusions requiring multiple chest tubes, lupus nephritis (class V, failed cellcept therapy), seronegative APS, DVT/PE diagnosed in Feb 2017 s/p RLE IVC filter placement, hx of failed AC (Xarelto, Coumadin, Lovenox, and Arixtra), b/l renal vein thrombosis, with many recent hospitalizations for chest pain, now presenting with left sided chest pain. Pt states that his chest pain worsened last night, it is left sided, and sharp in nature with no radiation. Pt's pain is exacerbated by lying flat and by taking deep breaths. Pain is somewhat relieved when pt is sitting forward. Pt states that he went to see his pain management doctor today, who sent him into the hospital due to his severe pain. Patient is otherwise not having any fevers, chills, shortness of breath, lower extremity edema, nausea, vomiting, diarrhea, or constipation.     Pt is requesting IV dilaudid at this time as he does during each hospitalization.     In the ER, pt received 4mg IV dilaudid and a 500 cc IV bolus.   In the ER, pt's vitals were 98.2, , /79, RR 14, satting 100% on room air.      Hospital Course  Patient was admitted to the medicine floors for further evaluation of chest pain. Patient was ruled out for ACS and started on his usual hospitalization medication from last admission, including Dilaudid 4mg q4H and 2mg q4H for breakthrough pain. Although patient stated he had an allergy to contrast, he was pre-medicated with Benadryl and Medrol for a CTA chest to rule out new PE.     4/24 Will f/u 2nd set of cardiac trops at 8PM   - Dilaudid added for breakthrough pain, added Toradol trial 15q8  4/26 Will need to speak with pt regarding getting a CT scan and pre-medicating for allergy.   Placed on heparin gtt, pt will get CT scan with pre-treatment.   - Tentatively CTA scheduled for 9 AM, will give first dose of solumedrol tonight, will clarify the time for the scan and schedule solumedrol and benadryl in the morning accordingly   4/27 Pt went for CT scan without reaction, will follow up report-  - consulted rheumatology  - will get Heme on board for anticoagulation    Pulmonary embolus within the posterior segment of the right lower   lobe.  Per rheum recs, increase medrol to 16 BID  - started coumadin 5mg tonight for bridging with hep gtt, started aspirin   4/28 o/n patient refused coumadin. Per , they do not order INR machines, will try to arrange transportation for patient. Per Dr. Estrella, she will not be managing the INR, will need clinical pharmacist to monitor INR.   - Will be supplementing pt with iron for iron deficiency anemia   4/29. Pt wants to be dosed coumadin in order to start the bridging process. Pt given phone number to set up transportation next week for INR checks. pt's aunt will make appt for him with a primary care doctor for INR follow up. will taper off dilaudid on Monday.   4/30: FOBT+, hold coumadin. Gi consulted.   5/1 Holding coumadin, Capsule placed, plan for egd and colonoscopy tomorrow. Advance diet to full liquids   - started on colchicine  5/2 Repeat ECG negative, trops negative, follow up repeat trops 4PM  f/u egd and colonoscopy  Endoscopy:  LA Grade D (one or more mucosal breaks involving at least 75% of esophageal circumference) esophagitis with no bleeding was found. Biopsies were taken with a cold forceps for histology.       A medium-sized hiatus hernia was present.  Colonoscopy:  Non-bleeding internal hemorrhoids  5/3 Video capsule endoscopy, read with only esophagitis, will dose coumadin for today since benefits outweigh the risk. Had to stick foot for blood. prefers astick in future. tried to switch heparin gtt to lovenox, but he refused as he reports a PE on lovenox in the past.   5/4 H/H stable. Dosing Coumadin daily. Will cut down on pain meds 2 and 1 in the afternoon. Patient wants to convert to Po tomorrow.   5/5 Convert to home pain regimen tomorrow Dosed 5mg today Coumadin  5/7 Convert to home regimen today. Will dose coumadin today   CC: orals did not work, patient reported to be in pain but on phone when I entered room. Restarted on IV dilaudid 2mg q4 PRN and 1mg for breakthrough. Pain did not respond to 1mg IV alone.   5/8 Patient wants to go back to orals tomorrow.   5/9 INR therapeutic 2.79. Dosed coumadin. Back to PO regimen today History of Present Illness		  25 y/o M with PMH of lupus complicated by recurrent pleural effusions requiring multiple chest tubes, lupus nephritis (class V, failed cellcept therapy), seronegative APS, DVT/PE diagnosed in Feb 2017 s/p RLE IVC filter placement, hx of failed AC (Xarelto, Coumadin, Lovenox, and Arixtra), b/l renal vein thrombosis, with many recent hospitalizations for chest pain, now presenting with left sided chest pain. Pt states that his chest pain worsened last night, it is left sided, and sharp in nature with no radiation. Pt's pain is exacerbated by lying flat and by taking deep breaths. Pain is somewhat relieved when pt is sitting forward. Pt states that he went to see his pain management doctor today, who sent him into the hospital due to his severe pain. Patient is otherwise not having any fevers, chills, shortness of breath, lower extremity edema, nausea, vomiting, diarrhea, or constipation.     Pt is requesting IV dilaudid at this time as he does during each hospitalization.     In the ER, pt received 4mg IV dilaudid and a 500 cc IV bolus.   In the ER, pt's vitals were 98.2, , /79, RR 14, satting 100% on room air.      Hospital Course  Patient was admitted to the medicine floors for further evaluation of chest pain. Patient was ruled out for ACS and started on his usual hospitalization medication from last admission, including Dilaudid 4mg q4H and 2mg q4H for breakthrough pain. Although patient stated he had an allergy to contrast, he was pre-medicated with Benadryl and Medrol for a CTA chest to rule out new PE. CTA showing pulmonary embolus within the posterior segment of the right lower lobe that is consistent with location of prior PE. Hematology was on board, who recommended that the best form of AC for patient was Coumadin, which patient eventually agreed to. Patient was placed on Heparin gtt and bridged to Coumadin with an INR goal of 2.5-3.0.   Rheumatology was also consulted, who recommended pt stay on 12 mg Medrol twice daily.  During the hospitalization, pt required 1 unit of PRBCs and had a GI workup for anemia as FOBT was positive as well. Pt underwent endoscopy and colonoscopy, which revealed Grade D esophagitis. Pt also had a video capsule endoscopy performed, which was negative. Pt was placed on Protonix BID and and started on iron supplements for iron deficiency anemia.   For pain control, patient was able to convert from IV to his home pain regimen PO. Patient's H/H remained stable.  Patient has an INR check scheduled for tomorrow at 1:30 PM with primary care doctor Dr. Francisco and will arrange transportation to get his INR checked.   Patient is stable for discharge at this time. History of Present Illness		  27 y/o M with PMH of lupus complicated by recurrent pleural effusions requiring multiple chest tubes, lupus nephritis (class V, failed cellcept therapy), seronegative APS, DVT/PE diagnosed in Feb 2017 s/p RLE IVC filter placement, hx of failed AC (Xarelto, Coumadin, Lovenox, and Arixtra), b/l renal vein thrombosis, with many recent hospitalizations for chest pain, now presenting with left sided chest pain. Pt states that his chest pain worsened last night, it is left sided, and sharp in nature with no radiation. Pt's pain is exacerbated by lying flat and by taking deep breaths. Pain is somewhat relieved when pt is sitting forward. Pt states that he went to see his pain management doctor today, who sent him into the hospital due to his severe pain. Patient is otherwise not having any fevers, chills, shortness of breath, lower extremity edema, nausea, vomiting, diarrhea, or constipation.     Pt is requesting IV dilaudid at this time as he does during each hospitalization.     In the ER, pt received 4mg IV dilaudid and a 500 cc IV bolus.   In the ER, pt's vitals were 98.2, , /79, RR 14, satting 100% on room air.      Hospital Course  Patient was admitted to the medicine floors for further evaluation of chest pain. Patient was ruled out for ACS and started on his usual hospitalization medication from last admission, including Dilaudid 4mg q4H and 2mg q4H for breakthrough pain. Although patient stated he had an allergy to contrast, he was pre-medicated with Benadryl and Medrol for a CTA chest to rule out new PE. CTA showing pulmonary embolus within the posterior segment of the right lower lobe that is consistent with location of prior PE. Hematology was on board, who recommended that the best form of AC for patient was Coumadin, which patient eventually agreed to. Patient was placed on Heparin gtt and bridged to Coumadin with an INR goal of 2.5-3.0. Rheumatology was also consulted, who recommended pt stay on 12 mg Medrol twice daily. During the hospitalization, pt required 1 unit of PRBCs and had a GI workup for anemia as FOBT was positive as well. Pt underwent endoscopy and colonoscopy, which revealed Grade D esophagitis. Pt also had a video capsule endoscopy performed, which was negative. Pt was placed on Protonix BID and and started on iron supplements for iron deficiency anemia. Patient's H/H remained stable. For pain control, patient was able to convert from IV to his home pain regimen PO.     Patient has an INR check scheduled for tomorrow at 1:30 PM with primary care doctor Dr. Francisco and will arrange transportation to get his INR checked.   Patient is stable for discharge at this time.

## 2017-05-10 NOTE — DISCHARGE NOTE ADULT - PLAN OF CARE
Treat Treat and follow up While you were in the hospital, you underwent an EGD and colonoscopy to find the source of your anemia. You were discovered to have Grade D esophagitis. Please continue taking your Protonix twice a day. You were also found to have iron deficiency anemia. Please continue taking your Iron pills daily. Please follow up with Dr. Francisco to have your blood counts checked routinely along with your INR. Please continue taking Plaquenil and follow up with Dr. Estrella within 1 -2 weeks of discharge. You were successfully bridged from heparin to Coumadin in the hospital. Your INR goal is between 2.5-3.0. Please take 5mg Coumadin once a day starting TODAY. You have an INR check tomorrow 5/11 at 1:30 PM with Alex Flores who will adjust your dose accordingly. Please keep in mind your diet restrictions when taking your coumadin. If you have any excessive bleeding, bruising or concerns please seek medical attention. Please continue taking Plaquenil and Medrol 12 mg twice a day and follow up with Dr. Estrella within 1 -2 weeks of discharge. You were successfully bridged from heparin to Coumadin in the hospital. Your INR goal is between 2.5-3.0. Please take 5mg Coumadin once a day starting TODAY. You have an INR check tomorrow 5/11 at 1:30 PM with Alex Flores who will adjust your dose accordingly. Please keep in mind your diet restrictions when taking your coumadin. If you have any excessive bleeding, bruising or concerns please seek medical attention.  your prescription at your pharmacy today. While you were in the hospital, you underwent an EGD and colonoscopy to find the source of your anemia. You were discovered to have Grade D esophagitis. Please continue taking your Protonix twice a day and Sucralfate twice a day. You were also found to have iron deficiency anemia. Please continue taking your Iron pills daily. Please follow up with Dr. Francisco to have your blood counts checked routinely along with your INR.

## 2017-05-10 NOTE — DISCHARGE NOTE ADULT - CARE PLAN
Principal Discharge DX:	Esophagitis on biopsy  Goal:	Treat  Instructions for follow-up, activity and diet:	While you were in the hospital, you underwent an EGD and colonoscopy to find the source of your anemia. You were discovered to have Grade D esophagitis. Please continue taking your Protonix twice a day. You were also found to have iron deficiency anemia. Please continue taking your Iron pills daily. Please follow up with Dr. Francisco to have your blood counts checked routinely along with your INR.  Secondary Diagnosis:	Pulmonary embolism  Goal:	Treat and follow up  Instructions for follow-up, activity and diet:	You were successfully bridged from heparin to Coumadin in the hospital. Your INR goal is between 2.5-3.0. Please take 5mg Coumadin once a day starting TODAY. You have an INR check tomorrow 5/11 at 1:30 PM with Alex Flores who will adjust your dose accordingly. Please keep in mind your diet restrictions when taking your coumadin. If you have any excessive bleeding, bruising or concerns please seek medical attention.  Secondary Diagnosis:	Systemic lupus erythematosus  Instructions for follow-up, activity and diet:	Please continue taking Plaquenil and follow up with Dr. Estrella within 1 -2 weeks of discharge. Principal Discharge DX:	Esophagitis on biopsy  Goal:	Treat  Instructions for follow-up, activity and diet:	While you were in the hospital, you underwent an EGD and colonoscopy to find the source of your anemia. You were discovered to have Grade D esophagitis. Please continue taking your Protonix twice a day. You were also found to have iron deficiency anemia. Please continue taking your Iron pills daily. Please follow up with Dr. Francisco to have your blood counts checked routinely along with your INR.  Secondary Diagnosis:	Pulmonary embolism  Goal:	Treat and follow up  Instructions for follow-up, activity and diet:	You were successfully bridged from heparin to Coumadin in the hospital. Your INR goal is between 2.5-3.0. Please take 5mg Coumadin once a day starting TODAY. You have an INR check tomorrow 5/11 at 1:30 PM with Alex Flores who will adjust your dose accordingly. Please keep in mind your diet restrictions when taking your coumadin. If you have any excessive bleeding, bruising or concerns please seek medical attention.  your prescription at your pharmacy today.  Secondary Diagnosis:	Systemic lupus erythematosus  Instructions for follow-up, activity and diet:	Please continue taking Plaquenil and Medrol 12 mg twice a day and follow up with Dr. Estrella within 1 -2 weeks of discharge. Principal Discharge DX:	Esophagitis on biopsy  Goal:	Treat  Instructions for follow-up, activity and diet:	While you were in the hospital, you underwent an EGD and colonoscopy to find the source of your anemia. You were discovered to have Grade D esophagitis. Please continue taking your Protonix twice a day and Sucralfate twice a day. You were also found to have iron deficiency anemia. Please continue taking your Iron pills daily. Please follow up with Dr. Francisco to have your blood counts checked routinely along with your INR.  Secondary Diagnosis:	Pulmonary embolism  Goal:	Treat and follow up  Instructions for follow-up, activity and diet:	You were successfully bridged from heparin to Coumadin in the hospital. Your INR goal is between 2.5-3.0. Please take 5mg Coumadin once a day starting TODAY. You have an INR check tomorrow 5/11 at 1:30 PM with Alex Flores who will adjust your dose accordingly. Please keep in mind your diet restrictions when taking your coumadin. If you have any excessive bleeding, bruising or concerns please seek medical attention.  your prescription at your pharmacy today.  Secondary Diagnosis:	Systemic lupus erythematosus  Instructions for follow-up, activity and diet:	Please continue taking Plaquenil and Medrol 12 mg twice a day and follow up with Dr. Estrella within 1 -2 weeks of discharge.

## 2017-05-12 ENCOUNTER — RX RENEWAL (OUTPATIENT)
Age: 27
End: 2017-05-12

## 2017-05-12 ENCOUNTER — LABORATORY RESULT (OUTPATIENT)
Age: 27
End: 2017-05-12

## 2017-05-15 LAB
INR PPP: 1.47 RATIO
PT BLD: 16.8 SEC

## 2017-05-16 ENCOUNTER — LABORATORY RESULT (OUTPATIENT)
Age: 27
End: 2017-05-16

## 2017-05-16 ENCOUNTER — APPOINTMENT (OUTPATIENT)
Dept: RHEUMATOLOGY | Facility: CLINIC | Age: 27
End: 2017-05-16

## 2017-05-16 VITALS
BODY MASS INDEX: 30.49 KG/M2 | DIASTOLIC BLOOD PRESSURE: 96 MMHG | SYSTOLIC BLOOD PRESSURE: 143 MMHG | HEART RATE: 120 BPM | OXYGEN SATURATION: 99 % | HEIGHT: 70 IN | WEIGHT: 213 LBS | TEMPERATURE: 97.9 F

## 2017-05-16 DIAGNOSIS — I82.409 ACUTE EMBOLISM AND THROMBOSIS OF UNSPECIFIED DEEP VEINS OF UNSPECIFIED LOWER EXTREMITY: ICD-10-CM

## 2017-05-16 DIAGNOSIS — M32.14 GLOMERULAR DISEASE IN SYSTEMIC LUPUS ERYTHEMATOSUS: ICD-10-CM

## 2017-05-16 DIAGNOSIS — Z72.0 TOBACCO USE: ICD-10-CM

## 2017-05-16 DIAGNOSIS — I26.99 OTHER PULMONARY EMBOLISM W/OUT ACUTE COR PULMONALE: ICD-10-CM

## 2017-05-16 LAB
ALBUMIN SERPL ELPH-MCNC: 2.6 G/DL
ALP BLD-CCNC: 84 U/L
ALT SERPL-CCNC: 16 U/L
ANION GAP SERPL CALC-SCNC: 13 MMOL/L
AST SERPL-CCNC: 18 U/L
BILIRUB SERPL-MCNC: <0.2 MG/DL
BUN SERPL-MCNC: 11 MG/DL
CALCIUM SERPL-MCNC: 8.4 MG/DL
CHLORIDE SERPL-SCNC: 105 MMOL/L
CO2 SERPL-SCNC: 23 MMOL/L
CREAT SERPL-MCNC: 0.74 MG/DL
CREAT SPEC-SCNC: 250 MG/DL
CREAT/PROT UR: 2.4
GLUCOSE SERPL-MCNC: 110 MG/DL
INR PPP: 1.85 RATIO
POTASSIUM SERPL-SCNC: 3.9 MMOL/L
PROT SERPL-MCNC: 5.2 G/DL
PROT UR-MCNC: 612 MG/DL
PT BLD: 21.2 SEC
SODIUM SERPL-SCNC: 141 MMOL/L

## 2017-05-17 LAB
APPEARANCE: CLEAR
BACTERIA: ABNORMAL
BASOPHILS # BLD AUTO: 0.01 K/UL
BASOPHILS NFR BLD AUTO: 0.1 %
BILIRUBIN URINE: NEGATIVE
BLOOD URINE: ABNORMAL
C3 SERPL-MCNC: 91 MG/DL
C4 SERPL-MCNC: 20 MG/DL
COLOR: YELLOW
DSDNA AB SER-ACNC: 402 IU/ML
EOSINOPHIL # BLD AUTO: 0.05 K/UL
EOSINOPHIL NFR BLD AUTO: 0.5 %
GLUCOSE QUALITATIVE U: NORMAL MG/DL
GRANULAR CASTS: 2 /LPF
HCT VFR BLD CALC: 31.1 %
HGB BLD-MCNC: 8.5 G/DL
HYALINE CASTS: 3 /LPF
IMM GRANULOCYTES NFR BLD AUTO: 0.2 %
KETONES URINE: NEGATIVE
LEUKOCYTE ESTERASE URINE: NEGATIVE
LYMPHOCYTES # BLD AUTO: 0.55 K/UL
LYMPHOCYTES NFR BLD AUTO: 5.7 %
MAN DIFF?: NORMAL
MCHC RBC-ENTMCNC: 20.6 PG
MCHC RBC-ENTMCNC: 27.3 GM/DL
MCV RBC AUTO: 75.3 FL
MICROSCOPIC-UA: NORMAL
MONOCYTES # BLD AUTO: 0.52 K/UL
MONOCYTES NFR BLD AUTO: 5.4 %
NEUTROPHILS # BLD AUTO: 8.51 K/UL
NEUTROPHILS NFR BLD AUTO: 88.1 %
NITRITE URINE: NEGATIVE
PH URINE: 5.5
PLATELET # BLD AUTO: 339 K/UL
PROTEIN URINE: 300 MG/DL
RBC # BLD: 4.13 M/UL
RBC # FLD: 21.8 %
RED BLOOD CELLS URINE: 2 /HPF
SPECIFIC GRAVITY URINE: 1.03
SQUAMOUS EPITHELIAL CELLS: 5 /HPF
UROBILINOGEN URINE: NORMAL MG/DL
WBC # FLD AUTO: 9.66 K/UL
WHITE BLOOD CELLS URINE: 14 /HPF

## 2017-05-18 ENCOUNTER — LABORATORY RESULT (OUTPATIENT)
Age: 27
End: 2017-05-18

## 2017-05-22 ENCOUNTER — RX RENEWAL (OUTPATIENT)
Age: 27
End: 2017-05-22

## 2017-05-22 ENCOUNTER — APPOINTMENT (OUTPATIENT)
Dept: PAIN MANAGEMENT | Facility: CLINIC | Age: 27
End: 2017-05-22

## 2017-05-22 RX ORDER — DABIGATRAN ETEXILATE MESYLATE 150 MG/1
150 CAPSULE ORAL TWICE DAILY
Qty: 60 | Refills: 0 | Status: DISCONTINUED | COMMUNITY
Start: 2017-02-16 | End: 2017-05-01

## 2017-05-23 ENCOUNTER — LABORATORY RESULT (OUTPATIENT)
Age: 27
End: 2017-05-23

## 2017-05-25 ENCOUNTER — LABORATORY RESULT (OUTPATIENT)
Age: 27
End: 2017-05-25

## 2017-05-27 ENCOUNTER — LABORATORY RESULT (OUTPATIENT)
Age: 27
End: 2017-05-27

## 2017-05-30 ENCOUNTER — LABORATORY RESULT (OUTPATIENT)
Age: 27
End: 2017-05-30

## 2017-05-31 ENCOUNTER — INPATIENT (INPATIENT)
Facility: HOSPITAL | Age: 27
LOS: 8 days | Discharge: HOME | DRG: 546 | End: 2017-06-09
Attending: HOSPITALIST | Admitting: INTERNAL MEDICINE
Payer: MEDICAID

## 2017-05-31 VITALS
SYSTOLIC BLOOD PRESSURE: 130 MMHG | TEMPERATURE: 99 F | HEART RATE: 108 BPM | DIASTOLIC BLOOD PRESSURE: 78 MMHG | OXYGEN SATURATION: 100 % | RESPIRATION RATE: 18 BRPM

## 2017-05-31 DIAGNOSIS — Z95.828 PRESENCE OF OTHER VASCULAR IMPLANTS AND GRAFTS: Chronic | ICD-10-CM

## 2017-05-31 DIAGNOSIS — R07.9 CHEST PAIN, UNSPECIFIED: ICD-10-CM

## 2017-05-31 DIAGNOSIS — Z98.890 OTHER SPECIFIED POSTPROCEDURAL STATES: Chronic | ICD-10-CM

## 2017-05-31 LAB
ALBUMIN SERPL ELPH-MCNC: 2.5 G/DL — LOW (ref 3.3–5)
ALP SERPL-CCNC: 77 U/L — SIGNIFICANT CHANGE UP (ref 40–120)
ALT FLD-CCNC: 11 U/L RC — SIGNIFICANT CHANGE UP (ref 10–45)
ANION GAP SERPL CALC-SCNC: 15 MMOL/L — SIGNIFICANT CHANGE UP (ref 5–17)
ANISOCYTOSIS BLD QL: SIGNIFICANT CHANGE UP
APTT BLD: 43.4 SEC — HIGH (ref 27.5–37.4)
AST SERPL-CCNC: 24 U/L — SIGNIFICANT CHANGE UP (ref 10–40)
BASOPHILS # BLD AUTO: 0 K/UL — SIGNIFICANT CHANGE UP (ref 0–0.2)
BASOPHILS NFR BLD AUTO: 0.8 % — SIGNIFICANT CHANGE UP (ref 0–2)
BILIRUB SERPL-MCNC: <0.1 MG/DL — LOW (ref 0.2–1.2)
BUN SERPL-MCNC: 12 MG/DL — SIGNIFICANT CHANGE UP (ref 7–23)
CALCIUM SERPL-MCNC: 8.2 MG/DL — LOW (ref 8.4–10.5)
CHLORIDE SERPL-SCNC: 105 MMOL/L — SIGNIFICANT CHANGE UP (ref 96–108)
CK MB CFR SERPL CALC: <1 NG/ML — SIGNIFICANT CHANGE UP (ref 0–6.7)
CK SERPL-CCNC: 58 U/L — SIGNIFICANT CHANGE UP (ref 30–200)
CO2 SERPL-SCNC: 22 MMOL/L — SIGNIFICANT CHANGE UP (ref 22–31)
CREAT SERPL-MCNC: 0.61 MG/DL — SIGNIFICANT CHANGE UP (ref 0.5–1.3)
DACRYOCYTES BLD QL SMEAR: SIGNIFICANT CHANGE UP
ELLIPTOCYTES BLD QL SMEAR: SLIGHT — SIGNIFICANT CHANGE UP
EOSINOPHIL # BLD AUTO: 0 K/UL — SIGNIFICANT CHANGE UP (ref 0–0.5)
EOSINOPHIL NFR BLD AUTO: 0.9 % — SIGNIFICANT CHANGE UP (ref 0–6)
GLUCOSE SERPL-MCNC: 88 MG/DL — SIGNIFICANT CHANGE UP (ref 70–99)
HCT VFR BLD CALC: 29.9 % — LOW (ref 39–50)
HGB BLD-MCNC: 9.3 G/DL — LOW (ref 13–17)
INR BLD: 2.9 RATIO — HIGH (ref 0.88–1.16)
LYMPHOCYTES # BLD AUTO: 1 K/UL — SIGNIFICANT CHANGE UP (ref 1–3.3)
LYMPHOCYTES # BLD AUTO: 20.2 % — SIGNIFICANT CHANGE UP (ref 13–44)
MCHC RBC-ENTMCNC: 23.2 PG — LOW (ref 27–34)
MCHC RBC-ENTMCNC: 31 GM/DL — LOW (ref 32–36)
MCV RBC AUTO: 74.7 FL — LOW (ref 80–100)
MICROCYTES BLD QL: SIGNIFICANT CHANGE UP
MONOCYTES # BLD AUTO: 0.5 K/UL — SIGNIFICANT CHANGE UP (ref 0–0.9)
MONOCYTES NFR BLD AUTO: 10.9 % — SIGNIFICANT CHANGE UP (ref 2–14)
NEUTROPHILS # BLD AUTO: 3.2 K/UL — SIGNIFICANT CHANGE UP (ref 1.8–7.4)
NEUTROPHILS NFR BLD AUTO: 67.2 % — SIGNIFICANT CHANGE UP (ref 43–77)
OVALOCYTES BLD QL SMEAR: SLIGHT — SIGNIFICANT CHANGE UP
PLAT MORPH BLD: NORMAL — SIGNIFICANT CHANGE UP
PLATELET # BLD AUTO: 302 K/UL — SIGNIFICANT CHANGE UP (ref 150–400)
POIKILOCYTOSIS BLD QL AUTO: SIGNIFICANT CHANGE UP
POTASSIUM SERPL-MCNC: 3.7 MMOL/L — SIGNIFICANT CHANGE UP (ref 3.5–5.3)
POTASSIUM SERPL-SCNC: 3.7 MMOL/L — SIGNIFICANT CHANGE UP (ref 3.5–5.3)
PROT SERPL-MCNC: 5.8 G/DL — LOW (ref 6–8.3)
PROTHROM AB SERPL-ACNC: 32 SEC — HIGH (ref 9.8–12.7)
RBC # BLD: 4 M/UL — LOW (ref 4.2–5.8)
RBC # FLD: 22.2 % — HIGH (ref 10.3–14.5)
RBC BLD AUTO: ABNORMAL
SCHISTOCYTES BLD QL AUTO: SLIGHT — SIGNIFICANT CHANGE UP
SODIUM SERPL-SCNC: 142 MMOL/L — SIGNIFICANT CHANGE UP (ref 135–145)
TROPONIN T SERPL-MCNC: <0.01 NG/ML — SIGNIFICANT CHANGE UP (ref 0–0.06)
WBC # BLD: 4.7 K/UL — SIGNIFICANT CHANGE UP (ref 3.8–10.5)
WBC # FLD AUTO: 4.7 K/UL — SIGNIFICANT CHANGE UP (ref 3.8–10.5)

## 2017-05-31 PROCEDURE — 71010: CPT | Mod: 26

## 2017-05-31 PROCEDURE — 99285 EMERGENCY DEPT VISIT HI MDM: CPT | Mod: 25

## 2017-05-31 RX ORDER — HYDROMORPHONE HYDROCHLORIDE 2 MG/ML
1 INJECTION INTRAMUSCULAR; INTRAVENOUS; SUBCUTANEOUS ONCE
Qty: 0 | Refills: 0 | Status: DISCONTINUED | OUTPATIENT
Start: 2017-05-31 | End: 2017-05-31

## 2017-05-31 RX ADMIN — HYDROMORPHONE HYDROCHLORIDE 1 MILLIGRAM(S): 2 INJECTION INTRAMUSCULAR; INTRAVENOUS; SUBCUTANEOUS at 22:28

## 2017-05-31 RX ADMIN — HYDROMORPHONE HYDROCHLORIDE 1 MILLIGRAM(S): 2 INJECTION INTRAMUSCULAR; INTRAVENOUS; SUBCUTANEOUS at 21:21

## 2017-05-31 RX ADMIN — HYDROMORPHONE HYDROCHLORIDE 1 MILLIGRAM(S): 2 INJECTION INTRAMUSCULAR; INTRAVENOUS; SUBCUTANEOUS at 23:55

## 2017-05-31 RX ADMIN — HYDROMORPHONE HYDROCHLORIDE 1 MILLIGRAM(S): 2 INJECTION INTRAMUSCULAR; INTRAVENOUS; SUBCUTANEOUS at 23:34

## 2017-05-31 NOTE — ED PROVIDER NOTE - MEDICAL DECISION MAKING DETAILS
Dr. Shea (Attending Physician)  Pt. with ho SLE, PE on coumadin pw midsternal chest pain of unclear etiology.  Worse with palpation. Denies shortness of breath.  Lungs clear.  Heart regular.  Will check labs cardiac enzymes inr cxr and pain control.  Allergic to IV contrast so cannot CTA.  If therapeutic on coumadin and pain persisting will admit.

## 2017-05-31 NOTE — ED PROVIDER NOTE - ATTENDING CONTRIBUTION TO CARE
Dr. Shea (Attending Physician)  I performed a history and physical exam of the patient and discussed their management with the resident. I reviewed the resident's note and agree with the documented findings and plan of care. My medical decision making and observations are found above. Dr. Shea (Attending Physician)  I performed a history and physical exam of the patient and discussed their management with the advanced care provider. I reviewed the advanced care provider's note and agree with the documented findings and plan of care. My medical decision making and objective findings are found above.

## 2017-05-31 NOTE — ED PROVIDER NOTE - OBJECTIVE STATEMENT
25 y/o M with PMH of lupus complicated by recurrent pleural effusions requiring multiple chest tubes, lupus nephritis (class V, failed cellcept therapy), seronegative APS, DVT/PE diagnosed in Feb 2017 s/p RLE IVC filter placement, hx of failed AC (Xarelto, Coumadin, Lovenox, and Arixtra), b/l renal vein thrombosis, with many recent hospitalizations for chest pain, now presenting with left sided chest pain. Pt states that his chest pain worsened last night, it is left sided, and sharp in nature with no radiation. Pt's pain is exacerbated by lying flat and by taking deep breaths. Pain is somewhat relieved when pt is sitting forward. Pt states that he went to see his pain management doctor today, who sent him into the hospital due to his severe pain. Patient is otherwise not having any fevers, chills, shortness of breath, lower extremity edema, nausea, vomiting, diarrhea, or constipation. 25 y/o M with PMH of lupus complicated by recurrent pleural effusions requiring multiple chest tubes, lupus nephritis (class V, failed cellcept therapy), seronegative APS, DVT/PE diagnosed in Feb 2017 s/p RLE IVC filter placement, hx of failed AC (Xarelto, Coumadin, Lovenox, and Arixtra), b/l renal vein thrombosis, with many recent hospitalizations for chest pain, now presenting with left sided chest pain. Patient reports onset of symptoms approximately 3-4 hours ago, left sided, and sharp and squeezing in nature with no radiation. Pt's pain is exacerbated by lying flat and by taking deep breaths. Pain is somewhat relieved when pt is sitting forward. Patient sees a pain management doctor and takes Oxycodone and Oxycontin which did not relieve pain. Associated shortness of breath. . Patient is otherwise not having any fevers, chills, shortness of breath, lower extremity edema, nausea, vomiting, diarrhea, or constipation.

## 2017-05-31 NOTE — ED PROVIDER NOTE - PROGRESS NOTE DETAILS
Dr. Shea (Attending Physician)  Pain persistent despite two doses of dilaudid. Will admit for pain control.  INR therapeutic so less likely PE.

## 2017-05-31 NOTE — ED ADULT NURSE NOTE - OBJECTIVE STATEMENT
Pt has Hx of Lupus ans pleural effusions on pain management comes in with left sided chest pain started to day. Pt has no fever chills noted.  IVL placed by Manager for pt has come up to the desk and spoke with the doctor that no body has seen her  EKG done ans no significant findings as per attending MD in the area was told that the Rn will be in to draw the blood soon and pt still not happy and c/o ot charge RN.  Primary RN was tending to the 5 other pt's she had tin the area and told family shes would be in as soon as she could. Nurse Manager placed iv and pain meds given as ordered Mpuleorn

## 2017-05-31 NOTE — ED ADULT NURSE NOTE - CAS DISCH CONDITION
Stable Stable/2310: Report received from Delores MCLAIN RN . Pt AAOx4, NAD, resp nonlabored, skin warm/dry, resting comfortably in bed with spouse at bedside. Pt c/o L chest pain. Pt denies headache, dizziness, abd pain, n/v/d, fevers, chills at this time. Pt awaiting bed. Safety maintained.  Pt medicated with pain meds as ordered. by MD. Report called to [facility] FOZIA Harris. Safety maintained, pt transported to floor with CCM, NSR HR 91, EDT and wife. Safety maintained.

## 2017-06-01 ENCOUNTER — APPOINTMENT (OUTPATIENT)
Dept: RHEUMATOLOGY | Facility: CLINIC | Age: 27
End: 2017-06-01

## 2017-06-01 DIAGNOSIS — Z29.9 ENCOUNTER FOR PROPHYLACTIC MEASURES, UNSPECIFIED: ICD-10-CM

## 2017-06-01 DIAGNOSIS — R07.9 CHEST PAIN, UNSPECIFIED: ICD-10-CM

## 2017-06-01 DIAGNOSIS — I26.99 OTHER PULMONARY EMBOLISM WITHOUT ACUTE COR PULMONALE: ICD-10-CM

## 2017-06-01 DIAGNOSIS — M32.9 SYSTEMIC LUPUS ERYTHEMATOSUS, UNSPECIFIED: ICD-10-CM

## 2017-06-01 DIAGNOSIS — R10.9 UNSPECIFIED ABDOMINAL PAIN: ICD-10-CM

## 2017-06-01 LAB
ANION GAP SERPL CALC-SCNC: 12 MMOL/L — SIGNIFICANT CHANGE UP (ref 5–17)
APPEARANCE UR: CLEAR — SIGNIFICANT CHANGE UP
BILIRUB UR-MCNC: NEGATIVE — SIGNIFICANT CHANGE UP
BUN SERPL-MCNC: 9 MG/DL — SIGNIFICANT CHANGE UP (ref 7–23)
CALCIUM SERPL-MCNC: 8.7 MG/DL — SIGNIFICANT CHANGE UP (ref 8.4–10.5)
CHLORIDE SERPL-SCNC: 106 MMOL/L — SIGNIFICANT CHANGE UP (ref 96–108)
CK MB BLD-MCNC: 2.9 % — SIGNIFICANT CHANGE UP (ref 0–3.5)
CK MB CFR SERPL CALC: 1 NG/ML — SIGNIFICANT CHANGE UP (ref 0–6.7)
CK MB CFR SERPL CALC: 1 NG/ML — SIGNIFICANT CHANGE UP (ref 0–6.7)
CK SERPL-CCNC: 33 U/L — SIGNIFICANT CHANGE UP (ref 30–200)
CK SERPL-CCNC: 34 U/L — SIGNIFICANT CHANGE UP (ref 30–200)
CO2 SERPL-SCNC: 25 MMOL/L — SIGNIFICANT CHANGE UP (ref 22–31)
COLOR SPEC: YELLOW — SIGNIFICANT CHANGE UP
CREAT SERPL-MCNC: 0.58 MG/DL — SIGNIFICANT CHANGE UP (ref 0.5–1.3)
DIFF PNL FLD: ABNORMAL
GLUCOSE SERPL-MCNC: 86 MG/DL — SIGNIFICANT CHANGE UP (ref 70–99)
GLUCOSE UR QL: NEGATIVE — SIGNIFICANT CHANGE UP
HCT VFR BLD CALC: 30.9 % — LOW (ref 39–50)
HGB BLD-MCNC: 9.5 G/DL — LOW (ref 13–17)
INR BLD: 2.49 RATIO — HIGH (ref 0.88–1.16)
KETONES UR-MCNC: NEGATIVE — SIGNIFICANT CHANGE UP
LEUKOCYTE ESTERASE UR-ACNC: NEGATIVE — SIGNIFICANT CHANGE UP
MCHC RBC-ENTMCNC: 23.1 PG — LOW (ref 27–34)
MCHC RBC-ENTMCNC: 30.7 GM/DL — LOW (ref 32–36)
MCV RBC AUTO: 75.1 FL — LOW (ref 80–100)
NITRITE UR-MCNC: NEGATIVE — SIGNIFICANT CHANGE UP
PH UR: 6 — SIGNIFICANT CHANGE UP (ref 5–8)
PLATELET # BLD AUTO: 324 K/UL — SIGNIFICANT CHANGE UP (ref 150–400)
POTASSIUM SERPL-MCNC: 3.7 MMOL/L — SIGNIFICANT CHANGE UP (ref 3.5–5.3)
POTASSIUM SERPL-SCNC: 3.7 MMOL/L — SIGNIFICANT CHANGE UP (ref 3.5–5.3)
PROT UR-MCNC: 300 MG/DL
PROTHROM AB SERPL-ACNC: 27.4 SEC — HIGH (ref 9.8–12.7)
RBC # BLD: 4.11 M/UL — LOW (ref 4.2–5.8)
RBC # FLD: 22.1 % — HIGH (ref 10.3–14.5)
SODIUM SERPL-SCNC: 143 MMOL/L — SIGNIFICANT CHANGE UP (ref 135–145)
SP GR SPEC: 1.01 — SIGNIFICANT CHANGE UP (ref 1.01–1.02)
TROPONIN T SERPL-MCNC: <0.01 NG/ML — SIGNIFICANT CHANGE UP (ref 0–0.06)
TROPONIN T SERPL-MCNC: <0.01 NG/ML — SIGNIFICANT CHANGE UP (ref 0–0.06)
UROBILINOGEN FLD QL: NEGATIVE — SIGNIFICANT CHANGE UP
WBC # BLD: 4.1 K/UL — SIGNIFICANT CHANGE UP (ref 3.8–10.5)
WBC # FLD AUTO: 4.1 K/UL — SIGNIFICANT CHANGE UP (ref 3.8–10.5)

## 2017-06-01 PROCEDURE — 99223 1ST HOSP IP/OBS HIGH 75: CPT | Mod: AI,GC

## 2017-06-01 PROCEDURE — 93010 ELECTROCARDIOGRAM REPORT: CPT

## 2017-06-01 PROCEDURE — 93975 VASCULAR STUDY: CPT | Mod: 26

## 2017-06-01 PROCEDURE — 99233 SBSQ HOSP IP/OBS HIGH 50: CPT | Mod: GC

## 2017-06-01 RX ORDER — HYDROMORPHONE HYDROCHLORIDE 2 MG/ML
2 INJECTION INTRAMUSCULAR; INTRAVENOUS; SUBCUTANEOUS EVERY 4 HOURS
Qty: 0 | Refills: 0 | Status: DISCONTINUED | OUTPATIENT
Start: 2017-06-01 | End: 2017-06-03

## 2017-06-01 RX ORDER — WARFARIN SODIUM 2.5 MG/1
5 TABLET ORAL ONCE
Qty: 0 | Refills: 0 | Status: DISCONTINUED | OUTPATIENT
Start: 2017-06-01 | End: 2017-06-01

## 2017-06-01 RX ORDER — HYDROMORPHONE HYDROCHLORIDE 2 MG/ML
1 INJECTION INTRAMUSCULAR; INTRAVENOUS; SUBCUTANEOUS EVERY 4 HOURS
Qty: 0 | Refills: 0 | Status: DISCONTINUED | OUTPATIENT
Start: 2017-06-01 | End: 2017-06-01

## 2017-06-01 RX ORDER — HYDROXYCHLOROQUINE SULFATE 200 MG
200 TABLET ORAL DAILY
Qty: 0 | Refills: 0 | Status: DISCONTINUED | OUTPATIENT
Start: 2017-06-01 | End: 2017-06-01

## 2017-06-01 RX ORDER — ASPIRIN/CALCIUM CARB/MAGNESIUM 324 MG
1 TABLET ORAL
Qty: 0 | Refills: 0 | COMMUNITY

## 2017-06-01 RX ORDER — WARFARIN SODIUM 2.5 MG/1
5 TABLET ORAL ONCE
Qty: 0 | Refills: 0 | Status: COMPLETED | OUTPATIENT
Start: 2017-06-01 | End: 2017-06-01

## 2017-06-01 RX ORDER — SUCRALFATE 1 G
1 TABLET ORAL EVERY 12 HOURS
Qty: 0 | Refills: 0 | Status: DISCONTINUED | OUTPATIENT
Start: 2017-06-01 | End: 2017-06-08

## 2017-06-01 RX ORDER — OXYCODONE HYDROCHLORIDE 5 MG/1
15 TABLET ORAL EVERY 12 HOURS
Qty: 0 | Refills: 0 | Status: DISCONTINUED | OUTPATIENT
Start: 2017-06-01 | End: 2017-06-03

## 2017-06-01 RX ORDER — HYDROXYCHLOROQUINE SULFATE 200 MG
200 TABLET ORAL EVERY 12 HOURS
Qty: 0 | Refills: 0 | Status: DISCONTINUED | OUTPATIENT
Start: 2017-06-01 | End: 2017-06-09

## 2017-06-01 RX ORDER — HYDROMORPHONE HYDROCHLORIDE 2 MG/ML
2 INJECTION INTRAMUSCULAR; INTRAVENOUS; SUBCUTANEOUS EVERY 4 HOURS
Qty: 0 | Refills: 0 | Status: DISCONTINUED | OUTPATIENT
Start: 2017-06-01 | End: 2017-06-01

## 2017-06-01 RX ORDER — WARFARIN SODIUM 2.5 MG/1
7.5 TABLET ORAL ONCE
Qty: 0 | Refills: 0 | Status: COMPLETED | OUTPATIENT
Start: 2017-06-01 | End: 2017-06-01

## 2017-06-01 RX ORDER — ASPIRIN/CALCIUM CARB/MAGNESIUM 324 MG
81 TABLET ORAL DAILY
Qty: 0 | Refills: 0 | Status: DISCONTINUED | OUTPATIENT
Start: 2017-06-01 | End: 2017-06-09

## 2017-06-01 RX ORDER — PANTOPRAZOLE SODIUM 20 MG/1
40 TABLET, DELAYED RELEASE ORAL
Qty: 0 | Refills: 0 | Status: DISCONTINUED | OUTPATIENT
Start: 2017-06-01 | End: 2017-06-08

## 2017-06-01 RX ORDER — HYDROMORPHONE HYDROCHLORIDE 2 MG/ML
4 INJECTION INTRAMUSCULAR; INTRAVENOUS; SUBCUTANEOUS EVERY 4 HOURS
Qty: 0 | Refills: 0 | Status: DISCONTINUED | OUTPATIENT
Start: 2017-06-01 | End: 2017-06-03

## 2017-06-01 RX ORDER — HYDROXYCHLOROQUINE SULFATE 200 MG
1 TABLET ORAL
Qty: 0 | Refills: 0 | COMMUNITY

## 2017-06-01 RX ORDER — FERROUS SULFATE 325(65) MG
325 TABLET ORAL DAILY
Qty: 0 | Refills: 0 | Status: DISCONTINUED | OUTPATIENT
Start: 2017-06-01 | End: 2017-06-08

## 2017-06-01 RX ADMIN — HYDROMORPHONE HYDROCHLORIDE 2 MILLIGRAM(S): 2 INJECTION INTRAMUSCULAR; INTRAVENOUS; SUBCUTANEOUS at 19:47

## 2017-06-01 RX ADMIN — HYDROMORPHONE HYDROCHLORIDE 2 MILLIGRAM(S): 2 INJECTION INTRAMUSCULAR; INTRAVENOUS; SUBCUTANEOUS at 03:06

## 2017-06-01 RX ADMIN — Medication 200 MILLIGRAM(S): at 21:06

## 2017-06-01 RX ADMIN — HYDROMORPHONE HYDROCHLORIDE 2 MILLIGRAM(S): 2 INJECTION INTRAMUSCULAR; INTRAVENOUS; SUBCUTANEOUS at 03:30

## 2017-06-01 RX ADMIN — HYDROMORPHONE HYDROCHLORIDE 4 MILLIGRAM(S): 2 INJECTION INTRAMUSCULAR; INTRAVENOUS; SUBCUTANEOUS at 09:48

## 2017-06-01 RX ADMIN — HYDROMORPHONE HYDROCHLORIDE 2 MILLIGRAM(S): 2 INJECTION INTRAMUSCULAR; INTRAVENOUS; SUBCUTANEOUS at 11:14

## 2017-06-01 RX ADMIN — Medication 12 MILLIGRAM(S): at 05:50

## 2017-06-01 RX ADMIN — Medication 1 GRAM(S): at 05:50

## 2017-06-01 RX ADMIN — HYDROMORPHONE HYDROCHLORIDE 2 MILLIGRAM(S): 2 INJECTION INTRAMUSCULAR; INTRAVENOUS; SUBCUTANEOUS at 08:30

## 2017-06-01 RX ADMIN — HYDROMORPHONE HYDROCHLORIDE 2 MILLIGRAM(S): 2 INJECTION INTRAMUSCULAR; INTRAVENOUS; SUBCUTANEOUS at 15:52

## 2017-06-01 RX ADMIN — WARFARIN SODIUM 5 MILLIGRAM(S): 2.5 TABLET ORAL at 01:09

## 2017-06-01 RX ADMIN — HYDROMORPHONE HYDROCHLORIDE 2 MILLIGRAM(S): 2 INJECTION INTRAMUSCULAR; INTRAVENOUS; SUBCUTANEOUS at 16:45

## 2017-06-01 RX ADMIN — Medication 1 GRAM(S): at 17:52

## 2017-06-01 RX ADMIN — HYDROMORPHONE HYDROCHLORIDE 4 MILLIGRAM(S): 2 INJECTION INTRAMUSCULAR; INTRAVENOUS; SUBCUTANEOUS at 14:13

## 2017-06-01 RX ADMIN — OXYCODONE HYDROCHLORIDE 15 MILLIGRAM(S): 5 TABLET ORAL at 17:53

## 2017-06-01 RX ADMIN — Medication 200 MILLIGRAM(S): at 15:30

## 2017-06-01 RX ADMIN — HYDROMORPHONE HYDROCHLORIDE 4 MILLIGRAM(S): 2 INJECTION INTRAMUSCULAR; INTRAVENOUS; SUBCUTANEOUS at 06:44

## 2017-06-01 RX ADMIN — HYDROMORPHONE HYDROCHLORIDE 4 MILLIGRAM(S): 2 INJECTION INTRAMUSCULAR; INTRAVENOUS; SUBCUTANEOUS at 05:51

## 2017-06-01 RX ADMIN — HYDROMORPHONE HYDROCHLORIDE 2 MILLIGRAM(S): 2 INJECTION INTRAMUSCULAR; INTRAVENOUS; SUBCUTANEOUS at 11:45

## 2017-06-01 RX ADMIN — OXYCODONE HYDROCHLORIDE 15 MILLIGRAM(S): 5 TABLET ORAL at 06:42

## 2017-06-01 RX ADMIN — HYDROMORPHONE HYDROCHLORIDE 4 MILLIGRAM(S): 2 INJECTION INTRAMUSCULAR; INTRAVENOUS; SUBCUTANEOUS at 14:40

## 2017-06-01 RX ADMIN — HYDROMORPHONE HYDROCHLORIDE 4 MILLIGRAM(S): 2 INJECTION INTRAMUSCULAR; INTRAVENOUS; SUBCUTANEOUS at 22:01

## 2017-06-01 RX ADMIN — OXYCODONE HYDROCHLORIDE 15 MILLIGRAM(S): 5 TABLET ORAL at 18:13

## 2017-06-01 RX ADMIN — HYDROMORPHONE HYDROCHLORIDE 4 MILLIGRAM(S): 2 INJECTION INTRAMUSCULAR; INTRAVENOUS; SUBCUTANEOUS at 22:16

## 2017-06-01 RX ADMIN — HYDROMORPHONE HYDROCHLORIDE 4 MILLIGRAM(S): 2 INJECTION INTRAMUSCULAR; INTRAVENOUS; SUBCUTANEOUS at 18:14

## 2017-06-01 RX ADMIN — Medication 8 MILLIGRAM(S): at 21:11

## 2017-06-01 RX ADMIN — HYDROMORPHONE HYDROCHLORIDE 4 MILLIGRAM(S): 2 INJECTION INTRAMUSCULAR; INTRAVENOUS; SUBCUTANEOUS at 11:21

## 2017-06-01 RX ADMIN — HYDROMORPHONE HYDROCHLORIDE 4 MILLIGRAM(S): 2 INJECTION INTRAMUSCULAR; INTRAVENOUS; SUBCUTANEOUS at 01:45

## 2017-06-01 RX ADMIN — HYDROMORPHONE HYDROCHLORIDE 2 MILLIGRAM(S): 2 INJECTION INTRAMUSCULAR; INTRAVENOUS; SUBCUTANEOUS at 08:05

## 2017-06-01 RX ADMIN — WARFARIN SODIUM 7.5 MILLIGRAM(S): 2.5 TABLET ORAL at 21:06

## 2017-06-01 RX ADMIN — PANTOPRAZOLE SODIUM 40 MILLIGRAM(S): 20 TABLET, DELAYED RELEASE ORAL at 15:25

## 2017-06-01 RX ADMIN — HYDROMORPHONE HYDROCHLORIDE 2 MILLIGRAM(S): 2 INJECTION INTRAMUSCULAR; INTRAVENOUS; SUBCUTANEOUS at 20:02

## 2017-06-01 RX ADMIN — HYDROMORPHONE HYDROCHLORIDE 4 MILLIGRAM(S): 2 INJECTION INTRAMUSCULAR; INTRAVENOUS; SUBCUTANEOUS at 01:10

## 2017-06-01 RX ADMIN — Medication 1 TABLET(S): at 11:15

## 2017-06-01 RX ADMIN — HYDROMORPHONE HYDROCHLORIDE 4 MILLIGRAM(S): 2 INJECTION INTRAMUSCULAR; INTRAVENOUS; SUBCUTANEOUS at 18:30

## 2017-06-01 RX ADMIN — OXYCODONE HYDROCHLORIDE 15 MILLIGRAM(S): 5 TABLET ORAL at 05:49

## 2017-06-01 RX ADMIN — Medication 325 MILLIGRAM(S): at 15:25

## 2017-06-01 RX ADMIN — PANTOPRAZOLE SODIUM 40 MILLIGRAM(S): 20 TABLET, DELAYED RELEASE ORAL at 05:50

## 2017-06-01 NOTE — H&P ADULT. - HISTORY OF PRESENT ILLNESS
Patient is a 25 y/o M with PMH of Lupus complicated by recurrent pleural effusions requiring multiple chest tubes, lupus nephritis (class V, failed cellcept therapy), seronegative APS, DVT/PE diagnosed in Feb 2017 s/p RLE IVC filter placement, hx of failed AC (Xarelto, Coumadin, Lovenox, and Arixtra), now on Coumadin, b/l renal vein thrombosis, with many recent hospitalizations for chest pain, re-presenting with Chest pain, and left flank pain X 5 days. Patient's chest pain is sharp, reproducible, consistent with prior flares.  Patient is more concerned by his left flank pain which started on Saturday (05/27/2017), pain started off as a 3/10, felt like a pulled muscle, but progressively got worse, now patient rates the pain as 8/10, and describes it as a "sensation of balloon that is about to pop." ROS otherwise negative, no f/c/ns/n/v/d/constipation/abd p/change in urinary frequency, no urinary hesitancy, no dysuria, no hematuria.  Patient states that the pain feels like his prior pain when he was diagnosed with a renal vein thrombus.      In the ED: vitals noteable for tachycardia, resolved within several hours. Labs consistent with patient's labs from prior admissions. D-dimer negative at 167, Tito negative (Trop <0.01) X 1 Patient is a 27 y/o M with PMH of Lupus complicated by recurrent pleural effusions requiring multiple chest tubes, lupus nephritis (class V, failed cellcept therapy), seronegative APS, DVT/PE diagnosed in Feb 2017 s/p RLE IVC filter placement, hx of failed AC (Xarelto, Coumadin, Lovenox, and Arixtra), now on Coumadin, b/l renal vein thrombosis, with many recent hospitalizations for chest pain p/w acute worsening of chronic chest pain yesterday and left flank pain last couple days. Patient's chest pain is sharp 8-9/10, reproducible, worse with movement/pleuritic in nature consistent with prior flares, took oxycontin 15 and oxycodone IR 10 without adequate control - came to ED.  Patient's left flank pain started on Saturday (05/27/2017) after a basketball game with friends, pain started off as a 3/10, felt like a pulled muscle, but progressively got worse, now patient rates the pain as 8/10, and describes it as a "sensation of balloon that is about to pop."  Patient states that the pain feels like his prior pain when he was diagnosed with a renal vein thrombus.  ROS otherwise negative, no f/c/ns/n/v/d/constipation/abd p/change in urinary frequency, no urinary hesitancy, no dysuria, no hematuria.      In the ED: vitals noteable for tachycardia, resolved within several hours. Labs consistent with patient's labs from prior admissions. D-dimer negative at 167, Tito negative (Trop <0.01) X 1

## 2017-06-01 NOTE — H&P ADULT. - ASSESSMENT
25 y/o M with PMH of Lupus complicated by recurrent pleural effusions requiring multiple chest tubes, lupus nephritis (class V, failed cellcept therapy), seronegative APS, DVT/PE diagnosed in Feb 2017 s/p RLE IVC filter placement, hx of failed AC (Xarelto, Coumadin, Lovenox, and Arixtra), now on Coumadin, b/l renal vein thrombosis, with many recent hospitalizations for chest pain, re-presenting with Chest pain, and left flank pain X 5 days. 27 y/o M with PMH of Lupus complicated by recurrent pleural effusions requiring multiple chest tubes, lupus nephritis (class V, failed cellcept therapy), seronegative APS, DVT/PE, b/l renal vein thrombosis on coumadin p/w acute worsening of chronic chest pain and new L flank/"L side" pain admitted with chest pain r/o ACS, concerning for SLE/serositis flare and L side pain r/o thrombosis.

## 2017-06-01 NOTE — H&P ADULT. - PROBLEM SELECTOR PLAN 3
Cont Coumadin dosing (5 mg daily home dose) - will treat with Coumadin dosing (5 mg daily home dose), and monitor INR

## 2017-06-01 NOTE — H&P ADULT. - PROBLEM SELECTOR PLAN 2
Similar to patient's prior presentations, likely 2/2 serositis   PE unlikely given therapeutic on coumadin, and negative D-dimer   Sepsis unlikely as does not meet criteria  Cont with pain control Similar to patient's prior presentations, likely 2/2 serositis   - lower suspicion for new PE therapeutic on coumadin, and negative D-dimer, recent CTA neg for new PE  - will check serial cardiac enzymes to r/o ACS, EKG w/o acute ST/T changes  - will pain control with IV Dilaudid  - Rheum eval in am

## 2017-06-01 NOTE — H&P ADULT. - NEGATIVE MUSCULOSKELETAL SYMPTOMS
no muscle weakness/no joint swelling/no neck pain/no arthritis/no arthralgia/no myalgia no muscle weakness/no myalgia/no joint swelling

## 2017-06-01 NOTE — H&P ADULT. - RS GEN PE MLT RESP DETAILS PC
clear to auscultation bilaterally/good air movement/respirations non-labored/normal/breath sounds equal/no rales/no wheezes/no rhonchi respirations non-labored/no wheezes/breath sounds equal/no rhonchi/no rales/good air movement/clear to auscultation bilaterally

## 2017-06-01 NOTE — H&P ADULT. - PROBLEM SELECTOR PLAN 1
Patient states pain similar to his prior episode of renal vein thrombosis  Will check UA, consider US duplex of kidney  Pain control with Dilaudid Patient states pain similar to his prior episode of renal vein thrombosis  - Will check UA, will check US duplex of kidney  - Pain control with Dilaudid iv

## 2017-06-01 NOTE — H&P ADULT. - NEUROLOGICAL DETAILS
sensation intact/responds to verbal commands/responds to pain/normal strength/cranial nerves intact/alert and oriented x 3 responds to pain/normal strength/sensation intact/responds to verbal commands/alert and oriented x 3

## 2017-06-01 NOTE — H&P ADULT. - GASTROINTESTINAL DETAILS
soft/no rigidity/nontender/normal/no distention/bowel sounds normal/no masses palpable/no guarding nontender/bowel sounds normal/no distention/soft/no masses palpable

## 2017-06-01 NOTE — H&P ADULT. - NEGATIVE CARDIOVASCULAR SYMPTOMS
no paroxysmal nocturnal dyspnea/no peripheral edema/no palpitations/no orthopnea/no dyspnea on exertion

## 2017-06-01 NOTE — H&P ADULT. - NEGATIVE NEUROLOGICAL SYMPTOMS
no loss of sensation/no transient paralysis/no paresthesias/no generalized seizures/no syncope/no tremors/no loss of consciousness/no focal seizures/no weakness

## 2017-06-01 NOTE — H&P ADULT. - PROBLEM SELECTOR PLAN 5
FEN: DASH  DVT ppx: Coumadin   Dispo: Pending further medical management FEN: DASH  DVT ppx: on Coumadin   Dispo: Pending further medical management

## 2017-06-01 NOTE — H&P ADULT. - LAB RESULTS AND INTERPRETATION
D-dimer negative at 167  Tito negative (Trop <0.01) X 1 labs personally reviewed - D-dimer negative at 167  Tito negative (Trop <0.01) X 1

## 2017-06-02 LAB
ANION GAP SERPL CALC-SCNC: 12 MMOL/L — SIGNIFICANT CHANGE UP (ref 5–17)
APPEARANCE UR: CLEAR — SIGNIFICANT CHANGE UP
APTT BLD: 50.2 SEC — HIGH (ref 27.5–37.4)
BILIRUB UR-MCNC: NEGATIVE — SIGNIFICANT CHANGE UP
BUN SERPL-MCNC: 7 MG/DL — SIGNIFICANT CHANGE UP (ref 7–23)
C3 SERPL-MCNC: 103 MG/DL — SIGNIFICANT CHANGE UP (ref 80–180)
C4 SERPL-MCNC: 22 MG/DL — SIGNIFICANT CHANGE UP (ref 10–45)
CALCIUM SERPL-MCNC: 8.6 MG/DL — SIGNIFICANT CHANGE UP (ref 8.4–10.5)
CHLORIDE SERPL-SCNC: 102 MMOL/L — SIGNIFICANT CHANGE UP (ref 96–108)
CO2 SERPL-SCNC: 25 MMOL/L — SIGNIFICANT CHANGE UP (ref 22–31)
COLOR SPEC: YELLOW — SIGNIFICANT CHANGE UP
CREAT ?TM UR-MCNC: 110 MG/DL — SIGNIFICANT CHANGE UP
CREAT ?TM UR-MCNC: 111 MG/DL — SIGNIFICANT CHANGE UP
CREAT SERPL-MCNC: 0.6 MG/DL — SIGNIFICANT CHANGE UP (ref 0.5–1.3)
DIFF PNL FLD: ABNORMAL
GLUCOSE SERPL-MCNC: 88 MG/DL — SIGNIFICANT CHANGE UP (ref 70–99)
GLUCOSE UR QL: NEGATIVE — SIGNIFICANT CHANGE UP
HCT VFR BLD CALC: 31.7 % — LOW (ref 39–50)
HGB BLD-MCNC: 9.4 G/DL — LOW (ref 13–17)
INR BLD: 3.75 RATIO — HIGH (ref 0.88–1.16)
KETONES UR-MCNC: NEGATIVE — SIGNIFICANT CHANGE UP
LEUKOCYTE ESTERASE UR-ACNC: NEGATIVE — SIGNIFICANT CHANGE UP
MCHC RBC-ENTMCNC: 22.5 PG — LOW (ref 27–34)
MCHC RBC-ENTMCNC: 29.7 GM/DL — LOW (ref 32–36)
MCV RBC AUTO: 75.7 FL — LOW (ref 80–100)
NITRITE UR-MCNC: NEGATIVE — SIGNIFICANT CHANGE UP
PH UR: 6.5 — SIGNIFICANT CHANGE UP (ref 5–8)
PLATELET # BLD AUTO: 299 K/UL — SIGNIFICANT CHANGE UP (ref 150–400)
POTASSIUM SERPL-MCNC: 4.1 MMOL/L — SIGNIFICANT CHANGE UP (ref 3.5–5.3)
POTASSIUM SERPL-SCNC: 4.1 MMOL/L — SIGNIFICANT CHANGE UP (ref 3.5–5.3)
PROT ?TM UR-MCNC: 288 MG/DL — HIGH (ref 0–12)
PROT ?TM UR-MCNC: 362 MG/DL — HIGH (ref 0–12)
PROT UR-MCNC: 300 MG/DL
PROT/CREAT UR-RTO: 2.6 RATIO — HIGH (ref 0–0.2)
PROT/CREAT UR-RTO: 3.3 RATIO — HIGH (ref 0–0.2)
PROTHROM AB SERPL-ACNC: 41.6 SEC — HIGH (ref 9.8–12.7)
RBC # BLD: 4.19 M/UL — LOW (ref 4.2–5.8)
RBC # FLD: 22.2 % — HIGH (ref 10.3–14.5)
SODIUM SERPL-SCNC: 139 MMOL/L — SIGNIFICANT CHANGE UP (ref 135–145)
SP GR SPEC: 1.03 — HIGH (ref 1.01–1.02)
UROBILINOGEN FLD QL: NEGATIVE — SIGNIFICANT CHANGE UP
WBC # BLD: 4 K/UL — SIGNIFICANT CHANGE UP (ref 3.8–10.5)
WBC # FLD AUTO: 4 K/UL — SIGNIFICANT CHANGE UP (ref 3.8–10.5)

## 2017-06-02 PROCEDURE — 99233 SBSQ HOSP IP/OBS HIGH 50: CPT

## 2017-06-02 PROCEDURE — 99232 SBSQ HOSP IP/OBS MODERATE 35: CPT | Mod: GC

## 2017-06-02 RX ORDER — POLYETHYLENE GLYCOL 3350 17 G/17G
17 POWDER, FOR SOLUTION ORAL DAILY
Qty: 0 | Refills: 0 | Status: DISCONTINUED | OUTPATIENT
Start: 2017-06-02 | End: 2017-06-08

## 2017-06-02 RX ORDER — DIPHENHYDRAMINE HCL 50 MG
50 CAPSULE ORAL ONCE
Qty: 0 | Refills: 0 | Status: DISCONTINUED | OUTPATIENT
Start: 2017-06-03 | End: 2017-06-03

## 2017-06-02 RX ORDER — WARFARIN SODIUM 2.5 MG/1
2.5 TABLET ORAL ONCE
Qty: 0 | Refills: 0 | Status: COMPLETED | OUTPATIENT
Start: 2017-06-02 | End: 2017-06-02

## 2017-06-02 RX ADMIN — HYDROMORPHONE HYDROCHLORIDE 2 MILLIGRAM(S): 2 INJECTION INTRAMUSCULAR; INTRAVENOUS; SUBCUTANEOUS at 05:23

## 2017-06-02 RX ADMIN — HYDROMORPHONE HYDROCHLORIDE 4 MILLIGRAM(S): 2 INJECTION INTRAMUSCULAR; INTRAVENOUS; SUBCUTANEOUS at 06:14

## 2017-06-02 RX ADMIN — HYDROMORPHONE HYDROCHLORIDE 4 MILLIGRAM(S): 2 INJECTION INTRAMUSCULAR; INTRAVENOUS; SUBCUTANEOUS at 19:19

## 2017-06-02 RX ADMIN — HYDROMORPHONE HYDROCHLORIDE 4 MILLIGRAM(S): 2 INJECTION INTRAMUSCULAR; INTRAVENOUS; SUBCUTANEOUS at 02:11

## 2017-06-02 RX ADMIN — HYDROMORPHONE HYDROCHLORIDE 4 MILLIGRAM(S): 2 INJECTION INTRAMUSCULAR; INTRAVENOUS; SUBCUTANEOUS at 14:41

## 2017-06-02 RX ADMIN — Medication 200 MILLIGRAM(S): at 09:25

## 2017-06-02 RX ADMIN — HYDROMORPHONE HYDROCHLORIDE 4 MILLIGRAM(S): 2 INJECTION INTRAMUSCULAR; INTRAVENOUS; SUBCUTANEOUS at 23:35

## 2017-06-02 RX ADMIN — OXYCODONE HYDROCHLORIDE 15 MILLIGRAM(S): 5 TABLET ORAL at 17:25

## 2017-06-02 RX ADMIN — HYDROMORPHONE HYDROCHLORIDE 2 MILLIGRAM(S): 2 INJECTION INTRAMUSCULAR; INTRAVENOUS; SUBCUTANEOUS at 14:25

## 2017-06-02 RX ADMIN — Medication 200 MILLIGRAM(S): at 21:15

## 2017-06-02 RX ADMIN — PANTOPRAZOLE SODIUM 40 MILLIGRAM(S): 20 TABLET, DELAYED RELEASE ORAL at 17:25

## 2017-06-02 RX ADMIN — HYDROMORPHONE HYDROCHLORIDE 2 MILLIGRAM(S): 2 INJECTION INTRAMUSCULAR; INTRAVENOUS; SUBCUTANEOUS at 13:53

## 2017-06-02 RX ADMIN — HYDROMORPHONE HYDROCHLORIDE 2 MILLIGRAM(S): 2 INJECTION INTRAMUSCULAR; INTRAVENOUS; SUBCUTANEOUS at 22:05

## 2017-06-02 RX ADMIN — HYDROMORPHONE HYDROCHLORIDE 2 MILLIGRAM(S): 2 INJECTION INTRAMUSCULAR; INTRAVENOUS; SUBCUTANEOUS at 05:08

## 2017-06-02 RX ADMIN — HYDROMORPHONE HYDROCHLORIDE 4 MILLIGRAM(S): 2 INJECTION INTRAMUSCULAR; INTRAVENOUS; SUBCUTANEOUS at 06:25

## 2017-06-02 RX ADMIN — HYDROMORPHONE HYDROCHLORIDE 2 MILLIGRAM(S): 2 INJECTION INTRAMUSCULAR; INTRAVENOUS; SUBCUTANEOUS at 09:25

## 2017-06-02 RX ADMIN — HYDROMORPHONE HYDROCHLORIDE 4 MILLIGRAM(S): 2 INJECTION INTRAMUSCULAR; INTRAVENOUS; SUBCUTANEOUS at 18:54

## 2017-06-02 RX ADMIN — HYDROMORPHONE HYDROCHLORIDE 4 MILLIGRAM(S): 2 INJECTION INTRAMUSCULAR; INTRAVENOUS; SUBCUTANEOUS at 02:26

## 2017-06-02 RX ADMIN — OXYCODONE HYDROCHLORIDE 15 MILLIGRAM(S): 5 TABLET ORAL at 05:53

## 2017-06-02 RX ADMIN — Medication 1 GRAM(S): at 17:25

## 2017-06-02 RX ADMIN — PANTOPRAZOLE SODIUM 40 MILLIGRAM(S): 20 TABLET, DELAYED RELEASE ORAL at 05:53

## 2017-06-02 RX ADMIN — HYDROMORPHONE HYDROCHLORIDE 2 MILLIGRAM(S): 2 INJECTION INTRAMUSCULAR; INTRAVENOUS; SUBCUTANEOUS at 18:44

## 2017-06-02 RX ADMIN — OXYCODONE HYDROCHLORIDE 15 MILLIGRAM(S): 5 TABLET ORAL at 17:55

## 2017-06-02 RX ADMIN — Medication 1 GRAM(S): at 05:53

## 2017-06-02 RX ADMIN — HYDROMORPHONE HYDROCHLORIDE 2 MILLIGRAM(S): 2 INJECTION INTRAMUSCULAR; INTRAVENOUS; SUBCUTANEOUS at 22:20

## 2017-06-02 RX ADMIN — HYDROMORPHONE HYDROCHLORIDE 2 MILLIGRAM(S): 2 INJECTION INTRAMUSCULAR; INTRAVENOUS; SUBCUTANEOUS at 01:04

## 2017-06-02 RX ADMIN — HYDROMORPHONE HYDROCHLORIDE 4 MILLIGRAM(S): 2 INJECTION INTRAMUSCULAR; INTRAVENOUS; SUBCUTANEOUS at 10:18

## 2017-06-02 RX ADMIN — WARFARIN SODIUM 2.5 MILLIGRAM(S): 2.5 TABLET ORAL at 21:15

## 2017-06-02 RX ADMIN — Medication 325 MILLIGRAM(S): at 09:25

## 2017-06-02 RX ADMIN — HYDROMORPHONE HYDROCHLORIDE 2 MILLIGRAM(S): 2 INJECTION INTRAMUSCULAR; INTRAVENOUS; SUBCUTANEOUS at 09:59

## 2017-06-02 RX ADMIN — HYDROMORPHONE HYDROCHLORIDE 4 MILLIGRAM(S): 2 INJECTION INTRAMUSCULAR; INTRAVENOUS; SUBCUTANEOUS at 10:53

## 2017-06-02 RX ADMIN — Medication 32 MILLIGRAM(S): at 21:15

## 2017-06-02 RX ADMIN — HYDROMORPHONE HYDROCHLORIDE 2 MILLIGRAM(S): 2 INJECTION INTRAMUSCULAR; INTRAVENOUS; SUBCUTANEOUS at 00:49

## 2017-06-02 RX ADMIN — HYDROMORPHONE HYDROCHLORIDE 4 MILLIGRAM(S): 2 INJECTION INTRAMUSCULAR; INTRAVENOUS; SUBCUTANEOUS at 23:20

## 2017-06-02 RX ADMIN — HYDROMORPHONE HYDROCHLORIDE 2 MILLIGRAM(S): 2 INJECTION INTRAMUSCULAR; INTRAVENOUS; SUBCUTANEOUS at 18:07

## 2017-06-02 RX ADMIN — HYDROMORPHONE HYDROCHLORIDE 4 MILLIGRAM(S): 2 INJECTION INTRAMUSCULAR; INTRAVENOUS; SUBCUTANEOUS at 15:09

## 2017-06-02 RX ADMIN — OXYCODONE HYDROCHLORIDE 15 MILLIGRAM(S): 5 TABLET ORAL at 06:26

## 2017-06-02 RX ADMIN — POLYETHYLENE GLYCOL 3350 17 GRAM(S): 17 POWDER, FOR SOLUTION ORAL at 14:41

## 2017-06-03 LAB
HCT VFR BLD CALC: 34.5 % — LOW (ref 39–50)
HGB BLD-MCNC: 10.1 G/DL — LOW (ref 13–17)
INR BLD: 5.08 RATIO — CRITICAL HIGH (ref 0.88–1.16)
MCHC RBC-ENTMCNC: 22.4 PG — LOW (ref 27–34)
MCHC RBC-ENTMCNC: 29.3 GM/DL — LOW (ref 32–36)
MCV RBC AUTO: 76.6 FL — LOW (ref 80–100)
PLATELET # BLD AUTO: 310 K/UL — SIGNIFICANT CHANGE UP (ref 150–400)
PROTHROM AB SERPL-ACNC: 57.2 SEC — HIGH (ref 9.8–12.7)
RBC # BLD: 4.51 M/UL — SIGNIFICANT CHANGE UP (ref 4.2–5.8)
RBC # FLD: 22.3 % — HIGH (ref 10.3–14.5)
WBC # BLD: 5.3 K/UL — SIGNIFICANT CHANGE UP (ref 3.8–10.5)
WBC # FLD AUTO: 5.3 K/UL — SIGNIFICANT CHANGE UP (ref 3.8–10.5)

## 2017-06-03 PROCEDURE — 99232 SBSQ HOSP IP/OBS MODERATE 35: CPT

## 2017-06-03 PROCEDURE — 99232 SBSQ HOSP IP/OBS MODERATE 35: CPT | Mod: GC

## 2017-06-03 RX ORDER — HYDROMORPHONE HYDROCHLORIDE 2 MG/ML
2 INJECTION INTRAMUSCULAR; INTRAVENOUS; SUBCUTANEOUS ONCE
Qty: 0 | Refills: 0 | Status: DISCONTINUED | OUTPATIENT
Start: 2017-06-03 | End: 2017-06-03

## 2017-06-03 RX ORDER — DIPHENHYDRAMINE HCL 50 MG
50 CAPSULE ORAL ONCE
Qty: 0 | Refills: 0 | Status: COMPLETED | OUTPATIENT
Start: 2017-06-04 | End: 2017-06-04

## 2017-06-03 RX ORDER — HYDROMORPHONE HYDROCHLORIDE 2 MG/ML
3 INJECTION INTRAMUSCULAR; INTRAVENOUS; SUBCUTANEOUS EVERY 4 HOURS
Qty: 0 | Refills: 0 | Status: DISCONTINUED | OUTPATIENT
Start: 2017-06-03 | End: 2017-06-07

## 2017-06-03 RX ORDER — HYDROMORPHONE HYDROCHLORIDE 2 MG/ML
5 INJECTION INTRAMUSCULAR; INTRAVENOUS; SUBCUTANEOUS EVERY 4 HOURS
Qty: 0 | Refills: 0 | Status: DISCONTINUED | OUTPATIENT
Start: 2017-06-03 | End: 2017-06-06

## 2017-06-03 RX ORDER — CLOTRIMAZOLE 10 MG
1 TROCHE MUCOUS MEMBRANE
Qty: 0 | Refills: 0 | Status: DISCONTINUED | OUTPATIENT
Start: 2017-06-03 | End: 2017-06-09

## 2017-06-03 RX ORDER — OXYCODONE HYDROCHLORIDE 5 MG/1
40 TABLET ORAL EVERY 12 HOURS
Qty: 0 | Refills: 0 | Status: DISCONTINUED | OUTPATIENT
Start: 2017-06-03 | End: 2017-06-03

## 2017-06-03 RX ORDER — OXYCODONE HYDROCHLORIDE 5 MG/1
15 TABLET ORAL EVERY 12 HOURS
Qty: 0 | Refills: 0 | Status: DISCONTINUED | OUTPATIENT
Start: 2017-06-03 | End: 2017-06-08

## 2017-06-03 RX ADMIN — Medication 325 MILLIGRAM(S): at 10:18

## 2017-06-03 RX ADMIN — HYDROMORPHONE HYDROCHLORIDE 2 MILLIGRAM(S): 2 INJECTION INTRAMUSCULAR; INTRAVENOUS; SUBCUTANEOUS at 10:42

## 2017-06-03 RX ADMIN — HYDROMORPHONE HYDROCHLORIDE 4 MILLIGRAM(S): 2 INJECTION INTRAMUSCULAR; INTRAVENOUS; SUBCUTANEOUS at 07:34

## 2017-06-03 RX ADMIN — Medication 12 MILLIGRAM(S): at 18:32

## 2017-06-03 RX ADMIN — HYDROMORPHONE HYDROCHLORIDE 2 MILLIGRAM(S): 2 INJECTION INTRAMUSCULAR; INTRAVENOUS; SUBCUTANEOUS at 14:08

## 2017-06-03 RX ADMIN — HYDROMORPHONE HYDROCHLORIDE 4 MILLIGRAM(S): 2 INJECTION INTRAMUSCULAR; INTRAVENOUS; SUBCUTANEOUS at 12:05

## 2017-06-03 RX ADMIN — HYDROMORPHONE HYDROCHLORIDE 3 MILLIGRAM(S): 2 INJECTION INTRAMUSCULAR; INTRAVENOUS; SUBCUTANEOUS at 16:48

## 2017-06-03 RX ADMIN — Medication 1 LOZENGE: at 21:26

## 2017-06-03 RX ADMIN — HYDROMORPHONE HYDROCHLORIDE 2 MILLIGRAM(S): 2 INJECTION INTRAMUSCULAR; INTRAVENOUS; SUBCUTANEOUS at 10:19

## 2017-06-03 RX ADMIN — HYDROMORPHONE HYDROCHLORIDE 2 MILLIGRAM(S): 2 INJECTION INTRAMUSCULAR; INTRAVENOUS; SUBCUTANEOUS at 14:21

## 2017-06-03 RX ADMIN — Medication 1 GRAM(S): at 18:32

## 2017-06-03 RX ADMIN — HYDROMORPHONE HYDROCHLORIDE 5 MILLIGRAM(S): 2 INJECTION INTRAMUSCULAR; INTRAVENOUS; SUBCUTANEOUS at 15:34

## 2017-06-03 RX ADMIN — HYDROMORPHONE HYDROCHLORIDE 2 MILLIGRAM(S): 2 INJECTION INTRAMUSCULAR; INTRAVENOUS; SUBCUTANEOUS at 13:39

## 2017-06-03 RX ADMIN — OXYCODONE HYDROCHLORIDE 15 MILLIGRAM(S): 5 TABLET ORAL at 05:46

## 2017-06-03 RX ADMIN — OXYCODONE HYDROCHLORIDE 15 MILLIGRAM(S): 5 TABLET ORAL at 19:30

## 2017-06-03 RX ADMIN — HYDROMORPHONE HYDROCHLORIDE 4 MILLIGRAM(S): 2 INJECTION INTRAMUSCULAR; INTRAVENOUS; SUBCUTANEOUS at 08:10

## 2017-06-03 RX ADMIN — Medication 200 MILLIGRAM(S): at 21:21

## 2017-06-03 RX ADMIN — HYDROMORPHONE HYDROCHLORIDE 4 MILLIGRAM(S): 2 INJECTION INTRAMUSCULAR; INTRAVENOUS; SUBCUTANEOUS at 11:34

## 2017-06-03 RX ADMIN — OXYCODONE HYDROCHLORIDE 15 MILLIGRAM(S): 5 TABLET ORAL at 06:31

## 2017-06-03 RX ADMIN — HYDROMORPHONE HYDROCHLORIDE 2 MILLIGRAM(S): 2 INJECTION INTRAMUSCULAR; INTRAVENOUS; SUBCUTANEOUS at 02:10

## 2017-06-03 RX ADMIN — Medication 200 MILLIGRAM(S): at 10:18

## 2017-06-03 RX ADMIN — PANTOPRAZOLE SODIUM 40 MILLIGRAM(S): 20 TABLET, DELAYED RELEASE ORAL at 05:46

## 2017-06-03 RX ADMIN — PANTOPRAZOLE SODIUM 40 MILLIGRAM(S): 20 TABLET, DELAYED RELEASE ORAL at 18:32

## 2017-06-03 RX ADMIN — Medication 32 MILLIGRAM(S): at 22:28

## 2017-06-03 RX ADMIN — OXYCODONE HYDROCHLORIDE 15 MILLIGRAM(S): 5 TABLET ORAL at 18:31

## 2017-06-03 RX ADMIN — HYDROMORPHONE HYDROCHLORIDE 5 MILLIGRAM(S): 2 INJECTION INTRAMUSCULAR; INTRAVENOUS; SUBCUTANEOUS at 20:20

## 2017-06-03 RX ADMIN — HYDROMORPHONE HYDROCHLORIDE 5 MILLIGRAM(S): 2 INJECTION INTRAMUSCULAR; INTRAVENOUS; SUBCUTANEOUS at 16:04

## 2017-06-03 RX ADMIN — HYDROMORPHONE HYDROCHLORIDE 2 MILLIGRAM(S): 2 INJECTION INTRAMUSCULAR; INTRAVENOUS; SUBCUTANEOUS at 06:17

## 2017-06-03 RX ADMIN — HYDROMORPHONE HYDROCHLORIDE 5 MILLIGRAM(S): 2 INJECTION INTRAMUSCULAR; INTRAVENOUS; SUBCUTANEOUS at 19:59

## 2017-06-03 RX ADMIN — HYDROMORPHONE HYDROCHLORIDE 3 MILLIGRAM(S): 2 INJECTION INTRAMUSCULAR; INTRAVENOUS; SUBCUTANEOUS at 21:40

## 2017-06-03 RX ADMIN — HYDROMORPHONE HYDROCHLORIDE 4 MILLIGRAM(S): 2 INJECTION INTRAMUSCULAR; INTRAVENOUS; SUBCUTANEOUS at 03:29

## 2017-06-03 RX ADMIN — HYDROMORPHONE HYDROCHLORIDE 4 MILLIGRAM(S): 2 INJECTION INTRAMUSCULAR; INTRAVENOUS; SUBCUTANEOUS at 03:14

## 2017-06-03 RX ADMIN — HYDROMORPHONE HYDROCHLORIDE 2 MILLIGRAM(S): 2 INJECTION INTRAMUSCULAR; INTRAVENOUS; SUBCUTANEOUS at 06:33

## 2017-06-03 RX ADMIN — HYDROMORPHONE HYDROCHLORIDE 3 MILLIGRAM(S): 2 INJECTION INTRAMUSCULAR; INTRAVENOUS; SUBCUTANEOUS at 21:13

## 2017-06-03 RX ADMIN — HYDROMORPHONE HYDROCHLORIDE 2 MILLIGRAM(S): 2 INJECTION INTRAMUSCULAR; INTRAVENOUS; SUBCUTANEOUS at 14:53

## 2017-06-03 RX ADMIN — POLYETHYLENE GLYCOL 3350 17 GRAM(S): 17 POWDER, FOR SOLUTION ORAL at 11:33

## 2017-06-03 RX ADMIN — HYDROMORPHONE HYDROCHLORIDE 3 MILLIGRAM(S): 2 INJECTION INTRAMUSCULAR; INTRAVENOUS; SUBCUTANEOUS at 17:35

## 2017-06-03 RX ADMIN — Medication 1 TABLET(S): at 10:18

## 2017-06-03 RX ADMIN — HYDROMORPHONE HYDROCHLORIDE 2 MILLIGRAM(S): 2 INJECTION INTRAMUSCULAR; INTRAVENOUS; SUBCUTANEOUS at 02:25

## 2017-06-03 RX ADMIN — Medication 1 GRAM(S): at 05:47

## 2017-06-04 LAB
APTT BLD: 59.9 SEC — HIGH (ref 27.5–37.4)
DSDNA AB SER-ACNC: 397 IU/ML — HIGH
HCT VFR BLD CALC: 36.4 % — LOW (ref 39–50)
HGB BLD-MCNC: 10.5 G/DL — LOW (ref 13–17)
INR BLD: 3.98 RATIO — HIGH (ref 0.88–1.16)
MCHC RBC-ENTMCNC: 21.8 PG — LOW (ref 27–34)
MCHC RBC-ENTMCNC: 28.7 GM/DL — LOW (ref 32–36)
MCV RBC AUTO: 76 FL — LOW (ref 80–100)
PLATELET # BLD AUTO: 347 K/UL — SIGNIFICANT CHANGE UP (ref 150–400)
PROTHROM AB SERPL-ACNC: 44.2 SEC — HIGH (ref 9.8–12.7)
RBC # BLD: 4.8 M/UL — SIGNIFICANT CHANGE UP (ref 4.2–5.8)
RBC # FLD: 22.3 % — HIGH (ref 10.3–14.5)
WBC # BLD: 5.1 K/UL — SIGNIFICANT CHANGE UP (ref 3.8–10.5)
WBC # FLD AUTO: 5.1 K/UL — SIGNIFICANT CHANGE UP (ref 3.8–10.5)

## 2017-06-04 PROCEDURE — 99233 SBSQ HOSP IP/OBS HIGH 50: CPT

## 2017-06-04 PROCEDURE — 71275 CT ANGIOGRAPHY CHEST: CPT | Mod: 26

## 2017-06-04 RX ADMIN — HYDROMORPHONE HYDROCHLORIDE 5 MILLIGRAM(S): 2 INJECTION INTRAMUSCULAR; INTRAVENOUS; SUBCUTANEOUS at 14:15

## 2017-06-04 RX ADMIN — HYDROMORPHONE HYDROCHLORIDE 5 MILLIGRAM(S): 2 INJECTION INTRAMUSCULAR; INTRAVENOUS; SUBCUTANEOUS at 00:30

## 2017-06-04 RX ADMIN — HYDROMORPHONE HYDROCHLORIDE 3 MILLIGRAM(S): 2 INJECTION INTRAMUSCULAR; INTRAVENOUS; SUBCUTANEOUS at 11:03

## 2017-06-04 RX ADMIN — HYDROMORPHONE HYDROCHLORIDE 5 MILLIGRAM(S): 2 INJECTION INTRAMUSCULAR; INTRAVENOUS; SUBCUTANEOUS at 05:45

## 2017-06-04 RX ADMIN — HYDROMORPHONE HYDROCHLORIDE 5 MILLIGRAM(S): 2 INJECTION INTRAMUSCULAR; INTRAVENOUS; SUBCUTANEOUS at 09:28

## 2017-06-04 RX ADMIN — HYDROMORPHONE HYDROCHLORIDE 3 MILLIGRAM(S): 2 INJECTION INTRAMUSCULAR; INTRAVENOUS; SUBCUTANEOUS at 15:09

## 2017-06-04 RX ADMIN — HYDROMORPHONE HYDROCHLORIDE 3 MILLIGRAM(S): 2 INJECTION INTRAMUSCULAR; INTRAVENOUS; SUBCUTANEOUS at 11:40

## 2017-06-04 RX ADMIN — PANTOPRAZOLE SODIUM 40 MILLIGRAM(S): 20 TABLET, DELAYED RELEASE ORAL at 19:00

## 2017-06-04 RX ADMIN — HYDROMORPHONE HYDROCHLORIDE 3 MILLIGRAM(S): 2 INJECTION INTRAMUSCULAR; INTRAVENOUS; SUBCUTANEOUS at 15:58

## 2017-06-04 RX ADMIN — Medication 1 LOZENGE: at 00:21

## 2017-06-04 RX ADMIN — OXYCODONE HYDROCHLORIDE 15 MILLIGRAM(S): 5 TABLET ORAL at 05:45

## 2017-06-04 RX ADMIN — HYDROMORPHONE HYDROCHLORIDE 5 MILLIGRAM(S): 2 INJECTION INTRAMUSCULAR; INTRAVENOUS; SUBCUTANEOUS at 10:33

## 2017-06-04 RX ADMIN — HYDROMORPHONE HYDROCHLORIDE 3 MILLIGRAM(S): 2 INJECTION INTRAMUSCULAR; INTRAVENOUS; SUBCUTANEOUS at 08:05

## 2017-06-04 RX ADMIN — OXYCODONE HYDROCHLORIDE 15 MILLIGRAM(S): 5 TABLET ORAL at 05:34

## 2017-06-04 RX ADMIN — HYDROMORPHONE HYDROCHLORIDE 5 MILLIGRAM(S): 2 INJECTION INTRAMUSCULAR; INTRAVENOUS; SUBCUTANEOUS at 22:14

## 2017-06-04 RX ADMIN — Medication 200 MILLIGRAM(S): at 11:08

## 2017-06-04 RX ADMIN — Medication 1 LOZENGE: at 19:52

## 2017-06-04 RX ADMIN — Medication 1 LOZENGE: at 23:36

## 2017-06-04 RX ADMIN — HYDROMORPHONE HYDROCHLORIDE 5 MILLIGRAM(S): 2 INJECTION INTRAMUSCULAR; INTRAVENOUS; SUBCUTANEOUS at 13:38

## 2017-06-04 RX ADMIN — HYDROMORPHONE HYDROCHLORIDE 3 MILLIGRAM(S): 2 INJECTION INTRAMUSCULAR; INTRAVENOUS; SUBCUTANEOUS at 20:09

## 2017-06-04 RX ADMIN — Medication 325 MILLIGRAM(S): at 11:08

## 2017-06-04 RX ADMIN — Medication 200 MILLIGRAM(S): at 23:35

## 2017-06-04 RX ADMIN — Medication 12 MILLIGRAM(S): at 19:00

## 2017-06-04 RX ADMIN — HYDROMORPHONE HYDROCHLORIDE 5 MILLIGRAM(S): 2 INJECTION INTRAMUSCULAR; INTRAVENOUS; SUBCUTANEOUS at 00:12

## 2017-06-04 RX ADMIN — HYDROMORPHONE HYDROCHLORIDE 5 MILLIGRAM(S): 2 INJECTION INTRAMUSCULAR; INTRAVENOUS; SUBCUTANEOUS at 05:23

## 2017-06-04 RX ADMIN — HYDROMORPHONE HYDROCHLORIDE 3 MILLIGRAM(S): 2 INJECTION INTRAMUSCULAR; INTRAVENOUS; SUBCUTANEOUS at 23:49

## 2017-06-04 RX ADMIN — OXYCODONE HYDROCHLORIDE 15 MILLIGRAM(S): 5 TABLET ORAL at 19:45

## 2017-06-04 RX ADMIN — HYDROMORPHONE HYDROCHLORIDE 5 MILLIGRAM(S): 2 INJECTION INTRAMUSCULAR; INTRAVENOUS; SUBCUTANEOUS at 21:57

## 2017-06-04 RX ADMIN — HYDROMORPHONE HYDROCHLORIDE 3 MILLIGRAM(S): 2 INJECTION INTRAMUSCULAR; INTRAVENOUS; SUBCUTANEOUS at 19:47

## 2017-06-04 RX ADMIN — HYDROMORPHONE HYDROCHLORIDE 5 MILLIGRAM(S): 2 INJECTION INTRAMUSCULAR; INTRAVENOUS; SUBCUTANEOUS at 18:56

## 2017-06-04 RX ADMIN — Medication 1 LOZENGE: at 15:09

## 2017-06-04 RX ADMIN — OXYCODONE HYDROCHLORIDE 15 MILLIGRAM(S): 5 TABLET ORAL at 19:00

## 2017-06-04 RX ADMIN — Medication 1 GRAM(S): at 05:30

## 2017-06-04 RX ADMIN — POLYETHYLENE GLYCOL 3350 17 GRAM(S): 17 POWDER, FOR SOLUTION ORAL at 13:38

## 2017-06-04 RX ADMIN — Medication 12 MILLIGRAM(S): at 05:30

## 2017-06-04 RX ADMIN — Medication 1 LOZENGE: at 08:06

## 2017-06-04 RX ADMIN — Medication 32 MILLIGRAM(S): at 08:06

## 2017-06-04 RX ADMIN — HYDROMORPHONE HYDROCHLORIDE 3 MILLIGRAM(S): 2 INJECTION INTRAMUSCULAR; INTRAVENOUS; SUBCUTANEOUS at 07:00

## 2017-06-04 RX ADMIN — Medication 50 MILLIGRAM(S): at 09:19

## 2017-06-04 RX ADMIN — HYDROMORPHONE HYDROCHLORIDE 5 MILLIGRAM(S): 2 INJECTION INTRAMUSCULAR; INTRAVENOUS; SUBCUTANEOUS at 17:44

## 2017-06-04 RX ADMIN — PANTOPRAZOLE SODIUM 40 MILLIGRAM(S): 20 TABLET, DELAYED RELEASE ORAL at 05:30

## 2017-06-04 RX ADMIN — Medication 1 LOZENGE: at 11:08

## 2017-06-05 LAB
ANION GAP SERPL CALC-SCNC: 10 MMOL/L — SIGNIFICANT CHANGE UP (ref 5–17)
APTT BLD: 43 SEC — HIGH (ref 27.5–37.4)
BUN SERPL-MCNC: 10 MG/DL — SIGNIFICANT CHANGE UP (ref 7–23)
CALCIUM SERPL-MCNC: 9 MG/DL — SIGNIFICANT CHANGE UP (ref 8.4–10.5)
CHLORIDE SERPL-SCNC: 99 MMOL/L — SIGNIFICANT CHANGE UP (ref 96–108)
CO2 SERPL-SCNC: 31 MMOL/L — SIGNIFICANT CHANGE UP (ref 22–31)
CREAT SERPL-MCNC: 0.59 MG/DL — SIGNIFICANT CHANGE UP (ref 0.5–1.3)
GLUCOSE SERPL-MCNC: 90 MG/DL — SIGNIFICANT CHANGE UP (ref 70–99)
HCT VFR BLD CALC: 34.5 % — LOW (ref 39–50)
HGB BLD-MCNC: 10.3 G/DL — LOW (ref 13–17)
INR BLD: 3.08 RATIO — HIGH (ref 0.88–1.16)
MCHC RBC-ENTMCNC: 22.5 PG — LOW (ref 27–34)
MCHC RBC-ENTMCNC: 30 GM/DL — LOW (ref 32–36)
MCV RBC AUTO: 74.9 FL — LOW (ref 80–100)
PLATELET # BLD AUTO: 312 K/UL — SIGNIFICANT CHANGE UP (ref 150–400)
POTASSIUM SERPL-MCNC: 4.1 MMOL/L — SIGNIFICANT CHANGE UP (ref 3.5–5.3)
POTASSIUM SERPL-SCNC: 4.1 MMOL/L — SIGNIFICANT CHANGE UP (ref 3.5–5.3)
PROTHROM AB SERPL-ACNC: 34.4 SEC — HIGH (ref 9.8–12.7)
RBC # BLD: 4.6 M/UL — SIGNIFICANT CHANGE UP (ref 4.2–5.8)
RBC # FLD: 22.4 % — HIGH (ref 10.3–14.5)
SODIUM SERPL-SCNC: 140 MMOL/L — SIGNIFICANT CHANGE UP (ref 135–145)
WBC # BLD: 5.9 K/UL — SIGNIFICANT CHANGE UP (ref 3.8–10.5)
WBC # FLD AUTO: 5.9 K/UL — SIGNIFICANT CHANGE UP (ref 3.8–10.5)

## 2017-06-05 PROCEDURE — 99231 SBSQ HOSP IP/OBS SF/LOW 25: CPT

## 2017-06-05 PROCEDURE — 99233 SBSQ HOSP IP/OBS HIGH 50: CPT

## 2017-06-05 RX ORDER — WARFARIN SODIUM 2.5 MG/1
5 TABLET ORAL ONCE
Qty: 0 | Refills: 0 | Status: COMPLETED | OUTPATIENT
Start: 2017-06-05 | End: 2017-06-05

## 2017-06-05 RX ORDER — WARFARIN SODIUM 2.5 MG/1
4 TABLET ORAL ONCE
Qty: 0 | Refills: 0 | Status: DISCONTINUED | OUTPATIENT
Start: 2017-06-05 | End: 2017-06-05

## 2017-06-05 RX ADMIN — HYDROMORPHONE HYDROCHLORIDE 5 MILLIGRAM(S): 2 INJECTION INTRAMUSCULAR; INTRAVENOUS; SUBCUTANEOUS at 02:20

## 2017-06-05 RX ADMIN — HYDROMORPHONE HYDROCHLORIDE 5 MILLIGRAM(S): 2 INJECTION INTRAMUSCULAR; INTRAVENOUS; SUBCUTANEOUS at 19:55

## 2017-06-05 RX ADMIN — Medication 1 LOZENGE: at 19:41

## 2017-06-05 RX ADMIN — OXYCODONE HYDROCHLORIDE 15 MILLIGRAM(S): 5 TABLET ORAL at 08:00

## 2017-06-05 RX ADMIN — OXYCODONE HYDROCHLORIDE 15 MILLIGRAM(S): 5 TABLET ORAL at 06:59

## 2017-06-05 RX ADMIN — Medication 1 LOZENGE: at 08:26

## 2017-06-05 RX ADMIN — HYDROMORPHONE HYDROCHLORIDE 3 MILLIGRAM(S): 2 INJECTION INTRAMUSCULAR; INTRAVENOUS; SUBCUTANEOUS at 19:03

## 2017-06-05 RX ADMIN — HYDROMORPHONE HYDROCHLORIDE 5 MILLIGRAM(S): 2 INJECTION INTRAMUSCULAR; INTRAVENOUS; SUBCUTANEOUS at 06:27

## 2017-06-05 RX ADMIN — OXYCODONE HYDROCHLORIDE 15 MILLIGRAM(S): 5 TABLET ORAL at 19:05

## 2017-06-05 RX ADMIN — Medication 1 GRAM(S): at 17:12

## 2017-06-05 RX ADMIN — OXYCODONE HYDROCHLORIDE 15 MILLIGRAM(S): 5 TABLET ORAL at 18:08

## 2017-06-05 RX ADMIN — Medication 12 MILLIGRAM(S): at 06:56

## 2017-06-05 RX ADMIN — HYDROMORPHONE HYDROCHLORIDE 5 MILLIGRAM(S): 2 INJECTION INTRAMUSCULAR; INTRAVENOUS; SUBCUTANEOUS at 02:02

## 2017-06-05 RX ADMIN — HYDROMORPHONE HYDROCHLORIDE 5 MILLIGRAM(S): 2 INJECTION INTRAMUSCULAR; INTRAVENOUS; SUBCUTANEOUS at 10:43

## 2017-06-05 RX ADMIN — PANTOPRAZOLE SODIUM 40 MILLIGRAM(S): 20 TABLET, DELAYED RELEASE ORAL at 06:59

## 2017-06-05 RX ADMIN — Medication 1 GRAM(S): at 06:56

## 2017-06-05 RX ADMIN — Medication 12 MILLIGRAM(S): at 17:13

## 2017-06-05 RX ADMIN — HYDROMORPHONE HYDROCHLORIDE 3 MILLIGRAM(S): 2 INJECTION INTRAMUSCULAR; INTRAVENOUS; SUBCUTANEOUS at 08:26

## 2017-06-05 RX ADMIN — Medication 1 LOZENGE: at 17:11

## 2017-06-05 RX ADMIN — HYDROMORPHONE HYDROCHLORIDE 3 MILLIGRAM(S): 2 INJECTION INTRAMUSCULAR; INTRAVENOUS; SUBCUTANEOUS at 21:24

## 2017-06-05 RX ADMIN — Medication 200 MILLIGRAM(S): at 10:46

## 2017-06-05 RX ADMIN — HYDROMORPHONE HYDROCHLORIDE 5 MILLIGRAM(S): 2 INJECTION INTRAMUSCULAR; INTRAVENOUS; SUBCUTANEOUS at 06:47

## 2017-06-05 RX ADMIN — HYDROMORPHONE HYDROCHLORIDE 3 MILLIGRAM(S): 2 INJECTION INTRAMUSCULAR; INTRAVENOUS; SUBCUTANEOUS at 00:09

## 2017-06-05 RX ADMIN — HYDROMORPHONE HYDROCHLORIDE 3 MILLIGRAM(S): 2 INJECTION INTRAMUSCULAR; INTRAVENOUS; SUBCUTANEOUS at 04:13

## 2017-06-05 RX ADMIN — HYDROMORPHONE HYDROCHLORIDE 3 MILLIGRAM(S): 2 INJECTION INTRAMUSCULAR; INTRAVENOUS; SUBCUTANEOUS at 19:04

## 2017-06-05 RX ADMIN — Medication 81 MILLIGRAM(S): at 10:45

## 2017-06-05 RX ADMIN — HYDROMORPHONE HYDROCHLORIDE 5 MILLIGRAM(S): 2 INJECTION INTRAMUSCULAR; INTRAVENOUS; SUBCUTANEOUS at 15:21

## 2017-06-05 RX ADMIN — HYDROMORPHONE HYDROCHLORIDE 5 MILLIGRAM(S): 2 INJECTION INTRAMUSCULAR; INTRAVENOUS; SUBCUTANEOUS at 15:14

## 2017-06-05 RX ADMIN — HYDROMORPHONE HYDROCHLORIDE 3 MILLIGRAM(S): 2 INJECTION INTRAMUSCULAR; INTRAVENOUS; SUBCUTANEOUS at 17:05

## 2017-06-05 RX ADMIN — Medication 325 MILLIGRAM(S): at 10:45

## 2017-06-05 RX ADMIN — HYDROMORPHONE HYDROCHLORIDE 5 MILLIGRAM(S): 2 INJECTION INTRAMUSCULAR; INTRAVENOUS; SUBCUTANEOUS at 19:37

## 2017-06-05 RX ADMIN — PANTOPRAZOLE SODIUM 40 MILLIGRAM(S): 20 TABLET, DELAYED RELEASE ORAL at 17:12

## 2017-06-05 RX ADMIN — Medication 1 LOZENGE: at 12:34

## 2017-06-05 RX ADMIN — HYDROMORPHONE HYDROCHLORIDE 3 MILLIGRAM(S): 2 INJECTION INTRAMUSCULAR; INTRAVENOUS; SUBCUTANEOUS at 21:30

## 2017-06-05 RX ADMIN — HYDROMORPHONE HYDROCHLORIDE 3 MILLIGRAM(S): 2 INJECTION INTRAMUSCULAR; INTRAVENOUS; SUBCUTANEOUS at 12:35

## 2017-06-05 RX ADMIN — HYDROMORPHONE HYDROCHLORIDE 3 MILLIGRAM(S): 2 INJECTION INTRAMUSCULAR; INTRAVENOUS; SUBCUTANEOUS at 04:20

## 2017-06-05 RX ADMIN — HYDROMORPHONE HYDROCHLORIDE 5 MILLIGRAM(S): 2 INJECTION INTRAMUSCULAR; INTRAVENOUS; SUBCUTANEOUS at 11:30

## 2017-06-05 RX ADMIN — Medication 200 MILLIGRAM(S): at 22:43

## 2017-06-05 NOTE — PROVIDER CONTACT NOTE (MEDICATION) - SITUATION
Patient refusing aspirin and methyprednisolone
Patient requesting pain medication frequency increase
Patient refusing aspirin, calcium carbonate/vitamin D, and methylprednisolone
patient refusing Coumadin INR 3.08.

## 2017-06-05 NOTE — PROVIDER CONTACT NOTE (OTHER) - SITUATION
Patient with 8/10 pain; no PRNs available. States he wishes to wait until 0908 for 2mg dilaudid dose.
Patient c/o left eye drift to lateral side & left eye discomfort. left eye blurred vision at times and burning sensations.
Patient without change in pain, no PRNs available

## 2017-06-05 NOTE — PROVIDER CONTACT NOTE (OTHER) - REASON
Patient with 8/10 pain; no PRNs available
Patient c/o left eye drift to lateral side & left eye discomfort.
Patient without change in pain, no PRNs available

## 2017-06-05 NOTE — PROVIDER CONTACT NOTE (OTHER) - ACTION/TREATMENT ORDERED:
Opthalmology consult called
NP made aware; NP to review medication orders.   Hospitalist paged.
NP made aware. NP stated to notify if pain worsens.

## 2017-06-05 NOTE — PROVIDER CONTACT NOTE (MEDICATION) - ASSESSMENT
Patient alert and oriented x4. Patient educated about indications for medications. Patient verbalized understanding the risks associated with refusing the medications. Patient still refusing medications.
Patient alert and oriented x4. Patient requesting pain medication frequency increase.
Patient alert and oriented x4. Patient educated about indications for medications. Patient verbalized understanding the risks associated with refusing the medications. Patient still refusing medications.
patient A&O x4,  Patient verbalized understanding of education and continues to refuse Coumadin. patient states that he is not taking Coumadin until he gets a home testing INR check device delivered to his house.

## 2017-06-05 NOTE — PROVIDER CONTACT NOTE (MEDICATION) - BACKGROUND
admitted for CP/r/o mi negative  goal INR 2.5 to 3.5  HX PE, b/l renal vein thrombus,  and DVT
Patient admitted for chest pain
Patient admitted for chest pain; history of lupus
Patient admitted for chest pain; history of lupus

## 2017-06-05 NOTE — PROVIDER CONTACT NOTE (MEDICATION) - REASON
Patient refusing aspirin and methyprednisolone
Patient refusing multiple medications
Patient requesting pain medication frequency increase
patient refusing Coumadin

## 2017-06-05 NOTE — PROVIDER CONTACT NOTE (OTHER) - ASSESSMENT
Patient alert and oriented x.4
neuro intact. patient A&O x3, + strength with all extremities. VSS.
Patient alert and oriented x4. Patient states no change in 8/10 pain after 4mg dilaudid IVP.

## 2017-06-05 NOTE — PROVIDER CONTACT NOTE (MEDICATION) - RECOMMENDATIONS
Patient educated on the benefits of Coumadin and risks of not taking Coumadin.
NP to review medication orders.

## 2017-06-05 NOTE — PROVIDER CONTACT NOTE (MEDICATION) - ACTION/TREATMENT ORDERED:
NP made aware; NP to review medication orders.
NP made aware; no further action ordered.
NP made aware; no further action ordered.
will continue to monitor the patient. Patient has rights to refuse. no further order at this time.

## 2017-06-05 NOTE — PROVIDER CONTACT NOTE (OTHER) - RECOMMENDATIONS
NP to review medication orders.
Opal HUFF assessed the patient at bedside
NP to review medication orders.

## 2017-06-06 LAB
ANION GAP SERPL CALC-SCNC: 11 MMOL/L — SIGNIFICANT CHANGE UP (ref 5–17)
BUN SERPL-MCNC: 8 MG/DL — SIGNIFICANT CHANGE UP (ref 7–23)
CALCIUM SERPL-MCNC: 9.5 MG/DL — SIGNIFICANT CHANGE UP (ref 8.4–10.5)
CHLORIDE SERPL-SCNC: 100 MMOL/L — SIGNIFICANT CHANGE UP (ref 96–108)
CO2 SERPL-SCNC: 30 MMOL/L — SIGNIFICANT CHANGE UP (ref 22–31)
CREAT SERPL-MCNC: 0.6 MG/DL — SIGNIFICANT CHANGE UP (ref 0.5–1.3)
GLUCOSE SERPL-MCNC: 87 MG/DL — SIGNIFICANT CHANGE UP (ref 70–99)
HCT VFR BLD CALC: 39.1 % — SIGNIFICANT CHANGE UP (ref 39–50)
HGB BLD-MCNC: 11.7 G/DL — LOW (ref 13–17)
INR BLD: 1.59 RATIO — HIGH (ref 0.88–1.16)
MCHC RBC-ENTMCNC: 22.6 PG — LOW (ref 27–34)
MCHC RBC-ENTMCNC: 29.9 GM/DL — LOW (ref 32–36)
MCV RBC AUTO: 75.8 FL — LOW (ref 80–100)
PLATELET # BLD AUTO: 316 K/UL — SIGNIFICANT CHANGE UP (ref 150–400)
POTASSIUM SERPL-MCNC: 3.9 MMOL/L — SIGNIFICANT CHANGE UP (ref 3.5–5.3)
POTASSIUM SERPL-SCNC: 3.9 MMOL/L — SIGNIFICANT CHANGE UP (ref 3.5–5.3)
PROTHROM AB SERPL-ACNC: 17.5 SEC — HIGH (ref 9.8–12.7)
RBC # BLD: 5.16 M/UL — SIGNIFICANT CHANGE UP (ref 4.2–5.8)
RBC # FLD: 22.1 % — HIGH (ref 10.3–14.5)
SODIUM SERPL-SCNC: 141 MMOL/L — SIGNIFICANT CHANGE UP (ref 135–145)
WBC # BLD: 6.1 K/UL — SIGNIFICANT CHANGE UP (ref 3.8–10.5)
WBC # FLD AUTO: 6.1 K/UL — SIGNIFICANT CHANGE UP (ref 3.8–10.5)

## 2017-06-06 PROCEDURE — 99233 SBSQ HOSP IP/OBS HIGH 50: CPT

## 2017-06-06 PROCEDURE — 99222 1ST HOSP IP/OBS MODERATE 55: CPT

## 2017-06-06 PROCEDURE — 99222 1ST HOSP IP/OBS MODERATE 55: CPT | Mod: GC

## 2017-06-06 RX ORDER — ENOXAPARIN SODIUM 100 MG/ML
100 INJECTION SUBCUTANEOUS ONCE
Qty: 0 | Refills: 0 | Status: DISCONTINUED | OUTPATIENT
Start: 2017-06-06 | End: 2017-06-06

## 2017-06-06 RX ORDER — ENOXAPARIN SODIUM 100 MG/ML
100 INJECTION SUBCUTANEOUS ONCE
Qty: 0 | Refills: 0 | Status: COMPLETED | OUTPATIENT
Start: 2017-06-06 | End: 2017-06-06

## 2017-06-06 RX ORDER — HYDROMORPHONE HYDROCHLORIDE 2 MG/ML
4 INJECTION INTRAMUSCULAR; INTRAVENOUS; SUBCUTANEOUS EVERY 4 HOURS
Qty: 0 | Refills: 0 | Status: DISCONTINUED | OUTPATIENT
Start: 2017-06-06 | End: 2017-06-07

## 2017-06-06 RX ORDER — WARFARIN SODIUM 2.5 MG/1
5 TABLET ORAL ONCE
Qty: 0 | Refills: 0 | Status: COMPLETED | OUTPATIENT
Start: 2017-06-06 | End: 2017-06-06

## 2017-06-06 RX ORDER — ENOXAPARIN SODIUM 100 MG/ML
100 INJECTION SUBCUTANEOUS
Qty: 0 | Refills: 0 | Status: DISCONTINUED | OUTPATIENT
Start: 2017-06-06 | End: 2017-06-09

## 2017-06-06 RX ADMIN — Medication 1 DROP(S): at 17:12

## 2017-06-06 RX ADMIN — HYDROMORPHONE HYDROCHLORIDE 4 MILLIGRAM(S): 2 INJECTION INTRAMUSCULAR; INTRAVENOUS; SUBCUTANEOUS at 21:41

## 2017-06-06 RX ADMIN — PANTOPRAZOLE SODIUM 40 MILLIGRAM(S): 20 TABLET, DELAYED RELEASE ORAL at 06:50

## 2017-06-06 RX ADMIN — PANTOPRAZOLE SODIUM 40 MILLIGRAM(S): 20 TABLET, DELAYED RELEASE ORAL at 17:13

## 2017-06-06 RX ADMIN — Medication 1 LOZENGE: at 23:26

## 2017-06-06 RX ADMIN — HYDROMORPHONE HYDROCHLORIDE 4 MILLIGRAM(S): 2 INJECTION INTRAMUSCULAR; INTRAVENOUS; SUBCUTANEOUS at 17:31

## 2017-06-06 RX ADMIN — HYDROMORPHONE HYDROCHLORIDE 3 MILLIGRAM(S): 2 INJECTION INTRAMUSCULAR; INTRAVENOUS; SUBCUTANEOUS at 02:10

## 2017-06-06 RX ADMIN — HYDROMORPHONE HYDROCHLORIDE 3 MILLIGRAM(S): 2 INJECTION INTRAMUSCULAR; INTRAVENOUS; SUBCUTANEOUS at 17:36

## 2017-06-06 RX ADMIN — Medication 1 TABLET(S): at 17:11

## 2017-06-06 RX ADMIN — Medication 1 GRAM(S): at 06:50

## 2017-06-06 RX ADMIN — HYDROMORPHONE HYDROCHLORIDE 5 MILLIGRAM(S): 2 INJECTION INTRAMUSCULAR; INTRAVENOUS; SUBCUTANEOUS at 09:27

## 2017-06-06 RX ADMIN — WARFARIN SODIUM 5 MILLIGRAM(S): 2.5 TABLET ORAL at 21:47

## 2017-06-06 RX ADMIN — HYDROMORPHONE HYDROCHLORIDE 5 MILLIGRAM(S): 2 INJECTION INTRAMUSCULAR; INTRAVENOUS; SUBCUTANEOUS at 00:14

## 2017-06-06 RX ADMIN — HYDROMORPHONE HYDROCHLORIDE 5 MILLIGRAM(S): 2 INJECTION INTRAMUSCULAR; INTRAVENOUS; SUBCUTANEOUS at 14:10

## 2017-06-06 RX ADMIN — HYDROMORPHONE HYDROCHLORIDE 3 MILLIGRAM(S): 2 INJECTION INTRAMUSCULAR; INTRAVENOUS; SUBCUTANEOUS at 11:09

## 2017-06-06 RX ADMIN — ENOXAPARIN SODIUM 100 MILLIGRAM(S): 100 INJECTION SUBCUTANEOUS at 15:53

## 2017-06-06 RX ADMIN — Medication 200 MILLIGRAM(S): at 09:39

## 2017-06-06 RX ADMIN — Medication 1 GRAM(S): at 17:14

## 2017-06-06 RX ADMIN — Medication 1 LOZENGE: at 12:05

## 2017-06-06 RX ADMIN — Medication 1 LOZENGE: at 08:53

## 2017-06-06 RX ADMIN — OXYCODONE HYDROCHLORIDE 15 MILLIGRAM(S): 5 TABLET ORAL at 18:55

## 2017-06-06 RX ADMIN — HYDROMORPHONE HYDROCHLORIDE 4 MILLIGRAM(S): 2 INJECTION INTRAMUSCULAR; INTRAVENOUS; SUBCUTANEOUS at 19:23

## 2017-06-06 RX ADMIN — HYDROMORPHONE HYDROCHLORIDE 5 MILLIGRAM(S): 2 INJECTION INTRAMUSCULAR; INTRAVENOUS; SUBCUTANEOUS at 09:00

## 2017-06-06 RX ADMIN — HYDROMORPHONE HYDROCHLORIDE 3 MILLIGRAM(S): 2 INJECTION INTRAMUSCULAR; INTRAVENOUS; SUBCUTANEOUS at 23:41

## 2017-06-06 RX ADMIN — Medication 1 LOZENGE: at 19:58

## 2017-06-06 RX ADMIN — HYDROMORPHONE HYDROCHLORIDE 3 MILLIGRAM(S): 2 INJECTION INTRAMUSCULAR; INTRAVENOUS; SUBCUTANEOUS at 19:46

## 2017-06-06 RX ADMIN — Medication 1 LOZENGE: at 00:15

## 2017-06-06 RX ADMIN — HYDROMORPHONE HYDROCHLORIDE 3 MILLIGRAM(S): 2 INJECTION INTRAMUSCULAR; INTRAVENOUS; SUBCUTANEOUS at 10:32

## 2017-06-06 RX ADMIN — OXYCODONE HYDROCHLORIDE 15 MILLIGRAM(S): 5 TABLET ORAL at 09:26

## 2017-06-06 RX ADMIN — Medication 12 MILLIGRAM(S): at 17:13

## 2017-06-06 RX ADMIN — HYDROMORPHONE HYDROCHLORIDE 5 MILLIGRAM(S): 2 INJECTION INTRAMUSCULAR; INTRAVENOUS; SUBCUTANEOUS at 13:25

## 2017-06-06 RX ADMIN — OXYCODONE HYDROCHLORIDE 15 MILLIGRAM(S): 5 TABLET ORAL at 06:48

## 2017-06-06 RX ADMIN — HYDROMORPHONE HYDROCHLORIDE 5 MILLIGRAM(S): 2 INJECTION INTRAMUSCULAR; INTRAVENOUS; SUBCUTANEOUS at 04:28

## 2017-06-06 RX ADMIN — Medication 1 DROP(S): at 23:26

## 2017-06-06 RX ADMIN — HYDROMORPHONE HYDROCHLORIDE 3 MILLIGRAM(S): 2 INJECTION INTRAMUSCULAR; INTRAVENOUS; SUBCUTANEOUS at 01:52

## 2017-06-06 RX ADMIN — Medication 1 LOZENGE: at 15:50

## 2017-06-06 RX ADMIN — HYDROMORPHONE HYDROCHLORIDE 3 MILLIGRAM(S): 2 INJECTION INTRAMUSCULAR; INTRAVENOUS; SUBCUTANEOUS at 20:15

## 2017-06-06 RX ADMIN — HYDROMORPHONE HYDROCHLORIDE 4 MILLIGRAM(S): 2 INJECTION INTRAMUSCULAR; INTRAVENOUS; SUBCUTANEOUS at 22:15

## 2017-06-06 RX ADMIN — HYDROMORPHONE HYDROCHLORIDE 3 MILLIGRAM(S): 2 INJECTION INTRAMUSCULAR; INTRAVENOUS; SUBCUTANEOUS at 15:47

## 2017-06-06 RX ADMIN — HYDROMORPHONE HYDROCHLORIDE 5 MILLIGRAM(S): 2 INJECTION INTRAMUSCULAR; INTRAVENOUS; SUBCUTANEOUS at 00:30

## 2017-06-06 RX ADMIN — Medication 325 MILLIGRAM(S): at 12:05

## 2017-06-06 RX ADMIN — Medication 12 MILLIGRAM(S): at 06:51

## 2017-06-07 DIAGNOSIS — H49.9 UNSPECIFIED PARALYTIC STRABISMUS: ICD-10-CM

## 2017-06-07 DIAGNOSIS — M32.9 SYSTEMIC LUPUS ERYTHEMATOSUS, UNSPECIFIED: ICD-10-CM

## 2017-06-07 DIAGNOSIS — R60.9 EDEMA, UNSPECIFIED: ICD-10-CM

## 2017-06-07 DIAGNOSIS — I27.82 CHRONIC PULMONARY EMBOLISM: ICD-10-CM

## 2017-06-07 LAB
ANION GAP SERPL CALC-SCNC: 12 MMOL/L — SIGNIFICANT CHANGE UP (ref 5–17)
ANISOCYTOSIS BLD QL: SLIGHT — SIGNIFICANT CHANGE UP
APTT BLD: 41.2 SEC — HIGH (ref 27.5–37.4)
BASOPHILS # BLD AUTO: 0 K/UL — SIGNIFICANT CHANGE UP (ref 0–0.2)
BASOPHILS NFR BLD AUTO: 0.6 % — SIGNIFICANT CHANGE UP (ref 0–2)
BUN SERPL-MCNC: 7 MG/DL — SIGNIFICANT CHANGE UP (ref 7–23)
CALCIUM SERPL-MCNC: 8.7 MG/DL — SIGNIFICANT CHANGE UP (ref 8.4–10.5)
CHLORIDE SERPL-SCNC: 99 MMOL/L — SIGNIFICANT CHANGE UP (ref 96–108)
CO2 SERPL-SCNC: 27 MMOL/L — SIGNIFICANT CHANGE UP (ref 22–31)
CREAT SERPL-MCNC: 0.54 MG/DL — SIGNIFICANT CHANGE UP (ref 0.5–1.3)
DACRYOCYTES BLD QL SMEAR: SLIGHT — SIGNIFICANT CHANGE UP
EOSINOPHIL # BLD AUTO: 0 K/UL — SIGNIFICANT CHANGE UP (ref 0–0.5)
EOSINOPHIL NFR BLD AUTO: 0.8 % — SIGNIFICANT CHANGE UP (ref 0–6)
GLUCOSE SERPL-MCNC: 86 MG/DL — SIGNIFICANT CHANGE UP (ref 70–99)
HCT VFR BLD CALC: 36.6 % — LOW (ref 39–50)
HGB BLD-MCNC: 10.7 G/DL — LOW (ref 13–17)
HYPOCHROMIA BLD QL: SLIGHT — SIGNIFICANT CHANGE UP
INR BLD: 1.5 RATIO — HIGH (ref 0.88–1.16)
LYMPHOCYTES # BLD AUTO: 0.8 K/UL — LOW (ref 1–3.3)
LYMPHOCYTES # BLD AUTO: 12.5 % — LOW (ref 13–44)
MCHC RBC-ENTMCNC: 22.3 PG — LOW (ref 27–34)
MCHC RBC-ENTMCNC: 29.3 GM/DL — LOW (ref 32–36)
MCV RBC AUTO: 76.1 FL — LOW (ref 80–100)
MONOCYTES # BLD AUTO: 0.6 K/UL — SIGNIFICANT CHANGE UP (ref 0–0.9)
MONOCYTES NFR BLD AUTO: 9.5 % — SIGNIFICANT CHANGE UP (ref 2–14)
NEUTROPHILS # BLD AUTO: 4.6 K/UL — SIGNIFICANT CHANGE UP (ref 1.8–7.4)
NEUTROPHILS NFR BLD AUTO: 76.5 % — SIGNIFICANT CHANGE UP (ref 43–77)
OVALOCYTES BLD QL SMEAR: SLIGHT — SIGNIFICANT CHANGE UP
PLAT MORPH BLD: NORMAL — SIGNIFICANT CHANGE UP
PLATELET # BLD AUTO: 264 K/UL — SIGNIFICANT CHANGE UP (ref 150–400)
POIKILOCYTOSIS BLD QL AUTO: SLIGHT — SIGNIFICANT CHANGE UP
POLYCHROMASIA BLD QL SMEAR: SLIGHT — SIGNIFICANT CHANGE UP
POTASSIUM SERPL-MCNC: 3.8 MMOL/L — SIGNIFICANT CHANGE UP (ref 3.5–5.3)
POTASSIUM SERPL-SCNC: 3.8 MMOL/L — SIGNIFICANT CHANGE UP (ref 3.5–5.3)
PROTHROM AB SERPL-ACNC: 16.3 SEC — HIGH (ref 9.8–12.7)
RBC # BLD: 4.81 M/UL — SIGNIFICANT CHANGE UP (ref 4.2–5.8)
RBC # FLD: 21.7 % — HIGH (ref 10.3–14.5)
RBC BLD AUTO: ABNORMAL
SCHISTOCYTES BLD QL AUTO: SLIGHT — SIGNIFICANT CHANGE UP
SODIUM SERPL-SCNC: 138 MMOL/L — SIGNIFICANT CHANGE UP (ref 135–145)
WBC # BLD: 6 K/UL — SIGNIFICANT CHANGE UP (ref 3.8–10.5)
WBC # FLD AUTO: 6 K/UL — SIGNIFICANT CHANGE UP (ref 3.8–10.5)

## 2017-06-07 PROCEDURE — 99233 SBSQ HOSP IP/OBS HIGH 50: CPT

## 2017-06-07 PROCEDURE — 93971 EXTREMITY STUDY: CPT | Mod: 26

## 2017-06-07 PROCEDURE — 70553 MRI BRAIN STEM W/O & W/DYE: CPT | Mod: 26

## 2017-06-07 PROCEDURE — 70540 MRI ORBIT/FACE/NECK W/O DYE: CPT | Mod: 26

## 2017-06-07 RX ORDER — HYDROMORPHONE HYDROCHLORIDE 2 MG/ML
2 INJECTION INTRAMUSCULAR; INTRAVENOUS; SUBCUTANEOUS EVERY 4 HOURS
Qty: 0 | Refills: 0 | Status: DISCONTINUED | OUTPATIENT
Start: 2017-06-07 | End: 2017-06-08

## 2017-06-07 RX ORDER — WARFARIN SODIUM 2.5 MG/1
5 TABLET ORAL ONCE
Qty: 0 | Refills: 0 | Status: COMPLETED | OUTPATIENT
Start: 2017-06-07 | End: 2017-06-07

## 2017-06-07 RX ORDER — HYDROMORPHONE HYDROCHLORIDE 2 MG/ML
1 INJECTION INTRAMUSCULAR; INTRAVENOUS; SUBCUTANEOUS EVERY 4 HOURS
Qty: 0 | Refills: 0 | Status: DISCONTINUED | OUTPATIENT
Start: 2017-06-07 | End: 2017-06-08

## 2017-06-07 RX ADMIN — Medication 12 MILLIGRAM(S): at 17:19

## 2017-06-07 RX ADMIN — HYDROMORPHONE HYDROCHLORIDE 3 MILLIGRAM(S): 2 INJECTION INTRAMUSCULAR; INTRAVENOUS; SUBCUTANEOUS at 07:44

## 2017-06-07 RX ADMIN — Medication 1 DROP(S): at 17:21

## 2017-06-07 RX ADMIN — ENOXAPARIN SODIUM 100 MILLIGRAM(S): 100 INJECTION SUBCUTANEOUS at 17:19

## 2017-06-07 RX ADMIN — HYDROMORPHONE HYDROCHLORIDE 1 MILLIGRAM(S): 2 INJECTION INTRAMUSCULAR; INTRAVENOUS; SUBCUTANEOUS at 18:25

## 2017-06-07 RX ADMIN — Medication 1 GRAM(S): at 17:21

## 2017-06-07 RX ADMIN — HYDROMORPHONE HYDROCHLORIDE 3 MILLIGRAM(S): 2 INJECTION INTRAMUSCULAR; INTRAVENOUS; SUBCUTANEOUS at 14:30

## 2017-06-07 RX ADMIN — HYDROMORPHONE HYDROCHLORIDE 4 MILLIGRAM(S): 2 INJECTION INTRAMUSCULAR; INTRAVENOUS; SUBCUTANEOUS at 01:40

## 2017-06-07 RX ADMIN — HYDROMORPHONE HYDROCHLORIDE 4 MILLIGRAM(S): 2 INJECTION INTRAMUSCULAR; INTRAVENOUS; SUBCUTANEOUS at 14:31

## 2017-06-07 RX ADMIN — OXYCODONE HYDROCHLORIDE 15 MILLIGRAM(S): 5 TABLET ORAL at 06:50

## 2017-06-07 RX ADMIN — HYDROMORPHONE HYDROCHLORIDE 1 MILLIGRAM(S): 2 INJECTION INTRAMUSCULAR; INTRAVENOUS; SUBCUTANEOUS at 23:00

## 2017-06-07 RX ADMIN — PANTOPRAZOLE SODIUM 40 MILLIGRAM(S): 20 TABLET, DELAYED RELEASE ORAL at 17:20

## 2017-06-07 RX ADMIN — HYDROMORPHONE HYDROCHLORIDE 3 MILLIGRAM(S): 2 INJECTION INTRAMUSCULAR; INTRAVENOUS; SUBCUTANEOUS at 00:15

## 2017-06-07 RX ADMIN — HYDROMORPHONE HYDROCHLORIDE 4 MILLIGRAM(S): 2 INJECTION INTRAMUSCULAR; INTRAVENOUS; SUBCUTANEOUS at 06:50

## 2017-06-07 RX ADMIN — Medication 1 DROP(S): at 23:14

## 2017-06-07 RX ADMIN — Medication 1 LOZENGE: at 20:14

## 2017-06-07 RX ADMIN — HYDROMORPHONE HYDROCHLORIDE 1 MILLIGRAM(S): 2 INJECTION INTRAMUSCULAR; INTRAVENOUS; SUBCUTANEOUS at 22:33

## 2017-06-07 RX ADMIN — Medication 1 TABLET(S): at 12:15

## 2017-06-07 RX ADMIN — Medication 1 LOZENGE: at 16:40

## 2017-06-07 RX ADMIN — Medication 1 DROP(S): at 06:21

## 2017-06-07 RX ADMIN — HYDROMORPHONE HYDROCHLORIDE 1 MILLIGRAM(S): 2 INJECTION INTRAMUSCULAR; INTRAVENOUS; SUBCUTANEOUS at 19:00

## 2017-06-07 RX ADMIN — HYDROMORPHONE HYDROCHLORIDE 2 MILLIGRAM(S): 2 INJECTION INTRAMUSCULAR; INTRAVENOUS; SUBCUTANEOUS at 18:03

## 2017-06-07 RX ADMIN — Medication 200 MILLIGRAM(S): at 08:51

## 2017-06-07 RX ADMIN — HYDROMORPHONE HYDROCHLORIDE 4 MILLIGRAM(S): 2 INJECTION INTRAMUSCULAR; INTRAVENOUS; SUBCUTANEOUS at 12:14

## 2017-06-07 RX ADMIN — HYDROMORPHONE HYDROCHLORIDE 4 MILLIGRAM(S): 2 INJECTION INTRAMUSCULAR; INTRAVENOUS; SUBCUTANEOUS at 02:10

## 2017-06-07 RX ADMIN — Medication 1 GRAM(S): at 06:21

## 2017-06-07 RX ADMIN — HYDROMORPHONE HYDROCHLORIDE 3 MILLIGRAM(S): 2 INJECTION INTRAMUSCULAR; INTRAVENOUS; SUBCUTANEOUS at 13:43

## 2017-06-07 RX ADMIN — HYDROMORPHONE HYDROCHLORIDE 3 MILLIGRAM(S): 2 INJECTION INTRAMUSCULAR; INTRAVENOUS; SUBCUTANEOUS at 04:10

## 2017-06-07 RX ADMIN — HYDROMORPHONE HYDROCHLORIDE 2 MILLIGRAM(S): 2 INJECTION INTRAMUSCULAR; INTRAVENOUS; SUBCUTANEOUS at 20:46

## 2017-06-07 RX ADMIN — Medication 1 DROP(S): at 13:42

## 2017-06-07 RX ADMIN — HYDROMORPHONE HYDROCHLORIDE 2 MILLIGRAM(S): 2 INJECTION INTRAMUSCULAR; INTRAVENOUS; SUBCUTANEOUS at 16:40

## 2017-06-07 RX ADMIN — OXYCODONE HYDROCHLORIDE 15 MILLIGRAM(S): 5 TABLET ORAL at 18:03

## 2017-06-07 RX ADMIN — HYDROMORPHONE HYDROCHLORIDE 3 MILLIGRAM(S): 2 INJECTION INTRAMUSCULAR; INTRAVENOUS; SUBCUTANEOUS at 03:38

## 2017-06-07 RX ADMIN — OXYCODONE HYDROCHLORIDE 15 MILLIGRAM(S): 5 TABLET ORAL at 06:20

## 2017-06-07 RX ADMIN — Medication 1 LOZENGE: at 12:15

## 2017-06-07 RX ADMIN — ENOXAPARIN SODIUM 100 MILLIGRAM(S): 100 INJECTION SUBCUTANEOUS at 06:21

## 2017-06-07 RX ADMIN — Medication 12 MILLIGRAM(S): at 06:20

## 2017-06-07 RX ADMIN — HYDROMORPHONE HYDROCHLORIDE 2 MILLIGRAM(S): 2 INJECTION INTRAMUSCULAR; INTRAVENOUS; SUBCUTANEOUS at 21:15

## 2017-06-07 RX ADMIN — Medication 325 MILLIGRAM(S): at 12:16

## 2017-06-07 RX ADMIN — Medication 200 MILLIGRAM(S): at 23:14

## 2017-06-07 RX ADMIN — Medication 81 MILLIGRAM(S): at 12:14

## 2017-06-07 RX ADMIN — HYDROMORPHONE HYDROCHLORIDE 4 MILLIGRAM(S): 2 INJECTION INTRAMUSCULAR; INTRAVENOUS; SUBCUTANEOUS at 06:16

## 2017-06-07 RX ADMIN — PANTOPRAZOLE SODIUM 40 MILLIGRAM(S): 20 TABLET, DELAYED RELEASE ORAL at 06:21

## 2017-06-07 RX ADMIN — Medication 1 LOZENGE: at 07:46

## 2017-06-07 RX ADMIN — OXYCODONE HYDROCHLORIDE 15 MILLIGRAM(S): 5 TABLET ORAL at 17:19

## 2017-06-07 RX ADMIN — WARFARIN SODIUM 5 MILLIGRAM(S): 2.5 TABLET ORAL at 22:02

## 2017-06-07 RX ADMIN — HYDROMORPHONE HYDROCHLORIDE 3 MILLIGRAM(S): 2 INJECTION INTRAMUSCULAR; INTRAVENOUS; SUBCUTANEOUS at 13:38

## 2017-06-07 NOTE — PROGRESS NOTE ADULT - ASSESSMENT
27 yo m with h/o lupus, nephritis, chronic chest pain, APLS, multiple thromboemobolic events p/w chest pain

## 2017-06-07 NOTE — PROGRESS NOTE ADULT - SUBJECTIVE AND OBJECTIVE BOX
Patient is a 26y old  Male who presents with a chief complaint of Chest Pain, L Flank Pain (01 Jun 2017 05:14)      SUBJECTIVE / OVERNIGHT EVENTS: Pain improving slowly. cont to complain of double vision. RLE edema as per patient.     MEDICATIONS  (STANDING):  aspirin enteric coated 81milliGRAM(s) Oral daily  ferrous    sulfate 325milliGRAM(s) Oral daily  sucralfate 1Gram(s) Oral every 12 hours  pantoprazole    Tablet 40milliGRAM(s) Oral two times a day before meals  calcium carbonate  625 mG + Vitamin D (OsCal 250 + D) 1Tablet(s) Oral daily  hydroxychloroquine 200milliGRAM(s) Oral every 12 hours  polyethylene glycol 3350 17Gram(s) Oral daily  oxyCODONE ER Tablet 15milliGRAM(s) Oral every 12 hours  methylPREDNISolone 12milliGRAM(s) Oral two times a day  clotrimazole Lozenge 1Lozenge Oral five times a day  enoxaparin Injectable 100milliGRAM(s) SubCutaneous two times a day  artificial  tears Solution 1Drop(s) Both EYES four times a day  warfarin 5milliGRAM(s) Oral once    MEDICATIONS  (PRN):  HYDROmorphone  Injectable 3milliGRAM(s) IV Push every 4 hours PRN Moderate Pain (4 - 6)  HYDROmorphone  Injectable 4milliGRAM(s) IV Push every 4 hours PRN Severe Pain (7 - 10)      Vital Signs Last 24 Hrs  T(C): 36.4, Max: 37 (06-06 @ 22:11)  HR: 83 (78 - 93)  BP: 135/86 (117/74 - 142/96)  RR: 18 (17 - 19)  SpO2: 100% (97% - 100%)  Wt(kg): --  CAPILLARY BLOOD GLUCOSE    I&O's Summary  I & Os for 24h ending 07 Jun 2017 07:00  =============================================  IN: 1920 ml / OUT: 3200 ml / NET: -1280 ml    I & Os for current day (as of 07 Jun 2017 13:06)  =============================================  IN: 660 ml / OUT: 550 ml / NET: 110 ml      PHYSICAL EXAM:  GENERAL: NAD, well-developed  HEAD:  Atraumatic, Normocephalic  EYES: left eye deviation laterally  NECK: Supple, No JVD  CHEST/LUNG: Clear to auscultation bilaterally; No wheeze  HEART: Regular rate and rhythm; No murmurs, rubs, or gallops  ABDOMEN: Soft, Nontender, Nondistended; Bowel sounds present  EXTREMITIES:  2+ Peripheral Pulses, mild edema R>L  PSYCH: AAOx3  SKIN: No rashes or lesions    LABS:                        10.7   6.0   )-----------( 264      ( 07 Jun 2017 09:39 )             36.6     06-07    138  |  99  |  7   ----------------------------<  86  3.8   |  27  |  0.54    Ca    8.7      07 Jun 2017 09:39      PT/INR - ( 07 Jun 2017 09:39 )   PT: 16.3 sec;   INR: 1.50 ratio         PTT - ( 07 Jun 2017 09:39 )  PTT:41.2 sec          RADIOLOGY & ADDITIONAL TESTS:    Imaging Personally Reviewed:    Consultant(s) Notes Reviewed:  rheum, ophtho    Care Discussed with Consultants/Other Providers: rheum and ophtho

## 2017-06-08 LAB
INR BLD: 1.62 RATIO — HIGH (ref 0.88–1.16)
PROTHROM AB SERPL-ACNC: 17.8 SEC — HIGH (ref 9.8–12.7)

## 2017-06-08 PROCEDURE — 99233 SBSQ HOSP IP/OBS HIGH 50: CPT

## 2017-06-08 RX ORDER — PANTOPRAZOLE SODIUM 20 MG/1
40 TABLET, DELAYED RELEASE ORAL
Qty: 0 | Refills: 0 | Status: DISCONTINUED | OUTPATIENT
Start: 2017-06-08 | End: 2017-06-08

## 2017-06-08 RX ORDER — FERROUS SULFATE 325(65) MG
325 TABLET ORAL DAILY
Qty: 0 | Refills: 0 | Status: DISCONTINUED | OUTPATIENT
Start: 2017-06-08 | End: 2017-06-08

## 2017-06-08 RX ORDER — PANTOPRAZOLE SODIUM 20 MG/1
40 TABLET, DELAYED RELEASE ORAL
Qty: 0 | Refills: 0 | Status: DISCONTINUED | OUTPATIENT
Start: 2017-06-08 | End: 2017-06-09

## 2017-06-08 RX ORDER — POLYETHYLENE GLYCOL 3350 17 G/17G
17 POWDER, FOR SOLUTION ORAL DAILY
Qty: 0 | Refills: 0 | Status: DISCONTINUED | OUTPATIENT
Start: 2017-06-08 | End: 2017-06-09

## 2017-06-08 RX ORDER — FERROUS SULFATE 325(65) MG
325 TABLET ORAL DAILY
Qty: 0 | Refills: 0 | Status: DISCONTINUED | OUTPATIENT
Start: 2017-06-08 | End: 2017-06-09

## 2017-06-08 RX ORDER — OXYCODONE HYDROCHLORIDE 5 MG/1
5 TABLET ORAL ONCE
Qty: 0 | Refills: 0 | Status: DISCONTINUED | OUTPATIENT
Start: 2017-06-08 | End: 2017-06-08

## 2017-06-08 RX ORDER — OXYCODONE HYDROCHLORIDE 5 MG/1
10 TABLET ORAL EVERY 6 HOURS
Qty: 0 | Refills: 0 | Status: DISCONTINUED | OUTPATIENT
Start: 2017-06-08 | End: 2017-06-08

## 2017-06-08 RX ORDER — OXYCODONE HYDROCHLORIDE 5 MG/1
5 TABLET ORAL EVERY 6 HOURS
Qty: 0 | Refills: 0 | Status: DISCONTINUED | OUTPATIENT
Start: 2017-06-08 | End: 2017-06-08

## 2017-06-08 RX ORDER — SUCRALFATE 1 G
1 TABLET ORAL EVERY 12 HOURS
Qty: 0 | Refills: 0 | Status: DISCONTINUED | OUTPATIENT
Start: 2017-06-08 | End: 2017-06-09

## 2017-06-08 RX ORDER — OXYCODONE HYDROCHLORIDE 5 MG/1
15 TABLET ORAL EVERY 12 HOURS
Qty: 0 | Refills: 0 | Status: DISCONTINUED | OUTPATIENT
Start: 2017-06-08 | End: 2017-06-08

## 2017-06-08 RX ORDER — SUCRALFATE 1 G
1 TABLET ORAL EVERY 12 HOURS
Qty: 0 | Refills: 0 | Status: DISCONTINUED | OUTPATIENT
Start: 2017-06-08 | End: 2017-06-08

## 2017-06-08 RX ORDER — OXYCODONE HYDROCHLORIDE 5 MG/1
5 TABLET ORAL EVERY 6 HOURS
Qty: 0 | Refills: 0 | Status: DISCONTINUED | OUTPATIENT
Start: 2017-06-08 | End: 2017-06-09

## 2017-06-08 RX ORDER — WARFARIN SODIUM 2.5 MG/1
5 TABLET ORAL ONCE
Qty: 0 | Refills: 0 | Status: COMPLETED | OUTPATIENT
Start: 2017-06-08 | End: 2017-06-08

## 2017-06-08 RX ORDER — POLYETHYLENE GLYCOL 3350 17 G/17G
17 POWDER, FOR SOLUTION ORAL DAILY
Qty: 0 | Refills: 0 | Status: DISCONTINUED | OUTPATIENT
Start: 2017-06-08 | End: 2017-06-08

## 2017-06-08 RX ORDER — OXYCODONE HYDROCHLORIDE 5 MG/1
10 TABLET ORAL EVERY 6 HOURS
Qty: 0 | Refills: 0 | Status: DISCONTINUED | OUTPATIENT
Start: 2017-06-08 | End: 2017-06-09

## 2017-06-08 RX ADMIN — Medication 1 GRAM(S): at 05:53

## 2017-06-08 RX ADMIN — Medication 1 DROP(S): at 17:19

## 2017-06-08 RX ADMIN — OXYCODONE HYDROCHLORIDE 5 MILLIGRAM(S): 5 TABLET ORAL at 17:18

## 2017-06-08 RX ADMIN — Medication 200 MILLIGRAM(S): at 08:44

## 2017-06-08 RX ADMIN — OXYCODONE HYDROCHLORIDE 5 MILLIGRAM(S): 5 TABLET ORAL at 20:52

## 2017-06-08 RX ADMIN — Medication 325 MILLIGRAM(S): at 08:44

## 2017-06-08 RX ADMIN — OXYCODONE HYDROCHLORIDE 15 MILLIGRAM(S): 5 TABLET ORAL at 05:56

## 2017-06-08 RX ADMIN — Medication 12 MILLIGRAM(S): at 17:18

## 2017-06-08 RX ADMIN — Medication 1 LOZENGE: at 17:19

## 2017-06-08 RX ADMIN — HYDROMORPHONE HYDROCHLORIDE 1 MILLIGRAM(S): 2 INJECTION INTRAMUSCULAR; INTRAVENOUS; SUBCUTANEOUS at 09:57

## 2017-06-08 RX ADMIN — PANTOPRAZOLE SODIUM 40 MILLIGRAM(S): 20 TABLET, DELAYED RELEASE ORAL at 17:18

## 2017-06-08 RX ADMIN — Medication 1 TABLET(S): at 08:43

## 2017-06-08 RX ADMIN — POLYETHYLENE GLYCOL 3350 17 GRAM(S): 17 POWDER, FOR SOLUTION ORAL at 08:44

## 2017-06-08 RX ADMIN — Medication 1 LOZENGE: at 12:46

## 2017-06-08 RX ADMIN — HYDROMORPHONE HYDROCHLORIDE 1 MILLIGRAM(S): 2 INJECTION INTRAMUSCULAR; INTRAVENOUS; SUBCUTANEOUS at 10:15

## 2017-06-08 RX ADMIN — ENOXAPARIN SODIUM 100 MILLIGRAM(S): 100 INJECTION SUBCUTANEOUS at 05:52

## 2017-06-08 RX ADMIN — HYDROMORPHONE HYDROCHLORIDE 2 MILLIGRAM(S): 2 INJECTION INTRAMUSCULAR; INTRAVENOUS; SUBCUTANEOUS at 08:39

## 2017-06-08 RX ADMIN — HYDROMORPHONE HYDROCHLORIDE 2 MILLIGRAM(S): 2 INJECTION INTRAMUSCULAR; INTRAVENOUS; SUBCUTANEOUS at 12:45

## 2017-06-08 RX ADMIN — Medication 200 MILLIGRAM(S): at 22:29

## 2017-06-08 RX ADMIN — HYDROMORPHONE HYDROCHLORIDE 2 MILLIGRAM(S): 2 INJECTION INTRAMUSCULAR; INTRAVENOUS; SUBCUTANEOUS at 00:44

## 2017-06-08 RX ADMIN — ENOXAPARIN SODIUM 100 MILLIGRAM(S): 100 INJECTION SUBCUTANEOUS at 17:19

## 2017-06-08 RX ADMIN — HYDROMORPHONE HYDROCHLORIDE 2 MILLIGRAM(S): 2 INJECTION INTRAMUSCULAR; INTRAVENOUS; SUBCUTANEOUS at 04:16

## 2017-06-08 RX ADMIN — OXYCODONE HYDROCHLORIDE 15 MILLIGRAM(S): 5 TABLET ORAL at 06:58

## 2017-06-08 RX ADMIN — Medication 1 GRAM(S): at 17:33

## 2017-06-08 RX ADMIN — HYDROMORPHONE HYDROCHLORIDE 1 MILLIGRAM(S): 2 INJECTION INTRAMUSCULAR; INTRAVENOUS; SUBCUTANEOUS at 06:58

## 2017-06-08 RX ADMIN — Medication 1 DROP(S): at 05:52

## 2017-06-08 RX ADMIN — Medication 1 LOZENGE: at 08:43

## 2017-06-08 RX ADMIN — HYDROMORPHONE HYDROCHLORIDE 1 MILLIGRAM(S): 2 INJECTION INTRAMUSCULAR; INTRAVENOUS; SUBCUTANEOUS at 05:49

## 2017-06-08 RX ADMIN — HYDROMORPHONE HYDROCHLORIDE 2 MILLIGRAM(S): 2 INJECTION INTRAMUSCULAR; INTRAVENOUS; SUBCUTANEOUS at 04:45

## 2017-06-08 RX ADMIN — OXYCODONE HYDROCHLORIDE 5 MILLIGRAM(S): 5 TABLET ORAL at 21:22

## 2017-06-08 RX ADMIN — HYDROMORPHONE HYDROCHLORIDE 2 MILLIGRAM(S): 2 INJECTION INTRAMUSCULAR; INTRAVENOUS; SUBCUTANEOUS at 09:00

## 2017-06-08 RX ADMIN — OXYCODONE HYDROCHLORIDE 5 MILLIGRAM(S): 5 TABLET ORAL at 14:27

## 2017-06-08 RX ADMIN — OXYCODONE HYDROCHLORIDE 5 MILLIGRAM(S): 5 TABLET ORAL at 15:00

## 2017-06-08 RX ADMIN — Medication 1 DROP(S): at 08:45

## 2017-06-08 RX ADMIN — HYDROMORPHONE HYDROCHLORIDE 2 MILLIGRAM(S): 2 INJECTION INTRAMUSCULAR; INTRAVENOUS; SUBCUTANEOUS at 13:00

## 2017-06-08 RX ADMIN — HYDROMORPHONE HYDROCHLORIDE 2 MILLIGRAM(S): 2 INJECTION INTRAMUSCULAR; INTRAVENOUS; SUBCUTANEOUS at 01:16

## 2017-06-08 RX ADMIN — OXYCODONE HYDROCHLORIDE 15 MILLIGRAM(S): 5 TABLET ORAL at 17:18

## 2017-06-08 RX ADMIN — WARFARIN SODIUM 5 MILLIGRAM(S): 2.5 TABLET ORAL at 22:29

## 2017-06-08 NOTE — PROGRESS NOTE ADULT - SUBJECTIVE AND OBJECTIVE BOX
Patient is a 26y old  Male who presents with a chief complaint of Chest Pain, L Flank Pain (01 Jun 2017 05:14)      SUBJECTIVE / OVERNIGHT EVENTS:    Chest pain improving. Denies any new complaints today. double vision unchanged. LE edema.   No overnight issues.       MEDICATIONS  (STANDING):  aspirin enteric coated 81milliGRAM(s) Oral daily  ferrous    sulfate 325milliGRAM(s) Oral daily  sucralfate 1Gram(s) Oral every 12 hours  pantoprazole    Tablet 40milliGRAM(s) Oral two times a day before meals  calcium carbonate  625 mG + Vitamin D (OsCal 250 + D) 1Tablet(s) Oral daily  hydroxychloroquine 200milliGRAM(s) Oral every 12 hours  polyethylene glycol 3350 17Gram(s) Oral daily  oxyCODONE ER Tablet 15milliGRAM(s) Oral every 12 hours  methylPREDNISolone 12milliGRAM(s) Oral two times a day  clotrimazole Lozenge 1Lozenge Oral five times a day  enoxaparin Injectable 100milliGRAM(s) SubCutaneous two times a day  artificial  tears Solution 1Drop(s) Both EYES four times a day  warfarin 5milliGRAM(s) Oral once    MEDICATIONS  (PRN):  HYDROmorphone  Injectable 2milliGRAM(s) IV Push every 4 hours PRN Severe Pain (7 - 10)  HYDROmorphone  Injectable 1milliGRAM(s) IV Push every 4 hours PRN Moderate Pain (4 - 6)      Vital Signs Last 24 Hrs  T(C): 36.7, Max: 36.8 (06-07 @ 20:54)  HR: 92 (82 - 92)  BP: 120/76 (120/76 - 141/80)  RR: 17 (17 - 20)  SpO2: 98% (94% - 100%)  Wt(kg): --  CAPILLARY BLOOD GLUCOSE    I&O's Summary  I & Os for 24h ending 08 Jun 2017 07:00  =============================================  IN: 1760 ml / OUT: 2425 ml / NET: -665 ml    I & Os for current day (as of 08 Jun 2017 13:20)  =============================================  IN: 300 ml / OUT: 0 ml / NET: 300 ml      PHYSICAL EXAM:  GENERAL: NAD, well-developed  HEAD:  Atraumatic, Normocephalic  EYES: left eye deviation lateral  NECK: Supple, No JVD  CHEST/LUNG: Clear to auscultation bilaterally; No wheeze  HEART: Regular rate and rhythm; No murmurs, rubs, or gallops  ABDOMEN: Soft, Nontender, Nondistended; Bowel sounds present  EXTREMITIES:  2+ Peripheral Pulses, No clubbing, cyanosis, or edema  PSYCH: AAOx3  SKIN: No rashes or lesions    LABS:                        10.7   6.0   )-----------( 264      ( 07 Jun 2017 09:39 )             36.6     06-07    138  |  99  |  7   ----------------------------<  86  3.8   |  27  |  0.54    Ca    8.7      07 Jun 2017 09:39      PT/INR - ( 08 Jun 2017 09:44 )   PT: 17.8 sec;   INR: 1.62 ratio         PTT - ( 07 Jun 2017 09:39 )  PTT:41.2 sec          RADIOLOGY & ADDITIONAL TESTS:    Imaging Personally Reviewed:    Consultant(s) Notes Reviewed:      Care Discussed with Consultants/Other Providers:

## 2017-06-09 VITALS
SYSTOLIC BLOOD PRESSURE: 145 MMHG | DIASTOLIC BLOOD PRESSURE: 86 MMHG | OXYGEN SATURATION: 100 % | TEMPERATURE: 98 F | RESPIRATION RATE: 18 BRPM | HEART RATE: 86 BPM

## 2017-06-09 LAB
ANION GAP SERPL CALC-SCNC: 10 MMOL/L — SIGNIFICANT CHANGE UP (ref 5–17)
BUN SERPL-MCNC: 6 MG/DL — LOW (ref 7–23)
CALCIUM SERPL-MCNC: 8.9 MG/DL — SIGNIFICANT CHANGE UP (ref 8.4–10.5)
CHLORIDE SERPL-SCNC: 101 MMOL/L — SIGNIFICANT CHANGE UP (ref 96–108)
CO2 SERPL-SCNC: 27 MMOL/L — SIGNIFICANT CHANGE UP (ref 22–31)
CREAT SERPL-MCNC: 0.66 MG/DL — SIGNIFICANT CHANGE UP (ref 0.5–1.3)
GLUCOSE SERPL-MCNC: 80 MG/DL — SIGNIFICANT CHANGE UP (ref 70–99)
HCT VFR BLD CALC: 34.8 % — LOW (ref 39–50)
HGB BLD-MCNC: 10.5 G/DL — LOW (ref 13–17)
INR BLD: 1.85 RATIO — HIGH (ref 0.88–1.16)
MCHC RBC-ENTMCNC: 23.1 PG — LOW (ref 27–34)
MCHC RBC-ENTMCNC: 30.2 GM/DL — LOW (ref 32–36)
MCV RBC AUTO: 76.5 FL — LOW (ref 80–100)
PLATELET # BLD AUTO: 226 K/UL — SIGNIFICANT CHANGE UP (ref 150–400)
POTASSIUM SERPL-MCNC: 4.5 MMOL/L — SIGNIFICANT CHANGE UP (ref 3.5–5.3)
POTASSIUM SERPL-SCNC: 4.5 MMOL/L — SIGNIFICANT CHANGE UP (ref 3.5–5.3)
PROTHROM AB SERPL-ACNC: 20.2 SEC — HIGH (ref 9.8–12.7)
RBC # BLD: 4.55 M/UL — SIGNIFICANT CHANGE UP (ref 4.2–5.8)
RBC # FLD: 21.9 % — HIGH (ref 10.3–14.5)
SODIUM SERPL-SCNC: 138 MMOL/L — SIGNIFICANT CHANGE UP (ref 135–145)
WBC # BLD: 4.2 K/UL — SIGNIFICANT CHANGE UP (ref 3.8–10.5)
WBC # FLD AUTO: 4.2 K/UL — SIGNIFICANT CHANGE UP (ref 3.8–10.5)

## 2017-06-09 PROCEDURE — 99239 HOSP IP/OBS DSCHRG MGMT >30: CPT

## 2017-06-09 RX ORDER — OXYCODONE HYDROCHLORIDE 5 MG/1
1 TABLET ORAL
Qty: 0 | Refills: 0 | COMMUNITY
Start: 2017-06-09

## 2017-06-09 RX ORDER — PANTOPRAZOLE SODIUM 20 MG/1
1 TABLET, DELAYED RELEASE ORAL
Qty: 0 | Refills: 0 | COMMUNITY
Start: 2017-06-09

## 2017-06-09 RX ORDER — WARFARIN SODIUM 2.5 MG/1
1 TABLET ORAL
Qty: 0 | Refills: 0 | COMMUNITY
Start: 2017-06-09

## 2017-06-09 RX ORDER — FERROUS SULFATE 325(65) MG
1 TABLET ORAL
Qty: 0 | Refills: 0 | COMMUNITY
Start: 2017-06-09

## 2017-06-09 RX ORDER — ENOXAPARIN SODIUM 100 MG/ML
100 INJECTION SUBCUTANEOUS
Qty: 0 | Refills: 0 | COMMUNITY
Start: 2017-06-09

## 2017-06-09 RX ORDER — SUCRALFATE 1 G
1 TABLET ORAL
Qty: 0 | Refills: 0 | COMMUNITY
Start: 2017-06-09

## 2017-06-09 RX ADMIN — PANTOPRAZOLE SODIUM 40 MILLIGRAM(S): 20 TABLET, DELAYED RELEASE ORAL at 05:44

## 2017-06-09 RX ADMIN — Medication 81 MILLIGRAM(S): at 12:06

## 2017-06-09 RX ADMIN — OXYCODONE HYDROCHLORIDE 5 MILLIGRAM(S): 5 TABLET ORAL at 08:49

## 2017-06-09 RX ADMIN — OXYCODONE HYDROCHLORIDE 5 MILLIGRAM(S): 5 TABLET ORAL at 09:49

## 2017-06-09 RX ADMIN — OXYCODONE HYDROCHLORIDE 10 MILLIGRAM(S): 5 TABLET ORAL at 12:05

## 2017-06-09 RX ADMIN — Medication 1 GRAM(S): at 05:44

## 2017-06-09 RX ADMIN — OXYCODONE HYDROCHLORIDE 5 MILLIGRAM(S): 5 TABLET ORAL at 03:20

## 2017-06-09 RX ADMIN — OXYCODONE HYDROCHLORIDE 5 MILLIGRAM(S): 5 TABLET ORAL at 02:50

## 2017-06-09 RX ADMIN — ENOXAPARIN SODIUM 100 MILLIGRAM(S): 100 INJECTION SUBCUTANEOUS at 05:44

## 2017-06-09 RX ADMIN — Medication 1 DROP(S): at 12:08

## 2017-06-09 RX ADMIN — Medication 200 MILLIGRAM(S): at 12:06

## 2017-06-09 RX ADMIN — Medication 1 TABLET(S): at 12:06

## 2017-06-09 RX ADMIN — Medication 325 MILLIGRAM(S): at 12:06

## 2017-06-09 NOTE — DISCHARGE NOTE ADULT - ADDITIONAL INSTRUCTIONS
Follow up with your Rheumatologist, Dr. Estrella within 1 week after discharge from hospital. Follow up with your Rheumatologist, Dr. Estrella within 1 week after discharge from hospital.  Follow up with Ophthomology Follow up with your Rheumatologist, Dr. Estrella within 1 week after discharge from hospital.  Follow up with your Pain management MD, Dr. Monsivais  within 1 week after discharge from hospital.  Follow up with Ophthalmology as Out patient.  Call to schedule appointments.

## 2017-06-09 NOTE — PROGRESS NOTE ADULT - ASSESSMENT
25 yo m with h/o lupus, nephritis, chronic chest pain, APLS, multiple thromboemobolic events p/w chest pain

## 2017-06-09 NOTE — DISCHARGE NOTE ADULT - CARE PROVIDERS DIRECT ADDRESSES
[email or web address]

## 2017-06-09 NOTE — DISCHARGE NOTE ADULT - SECONDARY DIAGNOSIS.
Systemic lupus erythematosus Pulmonary thromboembolism Hyperlipidemia, unspecified hyperlipidemia type Ophthalmoplegia Edema, unspecified type Left flank pain

## 2017-06-09 NOTE — PROGRESS NOTE ADULT - PROBLEM SELECTOR PROBLEM 2
Other chronic pulmonary embolism without acute cor pulmonale

## 2017-06-09 NOTE — DISCHARGE NOTE ADULT - HOSPITAL COURSE
25 y/o M with PMH of Lupus complicated by recurrent pleural effusions requiring multiple chest tubes, lupus nephritis (class V, failed cellcept therapy), seronegative APS, DVT/PE diagnosed in Feb 2017 s/p RLE IVC filter placement, hx of failed AC (Xarelto, Coumadin, Lovenox, and Arixtra), now on Coumadin, b/l renal vein thrombosis, with many recent hospitalizations for chest pain p/w acute worsening of chronic chest pain yesterday and left flank pain last couple days.     Hospital course:  CTA was performed given patient's complicated thromboembolic history showing no new PEs. LE duplex was unremarkable for new DVT as well. Pain slowly improve with IV pain meds. New complain of double vision during this hosp stay and was seen by Ophtho who recommended MRI of brain and orbitz without any acute findings. Patient initially refused to take coumadin unless he got home INR monitoring machine. Long discussion was had with patient, heme and rheum:Dr. Estrella. Patient is to follow up with Dr. Estrella next week for paperwork re: to insurance approval for the monitor. Patient is being discharged home on Lovenox and coumadin bridge with repeat INR this weekend and follow up at Dr. Estrella office next week. pain much improved now on po meds

## 2017-06-09 NOTE — PROGRESS NOTE ADULT - SUBJECTIVE AND OBJECTIVE BOX
Patient is a 26y old  Male who presents with a chief complaint of Chest Pain, L Flank Pain. (09 Jun 2017 10:36)        SUBJECTIVE / OVERNIGHT EVENTS:  Pain controlled adequately on oral meds.   Wants to go home. compliant with meds  No acute issues overnight    MEDICATIONS  (STANDING):  aspirin enteric coated 81milliGRAM(s) Oral daily  hydroxychloroquine 200milliGRAM(s) Oral every 12 hours  clotrimazole Lozenge 1Lozenge Oral five times a day  enoxaparin Injectable 100milliGRAM(s) SubCutaneous two times a day  artificial  tears Solution 1Drop(s) Both EYES four times a day  ferrous    sulfate 325milliGRAM(s) Oral daily  sucralfate 1Gram(s) Oral every 12 hours  calcium carbonate  625 mG + Vitamin D (OsCal 250 + D) 1Tablet(s) Oral daily  polyethylene glycol 3350 17Gram(s) Oral daily  pantoprazole    Tablet 40milliGRAM(s) Oral two times a day before meals  methylPREDNISolone 12milliGRAM(s) Oral two times a day    MEDICATIONS  (PRN):  oxyCODONE IR 5milliGRAM(s) Oral every 6 hours PRN Moderate Pain (4 - 6)  oxyCODONE IR 10milliGRAM(s) Oral every 6 hours PRN Severe Pain (7 - 10)      Vital Signs Last 24 Hrs  T(C): 36.8, Max: 37.3 (06-08 @ 21:25)  HR: 80 (80 - 96)  BP: 121/78 (121/78 - 124/86)  RR: 18 (18 - 18)  SpO2: 100% (100% - 100%)  Wt(kg): --  CAPILLARY BLOOD GLUCOSE    I&O's Summary  I & Os for 24h ending 09 Jun 2017 07:00  =============================================  IN: 1120 ml / OUT: 201 ml / NET: 919 ml    I & Os for current day (as of 09 Jun 2017 12:49)  =============================================  IN: 300 ml / OUT: 0 ml / NET: 300 ml      PHYSICAL EXAM  GENERAL: NAD, well-developed  HEAD:  Atraumatic, Normocephalic  EYES: left eye lateral deviation.   NECK: Supple, No JVD  CHEST/LUNG: Clear to auscultation bilaterally; No wheeze  HEART: Regular rate and rhythm; No murmurs, rubs, or gallops  ABDOMEN: Soft, Nontender, Nondistended; Bowel sounds present  EXTREMITIES:  2+ Peripheral Pulses, No clubbing, cyanosis, or edema  PSYCH: AAOx3  SKIN: No rashes or lesions    LABS:                        10.5   4.2   )-----------( 226      ( 09 Jun 2017 09:37 )             34.8     06-09    138  |  101  |  6<L>  ----------------------------<  80  4.5   |  27  |  0.66    Ca    8.9      09 Jun 2017 09:37      PT/INR - ( 09 Jun 2017 09:37 )   PT: 20.2 sec;   INR: 1.85 ratio                   RADIOLOGY & ADDITIONAL TESTS:    Imaging Personally Reviewed:  Consultant(s) Notes Reviewed:    Care Discussed with Consultants/Other Providers:

## 2017-06-09 NOTE — DISCHARGE NOTE ADULT - MEDICATION SUMMARY - MEDICATIONS TO TAKE
I will START or STAY ON the medications listed below when I get home from the hospital:    methylPREDNISolone 4 mg oral tablet  -- 3 tab(s) by mouth 2 times a day  -- Indication: For Systemic lupus erythematosus    aspirin 81 mg oral tablet  -- 1 tab(s) by mouth once a day  -- Indication: For Need for prophylactic measure    oxyCODONE 5 mg oral tablet  -- 1 tab(s) by mouth every 6 hours, As needed, Moderate Pain (4 - 6)  -- Indication: For Systemic lupus erythematosus    OxyCONTIN 15 mg oral tablet, extended release  -- 1 tab(s) by mouth every 12 hours  -- Indication: For Systemic lupus erythematosus    Coumadin 5 mg oral tablet  -- 1 tab(s) by mouth once a day (at bedtime)  -- Indication: For Pulmonary thromboembolism    enoxaparin  -- 100 milligram(s) subcutaneous 2 times a day  -- Indication: For Pulmonary thromboembolism    Plaquenil 200 mg oral tablet  -- 1 tab(s) by mouth 2 times a day  -- Indication: For Systemic lupus erythematosus    ferrous sulfate 325 mg (65 mg elemental iron) oral tablet  -- 1 tab(s) by mouth once a day  -- Indication: For anemia    sucralfate 1 g oral tablet  -- 1 tab(s) by mouth every 12 hours  -- Indication: For reflux    pantoprazole 40 mg oral delayed release tablet  -- 1 tab(s) by mouth 2 times a day (before meals)  -- Indication: For reflux    Calcium 600+D 600 mg-200 intl units oral tablet  -- Take 1 tab by mouth daily  -- Indication: For Need for prophylactic measure

## 2017-06-09 NOTE — PROGRESS NOTE ADULT - ATTENDING COMMENTS
Addendum: patient to be discharged home with lovenox and coumadin bridge. repeat INR check this weekend and follow up at Dr. Estrella office early next week. patient understands and agrees with plan.

## 2017-06-09 NOTE — DISCHARGE NOTE ADULT - PATIENT PORTAL LINK FT
“You can access the Follow[facility] Patient Portal, offered by [facility], by registering with the following website: [email or web address]”

## 2017-06-09 NOTE — DISCHARGE NOTE ADULT - PLAN OF CARE
pain free Cont with pain meds as prescribed follow up with pain specialist Cont with steroid and Plaquenil  Follow up with Rheum:Dr. Estrella next week Cont with lovenox to coumadin bridge.   Follow up with bloodwork tomorrow as outpt.   Follow up with Dr. Estrella next week for follow up regarding INR level home machine Cont with meds Follow up with ophtho as outpt.   MRI's were performed without new findings Cont with lovenox to coumadin bridge.   Follow up with blood work tomorrow as out pt.   Follow up with Dr. Estrella next week for follow up regarding INR level home machine Cont with meds  Follow up with your medical doctor to establish long term  treatment goals. Follow up with Ophthalmology  as out patient.   MRI's were performed without new findings

## 2017-06-09 NOTE — PROGRESS NOTE ADULT - PROBLEM SELECTOR PLAN 1
Chest pain likely secondary to chronic pain/lupus.   Cont with medrol and plaquenil as per rheum rec.   No new PE noted on CTA.  Follow up with rheum: Dr. Estrella as outpt  Titrate down pain meds as tolerated.

## 2017-06-09 NOTE — DISCHARGE NOTE ADULT - CARE PROVIDER_API CALL
Annabella Estrella (DO), Internal Medicine; Rheumatology  [address] [place] [address]  Phone: [phone]  Fax: [phone] Annabella Estrella (), Internal Medicine; Rheumatology  [address] [place] [address]  Phone: [phone]  Fax: [phone]    Tevin Monsivais), Neurology  [address] [place] [address]  Phone: [phone]  Fax: [phone]

## 2017-06-09 NOTE — PROGRESS NOTE ADULT - PROBLEM SELECTOR PLAN 2
Cont with Lovenox and coumadin bridge to INR goal of 2.5 to 3.5  Patient to discuss with Dr. Estrella re: INR check machine and approval.
Cont with Lovenox and coumadin bridge to INR goal of 2.5 to 3.5  patient to go for INR check in 1-2 days and follow up next week at office  Patient to discuss with Dr. Estrella re: INR check machine and approval.
Cont with Lovenox and coumadin bridge to INR goal of 2.5 to 3.5  Patient to discuss with Dr. Estrella re: INR check machine and approval.

## 2017-06-09 NOTE — PROGRESS NOTE ADULT - PROBLEM SELECTOR PLAN 3
MRI brain and orbits without acute findings.   Follow up ophtho as outpt
MRI brain and orbits without acute findings.   Follow up ophtho as outpt
Ophtho eval noted. Given overall history will need MRI of brain and orbits

## 2017-06-12 ENCOUNTER — LABORATORY RESULT (OUTPATIENT)
Age: 27
End: 2017-06-12

## 2017-06-14 ENCOUNTER — LABORATORY RESULT (OUTPATIENT)
Age: 27
End: 2017-06-14

## 2017-06-16 ENCOUNTER — LABORATORY RESULT (OUTPATIENT)
Age: 27
End: 2017-06-16

## 2017-06-19 ENCOUNTER — LABORATORY RESULT (OUTPATIENT)
Age: 27
End: 2017-06-19

## 2017-06-20 ENCOUNTER — APPOINTMENT (OUTPATIENT)
Dept: PAIN MANAGEMENT | Facility: CLINIC | Age: 27
End: 2017-06-20

## 2017-06-20 VITALS
BODY MASS INDEX: 30.49 KG/M2 | DIASTOLIC BLOOD PRESSURE: 87 MMHG | HEIGHT: 70 IN | SYSTOLIC BLOOD PRESSURE: 129 MMHG | HEART RATE: 102 BPM | WEIGHT: 213 LBS

## 2017-06-20 DIAGNOSIS — Z87.891 PERSONAL HISTORY OF NICOTINE DEPENDENCE: ICD-10-CM

## 2017-06-20 RX ORDER — PANTOPRAZOLE SODIUM 40 MG/1
40 GRANULE, DELAYED RELEASE ORAL
Refills: 0 | Status: ACTIVE | COMMUNITY
Start: 2017-06-20

## 2017-06-20 RX ORDER — ASPIRIN 81 MG/1
81 TABLET ORAL DAILY
Qty: 30 | Refills: 5 | Status: DISCONTINUED | COMMUNITY
Start: 2017-02-16 | End: 2017-06-20

## 2017-06-20 RX ORDER — SUCRALFATE 1 G/1
1 TABLET ORAL
Qty: 60 | Refills: 0 | Status: ACTIVE | COMMUNITY
Start: 2017-05-10

## 2017-06-21 ENCOUNTER — LABORATORY RESULT (OUTPATIENT)
Age: 27
End: 2017-06-21

## 2017-06-23 ENCOUNTER — APPOINTMENT (OUTPATIENT)
Dept: RHEUMATOLOGY | Facility: CLINIC | Age: 27
End: 2017-06-23

## 2017-06-23 VITALS
WEIGHT: 215 LBS | HEIGHT: 70 IN | SYSTOLIC BLOOD PRESSURE: 135 MMHG | OXYGEN SATURATION: 99 % | TEMPERATURE: 98.8 F | DIASTOLIC BLOOD PRESSURE: 84 MMHG | BODY MASS INDEX: 30.78 KG/M2 | HEART RATE: 100 BPM

## 2017-06-23 DIAGNOSIS — K20.9 ESOPHAGITIS, UNSPECIFIED: ICD-10-CM

## 2017-06-23 DIAGNOSIS — Z79.01 ENCOUNTER FOR THERAPEUTIC DRUG LVL MONITORING: ICD-10-CM

## 2017-06-23 DIAGNOSIS — Z51.81 ENCOUNTER FOR THERAPEUTIC DRUG LVL MONITORING: ICD-10-CM

## 2017-06-23 DIAGNOSIS — M32.9 SYSTEMIC LUPUS ERYTHEMATOSUS, UNSPECIFIED: ICD-10-CM

## 2017-06-23 DIAGNOSIS — D50.9 IRON DEFICIENCY ANEMIA, UNSPECIFIED: ICD-10-CM

## 2017-06-23 DIAGNOSIS — D68.61 ANTIPHOSPHOLIPID SYNDROME: ICD-10-CM

## 2017-06-23 DIAGNOSIS — D64.9 ANEMIA, UNSPECIFIED: ICD-10-CM

## 2017-06-23 RX ORDER — SUCRALFATE 1 G/1
1 TABLET ORAL
Qty: 60 | Refills: 0 | Status: ACTIVE | COMMUNITY
Start: 2017-06-23 | End: 1900-01-01

## 2017-06-23 RX ORDER — CHLORHEXIDINE GLUCONATE 4 %
325 (65 FE) LIQUID (ML) TOPICAL
Qty: 30 | Refills: 5 | Status: ACTIVE | COMMUNITY
Start: 2017-05-10 | End: 1900-01-01

## 2017-06-27 PROBLEM — D68.61 ANTIPHOSPHOLIPID SYNDROME: Status: ACTIVE | Noted: 2017-05-16

## 2017-07-10 ENCOUNTER — RX RENEWAL (OUTPATIENT)
Age: 27
End: 2017-07-10

## 2017-07-10 RX ORDER — PANTOPRAZOLE 40 MG/1
40 TABLET, DELAYED RELEASE ORAL
Qty: 30 | Refills: 0 | Status: ACTIVE | COMMUNITY
Start: 2017-05-10 | End: 1900-01-01

## 2017-07-18 RX ORDER — WARFARIN 2.5 MG/1
2.5 TABLET ORAL DAILY
Qty: 30 | Refills: 0 | Status: ACTIVE | COMMUNITY
Start: 2017-05-12 | End: 1900-01-01

## 2017-07-18 RX ORDER — WARFARIN 5 MG/1
5 TABLET ORAL DAILY
Qty: 30 | Refills: 1 | Status: ACTIVE | COMMUNITY
Start: 2017-05-22 | End: 1900-01-01

## 2017-07-23 PROCEDURE — 82803 BLOOD GASES ANY COMBINATION: CPT

## 2017-07-23 PROCEDURE — 86335 IMMUNFIX E-PHORSIS/URINE/CSF: CPT

## 2017-07-23 PROCEDURE — 81001 URINALYSIS AUTO W/SCOPE: CPT

## 2017-07-23 PROCEDURE — 70450 CT HEAD/BRAIN W/O DYE: CPT

## 2017-07-23 PROCEDURE — 87798 DETECT AGENT NOS DNA AMP: CPT

## 2017-07-23 PROCEDURE — 93005 ELECTROCARDIOGRAM TRACING: CPT

## 2017-07-23 PROCEDURE — 82550 ASSAY OF CK (CPK): CPT

## 2017-07-23 PROCEDURE — 85027 COMPLETE CBC AUTOMATED: CPT

## 2017-07-23 PROCEDURE — 86225 DNA ANTIBODY NATIVE: CPT

## 2017-07-23 PROCEDURE — 99285 EMERGENCY DEPT VISIT HI MDM: CPT | Mod: 25

## 2017-07-23 PROCEDURE — 83735 ASSAY OF MAGNESIUM: CPT

## 2017-07-23 PROCEDURE — 86355 B CELLS TOTAL COUNT: CPT

## 2017-07-23 PROCEDURE — 82947 ASSAY GLUCOSE BLOOD QUANT: CPT

## 2017-07-23 PROCEDURE — 82435 ASSAY OF BLOOD CHLORIDE: CPT

## 2017-07-23 PROCEDURE — 80053 COMPREHEN METABOLIC PANEL: CPT

## 2017-07-23 PROCEDURE — 86704 HEP B CORE ANTIBODY TOTAL: CPT

## 2017-07-23 PROCEDURE — A9585: CPT

## 2017-07-23 PROCEDURE — 71046 X-RAY EXAM CHEST 2 VIEWS: CPT

## 2017-07-23 PROCEDURE — 80048 BASIC METABOLIC PNL TOTAL CA: CPT

## 2017-07-23 PROCEDURE — 84484 ASSAY OF TROPONIN QUANT: CPT

## 2017-07-23 PROCEDURE — 76775 US EXAM ABDO BACK WALL LIM: CPT

## 2017-07-23 PROCEDURE — 87486 CHLMYD PNEUM DNA AMP PROBE: CPT

## 2017-07-23 PROCEDURE — 85730 THROMBOPLASTIN TIME PARTIAL: CPT

## 2017-07-23 PROCEDURE — 87633 RESP VIRUS 12-25 TARGETS: CPT

## 2017-07-23 PROCEDURE — 96376 TX/PRO/DX INJ SAME DRUG ADON: CPT

## 2017-07-23 PROCEDURE — 87581 M.PNEUMON DNA AMP PROBE: CPT

## 2017-07-23 PROCEDURE — 96375 TX/PRO/DX INJ NEW DRUG ADDON: CPT

## 2017-07-23 PROCEDURE — 83605 ASSAY OF LACTIC ACID: CPT

## 2017-07-23 PROCEDURE — 80061 LIPID PANEL: CPT

## 2017-07-23 PROCEDURE — 84295 ASSAY OF SERUM SODIUM: CPT

## 2017-07-23 PROCEDURE — 71045 X-RAY EXAM CHEST 1 VIEW: CPT

## 2017-07-23 PROCEDURE — 80307 DRUG TEST PRSMV CHEM ANLYZR: CPT

## 2017-07-23 PROCEDURE — 85610 PROTHROMBIN TIME: CPT

## 2017-07-23 PROCEDURE — G0378: CPT

## 2017-07-23 PROCEDURE — 86706 HEP B SURFACE ANTIBODY: CPT

## 2017-07-23 PROCEDURE — 84156 ASSAY OF PROTEIN URINE: CPT

## 2017-07-23 PROCEDURE — 82553 CREATINE MB FRACTION: CPT

## 2017-07-23 PROCEDURE — 82232 ASSAY OF BETA-2 PROTEIN: CPT

## 2017-07-23 PROCEDURE — 87040 BLOOD CULTURE FOR BACTERIA: CPT

## 2017-07-23 PROCEDURE — 85652 RBC SED RATE AUTOMATED: CPT

## 2017-07-23 PROCEDURE — 86140 C-REACTIVE PROTEIN: CPT

## 2017-07-23 PROCEDURE — 85379 FIBRIN DEGRADATION QUANT: CPT

## 2017-07-23 PROCEDURE — 78582 LUNG VENTILAT&PERFUS IMAGING: CPT

## 2017-07-23 PROCEDURE — 96374 THER/PROPH/DIAG INJ IV PUSH: CPT

## 2017-07-23 PROCEDURE — 84132 ASSAY OF SERUM POTASSIUM: CPT

## 2017-07-23 PROCEDURE — 74176 CT ABD & PELVIS W/O CONTRAST: CPT

## 2017-07-23 PROCEDURE — 93306 TTE W/DOPPLER COMPLETE: CPT

## 2017-07-23 PROCEDURE — 85014 HEMATOCRIT: CPT

## 2017-07-23 PROCEDURE — A9540: CPT

## 2017-07-23 PROCEDURE — 84165 PROTEIN E-PHORESIS SERUM: CPT

## 2017-07-23 PROCEDURE — 84155 ASSAY OF PROTEIN SERUM: CPT

## 2017-07-23 PROCEDURE — 87086 URINE CULTURE/COLONY COUNT: CPT

## 2017-07-23 PROCEDURE — 84166 PROTEIN E-PHORESIS/URINE/CSF: CPT

## 2017-07-23 PROCEDURE — A9567: CPT

## 2017-07-23 PROCEDURE — 84100 ASSAY OF PHOSPHORUS: CPT

## 2017-07-23 PROCEDURE — 86334 IMMUNOFIX E-PHORESIS SERUM: CPT

## 2017-07-23 PROCEDURE — 86160 COMPLEMENT ANTIGEN: CPT

## 2017-07-23 PROCEDURE — 83880 ASSAY OF NATRIURETIC PEPTIDE: CPT

## 2017-07-23 PROCEDURE — 93975 VASCULAR STUDY: CPT

## 2017-07-23 PROCEDURE — 83690 ASSAY OF LIPASE: CPT

## 2017-07-23 PROCEDURE — 82330 ASSAY OF CALCIUM: CPT

## 2017-07-23 PROCEDURE — 70546 MR ANGIOGRAPH HEAD W/O&W/DYE: CPT

## 2017-07-23 PROCEDURE — 80074 ACUTE HEPATITIS PANEL: CPT

## 2017-07-23 PROCEDURE — 82784 ASSAY IGA/IGD/IGG/IGM EACH: CPT

## 2017-07-23 PROCEDURE — 86357 NK CELLS TOTAL COUNT: CPT

## 2017-08-02 ENCOUNTER — MEDICATION RENEWAL (OUTPATIENT)
Age: 27
End: 2017-08-02

## 2017-08-14 NOTE — ED ADULT NURSE NOTE - PAIN: BODY LOCATION
CC:  Ronn Hernandez is here today to establish care.    Medications: medications verified and updated  Refills needed today?  NO  denies Latex allergy or sensitivity  Patient would like communication of their results via:      Cell Phone:   Telephone Information:   Mobile [phone]     Okay to leave a message containing results? Yes  This is wife's cell phone, Meg.  Patient is hard of hearing.   Reviewed overdue health maintenance topics with patient.             chest, general/Left:

## 2017-08-21 RX ORDER — OXYCODONE HYDROCHLORIDE 15 MG/1
15 TABLET, FILM COATED, EXTENDED RELEASE ORAL
Qty: 14 | Refills: 0 | Status: ACTIVE | COMMUNITY
Start: 2017-03-20 | End: 1900-01-01

## 2017-12-15 PROCEDURE — A9585: CPT

## 2017-12-15 PROCEDURE — 70553 MRI BRAIN STEM W/O & W/DYE: CPT

## 2017-12-15 PROCEDURE — 70540 MRI ORBIT/FACE/NECK W/O DYE: CPT

## 2017-12-15 PROCEDURE — 84156 ASSAY OF PROTEIN URINE: CPT

## 2017-12-15 PROCEDURE — 80048 BASIC METABOLIC PNL TOTAL CA: CPT

## 2017-12-15 PROCEDURE — 99285 EMERGENCY DEPT VISIT HI MDM: CPT | Mod: 25

## 2017-12-15 PROCEDURE — 85610 PROTHROMBIN TIME: CPT

## 2017-12-15 PROCEDURE — 84484 ASSAY OF TROPONIN QUANT: CPT

## 2017-12-15 PROCEDURE — 93975 VASCULAR STUDY: CPT

## 2017-12-15 PROCEDURE — 85730 THROMBOPLASTIN TIME PARTIAL: CPT

## 2017-12-15 PROCEDURE — 81001 URINALYSIS AUTO W/SCOPE: CPT

## 2017-12-15 PROCEDURE — 86160 COMPLEMENT ANTIGEN: CPT

## 2017-12-15 PROCEDURE — 96376 TX/PRO/DX INJ SAME DRUG ADON: CPT

## 2017-12-15 PROCEDURE — 71275 CT ANGIOGRAPHY CHEST: CPT

## 2017-12-15 PROCEDURE — 93005 ELECTROCARDIOGRAM TRACING: CPT

## 2017-12-15 PROCEDURE — 93971 EXTREMITY STUDY: CPT

## 2017-12-15 PROCEDURE — 86225 DNA ANTIBODY NATIVE: CPT

## 2017-12-15 PROCEDURE — 80053 COMPREHEN METABOLIC PANEL: CPT

## 2017-12-15 PROCEDURE — 85379 FIBRIN DEGRADATION QUANT: CPT

## 2017-12-15 PROCEDURE — 85027 COMPLETE CBC AUTOMATED: CPT

## 2017-12-15 PROCEDURE — 82550 ASSAY OF CK (CPK): CPT

## 2017-12-15 PROCEDURE — 96374 THER/PROPH/DIAG INJ IV PUSH: CPT

## 2017-12-15 PROCEDURE — 82553 CREATINE MB FRACTION: CPT

## 2017-12-15 PROCEDURE — 71045 X-RAY EXAM CHEST 1 VIEW: CPT

## 2017-12-15 NOTE — PATIENT PROFILE ADULT. - HEALTHCARE QUESTIONS, PROFILE
Problem: Altered Mood, Psychotic Behavior  Goal: STG-Able to demonstrate trust by eating, participating in treatment and following staff's directions  Outcome: Ongoing  Pt did not attend patient advisory  group at [phone] despite staff invitation to attend. none

## 2017-12-26 ENCOUNTER — OTHER (OUTPATIENT)
Age: 27
End: 2017-12-26

## 2018-04-26 NOTE — PATIENT PROFILE ADULT. - NS PRO INDICAT FLU
[facility]03 [facility] REPORT    Rose Veras  MR#: [id number]  : 1956  ACCOUNT #: [de-identified]   DATE OF SERVICE: 2018    PREOPERATIVE DIAGNOSIS:  Internal derangement, right knee. POSTOPERATIVE DIAGNOSES:  Degenerative medial meniscus tear, right knee and a grade I to grade II cartilaginous medial femoral condyle, right knee. PROCEDURES PERFORMED:  Diagnostic arthroscopy, right knee and arthroscopic partial medial meniscectomy, right knee and debridement of chondrolysis medial femoral condyle, right knee. SURGEON:  Nanci Loft, MD ASSISTANTElida Saint    ANESTHESIA:  General anesthesia. COMPLICATIONS:  None. ESTIMATED BLOOD LOSS:  Minimal.    DRAINS:  None. SPECIMENS REMOVED:  Medial meniscus tear    IMPLANTS:  none    TOURNIQUET:  300 mmHg times approximately 11 minutes. OPERATIVE COURSE:  The patient was taken to the operating room on 2018. He was given weighted dose of Ancef IV 3 g IV piggyback in the preoperative holding area. He was taken to the operating room, placed under general anesthesia. In the preoperative holding area, consent confirmation and side identification were performed. A timeout was performed prior to proceeding. After adequate anesthesia, a tourniquet was placed on his right proximal thigh. His right knee and leg were prepped and draped in a sterile manner with the right leg draped free. The right leg and knee were wrapped with a sterile Esmarch bandage. Leg was elevated and knee was flexed and tourniquet was inflated to 300 mmHg. An incision was made over the anterolateral aspect of the right knee over the lateral joint line. Incision was carried through skin and subcutaneous tissues. The arthroscopic trocar and cannula was inserted. The arthroscope was brought into the intercondylar notch. The trocar was removed and the arthroscope was inserted.   The right knee was arthroscoped in systematic manner began in the suprapatellar region. He had mild synovitis present in the suprapatellar region. He had grade I to grade II chondrolysis just around more the lateral and a portion of the medial patellar facets. There were no loose bodies present. The arthroscope was next brought in the medial gutter. There were no loose bodies present. He had a small osteophyte forming on the medial femoral condyle. There was no plica present. The arthroscope was next brought in the medial compartment. He was found to have some mild fraying of the mid portion of the medial meniscus, but he did have a radial tear of the posterior horn of the medial meniscus with some additional fraying. A second portal was made in the inferomedial aspect of the right knee at the medial joint line. The arthroscopic shaver was inserted. The medial meniscus tear was debrided back to a stable base using the full radius shaver. Care was taken to tag normal articular cartilage and normal meniscus during this debridement. The remaining portion of meniscus was probed and found to be stable. The arthroscope was also used to carefully debride the mild chondrolysis of the femoral condyle without disturbing normal articular cartilage. There were no loose bodies present. The arthroscope was next brought in the intercondylar notch. The anterior cruciate ligament as well as posterior cruciate ligaments were within normal limits. The right knee was placed in a figure-of-four position. The lateral quad was well visualized. He had a normal appearing lateral femoral condyle and lateral tibial plateau. There is minimal degeneration of his lateral meniscus and no significant tears present. The lateral meniscus was probed and found to be stable. The popliteus tendon was easily seen and identified. There were no loose bodies present in the lateral compartment or lateral gutter.     The right knee was irrigated with copious amounts of the arthroscopic fluid. The arthroscopic instrumentation was removed. The portals were closed with 3-0 nylon mattress sutures. Knee was injected with Marcaine and 40 mg of Depo-Medrol. A dry sterile dressing applied. The tourniquet was deflated at 11 minutes. There was good capillary refill of the right foot following release of tourniquet. He was awakened in the operating room, extubated and taken to recovery in stable condition. Sponge, needle correct x 2 following procedure. ESTIMATED BLOOD LOSS:  Minimal.    Neurovascular status intact in the right foot following the procedure.       Irma Ventura MD       SKG / LN  D: 04/25/2018 12:24     T: 04/26/2018 08:44  JOB #: [id number] Patient is 18 years or older

## 2018-12-11 NOTE — ED ADULT NURSE NOTE - CHPI ED SYMPTOMS POS
Hailey Clemons is  66 year old female  Chief Complaint: Polyarthralgia follow up. Patient is still taking 8 tabs of MTX and she needs refills.  Joints affected: Knee, wrist and hands  Pain level: 4/10  Area's of Joint stiffness: Knee, wrist and hands  Current NSAID's: Ibuprofen  Visit Vitals  LMP 04/13/1984     Denies known Latex allergy or symptoms of Latex sensitivity.  Medications verified and reviewed.  Allergies verified and reviewed.  Tobacco history verified 12/11/2018.  PCP verified or updated.   Harvey Campbell MD     CHEST PAIN

## 2020-03-24 NOTE — ED ADULT TRIAGE NOTE - SOURCE OF INFORMATION
Reason For Visit  No chief complaint on file.    Accompanied by:  Unaccompanied today   Needed: No       Progress Note  Session Type: Individual: Telephone session   Date: 3/24/2020  Start Time: 11a   End Time: 11:40a   Mood: moderately anxious and mildly depressed  Recent Behavior: cooperative  Symptom Change:  Slight increase   Diagnoses: F41.1, F60.5, F34.1  Mental Status: alert  Risk of Harm: no risk  Suicide/Homicide: none  Self-Injury: None  Abuse (Phys,Sex,Emot): None  Type of Treatment: Behavioral, Cognitive and Insight   Session Summary: 143/3. This was a telephone session due to COVID-19.  Spoke to client vis his cell phone while he was in his bedroom of his parents home. This is a very stressful time for client.  He is anxious about his health status due to being high risk (liver transplant and disease).  He does not have the best relationship with his parents, particularly his mother and that triggers stress.  His sister continues to be very demanding and reactive and continues to make poor health decisions that adds conflict and stress with their parents.  He is also struggling with the policies and lack thereof for online learning at [employer].  Processed the stress of all of these, how he is managing them and how to proceed.  Plan: 5 weeks     Signatures  Electronically signed by : Kathy Jenkins PSYD PsyD; 3/24/2020 11:53 AM CST         
Patient

## 2022-01-21 NOTE — ED ADULT TRIAGE NOTE - NS ED NURSE BANDS TYPE
Patient called to report low drain outputs. 20cc, 16cc, 1cc Q24 over the last 3 days, respectively. Patient reports that drain outputs are clear, denies abdominal pain or fevers. Feels well overall.     Will order CT to evaluate for residual abscess, possible removal if resolved.   
Name band;

## 2022-02-21 NOTE — ED PROVIDER NOTE - CPE EDP EYES NORM
Referred by: Ric Morales,*; Medical Diagnosis (from order):    Diagnosis Information      Diagnosis    724.2 (ICD-9-CM) - M54.50 (ICD-10-CM) - Acute left-sided low back pain without sciatica                Initial Evaluation    Visit:  1   Treatment Diagnosis: lumbar, symptoms with increased pain/symptoms, impaired posture, impaired range of motion, impaired muscle length/flexibility, impaired joint play/mobility, impaired mobility, impaired tissue mobility, impaired activity tolerance, impaired body mechanics  Date of onset: 2/17/2022  Diagnostic Tests: X-ray: reviewed.      SUBJECTIVE                                                                                                               Reports that his back has been bothering him for a few months, has recently been golfing (simulator) more, not sure why. But then last Thursday his back really hurt, couldn't sleep at all.  Works as a , lots of computer work sitting, which is really hurting.  Pain / Symptoms:  Pain/symptom is: constant  Pain rating (out of 10): Current: 3 ; Best: 2; Worst: 6  Location: Left side low back, muscle spasm  Quality / Description: ache, sore, shooting. Denies radiating pain  Alleviating Factors: change in position, heat, rest, prescription medications, avoiding movement in involved area.   Progression since onset: worsening  Function:   Limitations / Exacerbation Factors: pain, difficulty, bed mobility, lower body dressing, sleep disturbed, meal/food prep, computer tasks, driving/riding in a vehicle, grocery shopping, house/yard work, standing tasks, sitting tasks, bending/squatting/lifting, walking, sitting, lifting/carrying, squatting/lifting, standing and pushing/pulling, community distances, walking quickly as required to cross a street/exit a building rapidly, stairs  Prior Level of Function: pain free ADLs and IADLs. no limitation in involved extremity,  Patient Goals: decreased pain, increased  [id number]  URL: [email or web address]  30 year old male patient has reported functional limitations listed above impacted by signs and symptoms consistent with below   • Involved: lumbar   • Symptoms/impairments: increased pain/symptoms, impaired posture, impaired range of motion, impaired muscle length/flexibility, impaired joint play/mobility, impaired mobility, impaired tissue mobility, impaired activity tolerance and impaired body mechanics  Prognosis: patient will benefit from skilled therapy  Rehabilitative potential is: excellent  Predicted patient presentation: Low (stable) -  Patient comorbidities and complexities, as defined above, will have little effect on progress for prescribed plan of care.  Patient Education:   Who will be receiving education: patient  Are they ready to learn: yes  Preferred learning style: verbal, demonstration, video and written  Barriers to learning: no barriers apparent at this time  Results of above outlined education: Verbalizes understanding, Demonstrates understanding and Needs reinforcement      PLAN                                                                                                                         The following skilled interventions to be implemented to achieve goals listed below:Gait Training (17186)  Manual Therapy (19396)  Neuromuscular Re-Education (37867)  Therapeutic Activity (96565)  Therapeutic Exercise (80955)  Frequency / Duration: 1 times per week tapering as patient progresses for an estimated total of 8 visits for 12 weeks    Patient involved in and agreed to plan of care and goals.  Patient has been given attendance policy at time of initial evaluation.    GOALS                                                                                                                           Long Term Goals: to be met by end of plan of care  1. Patient will demonstrate ability to negotiate level and unlevel surfaces at variable velocities, including change of direction without increased pain or instability to return to age appropriate and community activities at prior level of function.  2. Patient will stand for 20 minutes for completion of household tasks such as cooking meal.  3. Patient will bend/squat without reported pain for tasks for independent living including all household lifting.  4. Patient will be independent with progressed and modified home exercise program.  5. Oswestry: Patient will complete form to reflect an improved score to less than or equal to 10% to indicate patient reported improvement in  function/disability/impairment (minimal detectable change: 12%).      Therapy procedure time and total treatment time can be found documented on the Time Entry flowsheet   normal...

## 2022-04-13 NOTE — H&P ADULT. - PHARYNX
normal/no redness/no peritonsillar abscess/no discharge Detail Level: Detailed Number Of Curettages: 3 Size Of Lesion After Curettage: 1 Add Intralesional Injection: No Concentration (Mg/Ml Or Millions Of Plaque Forming Units/Cc): 0.01 Anesthesia Type: 1% lidocaine with epinephrine Cautery Type: electrodesiccation What Was Performed First?: Curettage Final Size Statement: The size of the lesion after curettage was Additional Information: (Optional): The wound was cleaned, and a pressure dressing was applied.  The patient received detailed post-op instructions. Consent was obtained from the patient. The risks, benefits and alternatives to therapy were discussed in detail. Specifically, the risks of infection, scarring, bleeding, prolonged wound healing, nerve injury, incomplete removal, allergy to anesthesia and recurrence were addressed. Alternatives to ED&C, such as: surgical removal and XRT were also discussed.  Prior to the procedure, the treatment site was clearly identified and confirmed by the patient. All components of Universal Protocol/PAUSE Rule completed. Post-Care Instructions: I reviewed with the patient in detail post-care instructions. Patient is to keep the area dry for 48 hours, and not to engage in any swimming until the area is healed. Should the patient develop any fevers, chills, bleeding, severe pain patient will contact the office immediately. Bill As A Line Item Or As Units: Line Item

## 2023-10-29 NOTE — ED PROVIDER NOTE - INTERPRETATION
normal sinus rhythm, Normal axis, Normal LA interval and QRS complex. There are no acute ischemic ST or T-wave changes. 1 = Total assistance

## 2024-04-09 NOTE — ED PROVIDER NOTE - RESPIRATORY NEGATIVE STATEMENT, MLM
No - the patient is unable to be screened due to medical condition +chest pain, no cough, and +shortness of breath. patient was critically ill... Patient was critically ill with a high probability of imminent or life threatening deterioration.

## 2025-03-28 NOTE — H&P ADULT. - PROBLEM SELECTOR PROBLEM 3
Overt s/s of penetration/aspiration for thin liquids. Toleration of puree with mildly thick liquids w/o overt s/s of penetration/aspiration. Toleration observed for puree and thin liquids without overt s/s of penetration/ aspiration. Pt required max cues to use multiple swallows, throat clear and cough + overt s/s of penetration/aspiration with small amounts of puree and mildly thick liquids. Other chronic pulmonary embolism without acute cor pulmonale

## 2025-04-29 NOTE — ED ADULT NURSE NOTE - NEURO WDL
accepted Alert and oriented to person, place and time, memory intact, behavior appropriate to situation, PERRL.

## 2025-06-16 NOTE — ED PROVIDER NOTE - NS ED ATTENDING STATEMENT MOD
I have personally seen and examined this patient. I have fully participated in the care of this patient. I have reviewed all pertinent clinical information, including history physical exam, plan and the Resident's note and agree except as noted Patient/Parent(s)
